# Patient Record
Sex: FEMALE | Race: WHITE | NOT HISPANIC OR LATINO | Employment: OTHER | URBAN - METROPOLITAN AREA
[De-identification: names, ages, dates, MRNs, and addresses within clinical notes are randomized per-mention and may not be internally consistent; named-entity substitution may affect disease eponyms.]

---

## 2017-02-15 ENCOUNTER — APPOINTMENT (OUTPATIENT)
Dept: RADIOLOGY | Facility: IMAGING CENTER | Age: 62
End: 2017-02-15
Attending: PHYSICIAN ASSISTANT
Payer: MEDICARE

## 2017-02-15 ENCOUNTER — OFFICE VISIT (OUTPATIENT)
Dept: URGENT CARE | Facility: PHYSICIAN GROUP | Age: 62
End: 2017-02-15
Payer: MEDICARE

## 2017-02-15 VITALS
OXYGEN SATURATION: 97 % | HEART RATE: 80 BPM | BODY MASS INDEX: 21.89 KG/M2 | HEIGHT: 64 IN | TEMPERATURE: 98.8 F | WEIGHT: 128.2 LBS | SYSTOLIC BLOOD PRESSURE: 140 MMHG | DIASTOLIC BLOOD PRESSURE: 82 MMHG
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
## 2017-02-15 DIAGNOSIS — S20.212A CONTUSION OF RIB ON LEFT SIDE, INITIAL ENCOUNTER: ICD-10-CM

## 2017-02-15 DIAGNOSIS — S20.212A CONTUSION OF RIB ON LEFT SIDE, INITIAL ENCOUNTER: Primary | ICD-10-CM

## 2017-02-15 PROCEDURE — G8420 CALC BMI NORM PARAMETERS: HCPCS | Performed by: PHYSICIAN ASSISTANT

## 2017-02-15 PROCEDURE — 3017F COLORECTAL CA SCREEN DOC REV: CPT | Mod: 8P | Performed by: PHYSICIAN ASSISTANT

## 2017-02-15 PROCEDURE — 99204 OFFICE O/P NEW MOD 45 MIN: CPT | Performed by: PHYSICIAN ASSISTANT

## 2017-02-15 PROCEDURE — G8432 DEP SCR NOT DOC, RNG: HCPCS | Performed by: PHYSICIAN ASSISTANT

## 2017-02-15 PROCEDURE — 4004F PT TOBACCO SCREEN RCVD TLK: CPT | Mod: 8P | Performed by: PHYSICIAN ASSISTANT

## 2017-02-15 PROCEDURE — 71101 X-RAY EXAM UNILAT RIBS/CHEST: CPT | Mod: TC,LT | Performed by: PHYSICIAN ASSISTANT

## 2017-02-15 PROCEDURE — G8484 FLU IMMUNIZE NO ADMIN: HCPCS | Performed by: PHYSICIAN ASSISTANT

## 2017-02-15 PROCEDURE — 3014F SCREEN MAMMO DOC REV: CPT | Mod: 8P | Performed by: PHYSICIAN ASSISTANT

## 2017-02-15 RX ORDER — TIZANIDINE 4 MG/1
4 TABLET ORAL EVERY 6 HOURS PRN
Qty: 45 TAB | Refills: 0 | Status: SHIPPED | OUTPATIENT
Start: 2017-02-15 | End: 2017-05-27

## 2017-02-15 RX ORDER — HYDROCODONE BITARTRATE AND ACETAMINOPHEN 5; 325 MG/1; MG/1
1-2 TABLET ORAL EVERY 4 HOURS PRN
Qty: 20 TAB | Refills: 0 | Status: SHIPPED | OUTPATIENT
Start: 2017-02-15 | End: 2017-05-27

## 2017-02-15 ASSESSMENT — PAIN SCALES - GENERAL: PAINLEVEL: 10=SEVERE PAIN

## 2017-02-15 ASSESSMENT — ENCOUNTER SYMPTOMS: MYALGIAS: 1

## 2017-02-15 NOTE — MR AVS SNAPSHOT
"Shahidajoseph Joshuabow   2/15/2017 9:30 AM   Office Visit   MRN: [id number]    Department:  [facility]   Dept Phone:  [phone]    Description:  Female : 1955   Provider:  Angélica Matt PA-C           Reason for Visit     Rib Injury fell on ribs on edge of bathtub 3 days ago      Allergies as of 2/15/2017     Allergen Noted Reactions    Codeine 2010   Rash    Difficulty breathing, and itching    Pcn [Penicillins] 2010       All \"cillin\" family, per patient  ---   I broke out in \"trench\" mouth    Morphine 2011       headache      You were diagnosed with     Contusion of rib on left side, initial encounter   [[id number]]  -  Primary     Fall at home, initial encounter   [[id number]]         Vital Signs     Blood Pressure Pulse Temperature Height Weight Body Mass Index    140/82 mmHg 80 37.1 °C (98.8 °F) 1.613 m (5' 3.5\") 58.151 kg (128 lb 3.2 oz) 22.35 kg/m2    Oxygen Saturation Breastfeeding? Smoking Status             97% No Current Every Day Smoker         Basic Information     Date Of Birth Sex Race Ethnicity Preferred Language    1955 Female White Non- English      Your appointments     2017  1:00 PM   MR EXTREMITY WITHOUT (30) with DOUBLE R MRI 1   IMAGING DOUBLE R. (Double R)    [address]  [facility] [address]   [phone]            2017  1:30 PM   MR EXTREMITY WITHOUT (30) with DOUBLE R MRI 1   IMAGING DOUBLE R. (Double R)    [address]  [facility] [address]   [phone]              Problem List              ICD-10-CM Priority Class Noted - Resolved    Closed die-punch intra-articular fracture of distal end of left radius S52.572A   3/3/2016 - Present    Lumbar spinal stenosis M48.06   10/8/2016 - Present      Health Maintenance        Date Due Completion Dates    IMM DTaP/Tdap/Td Vaccine (1 - Tdap) 1972 ---    PAP SMEAR 1974 ---    MAMMOGRAM 1993 ---    COLONOSCOPY 2003 ---    IMM ZOSTER " VACCINE 11/9/2013 ---    IMM INFLUENZA (1) 9/1/2016 ---            Current Immunizations     Influenza Vaccine 6/4/2011, 11/9/2010    Pertussis Vaccine 12/9/2009    Pneumococcal Vaccine (UF)Historical Data 10/4/2007      Below and/or attached are the medications your provider expects you to take. Review all of your home medications and newly ordered medications with your provider and/or pharmacist. Follow medication instructions as directed by your provider and/or pharmacist. Please keep your medication list with you and share with your provider. Update the information when medications are discontinued, doses are changed, or new medications (including over-the-counter products) are added; and carry medication information at all times in the event of emergency situations     Allergies:  CODEINE - Rash     PCN - (reactions not documented)     MORPHINE - (reactions not documented)               Medications  Valid as of: February 15, 2017 - 10:30 AM    Generic Name Brand Name Tablet Size Instructions for use    Acetaminophen (Tab) TYLENOL 325 MG Take 650 mg by mouth every four hours as needed.        Albuterol Sulfate (Nebu Soln) PROVENTIL 2.5mg/3ml 2.5 mg by Nebulization route every four hours as needed for Shortness of Breath.        Albuterol Sulfate (Aero Soln) albuterol 108 (90 BASE) MCG/ACT Inhale 2 Puffs by mouth every 6 hours as needed for Shortness of Breath.        Butalbital-APAP-Caffeine   Take  by mouth as needed.        CloNIDine HCl (Tab) CATAPRES 0.1 MG Take 0.1 mg by mouth 3 times a day.        Cyanocobalamin   Take  by mouth every day.        Dexlansoprazole (CAPSULE DELAYED RELEASE) Dexlansoprazole 60 MG Take  by mouth every day.        DiazePAM (Tab) VALIUM 5 MG Take 1 Tab by mouth every 8 hours as needed (severe spasms).        DULoxetine HCl   Take  by mouth. Indications: patient thinks it is 60 mg        Evening Primrose Oil   Take  by mouth every day.        Gabapentin   Take 1,200 mg by mouth 3  times a day.        Hydrocodone-Acetaminophen (Tab) NORCO 5-325 MG Take 1-2 Tabs by mouth every four hours as needed. No Driving or alcohol with medication given.        HYDROmorphone HCl (Tab) DILAUDID 4 MG Take 1 Tab by mouth every four hours as needed (pain).        Meloxicam   Take  by mouth.        PARoxetine HCl   Take  by mouth every day.        Sennosides-Docusate Sodium (Tab) PERICOLACE or SENOKOT S 8.6-50 MG Take 2 Tabs by mouth every day.        SUMAtriptan Succinate (Tab) IMITREX 25 MG Take  mg by mouth Once PRN for Migraine.        TiZANidine HCl (Tab) ZANAFLEX 4 MG Take 1 Tab by mouth every 8 hours.        TiZANidine HCl (Tab) ZANAFLEX 4 MG Take 1 Tab by mouth every 6 hours.        TiZANidine HCl (Tab) ZANAFLEX 4 MG Take 1 Tab by mouth every 6 hours as needed.        .                 Medicines prescribed today were sent to:     [facility] [address] [place] [address] [place] AT [facility] [employer] [facility] [place] [address] [place] [address]    Phone: [phone] Fax: [phone]    Open 24 Hours?: No      Medication refill instructions:       If your prescription bottle indicates you have medication refills left, it is not necessary to call your provider’s office. Please contact your pharmacy and they will refill your medication.    If your prescription bottle indicates you do not have any refills left, you may request refills at any time through one of the following ways: The online [email or web address] system (except Urgent Care), by calling your provider’s office, or by asking your pharmacy to contact your provider’s office with a refill request. Medication refills are processed only during regular business hours and may not be available until the next business day. Your provider may request additional information or to have a follow-up visit with you prior to refilling your medication.   *Please Note: Medication refills are assigned a new Rx number when refilled electronically. Your pharmacy  may indicate that no refills were authorized even though a new prescription for the same medication is available at the pharmacy. Please request the medicine by name with the pharmacy before contacting your provider for a refill.        Your To Do List     Future Labs/Procedures Complete By Expires    [id number] (WITH 1-VIEW CXR) LEFT  As directed 2/15/2018      Instructions    Rib Contusion  A rib contusion is a deep bruise on your rib area. Contusions are the result of a blunt trauma that causes bleeding and injury to the tissues under the skin. A rib contusion may involve bruising of the ribs and of the skin and muscles in the area. The skin overlying the contusion may turn blue, purple, or yellow. Minor injuries will give you a painless contusion, but more severe contusions may stay painful and swollen for a few weeks.  CAUSES   A contusion is usually caused by a blow, trauma, or direct force to an area of the body. This often occurs while playing contact sports.  SYMPTOMS  · Swelling and redness of the injured area.  · Discoloration of the injured area.  · Tenderness and soreness of the injured area.  · Pain with or without movement.  DIAGNOSIS   The diagnosis can be made by taking a medical history and performing a physical exam. An X-ray, CT scan, or MRI may be needed to determine if there were any associated injuries, such as broken bones (fractures) or internal injuries.  TREATMENT   Often, the best treatment for a rib contusion is rest. Icing or applying cold compresses to the injured area may help reduce swelling and inflammation. Deep breathing exercises may be recommended to reduce the risk of partial lung collapse and pneumonia. Over-the-counter or prescription medicines may also be recommended for pain control.  HOME CARE INSTRUCTIONS   · Apply ice to the injured area:  ¨ Put ice in a plastic bag.  ¨ Place a towel between your skin and the bag.  ¨ Leave the ice on for 20 minutes, 2-3 times per  day.  · Take medicines only as directed by your health care provider.  · Rest the injured area. Avoid strenuous activity and any activities or movements that cause pain. Be careful during activities and avoid bumping the injured area.  · Perform deep-breathing exercises as directed by your health care provider.  · Do not lift anything that is heavier than 5 lb (2.3 kg) until your health care provider approves.  · Do not use any tobacco products, including cigarettes, chewing tobacco, or electronic cigarettes. If you need help quitting, ask your health care provider.  SEEK MEDICAL CARE IF:   · You have increased bruising or swelling.  · You have pain that is not controlled with treatment.  · You have a fever.  SEEK IMMEDIATE MEDICAL CARE IF:   · You have difficulty breathing or shortness of breath.  · You develop a continual cough, or you cough up thick or bloody sputum.  · You feel sick to your stomach (nauseous), you throw up (vomit), or you have abdominal pain.     This information is not intended to replace advice given to you by your health care provider. Make sure you discuss any questions you have with your health care provider.     Document Released: 09/12/2002 Document Revised: 01/08/2016 Document Reviewed: 09/29/2015  [employer] Interactive Patient Education ©2016 [employer] Inc.            Directr Access Code: [email or web address]  Expires: 3/17/2017  9:36 AM    Directr  A secure, online tool to manage your health information     [employer]’s Directr® is a secure, online tool that connects you to your personalized health information from the privacy of your home -- day or night - making it very easy for you to manage your healthcare. Once the activation process is completed, you can even access your medical information using the Directr taylor, which is available for free in the Apple Taylor store or Google Play store.     Directr provides the following levels of access (as shown below):   My Chart Features   [facility]  Primary Care Doctor [facility]  Specialists [facility]  Urgent  Care Non-[facility]  Primary Care  Doctor   Email your healthcare team securely and privately 24/7 X X X    Manage appointments: schedule your next appointment; view details of past/upcoming appointments X      Request prescription refills. X      View recent personal medical records, including lab and immunizations X X X X   View health record, including health history, allergies, medications X X X X   Read reports about your outpatient visits, procedures, consult and ER notes X X X X   See your discharge summary, which is a recap of your hospital and/or ER visit that includes your diagnosis, lab results, and care plan. X X       How to register for Dynamic Recreation:  1. Go to  [email or web address].  2. Click on the Sign Up Now box, which takes you to the New Member Sign Up page. You will need to provide the following information:  a. Enter your Dynamic Recreation Access Code exactly as it appears at the top of this page. (You will not need to use this code after you’ve completed the sign-up process. If you do not sign up before the expiration date, you must request a new code.)   b. Enter your date of birth.   c. Enter your home email address.   d. Click Submit, and follow the next screen’s instructions.  3. Create a Dynamic Recreation ID. This will be your Dynamic Recreation login ID and cannot be changed, so think of one that is secure and easy to remember.  4. Create a Dynamic Recreation password. You can change your password at any time.  5. Enter your Password Reset Question and Answer. This can be used at a later time if you forget your password.   6. Enter your e-mail address. This allows you to receive e-mail notifications when new information is available in Dynamic Recreation.  7. Click Sign Up. You can now view your health information.    For assistance activating your Dynamic Recreation account, call [phone]

## 2017-02-15 NOTE — PROGRESS NOTES
Subjective:      Lynette Correa is a 61 y.o. female who presents with Rib Injury    PMH:  has a past medical history of ASTHMA; Breath shortness; migraines; Risk for falls; Arthritis (9-23-16); Dental disorder; Pneumonia (2013); Unspecified hemorrhagic conditions (CMS-HCC); Jaundice; kidney stones; Bronchitis (2015); COPD (chronic obstructive pulmonary disease) (CMS-HCC); Pain; Heart burn; Hepatitis C; Ulcer (CMS-HCC); Anesthesia; Anesthesia (9-23-16); Arrhythmia; Indigestion; and Bowel habit changes (9-23-16).  MEDS:   Current outpatient prescriptions:   •  MELOXICAM PO, Take  by mouth., Disp: , Rfl:   •  DULoxetine HCl (CYMBALTA PO), Take  by mouth. Indications: patient thinks it is 60 mg, Disp: , Rfl:   •  acetaminophen (TYLENOL) 325 MG Tab, Take 650 mg by mouth every four hours as needed., Disp: , Rfl:   •  albuterol (PROVENTIL) 2.5mg/3ml Nebu Soln solution for nebulization, 2.5 mg by Nebulization route every four hours as needed for Shortness of Breath., Disp: , Rfl:   •  albuterol 108 (90 BASE) MCG/ACT Aero Soln inhalation aerosol, Inhale 2 Puffs by mouth every 6 hours as needed for Shortness of Breath., Disp: , Rfl:   •  Cyanocobalamin (VITAMIN B-12 PO), Take  by mouth every day., Disp: , Rfl:   •  GABAPENTIN PO, Take 1,200 mg by mouth 3 times a day., Disp: , Rfl:   •  Dexlansoprazole (DEXILANT) 60 MG CAPSULE DELAYED RELEASE delayed-release capsule, Take  by mouth every day., Disp: , Rfl:   •  sumatriptan (IMITREX) 25 MG Tab, Take  mg by mouth Once PRN for Migraine., Disp: , Rfl:   •  clonidine (CATAPRES) 0.1 MG TABS, Take 0.1 mg by mouth 3 times a day., Disp: , Rfl:   •  PARoxetine HCl (PAXIL PO), Take  by mouth every day., Disp: , Rfl:   •  diazepam (VALIUM) 5 MG Tab, Take 1 Tab by mouth every 8 hours as needed (severe spasms)., Disp: 30 Tab, Rfl: 0  •  tizanidine (ZANAFLEX) 4 MG Tab, Take 1 Tab by mouth every 6 hours., Disp: 90 Tab, Rfl: 0  •  HYDROmorphone (DILAUDID) 4 MG Tab, Take 1 Tab by mouth  "every four hours as needed (pain)., Disp: 80 Tab, Rfl: 0  •  senna-docusate (PERICOLACE OR SENOKOT S) 8.6-50 MG Tab, Take 2 Tabs by mouth every day., Disp: 30 Tab, Rfl: 0  •  tizanidine (ZANAFLEX) 4 MG Tab, Take 1 Tab by mouth every 8 hours., Disp: 90 Tab, Rfl: 0  •  EVENING PRIMROSE OIL PO, Take  by mouth every day., Disp: , Rfl:   •  Butalbital-APAP-Caffeine (FIORICET PO), Take  by mouth as needed., Disp: , Rfl:   ALLERGIES:   Allergies   Allergen Reactions   • Codeine Rash     Difficulty breathing, and itching   • Pcn [Penicillins]      All \"cillin\" family, per patient  ---   I broke out in \"trench\" mouth   • Morphine      headache     SURGHX:   Past Surgical History   Procedure Laterality Date   • Cervical fusion posterior  7/20/2010     Performed by PREET SPEAR at SURGERY [facility]   • Cervical decompression posterior  7/20/2010     Performed by PREET SPEAR at SURGERY [facility]   • Tonsillectomy     • Lumbar laminectomy diskectomy  1/8/2011     Performed by PREET SPEAR at SURGERY [facility]   • Mass excision general  10/4/2011     Performed by GANSER, JOHN H at [facility]   • Wrist orif Left 3/3/2016     Procedure: WRIST ORIF;  Surgeon: Campbell Love M.D.;  Location: SURGERY [facility];  Service:    • Carpal tunnel release       right   • Other orthopedic surgery       left knee   • Knee arthroplasty total       right   • Other orthopedic surgery       right index finger sewn back on   • Other orthopedic surgery       spinal surgery 3 times   • Primary c section  1989/1988/1993     x 3   • Hysterectomy laparoscopy     • Other cardiac surgery  2005     ablation  and small hole in heart valve repaired by dr medel   • Other       right shin biopsy    • Lumbar laminectomy diskectomy  10/8/2016     Procedure: LUMBAR LAMINECTOMY DISKECTOMY FPRAMINOTOMY L3-4- POSTERIOR L3-4 STABILIZATION WITH INSTRUMENTATION  WITH [employer] INSTRUMENTS;  Surgeon: Preet Spear M.D.;  " "Location: SURGERY [facility];  Service:      SOCHX:  reports that she has been smoking Cigarettes.  She has a 40 pack-year smoking history. She has never used smokeless tobacco. She reports that she does not drink alcohol or use illicit drugs.  FH: Reviewed with patient/family. Not pertinent to this complaint.              HPI Comments: Patient presents with:  Rib Injury: fell on ribs on edge of bathtub 3 days ago        Rib Injury  This is a new problem. The current episode started in the past 7 days. The problem occurs constantly. The problem has been unchanged. Associated symptoms include myalgias. Pertinent negatives include no chest pain. The symptoms are aggravated by bending, coughing, exertion, sneezing and twisting (any movement really). She has tried NSAIDs, heat, ice, lying down, position changes, relaxation and rest for the symptoms. The treatment provided no relief.       Review of Systems   Cardiovascular: Negative for chest pain.        Rib pain     Musculoskeletal: Positive for myalgias.   All other systems reviewed and are negative.         Objective:     /82 mmHg  Pulse 80  Temp(Src) 37.1 °C (98.8 °F)  Ht 1.613 m (5' 3.5\")  Wt 58.151 kg (128 lb 3.2 oz)  BMI 22.35 kg/m2  SpO2 97%  Breastfeeding? No     Physical Exam   Constitutional: She appears well-developed and well-nourished. No distress.   HENT:   Head: Normocephalic.   Mouth/Throat: Oropharynx is clear and moist.   Eyes: Conjunctivae and EOM are normal. Pupils are equal, round, and reactive to light.   Neck: Normal range of motion. Neck supple.   Cardiovascular: Normal rate, regular rhythm and normal heart sounds.    Pulmonary/Chest: Effort normal and breath sounds normal. No respiratory distress. She has no wheezes. She has no rales. Chest wall is not dull to percussion. She exhibits tenderness (left anterolateral  ribs are very tender to palpation, no crepitus noted, + splinting of left side) and bony tenderness. She " exhibits no laceration, no crepitus, no edema and no swelling.       Musculoskeletal: Normal range of motion.   Neurological: She is alert.   Skin: Skin is warm and dry.   Psychiatric: She has a normal mood and affect.   Nursing note and vitals reviewed.         Xray images viewed and read by me, confirmed by radiology:    No rib fracture noted.  No acute infiltrate, no PTX or free air. No acute findings.       Assessment/Plan:     1. Contusion of rib on left side, initial encounter  [id number] (WITH 1-VIEW CXR) LEFT    tizanidine (ZANAFLEX) 4 MG Tab    hydrocodone-acetaminophen (NORCO) 5-325 MG Tab per tablet   2. Fall at home, initial encounter  [id number] (WITH 1-VIEW CXR) LEFT    tizanidine (ZANAFLEX) 4 MG Tab    hydrocodone-acetaminophen (NORCO) 5-325 MG Tab per tablet   PT encouraged to do incentive spirometry exercises (demonstrated) to help prevent respiratory infection due to under inflation of lungs secondary to pain.     PT should follow up with PCP in 1-2 days for re-evaluation if symptoms have not improved.  Discussed red flags and reasons to return to UC or ED.  Pt and/or family verbalized understanding of diagnosis and follow up instructions and was given informational handout on diagnosis.  PT discharged.     [place]  Aware web site evaluation: I have obtained and reviewed patient utilization report from [facility] pharmacy database prior to writing prescription for controlled substance II, III or IV per [place] bill . Based on the report and my clinical assessment the prescription is medically necessary.   Rx NSAIDs for pain 1-5, Norco for pain 6-10 or to help get to sleep.

## 2017-02-15 NOTE — PATIENT INSTRUCTIONS
Rib Contusion  A rib contusion is a deep bruise on your rib area. Contusions are the result of a blunt trauma that causes bleeding and injury to the tissues under the skin. A rib contusion may involve bruising of the ribs and of the skin and muscles in the area. The skin overlying the contusion may turn blue, purple, or yellow. Minor injuries will give you a painless contusion, but more severe contusions may stay painful and swollen for a few weeks.  CAUSES   A contusion is usually caused by a blow, trauma, or direct force to an area of the body. This often occurs while playing contact sports.  SYMPTOMS  · Swelling and redness of the injured area.  · Discoloration of the injured area.  · Tenderness and soreness of the injured area.  · Pain with or without movement.  DIAGNOSIS   The diagnosis can be made by taking a medical history and performing a physical exam. An X-ray, CT scan, or MRI may be needed to determine if there were any associated injuries, such as broken bones (fractures) or internal injuries.  TREATMENT   Often, the best treatment for a rib contusion is rest. Icing or applying cold compresses to the injured area may help reduce swelling and inflammation. Deep breathing exercises may be recommended to reduce the risk of partial lung collapse and pneumonia. Over-the-counter or prescription medicines may also be recommended for pain control.  HOME CARE INSTRUCTIONS   · Apply ice to the injured area:  ¨ Put ice in a plastic bag.  ¨ Place a towel between your skin and the bag.  ¨ Leave the ice on for 20 minutes, 2-3 times per day.  · Take medicines only as directed by your health care provider.  · Rest the injured area. Avoid strenuous activity and any activities or movements that cause pain. Be careful during activities and avoid bumping the injured area.  · Perform deep-breathing exercises as directed by your health care provider.  · Do not lift anything that is heavier than 5 lb (2.3 kg) until your  health care provider approves.  · Do not use any tobacco products, including cigarettes, chewing tobacco, or electronic cigarettes. If you need help quitting, ask your health care provider.  SEEK MEDICAL CARE IF:   · You have increased bruising or swelling.  · You have pain that is not controlled with treatment.  · You have a fever.  SEEK IMMEDIATE MEDICAL CARE IF:   · You have difficulty breathing or shortness of breath.  · You develop a continual cough, or you cough up thick or bloody sputum.  · You feel sick to your stomach (nauseous), you throw up (vomit), or you have abdominal pain.     This information is not intended to replace advice given to you by your health care provider. Make sure you discuss any questions you have with your health care provider.     Document Released: 09/12/2002 Document Revised: 01/08/2016 Document Reviewed: 09/29/2015  [employer] Interactive Patient Education ©2016 [employer]

## 2017-03-31 ENCOUNTER — TELEPHONE (OUTPATIENT)
Dept: URGENT CARE | Facility: PHYSICIAN GROUP | Age: 62
End: 2017-03-31

## 2017-03-31 ENCOUNTER — OFFICE VISIT (OUTPATIENT)
Dept: URGENT CARE | Facility: PHYSICIAN GROUP | Age: 62
End: 2017-03-31
Payer: MEDICARE

## 2017-03-31 VITALS
WEIGHT: 129 LBS | SYSTOLIC BLOOD PRESSURE: 134 MMHG | HEIGHT: 64 IN | TEMPERATURE: 98.1 F | BODY MASS INDEX: 22.02 KG/M2 | OXYGEN SATURATION: 98 % | HEART RATE: 69 BPM | RESPIRATION RATE: 12 BRPM | DIASTOLIC BLOOD PRESSURE: 86 MMHG

## 2017-03-31 DIAGNOSIS — S61.219A LACERATION OF FINGER, INITIAL ENCOUNTER: ICD-10-CM

## 2017-03-31 PROCEDURE — 90471 IMMUNIZATION ADMIN: CPT | Mod: 59 | Performed by: EMERGENCY MEDICINE

## 2017-03-31 PROCEDURE — 90715 TDAP VACCINE 7 YRS/> IM: CPT | Performed by: EMERGENCY MEDICINE

## 2017-03-31 PROCEDURE — 12001 RPR S/N/AX/GEN/TRNK 2.5CM/<: CPT | Mod: F1 | Performed by: EMERGENCY MEDICINE

## 2017-03-31 RX ORDER — DULOXETIN HYDROCHLORIDE 30 MG/1
CAPSULE, DELAYED RELEASE ORAL
Refills: 0 | COMMUNITY
Start: 2017-03-27 | End: 2017-05-27

## 2017-03-31 RX ORDER — PAROXETINE 10 MG/1
TABLET, FILM COATED ORAL
Refills: 4 | COMMUNITY
Start: 2017-03-13 | End: 2017-05-27

## 2017-03-31 RX ORDER — MELOXICAM 15 MG/1
TABLET ORAL
Refills: 11 | COMMUNITY
Start: 2017-03-13 | End: 2017-05-27

## 2017-03-31 RX ORDER — GABAPENTIN 800 MG/1
1200 TABLET ORAL 3 TIMES DAILY
Refills: 6 | COMMUNITY
Start: 2017-03-27 | End: 2021-10-26 | Stop reason: SDUPTHER

## 2017-03-31 ASSESSMENT — ENCOUNTER SYMPTOMS
VOMITING: 0
EYE DISCHARGE: 0
FALLS: 0
EYE REDNESS: 0
CHILLS: 0
NAUSEA: 0
ABDOMINAL PAIN: 0
FEVER: 0
COUGH: 0
NERVOUS/ANXIOUS: 0

## 2017-03-31 ASSESSMENT — PAIN SCALES - GENERAL: PAINLEVEL: 7=MODERATE-SEVERE PAIN

## 2017-03-31 NOTE — MR AVS SNAPSHOT
"        Lynette López Sunny   3/31/2017 11:35 AM   Office Visit   MRN: [id number]    Department:  [facility]   Dept Phone:  [phone]    Description:  Female : 1955   Provider:  ISIDORO Soto M.D.           Reason for Visit     Laceration x 7 days / not getting better      Allergies as of 3/31/2017     Allergen Noted Reactions    Codeine 2010   Rash    Difficulty breathing, and itching    Pcn [Penicillins] 2010       All \"cillin\" family, per patient  ---   I broke out in \"trench\" mouth    Morphine 2011       headache      You were diagnosed with     Laceration of finger, initial encounter   [[id number]]         Vital Signs     Blood Pressure Pulse Temperature Respirations Height Weight    134/86 mmHg 69 36.7 °C (98.1 °F) 12 1.613 m (5' 3.5\") 58.514 kg (129 lb)    Body Mass Index Oxygen Saturation Smoking Status             22.49 kg/m2 98% Current Every Day Smoker         Basic Information     Date Of Birth Sex Race Ethnicity Preferred Language    1955 Female White Non- English      Problem List              ICD-10-CM Priority Class Noted - Resolved    Closed die-punch intra-articular fracture of distal end of left radius S52.572A   3/3/2016 - Present    Lumbar spinal stenosis M48.06   10/8/2016 - Present      Health Maintenance        Date Due Completion Dates    IMM DTaP/Tdap/Td Vaccine (1 - Tdap) 1972 ---    PAP SMEAR 1974 ---    MAMMOGRAM 1993 ---    COLONOSCOPY 2003 ---    IMM ZOSTER VACCINE 2013 ---    IMM INFLUENZA (1) 2016 ---            Current Immunizations     Influenza Vaccine 2011, 2010    Pertussis Vaccine 2009    Pneumococcal Vaccine (UF)Historical Data 10/4/2007    Tdap Vaccine  Incomplete      Below and/or attached are the medications your provider expects you to take. Review all of your home medications and newly ordered medications with your provider and/or pharmacist. Follow medication instructions as directed by " your provider and/or pharmacist. Please keep your medication list with you and share with your provider. Update the information when medications are discontinued, doses are changed, or new medications (including over-the-counter products) are added; and carry medication information at all times in the event of emergency situations     Allergies:  CODEINE - Rash     PCN - (reactions not documented)     MORPHINE - (reactions not documented)               Medications  Valid as of: March 31, 2017 - 12:33 PM    Generic Name Brand Name Tablet Size Instructions for use    Acetaminophen (Tab) TYLENOL 325 MG Take 650 mg by mouth every four hours as needed.        Albuterol Sulfate (Nebu Soln) PROVENTIL 2.5mg/3ml 2.5 mg by Nebulization route every four hours as needed for Shortness of Breath.        Albuterol Sulfate (Aero Soln) albuterol 108 (90 BASE) MCG/ACT Inhale 2 Puffs by mouth every 6 hours as needed for Shortness of Breath.        Butalbital-APAP-Caffeine   Take  by mouth as needed.        CloNIDine HCl (Tab) CATAPRES 0.1 MG Take 0.1 mg by mouth 3 times a day.        Cyanocobalamin   Take  by mouth every day.        Dexlansoprazole (CAPSULE DELAYED RELEASE) Dexlansoprazole 60 MG Take  by mouth every day.        DiazePAM (Tab) VALIUM 5 MG Take 1 Tab by mouth every 8 hours as needed (severe spasms).        DULoxetine HCl   Take  by mouth. Indications: patient thinks it is 60 mg        DULoxetine HCl (Cap DR Particles) CYMBALTA 30 MG TK 1 C PO ONCE D        Evening Primrose Oil   Take  by mouth every day.        Gabapentin   Take 1,200 mg by mouth 3 times a day.        Gabapentin (Tab) NEURONTIN 800 MG         Hydrocodone-Acetaminophen (Tab) NORCO 5-325 MG Take 1-2 Tabs by mouth every four hours as needed. No Driving or alcohol with medication given.        HYDROmorphone HCl (Tab) DILAUDID 4 MG Take 1 Tab by mouth every four hours as needed (pain).        Meloxicam   Take  by mouth.        Meloxicam (Tab) MOBIC 15 MG TK  1 T PO D        PARoxetine HCl   Take  by mouth every day.        PARoxetine HCl (Tab) PAXIL 10 MG TK 1 T PO ONCE D        Sennosides-Docusate Sodium (Tab) PERICOLACE or SENOKOT S 8.6-50 MG Take 2 Tabs by mouth every day.        SUMAtriptan Succinate (Tab) IMITREX 25 MG Take  mg by mouth Once PRN for Migraine.        TiZANidine HCl (Tab) ZANAFLEX 4 MG Take 1 Tab by mouth every 8 hours.        TiZANidine HCl (Tab) ZANAFLEX 4 MG Take 1 Tab by mouth every 6 hours.        TiZANidine HCl (Tab) ZANAFLEX 4 MG Take 1 Tab by mouth every 6 hours as needed.        .                 Medicines prescribed today were sent to:     [facility] [address] [place] [address] [place] [facility] [address] [facility] [employer]    [address] [place] [address]    Phone: [phone] Fax: [phone]    Open 24 Hours?: No      Medication refill instructions:       If your prescription bottle indicates you have medication refills left, it is not necessary to call your provider’s office. Please contact your pharmacy and they will refill your medication.    If your prescription bottle indicates you do not have any refills left, you may request refills at any time through one of the following ways: The online Particle system (except Urgent Care), by calling your provider’s office, or by asking your pharmacy to contact your provider’s office with a refill request. Medication refills are processed only during regular business hours and may not be available until the next business day. Your provider may request additional information or to have a follow-up visit with you prior to refilling your medication.   *Please Note: Medication refills are assigned a new Rx number when refilled electronically. Your pharmacy may indicate that no refills were authorized even though a new prescription for the same medication is available at the pharmacy. Please request the medicine by name with the pharmacy before contacting your provider for a refill.             [employer] Access Code: Activation code not generated  Current [employer] Status: Active          Quit Tobacco Information     Do you want to quit using tobacco?    Quitting tobacco decreases risks of cancer, heart and lung disease, increases life expectancy, improves sense of taste and smell, and increases spending money, among other benefits.    If you are thinking about quitting, we can help.  • [facility] Quit Tobacco Program: [phone]  o Program occurs weekly for four weeks and includes pharmacist consultation on products to support quitting smoking or chewing tobacco. A provider referral is needed for pharmacist consultation.  • Tobacco Users Help Hotline: 5-536-QUIT-NOW (814-9299) or https://[place].quitlogix.org/  o Free, confidential telephone and online coaching for [place] residents. Sessions are designed on a schedule that is convenient for you. Eligible clients receive free nicotine replacement therapy.  • Nationally: www.smokefree.gov  o Information and professional assistance to support both immediate and long-term needs as you become, and remain, a non-smoker. Smokefree.gov allows you to choose the help that best fits your needs.

## 2017-03-31 NOTE — TELEPHONE ENCOUNTER
Called and spoke to the patient she is given a return for a Tdap, she does not know when she last had one

## 2017-03-31 NOTE — PROGRESS NOTES
"Subjective:      Lynette Correa is a 61 y.o. female who presents with Laceration            Laceration   The incident occurred 5 to 7 days ago (patient received a laceration to her finger a week ago.).   The patient was in the kitchen with a clean knife cutting food when she cut the tip of her left index finger there was a remaining flap approximately 80% of pulse that has been there for the past week turning purple. The patient has no fever chills nausea vomiting or diarrhea.      Allergies   Allergen Reactions   • Codeine Rash     Difficulty breathing, and itching   • Pcn [Penicillins]      All \"cillin\" family, per patient  ---   I broke out in \"trench\" mouth   • Morphine      headache       the patient Social History     Social History   • Marital Status:      Spouse Name: N/A   • Number of Children: N/A   • Years of Education: N/A     Occupational History   • Not on file.     Social History Main Topics   • Smoking status: Current Every Day Smoker -- 1.00 packs/day for 40 years     Types: Cigarettes   • Smokeless tobacco: Never Used      Comment: 1 ppd 10 yrs   • Alcohol Use: No   • Drug Use: No   • Sexual Activity: Not on file     Other Topics Concern   • Not on file     Social History Narrative     Past Medical History   Diagnosis Date   • ASTHMA    • Breath shortness    • Hx of migraines    • Risk for falls      HX of falls   • Arthritis 9-23-16     \"Everywhere\"   • Dental disorder      Full Set Dentures   • Pneumonia 2013   • Unspecified hemorrhagic conditions (CMS-HCC)      PROLONGED BLEEDING TIME   • Jaundice      Age 15   • kidney stones      kidney stones   • Bronchitis 2015   • COPD (chronic obstructive pulmonary disease) (CMS-HCC)    • Pain      \"everywhere\"   • Heart burn    • Hepatitis C    • Ulcer (CMS-HCC)    • Anesthesia      \"Mother had a stroke with anesthesia\"   • Anesthesia 9-23-16     \"Only with Morphine\"  Migraines & PONV    • Arrhythmia      \"2015 detected a flutter\", does not see a " cardiologist, small hole heart valve 10 years ago   • Indigestion      stomach ulcers    • Bowel habit changes 9-23-16     Diarrhea often     Current Outpatient Prescriptions on File Prior to Visit   Medication Sig Dispense Refill   • MELOXICAM PO Take  by mouth.     • tizanidine (ZANAFLEX) 4 MG Tab Take 1 Tab by mouth every 6 hours as needed. 45 Tab 0   • hydrocodone-acetaminophen (NORCO) 5-325 MG Tab per tablet Take 1-2 Tabs by mouth every four hours as needed. No Driving or alcohol with medication given. 20 Tab 0   • acetaminophen (TYLENOL) 325 MG Tab Take 650 mg by mouth every four hours as needed.     • albuterol (PROVENTIL) 2.5mg/3ml Nebu Soln solution for nebulization 2.5 mg by Nebulization route every four hours as needed for Shortness of Breath.     • albuterol 108 (90 BASE) MCG/ACT Aero Soln inhalation aerosol Inhale 2 Puffs by mouth every 6 hours as needed for Shortness of Breath.     • Cyanocobalamin (VITAMIN B-12 PO) Take  by mouth every day.     • GABAPENTIN PO Take 1,200 mg by mouth 3 times a day.     • Dexlansoprazole (DEXILANT) 60 MG CAPSULE DELAYED RELEASE delayed-release capsule Take  by mouth every day.     • sumatriptan (IMITREX) 25 MG Tab Take  mg by mouth Once PRN for Migraine.     • clonidine (CATAPRES) 0.1 MG TABS Take 0.1 mg by mouth 3 times a day.     • PARoxetine HCl (PAXIL PO) Take  by mouth every day.     • diazepam (VALIUM) 5 MG Tab Take 1 Tab by mouth every 8 hours as needed (severe spasms). 30 Tab 0   • tizanidine (ZANAFLEX) 4 MG Tab Take 1 Tab by mouth every 6 hours. 90 Tab 0   • HYDROmorphone (DILAUDID) 4 MG Tab Take 1 Tab by mouth every four hours as needed (pain). 80 Tab 0   • senna-docusate (PERICOLACE OR SENOKOT S) 8.6-50 MG Tab Take 2 Tabs by mouth every day. 30 Tab 0   • tizanidine (ZANAFLEX) 4 MG Tab Take 1 Tab by mouth every 8 hours. 90 Tab 0   • DULoxetine HCl (CYMBALTA PO) Take  by mouth. Indications: patient thinks it is 60 mg     • EVENING PRIMROSE OIL PO Take  by  "mouth every day.     • Butalbital-APAP-Caffeine (FIORICET PO) Take  by mouth as needed.       No current facility-administered medications on file prior to visit.   History reviewed. No pertinent family history.    Review of Systems   Constitutional: Negative for fever and chills.   Eyes: Negative for discharge and redness.   Respiratory: Negative for cough.    Cardiovascular: Negative for chest pain.   Gastrointestinal: Negative for nausea, vomiting and abdominal pain.   Musculoskeletal: Negative for joint pain and falls.   Skin: Negative for itching and rash.        Partial avulsion of the left index finger. This cyanotic. Avascular   Psychiatric/Behavioral: The patient is not nervous/anxious.           Objective:     /86 mmHg  Pulse 69  Temp(Src) 36.7 °C (98.1 °F)  Resp 12  Ht 1.613 m (5' 3.5\")  Wt 58.514 kg (129 lb)  BMI 22.49 kg/m2  SpO2 98%     Physical Exam   Constitutional: She appears well-developed and well-nourished. No distress.   Very pleasant and talkative patient went over all her orthopedic/ neurosurgical problems and her surgeons.   HENT:   Head: Normocephalic and atraumatic.   Right Ear: External ear normal.   Left Ear: External ear normal.   Eyes: Conjunctivae are normal.   Cardiovascular: Normal rate.    Pulmonary/Chest: Effort normal.   Musculoskeletal: She exhibits tenderness. She exhibits no edema.   Patient has a near avulsion of her tip of her left index finger with the tip being avascular approximately 0.5 x 0.7 cm in size. Purple in coloration.   Neurological: She is alert.   Skin: Skin is warm. She is not diaphoretic. No erythema.   Psychiatric: She has a normal mood and affect. Her behavior is normal.   Vitals reviewed.         Procedure the finger tip is avascular purple colorations does not appear to be infected. The wound appears to be clean, no signs of cellulitis. I blocked the finger with 1% without Xylocaine and removed the 80% avulsed tip that was nonviable. Bleeding " was stopped with silver nitrate and a dressing was placed.     Assessment/Plan:     Diagnosis laceration to left index finger tip with a vascular tip still remaining      The tip was removed and silver nitrate was placed on the oozing clean wound. Polysporin Coban dressing was placed. Patient return for any signs of infection lymphangitis. She'll change the dressing every day patient was given dressing changes Polysporin and Coban by the medical assistant.

## 2017-04-05 ENCOUNTER — HOSPITAL ENCOUNTER (OUTPATIENT)
Dept: RADIOLOGY | Facility: MEDICAL CENTER | Age: 62
End: 2017-04-05
Attending: ORTHOPAEDIC SURGERY
Payer: MEDICARE

## 2017-04-05 DIAGNOSIS — M19.012 PRIMARY OSTEOARTHRITIS OF LEFT SHOULDER: ICD-10-CM

## 2017-04-05 DIAGNOSIS — M19.011 PRIMARY OSTEOARTHRITIS OF RIGHT SHOULDER: ICD-10-CM

## 2017-04-05 PROCEDURE — 73221 MRI JOINT UPR EXTREM W/O DYE: CPT | Mod: LT

## 2017-04-05 PROCEDURE — 73221 MRI JOINT UPR EXTREM W/O DYE: CPT | Mod: RT

## 2017-05-13 ENCOUNTER — APPOINTMENT (OUTPATIENT)
Dept: RADIOLOGY | Facility: MEDICAL CENTER | Age: 62
End: 2017-05-13
Attending: EMERGENCY MEDICINE
Payer: MEDICARE

## 2017-05-13 ENCOUNTER — HOSPITAL ENCOUNTER (EMERGENCY)
Facility: MEDICAL CENTER | Age: 62
End: 2017-05-13
Attending: EMERGENCY MEDICINE
Payer: MEDICARE

## 2017-05-13 VITALS
RESPIRATION RATE: 16 BRPM | DIASTOLIC BLOOD PRESSURE: 69 MMHG | HEIGHT: 65 IN | TEMPERATURE: 97.1 F | BODY MASS INDEX: 20.9 KG/M2 | OXYGEN SATURATION: 96 % | WEIGHT: 125.44 LBS | HEART RATE: 63 BPM | SYSTOLIC BLOOD PRESSURE: 151 MMHG

## 2017-05-13 DIAGNOSIS — M25.562 CHRONIC PAIN OF LEFT KNEE: ICD-10-CM

## 2017-05-13 DIAGNOSIS — G89.29 CHRONIC PAIN OF LEFT KNEE: ICD-10-CM

## 2017-05-13 DIAGNOSIS — M19.90 ARTHRITIS: ICD-10-CM

## 2017-05-13 PROCEDURE — A9270 NON-COVERED ITEM OR SERVICE: HCPCS | Performed by: EMERGENCY MEDICINE

## 2017-05-13 PROCEDURE — 700102 HCHG RX REV CODE 250 W/ 637 OVERRIDE(OP): Performed by: EMERGENCY MEDICINE

## 2017-05-13 PROCEDURE — 73564 X-RAY EXAM KNEE 4 OR MORE: CPT | Mod: LT

## 2017-05-13 PROCEDURE — 99284 EMERGENCY DEPT VISIT MOD MDM: CPT

## 2017-05-13 RX ORDER — OXYCODONE HYDROCHLORIDE AND ACETAMINOPHEN 5; 325 MG/1; MG/1
1-2 TABLET ORAL EVERY 4 HOURS PRN
Qty: 10 TAB | Refills: 0 | Status: SHIPPED | OUTPATIENT
Start: 2017-05-13 | End: 2017-05-27

## 2017-05-13 RX ORDER — HYDROCODONE BITARTRATE AND ACETAMINOPHEN 5; 325 MG/1; MG/1
1 TABLET ORAL ONCE
Status: COMPLETED | OUTPATIENT
Start: 2017-05-13 | End: 2017-05-13

## 2017-05-13 RX ORDER — HYDROCODONE BITARTRATE AND ACETAMINOPHEN 5; 325 MG/1; MG/1
1-2 TABLET ORAL EVERY 4 HOURS PRN
Qty: 10 TAB | Refills: 0 | Status: SHIPPED | OUTPATIENT
Start: 2017-05-13 | End: 2017-05-27

## 2017-05-13 RX ADMIN — HYDROCODONE BITARTRATE AND ACETAMINOPHEN 1 TABLET: 5; 325 TABLET ORAL at 09:30

## 2017-05-13 ASSESSMENT — PAIN SCALES - GENERAL: PAINLEVEL_OUTOF10: 10

## 2017-05-13 NOTE — ED AVS SNAPSHOT
[employer] Access Code: Activation code not generated  Current [employer] Status: Active    MediaVhart  A secure, online tool to manage your health information     [employer]’s [employer]® is a secure, online tool that connects you to your personalized health information from the privacy of your home -- day or night - making it very easy for you to manage your healthcare. Once the activation process is completed, you can even access your medical information using the [employer] taylor, which is available for free in the Apple Taylor store or Google Play store.     [employer] provides the following levels of access (as shown below):   My Chart Features   [facility] Primary Care Doctor [facility]  Specialists [facility]  Urgent  Care Non-[facility]  Primary Care  Doctor   Email your healthcare team securely and privately 24/7 X X X X   Manage appointments: schedule your next appointment; view details of past/upcoming appointments X      Request prescription refills. X      View recent personal medical records, including lab and immunizations X X X X   View health record, including health history, allergies, medications X X X X   Read reports about your outpatient visits, procedures, consult and ER notes X X X X   See your discharge summary, which is a recap of your hospital and/or ER visit that includes your diagnosis, lab results, and care plan. X X       How to register for [employer]:  1. Go to  [email or web address].  2. Click on the Sign Up Now box, which takes you to the New Member Sign Up page. You will need to provide the following information:  a. Enter your [employer] Access Code exactly as it appears at the top of this page. (You will not need to use this code after you’ve completed the sign-up process. If you do not sign up before the expiration date, you must request a new code.)   b. Enter your date of birth.   c. Enter your home email address.   d. Click Submit, and follow the next screen’s instructions.  3. Create a [employer] ID. This will  be your [employer] login ID and cannot be changed, so think of one that is secure and easy to remember.  4. Create a [employer] password. You can change your password at any time.  5. Enter your Password Reset Question and Answer. This can be used at a later time if you forget your password.   6. Enter your e-mail address. This allows you to receive e-mail notifications when new information is available in [employer].  7. Click Sign Up. You can now view your health information.    For assistance activating your [employer] account, call [phone]

## 2017-05-13 NOTE — ED NOTES
"Chief Complaint   Patient presents with   • Knee Pain     Left knee, needs replacement, states pain has been increasing over the last five days     /69 mmHg  Pulse 82  Temp(Src) 36.2 °C (97.1 °F)  Resp 16  Ht 1.651 m (5' 5\")  Wt 56.9 kg (125 lb 7.1 oz)  BMI 20.87 kg/m2  SpO2 97%    "

## 2017-05-13 NOTE — ED AVS SNAPSHOT
5/13/2017    Lynette Correa  [address] [place] [id number]    Dear Lynette:    [facility] wants to ensure your discharge home is safe and you or your loved ones have had all of your questions answered regarding your care after you leave the hospital.    Below is a list of resources and contact information should you have any questions regarding your hospital stay, follow-up instructions, or active medical symptoms.    Questions or Concerns Regarding… Contact   Medical Questions Related to Your Discharge  (7 days a week, 8am-5pm) Contact a Nurse Care Coordinator   [phone]   Medical Questions Not Related to Your Discharge  (24 hours a day / 7 days a week)  Contact the Nurse Health Line   [phone]    Medications or Discharge Instructions Refer to your discharge packet   or contact your [facility] Primary Care Provider   [phone]   Follow-up Appointment(s) Schedule your appointment via [employer]   or contact Scheduling [phone]   Billing Review your statement via [employer]  or contact Billing [phone]   Medical Records Review your records via [employer]   or contact Medical Records [phone]     You may receive a telephone call within two days of discharge. This call is to make certain you understand your discharge instructions and have the opportunity to have any questions answered. You can also easily access your medical information, test results and upcoming appointments via the [employer] free online health management tool. You can learn more and sign up at [employer] [facility]. For assistance setting up your [employer] [facility] [employer] account, please call [phone].    Once again, we want to ensure your discharge home is safe and that you have a clear understanding of any next steps in your care. If you have any questions or concerns, please do not hesitate to contact us, we are here for you. Thank you for choosing [facility] for your healthcare needs.    Sincerely,    Your [facility] Healthcare Team

## 2017-05-13 NOTE — ED AVS SNAPSHOT
Home Care Instructions                                                                                                                Lynette Correa   MRN: [id number]    Department:  [facility], Emergency Dept   Date of Visit:  5/13/2017            [facility], Emergency Dept    [address]    [place] [address]    Phone:  [phone]      You were seen by     Sina Barajas M.D.      Your Diagnosis Was     Chronic pain of left knee     M25.562, G89.29       These are the medications you received during your hospitalization from 05/13/2017 0840 to 05/13/2017 1018     Date/Time Order Dose Route Action    05/13/2017 0930 hydrocodone-acetaminophen (NORCO) 5-325 MG per tablet 1 Tab 1 Tab Oral Given      Follow-up Information     1. Follow up with Pee Yoon M.D.. Schedule an appointment as soon as possible for a visit in 1 week.    Specialty:  Orthopaedics    Contact information    [address]  [address]  [place] [address]  [phone]        Medication Information     Review all of your home medications and newly ordered medications with your primary doctor and/or pharmacist as soon as possible. Follow medication instructions as directed by your doctor and/or pharmacist.     Please keep your complete medication list with you and share with your physician. Update the information when medications are discontinued, doses are changed, or new medications (including over-the-counter products) are added; and carry medication information at all times in the event of emergency situations.               Medication List      START taking these medications        Instructions    Morning Afternoon Evening Bedtime    oxycodone-acetaminophen 5-325 MG Tabs   Commonly known as:  PERCOCET        Take 1-2 Tabs by mouth every four hours as needed (pain).   Dose:  1-2 Tab                          ASK your doctor about these medications        Instructions    Morning  Afternoon Evening Bedtime    acetaminophen 325 MG Tabs   Commonly known as:  TYLENOL        Take 650 mg by mouth every four hours as needed.   Dose:  650 mg                        * albuterol 2.5mg/3ml Nebu solution for nebulization   Commonly known as:  PROVENTIL        2.5 mg by Nebulization route every four hours as needed for Shortness of Breath.   Dose:  2.5 mg                        * albuterol 108 (90 BASE) MCG/ACT Aers inhalation aerosol        Inhale 2 Puffs by mouth every 6 hours as needed for Shortness of Breath.   Dose:  2 Puff                        clonidine 0.1 MG Tabs   Commonly known as:  CATAPRES        Take 0.1 mg by mouth 3 times a day.   Dose:  0.1 mg                        * CYMBALTA PO        Take  by mouth. Indications: patient thinks it is 60 mg                        * duloxetine 30 MG Cpep   Commonly known as:  CYMBALTA        TK 1 C PO ONCE D                        DEXILANT 60 MG Cpdr delayed-release capsule   Generic drug:  Dexlansoprazole        Take  by mouth every day.                        diazepam 5 MG Tabs   Commonly known as:  VALIUM        Take 1 Tab by mouth every 8 hours as needed (severe spasms).   Dose:  5 mg                        EVENING PRIMROSE OIL PO        Take  by mouth every day.                        FIORICET PO        Take  by mouth as needed.                        * GABAPENTIN PO        Take 1,200 mg by mouth 3 times a day.   Dose:  1200 mg                        * gabapentin 800 MG tablet   Commonly known as:  NEURONTIN                             * hydrocodone-acetaminophen 5-325 MG Tabs per tablet   What changed:  Another medication with the same name was added. Make sure you understand how and when to take each.   Last time this was given:  1 Tab on 5/13/2017  9:30 AM   Commonly known as:  NORCO   Ask about: Which instructions should I use?        Take 1-2 Tabs by mouth every four hours as needed. No Driving or alcohol with medication given.   Dose:  1-2 Tab                         * hydrocodone-acetaminophen 5-325 MG Tabs per tablet   What changed:  You were already taking a medication with the same name, and this prescription was added. Make sure you understand how and when to take each.   Last time this was given:  1 Tab on 5/13/2017  9:30 AM   Commonly known as:  NORCO   Ask about: Which instructions should I use?        Take 1-2 Tabs by mouth every four hours as needed.   Dose:  1-2 Tab                        HYDROmorphone 4 MG Tabs   Commonly known as:  DILAUDID        Take 1 Tab by mouth every four hours as needed (pain).   Dose:  4 mg                        * MELOXICAM PO        Take  by mouth.                        * meloxicam 15 MG tablet   Commonly known as:  MOBIC        TK 1 T PO D                        * PAXIL PO        Take  by mouth every day.                        * paroxetine 10 MG Tabs   Commonly known as:  PAXIL        TK 1 T PO ONCE D                        senna-docusate 8.6-50 MG Tabs   Commonly known as:  PERICOLACE or SENOKOT S        Take 2 Tabs by mouth every day.   Dose:  2 Tab                        SUMAtriptan 25 MG Tabs tablet   Commonly known as:  IMITREX        Take  mg by mouth Once PRN for Migraine.   Dose:   mg                        * tizanidine 4 MG Tabs   Commonly known as:  ZANAFLEX        Take 1 Tab by mouth every 8 hours.   Dose:  4 mg                        * tizanidine 4 MG Tabs   Commonly known as:  ZANAFLEX        Take 1 Tab by mouth every 6 hours.   Dose:  4 mg                        * tizanidine 4 MG Tabs   Commonly known as:  ZANAFLEX        Take 1 Tab by mouth every 6 hours as needed.   Dose:  4 mg                        VITAMIN B-12 PO        Take  by mouth every day.                        * Notice:  This list has 15 medication(s) that are the same as other medications prescribed for you. Read the directions carefully, and ask your doctor or other care provider to review them with you.         Where to  Get Your Medications      You can get these medications from any pharmacy     Bring a paper prescription for each of these medications    - hydrocodone-acetaminophen 5-325 MG Tabs per tablet  - oxycodone-acetaminophen 5-325 MG Tabs            Procedures and tests performed during your visit     APPLICATION KNEE IMMOBILIZER    CRUTCHES    DX-KNEE COMPLETE 4+ LEFT        Discharge Instructions       Degenerative Arthritis  You have osteoarthritis. This is the wear and tear arthritis that comes with aging. It is also called degenerative arthritis. This is common in people past middle age. It is caused by stress on the joints. The large weight bearing joints of the lower extremities are most often affected. The knees, hips, back, neck, and hands can become painful, swollen, and stiff. This is the most common type of arthritis. It comes on with age, carrying too much weight, or from an injury.  Treatment includes resting the sore joint until the pain and swelling improve. Crutches or a walker may be needed for severe flares. Only take over-the-counter or prescription medicines for pain, discomfort, or fever as directed by your caregiver. Local heat therapy may improve motion. Cortisone shots into the joint are sometimes used to reduce pain and swelling during flares.  Osteoarthritis is usually not crippling and progresses slowly. There are things you can do to decrease pain:  · Avoid high impact activities.   · Exercise regularly.   · Low impact exercises such as walking, biking and swimming help to keep the muscles strong and keep normal joint function.   · Stretching helps to keep your range of motion.   · Lose weight if you are overweight. This reduces joint stress.   In severe cases when you have pain at rest or increasing disability, joint surgery may be helpful. See your caregiver for follow-up treatment as recommended.   SEEK IMMEDIATE MEDICAL CARE IF:   · You have severe joint pain.   · Marked swelling and  redness in your joint develops.   · You develop a high fever.   Document Released: 12/18/2006 Document Revised: 03/11/2013 Document Reviewed: 05/19/2008  [employer]® Patient Information ©2013 [employer].  Chronic Pain  Chronic pain can be defined as pain that is off and on and lasts for 3-6 months or longer. Many things cause chronic pain, which can make it difficult to make a diagnosis. There are many treatment options available for chronic pain. However, finding a treatment that works well for you may require trying various approaches until the right one is found. Many people benefit from a combination of two or more types of treatment to control their pain.  SYMPTOMS   Chronic pain can occur anywhere in the body and can range from mild to very severe. Some types of chronic pain include:  · Headache.  · Low back pain.  · Cancer pain.  · Arthritis pain.  · Neurogenic pain. This is pain resulting from damage to nerves.   People with chronic pain may also have other symptoms such as:  · Depression.  · Anger.  · Insomnia.  · Anxiety.  DIAGNOSIS   Your health care provider will help diagnose your condition over time. In many cases, the initial focus will be on excluding possible conditions that could be causing the pain. Depending on your symptoms, your health care provider may order tests to diagnose your condition. Some of these tests may include:   · Blood tests.    · CT scan.    · MRI.    · X-rays.    · Ultrasounds.    · Nerve conduction studies.    You may need to see a specialist.   TREATMENT   Finding treatment that works well may take time. You may be referred to a pain specialist. He or she may prescribe medicine or therapies, such as:   · Mindful meditation or yoga.  · Shots (injections) of numbing or pain-relieving medicines into the spine or area of pain.  · Local electrical stimulation.  · Acupuncture.    · Massage therapy.    · Aroma, color, light, or sound therapy.    · Biofeedback.    · Working with a  physical therapist to keep from getting stiff.    · Regular, gentle exercise.    · Cognitive or behavioral therapy.    · Group support.    Sometimes, surgery may be recommended.   HOME CARE INSTRUCTIONS   · Take all medicines as directed by your health care provider.    · Lessen stress in your life by relaxing and doing things such as listening to calming music.    · Exercise or be active as directed by your health care provider.    · Eat a healthy diet and include things such as vegetables, fruits, fish, and lean meats in your diet.    · Keep all follow-up appointments with your health care provider.    · Attend a support group with others suffering from chronic pain.  SEEK MEDICAL CARE IF:   · Your pain gets worse.    · You develop a new pain that was not there before.    · You cannot tolerate medicines given to you by your health care provider.    · You have new symptoms since your last visit with your health care provider.    SEEK IMMEDIATE MEDICAL CARE IF:   · You feel weak.    · You have decreased sensation or numbness.    · You lose control of bowel or bladder function.    · Your pain suddenly gets much worse.    · You develop shaking.  · You develop chills.  · You develop confusion.  · You develop chest pain.  · You develop shortness of breath.    MAKE SURE YOU:  · Understand these instructions.  · Will watch your condition.  · Will get help right away if you are not doing well or get worse.     This information is not intended to replace advice given to you by your health care provider. Make sure you discuss any questions you have with your health care provider.     Document Released: 09/09/2003 Document Revised: 08/20/2014 Document Reviewed: 06/13/2014  [employer] Interactive Patient Education ©2016 [employer]    Knee Bracing  Knee braces are supports to help stabilize and protect an injured or painful knee. They come in many different styles. They should support and protect the knee without increasing  the chance of other injuries to yourself or others. It is important not to have a false sense of security when using a brace. Knee braces that help you to keep using your knee:  · Do not restore normal knee stability under high stress forces.  · May decrease some aspects of athletic performance.  Some of the different types of knee braces are:  · Prophylactic knee braces are designed to prevent or reduce the severity of knee injuries during sports that make injury to the knee more likely.  · Rehabilitative knee braces are designed to allow protected motion of:  ¨ Injured knees.  ¨ Knees that have been treated with or without surgery.  There is no evidence that the use of a supportive knee brace protects the graft following a successful anterior cruciate ligament (ACL) reconstruction. However, braces are sometimes used to:   · Protect injured ligaments.  · Control knee movement during the initial healing period.  They may be used as part of the treatment program for the various injured ligaments or cartilage of the knee including the:  · Anterior cruciate ligament.  · Medial collateral ligament.  · Medial or lateral cartilage (meniscus).  · Posterior cruciate ligament.  · Lateral collateral ligament.  Rehabilitative knee braces are most commonly used:  · During crutch-assisted walking right after injury.  · During crutch-assisted walking right after surgery to repair the cartilage and/or cruciate ligament injury.  · For a short period of time, 2-8 weeks, after the injury or surgery.  The value of a rehabilitative brace as opposed to a cast or splint includes the:  · Ability to adjust the brace for swelling.  · Ability to remove the brace for examinations, icing, or showering.  · Ability to allow for movement in a controlled range of motion.  Functional knee braces give support to knees that have already been injured. They are designed to provide stability for the injured knee and provide protection after repair.  Functional knee braces may not affect performance much. Lower extremity muscle strengthening, flexibility, and improvement in technique are more important than bracing in treating ligamentous knee injuries. Functional braces are not a substitute for rehabilitation or surgical procedures.  /off- braces are designed to provide pain relief in arthritic knees. Patients with wear and tear arthritis from growing old or from an old cartilage injury (osteoarthritis) of the knee, and bowlegged (varus) or knock-knee (valgus) deformities, often develop increased pain in the arthritic side due to increased loading. /off- braces are made to reduce uneven loading in such knees. There is reduction in bowing out movement in bowlegged knees when the correct  brace is used. Patients with advanced osteoarthritis or severe varus or valgus alignment problems would not likely benefit from bracing.  Patellofemoral braces help the kneecap to move smoothly and well centered over the end of the femur in the knee.   Most people who wear knee braces feel that they help. However, there is a lack of scientific evidence that knee braces are helpful at the level needed for athletic participation to prevent injury. In spite of this, athletes report an increase in knee stability, pain relief, performance improvement, and confidence during athletics when using a brace.   Different knee problems require different knee braces:  · Your caregiver may suggest one kind of knee brace after knee surgery.  · A caregiver may choose another kind of knee brace for support instead of surgery for some types of torn ligaments.  · You may also need one for pain in the front of your knee that is not getting better with strengthening and flexibility exercises.  Get your caregiver's advice if you want to try a knee brace. The caregiver will advise you on where to get them and provide a prescription when it is needed to fashion and/or  fit the brace.  Knee braces are the least important part of preventing knee injuries or getting better following injury. Stretching, strengthening and technique improvement are far more important in caring for and preventing knee injuries. When strengthening your knee, increase your activities a little at a time so as not to develop injuries from overuse. Work out an exercise plan with your caregiver and/or physical therapist to get the best program for you. Do not let a knee brace become a crutch.  Always remember, there are no braces which support the knee as well as your original ligaments and cartilage you were born with. Conditioning, proper warm-up, and stretching remain the most important parts of keeping your knees healthy.  HOW TO USE A KNEE BRACE  · During sports, knee braces should be used as directed by your caregiver.  · Make sure that the hinges are where the knee bends.  · Straps, tapes, or hook-and-loop tapes should be fastened around your leg as instructed.  · You should check the placement of the brace during activities to make sure that it has not moved. Poorly positioned braces can hurt rather than help you.  · To work well, a knee brace should be worn during all activities that put you at risk of knee injury.  · Warm up properly before beginning athletic activities.  HOME CARE INSTRUCTIONS  · Knee braces often get damaged during normal use. Replace worn-out braces for maximum benefit.  · Clean regularly with soap and water.  · Inspect your brace often for wear and tear.  · Cover exposed metal to protect others from injury.  · Durable materials may cost more, but last longer.  SEEK IMMEDIATE MEDICAL CARE IF:   · Your knee seems to be getting worse rather than better.  · You have increasing pain or swelling in the knee.  · You have problems caused by the knee brace.  · You have increased swelling or inflammation (redness or soreness) in your knee.  · Your knee becomes warm and more painful and you  develop an unexplained temperature over 101°F (38.3°C).  MAKE SURE YOU:   · Understand these instructions.  · Will watch your condition.  · Will get help right away if you are not doing well or get worse.  See your caregiver, physical therapist, or orthopedic surgeon for additional information.     This information is not intended to replace advice given to you by your health care provider. Make sure you discuss any questions you have with your health care provider.     Document Released: 03/09/2005 Document Revised: 01/08/2016 Document Reviewed: 06/16/2010  [employer] Interactive Patient Education ©2016 [employer]            Patient Information     Patient Information    Following emergency treatment: all patient requiring follow-up care must return either to a private physician or a clinic if your condition worsens before you are able to obtain further medical attention, please return to the emergency room.     Billing Information    At [facility], we work to make the billing process streamlined for our patients.  Our Representatives are here to answer any questions you may have regarding your hospital bill.  If you have insurance coverage and have supplied your insurance information to us, we will submit a claim to your insurer on your behalf.  Should you have any questions regarding your bill, we can be reached online or by phone as follows:  Online: You are able pay your bills online or live chat with our representatives about any billing questions you may have. We are here to help Monday - Friday from 8:00am to 7:30pm and 9:00am - 12:00pm on Saturdays.  Please visit [email or web address]  for more information.   Phone:  [phone] or [phone]    Please note that your emergency physician, surgeon, pathologist, radiologist, anesthesiologist, and other specialists are not employed by [facility] and will therefore bill separately for their services.  Please contact them  directly for any questions concerning their bills at the numbers below:     Emergency Physician Services:  [phone]  [facility]:  [phone]  [facility]:  [phone]  [facility]:  [phone]    1. Your final bill may vary from the amount quoted upon discharge if all procedures are not complete at that time, or if your doctor has additional procedures of which we are not aware. You will receive an additional bill if you return to the Emergency Department at [facility] for suture removal regardless of the facility of which the sutures were placed.     2. Please arrange for settlement of this account at the emergency registration.    3. All self-pay accounts are due in full at the time of treatment.  If you are unable to meet this obligation then payment is expected within 4-5 days.     4. If you have had radiology studies (CT, X-ray, Ultrasound, MRI), you have received a preliminary result during your emergency department visit. Please contact the radiology department [phone] to receive a copy of your final result. Please discuss the Final result with your primary physician or with the follow up physician provided.     Crisis Hotline:  [place] Crisis Hotline:  1-899-QWFGEQB or 1-750.468.2408  [place] Crisis Hotline:    1-847.524.3734 or 746-425-8121         ED Discharge Follow Up Questions    1. In order to provide you with very good care, we would like to follow up with a phone call in the next few days.  May we have your permission to contact you?     YES /  NO    2. What is the best phone number to call you? (       )_____-__________    3. What is the best time to call you?      Morning  /  Afternoon  /  Evening                   Patient Signature:  ____________________________________________________________    Date:  ____________________________________________________________

## 2017-05-13 NOTE — ED PROVIDER NOTES
"ED Provider Note    CHIEF COMPLAINT  Chief Complaint   Patient presents with   • Knee Pain     Left knee, needs replacement, states pain has been increasing over the last five days       HPI  Lynette Correa is a 61 y.o. female who presents to the left knee pain. The patient said chronic left knee pain for some time. She states she needs a knee replacement. Currently does not have an orthopedic doctor. This is flared up on her many times before. Skin bother her now for the last week to 10 days. She denies any other trauma or falls recently. His [place] deformed. She has psoriatic arthritis. Fevers no chills no redness noted to the joint.    REVIEW OF SYSTEMS  CONSTITUTIONAL:  Denies fever, chills,   CARDIOVASCULAR:  Denies chest pain, palpitations or swelling  RESPIRATORY:  Denies cough or shortness of breath or difficulty breathing  GI:  Denies abdominal pain,  MUSCULOSKELETAL: Positive pain left knee very similar to previously. No heat. No trauma. No ankle or hip pain. She has chronic arthritis and lots of her joints of her hands and toes.   ALLERGIC: No itchy rashes.  NEUROLOGIC:  Denies headache, focal weakness or numbness area chronic back pain with surgeries by Dr. LOPEZ Restrepo in the past  PSYCHIATRIC:  Denies depression      PAST MEDICAL HISTORY  Past Medical History   Diagnosis Date   • ASTHMA    • Breath shortness    • Hx of migraines    • Risk for falls      HX of falls   • Arthritis 9-23-16     \"Everywhere\"   • Dental disorder      Full Set Dentures   • Pneumonia 2013   • Unspecified hemorrhagic conditions      PROLONGED BLEEDING TIME   • Jaundice      Age 15   • kidney stones      kidney stones   • Bronchitis 2015   • COPD (chronic obstructive pulmonary disease) (CMS-HCC)    • Pain      \"everywhere\"   • Heart burn    • Hepatitis C    • Ulcer (CMS-HCC)    • Anesthesia      \"Mother had a stroke with anesthesia\"   • Anesthesia 9-23-16     \"Only with Morphine\"  Migraines & PONV    • Arrhythmia      \"2015 " "detected a flutter\", does not see a cardiologist, small hole heart valve 10 years ago   • Indigestion      stomach ulcers    • Bowel habit changes 9-23-16     Diarrhea often       FAMILY HISTORY  History reviewed. No pertinent family history.    SOCIAL HISTORY   reports that she has been smoking Cigarettes.  She has a 20 pack-year smoking history. She has never used smokeless tobacco. She reports that she does not drink alcohol or use illicit drugs.    SURGICAL HISTORY  Past Surgical History   Procedure Laterality Date   • Cervical fusion posterior  7/20/2010     Performed by PREET SEPAR at SURGERY [facility]   • Cervical decompression posterior  7/20/2010     Performed by PREET SPEAR at SURGERY [facility]   • Tonsillectomy     • Lumbar laminectomy diskectomy  1/8/2011     Performed by PREET SPEAR at SURGERY [facility]   • Mass excision general  10/4/2011     Performed by GANSER, JOHN H at [facility]   • Wrist orif Left 3/3/2016     Procedure: WRIST ORIF;  Surgeon: Campbell Love M.D.;  Location: SURGERY [facility];  Service:    • Carpal tunnel release       right   • Other orthopedic surgery       left knee   • Knee arthroplasty total       right   • Other orthopedic surgery       right index finger sewn back on   • Other orthopedic surgery       spinal surgery 3 times   • Primary c section  1989/1988/1993     x 3   • Hysterectomy laparoscopy     • Other cardiac surgery  2005     ablation  and small hole in heart valve repaired by dr medel   • Other       right shin biopsy    • Lumbar laminectomy diskectomy  10/8/2016     Procedure: LUMBAR LAMINECTOMY DISKECTOMY FPRAMINOTOMY L3-4- POSTERIOR L3-4 STABILIZATION WITH INSTRUMENTATION  WITH [employer]ON[employer] INSTRUMENTS;  Surgeon: Preet Spear M.D.;  Location: [facility];  Service:        CURRENT MEDICATIONS  No current facility-administered medications for this encounter.    Current outpatient prescriptions:   •  " hydrocodone-acetaminophen (NORCO) 5-325 MG Tab per tablet, Take 1-2 Tabs by mouth every four hours as needed., Disp: 10 Tab, Rfl: 0  •  duloxetine (CYMBALTA) 30 MG Cap DR Particles, TK 1 C PO ONCE D, Disp: , Rfl: 0  •  gabapentin (NEURONTIN) 800 MG tablet, , Disp: , Rfl: 6  •  meloxicam (MOBIC) 15 MG tablet, TK 1 T PO D, Disp: , Rfl: 11  •  paroxetine (PAXIL) 10 MG Tab, TK 1 T PO ONCE D, Disp: , Rfl: 4  •  MELOXICAM PO, Take  by mouth., Disp: , Rfl:   •  tizanidine (ZANAFLEX) 4 MG Tab, Take 1 Tab by mouth every 6 hours as needed., Disp: 45 Tab, Rfl: 0  •  hydrocodone-acetaminophen (NORCO) 5-325 MG Tab per tablet, Take 1-2 Tabs by mouth every four hours as needed. No Driving or alcohol with medication given., Disp: 20 Tab, Rfl: 0  •  diazepam (VALIUM) 5 MG Tab, Take 1 Tab by mouth every 8 hours as needed (severe spasms)., Disp: 30 Tab, Rfl: 0  •  tizanidine (ZANAFLEX) 4 MG Tab, Take 1 Tab by mouth every 6 hours., Disp: 90 Tab, Rfl: 0  •  HYDROmorphone (DILAUDID) 4 MG Tab, Take 1 Tab by mouth every four hours as needed (pain)., Disp: 80 Tab, Rfl: 0  •  senna-docusate (PERICOLACE OR SENOKOT S) 8.6-50 MG Tab, Take 2 Tabs by mouth every day., Disp: 30 Tab, Rfl: 0  •  tizanidine (ZANAFLEX) 4 MG Tab, Take 1 Tab by mouth every 8 hours., Disp: 90 Tab, Rfl: 0  •  DULoxetine HCl (CYMBALTA PO), Take  by mouth. Indications: patient thinks it is 60 mg, Disp: , Rfl:   •  acetaminophen (TYLENOL) 325 MG Tab, Take 650 mg by mouth every four hours as needed., Disp: , Rfl:   •  albuterol (PROVENTIL) 2.5mg/3ml Nebu Soln solution for nebulization, 2.5 mg by Nebulization route every four hours as needed for Shortness of Breath., Disp: , Rfl:   •  albuterol 108 (90 BASE) MCG/ACT Aero Soln inhalation aerosol, Inhale 2 Puffs by mouth every 6 hours as needed for Shortness of Breath., Disp: , Rfl:   •  Cyanocobalamin (VITAMIN B-12 PO), Take  by mouth every day., Disp: , Rfl:   •  EVENING PRIMROSE OIL PO, Take  by mouth every day., Disp: , Rfl:  "  •  GABAPENTIN PO, Take 1,200 mg by mouth 3 times a day., Disp: , Rfl:   •  Dexlansoprazole (DEXILANT) 60 MG CAPSULE DELAYED RELEASE delayed-release capsule, Take  by mouth every day., Disp: , Rfl:   •  sumatriptan (IMITREX) 25 MG Tab, Take  mg by mouth Once PRN for Migraine., Disp: , Rfl:   •  clonidine (CATAPRES) 0.1 MG TABS, Take 0.1 mg by mouth 3 times a day., Disp: , Rfl:   •  PARoxetine HCl (PAXIL PO), Take  by mouth every day., Disp: , Rfl:   •  Butalbital-APAP-Caffeine (FIORICET PO), Take  by mouth as needed., Disp: , Rfl:       ALLERGIES  Allergies   Allergen Reactions   • Codeine Rash     Difficulty breathing, and itching   • Pcn [Penicillins]      All \"cillin\" family, per patient  ---   I broke out in \"trench\" mouth   • Morphine      headache         PHYSICAL EXAM  VITAL SIGNS: /69 mmHg  Pulse 82  Temp(Src) 36.2 °C (97.1 °F)  Resp 16  Ht 1.651 m (5' 5\")  Wt 56.9 kg (125 lb 7.1 oz)  BMI 20.87 kg/m2  SpO2 97%     Constitutional: Patient is awake alert person place and time. No acute respiratory distress Well developed,   HENT: Normocephalic, Atraumatic  Cardiovascular: Heart is regular rate and rhythm no murmur,  Thorax & Lungs: Chest is symmetrical, with good breath sounds. Occasional wheeze bilaterally.   Skin: Left knee. No redness no heat to it at all.  Extremities: Left knee is arthritic looking. He is not read it is not hot. It is obviously deformed from probable arthritis. She also has some deformity to her toes and fingers as well. However clinically no signs of infection or septic joint.  Musculoskeletal: No pain to range of motion of the left hip and left ankle. Just pain tenderness to the left knee with the deformities as described above.   Neurologic: Alert & oriented to person, place, and time. Strength is good in the left leg. She is able to move it and raise the legs able plantar flexion extend her leg. Sensory is intact to light touch to the leg " distally.      RADIOLOGY/PROCEDURES  DX-KNEE COMPLETE 4+ LEFT   Final Result      1.  Severe tricompartment degenerative change.      2.  Multiple intra-articular loose bodies.      3.  Valgus deformity.            COURSE & MEDICAL DECISION MAKING  Pertinent Labs & Imaging studies reviewed. (See chart for details)    I have reviewed the patient's prescription monitoring program      Patient with chronic arthritis. His had a lot of problems with her knee in the past. Increased pain in the last several days. No signs of clinical infection. X-ray shows no fractures. I will refer her to orthopedic surgery for continued care and evaluation. She will also follow up with her primary care doctor this week as well.    She is stable for discharge          FINAL IMPRESSION  1. Left knee pain/ severe arthritis  2. Chronic pain  3. Arthritis     PLAN  1. Follow up orthopedics within 5 days  2. Follow up primary care doctor within 3 days  3. Knee immobilizer/crutches/Percocet   4. Return to the emergency department for increased pains, fevers, vomiting or change in condition.  Electronically signed by: Sina Barajas, 5/13/2017 9:09 AM

## 2017-05-13 NOTE — DISCHARGE INSTRUCTIONS
Degenerative Arthritis  You have osteoarthritis. This is the wear and tear arthritis that comes with aging. It is also called degenerative arthritis. This is common in people past middle age. It is caused by stress on the joints. The large weight bearing joints of the lower extremities are most often affected. The knees, hips, back, neck, and hands can become painful, swollen, and stiff. This is the most common type of arthritis. It comes on with age, carrying too much weight, or from an injury.  Treatment includes resting the sore joint until the pain and swelling improve. Crutches or a walker may be needed for severe flares. Only take over-the-counter or prescription medicines for pain, discomfort, or fever as directed by your caregiver. Local heat therapy may improve motion. Cortisone shots into the joint are sometimes used to reduce pain and swelling during flares.  Osteoarthritis is usually not crippling and progresses slowly. There are things you can do to decrease pain:  · Avoid high impact activities.   · Exercise regularly.   · Low impact exercises such as walking, biking and swimming help to keep the muscles strong and keep normal joint function.   · Stretching helps to keep your range of motion.   · Lose weight if you are overweight. This reduces joint stress.   In severe cases when you have pain at rest or increasing disability, joint surgery may be helpful. See your caregiver for follow-up treatment as recommended.   SEEK IMMEDIATE MEDICAL CARE IF:   · You have severe joint pain.   · Marked swelling and redness in your joint develops.   · You develop a high fever.   Document Released: 12/18/2006 Document Revised: 03/11/2013 Document Reviewed: 05/19/2008  [employer]® Patient Information ©2013 [employer].  Chronic Pain  Chronic pain can be defined as pain that is off and on and lasts for 3-6 months or longer. Many things cause chronic pain, which can make it difficult to make a diagnosis. There are many  treatment options available for chronic pain. However, finding a treatment that works well for you may require trying various approaches until the right one is found. Many people benefit from a combination of two or more types of treatment to control their pain.  SYMPTOMS   Chronic pain can occur anywhere in the body and can range from mild to very severe. Some types of chronic pain include:  · Headache.  · Low back pain.  · Cancer pain.  · Arthritis pain.  · Neurogenic pain. This is pain resulting from damage to nerves.   People with chronic pain may also have other symptoms such as:  · Depression.  · Anger.  · Insomnia.  · Anxiety.  DIAGNOSIS   Your health care provider will help diagnose your condition over time. In many cases, the initial focus will be on excluding possible conditions that could be causing the pain. Depending on your symptoms, your health care provider may order tests to diagnose your condition. Some of these tests may include:   · Blood tests.    · CT scan.    · MRI.    · X-rays.    · Ultrasounds.    · Nerve conduction studies.    You may need to see a specialist.   TREATMENT   Finding treatment that works well may take time. You may be referred to a pain specialist. He or she may prescribe medicine or therapies, such as:   · Mindful meditation or yoga.  · Shots (injections) of numbing or pain-relieving medicines into the spine or area of pain.  · Local electrical stimulation.  · Acupuncture.    · Massage therapy.    · Aroma, color, light, or sound therapy.    · Biofeedback.    · Working with a physical therapist to keep from getting stiff.    · Regular, gentle exercise.    · Cognitive or behavioral therapy.    · Group support.    Sometimes, surgery may be recommended.   HOME CARE INSTRUCTIONS   · Take all medicines as directed by your health care provider.    · Lessen stress in your life by relaxing and doing things such as listening to calming music.    · Exercise or be active as directed by  your health care provider.    · Eat a healthy diet and include things such as vegetables, fruits, fish, and lean meats in your diet.    · Keep all follow-up appointments with your health care provider.    · Attend a support group with others suffering from chronic pain.  SEEK MEDICAL CARE IF:   · Your pain gets worse.    · You develop a new pain that was not there before.    · You cannot tolerate medicines given to you by your health care provider.    · You have new symptoms since your last visit with your health care provider.    SEEK IMMEDIATE MEDICAL CARE IF:   · You feel weak.    · You have decreased sensation or numbness.    · You lose control of bowel or bladder function.    · Your pain suddenly gets much worse.    · You develop shaking.  · You develop chills.  · You develop confusion.  · You develop chest pain.  · You develop shortness of breath.    MAKE SURE YOU:  · Understand these instructions.  · Will watch your condition.  · Will get help right away if you are not doing well or get worse.     This information is not intended to replace advice given to you by your health care provider. Make sure you discuss any questions you have with your health care provider.     Document Released: 09/09/2003 Document Revised: 08/20/2014 Document Reviewed: 06/13/2014  [employer] Interactive Patient Education ©2016 [employer]    Knee Bracing  Knee braces are supports to help stabilize and protect an injured or painful knee. They come in many different styles. They should support and protect the knee without increasing the chance of other injuries to yourself or others. It is important not to have a false sense of security when using a brace. Knee braces that help you to keep using your knee:  · Do not restore normal knee stability under high stress forces.  · May decrease some aspects of athletic performance.  Some of the different types of knee braces are:  · Prophylactic knee braces are designed to prevent or reduce  the severity of knee injuries during sports that make injury to the knee more likely.  · Rehabilitative knee braces are designed to allow protected motion of:  ¨ Injured knees.  ¨ Knees that have been treated with or without surgery.  There is no evidence that the use of a supportive knee brace protects the graft following a successful anterior cruciate ligament (ACL) reconstruction. However, braces are sometimes used to:   · Protect injured ligaments.  · Control knee movement during the initial healing period.  They may be used as part of the treatment program for the various injured ligaments or cartilage of the knee including the:  · Anterior cruciate ligament.  · Medial collateral ligament.  · Medial or lateral cartilage (meniscus).  · Posterior cruciate ligament.  · Lateral collateral ligament.  Rehabilitative knee braces are most commonly used:  · During crutch-assisted walking right after injury.  · During crutch-assisted walking right after surgery to repair the cartilage and/or cruciate ligament injury.  · For a short period of time, 2-8 weeks, after the injury or surgery.  The value of a rehabilitative brace as opposed to a cast or splint includes the:  · Ability to adjust the brace for swelling.  · Ability to remove the brace for examinations, icing, or showering.  · Ability to allow for movement in a controlled range of motion.  Functional knee braces give support to knees that have already been injured. They are designed to provide stability for the injured knee and provide protection after repair. Functional knee braces may not affect performance much. Lower extremity muscle strengthening, flexibility, and improvement in technique are more important than bracing in treating ligamentous knee injuries. Functional braces are not a substitute for rehabilitation or surgical procedures.  /off- braces are designed to provide pain relief in arthritic knees. Patients with wear and tear arthritis  from growing old or from an old cartilage injury (osteoarthritis) of the knee, and bowlegged (varus) or knock-knee (valgus) deformities, often develop increased pain in the arthritic side due to increased loading. /off- braces are made to reduce uneven loading in such knees. There is reduction in bowing out movement in bowlegged knees when the correct  brace is used. Patients with advanced osteoarthritis or severe varus or valgus alignment problems would not likely benefit from bracing.  Patellofemoral braces help the kneecap to move smoothly and well centered over the end of the femur in the knee.   Most people who wear knee braces feel that they help. However, there is a lack of scientific evidence that knee braces are helpful at the level needed for athletic participation to prevent injury. In spite of this, athletes report an increase in knee stability, pain relief, performance improvement, and confidence during athletics when using a brace.   Different knee problems require different knee braces:  · Your caregiver may suggest one kind of knee brace after knee surgery.  · A caregiver may choose another kind of knee brace for support instead of surgery for some types of torn ligaments.  · You may also need one for pain in the front of your knee that is not getting better with strengthening and flexibility exercises.  Get your caregiver's advice if you want to try a knee brace. The caregiver will advise you on where to get them and provide a prescription when it is needed to fashion and/or fit the brace.  Knee braces are the least important part of preventing knee injuries or getting better following injury. Stretching, strengthening and technique improvement are far more important in caring for and preventing knee injuries. When strengthening your knee, increase your activities a little at a time so as not to develop injuries from overuse. Work out an exercise plan with your caregiver and/or  physical therapist to get the best program for you. Do not let a knee brace become a crutch.  Always remember, there are no braces which support the knee as well as your original ligaments and cartilage you were born with. Conditioning, proper warm-up, and stretching remain the most important parts of keeping your knees healthy.  HOW TO USE A KNEE BRACE  · During sports, knee braces should be used as directed by your caregiver.  · Make sure that the hinges are where the knee bends.  · Straps, tapes, or hook-and-loop tapes should be fastened around your leg as instructed.  · You should check the placement of the brace during activities to make sure that it has not moved. Poorly positioned braces can hurt rather than help you.  · To work well, a knee brace should be worn during all activities that put you at risk of knee injury.  · Warm up properly before beginning athletic activities.  HOME CARE INSTRUCTIONS  · Knee braces often get damaged during normal use. Replace worn-out braces for maximum benefit.  · Clean regularly with soap and water.  · Inspect your brace often for wear and tear.  · Cover exposed metal to protect others from injury.  · Durable materials may cost more, but last longer.  SEEK IMMEDIATE MEDICAL CARE IF:   · Your knee seems to be getting worse rather than better.  · You have increasing pain or swelling in the knee.  · You have problems caused by the knee brace.  · You have increased swelling or inflammation (redness or soreness) in your knee.  · Your knee becomes warm and more painful and you develop an unexplained temperature over 101°F (38.3°C).  MAKE SURE YOU:   · Understand these instructions.  · Will watch your condition.  · Will get help right away if you are not doing well or get worse.  See your caregiver, physical therapist, or orthopedic surgeon for additional information.     This information is not intended to replace advice given to you by your health care provider. Make sure you  discuss any questions you have with your health care provider.     Document Released: 03/09/2005 Document Revised: 01/08/2016 Document Reviewed: 06/16/2010  [employer] Interactive Patient Education ©2016 [employer]

## 2017-05-26 ENCOUNTER — OFFICE VISIT (OUTPATIENT)
Dept: URGENT CARE | Facility: PHYSICIAN GROUP | Age: 62
End: 2017-05-26
Payer: MEDICARE

## 2017-05-26 VITALS
SYSTOLIC BLOOD PRESSURE: 150 MMHG | HEART RATE: 72 BPM | OXYGEN SATURATION: 96 % | RESPIRATION RATE: 16 BRPM | TEMPERATURE: 98.8 F | HEIGHT: 63 IN | BODY MASS INDEX: 21.62 KG/M2 | DIASTOLIC BLOOD PRESSURE: 98 MMHG | WEIGHT: 122 LBS

## 2017-05-26 DIAGNOSIS — R10.12 ABDOMINAL PAIN, ACUTE, LEFT UPPER QUADRANT: ICD-10-CM

## 2017-05-26 DIAGNOSIS — R10.9 ABDOMINAL PAIN IN FEMALE: ICD-10-CM

## 2017-05-26 DIAGNOSIS — R10.9 ABDOMINAL PAIN, LEFT LATERAL: ICD-10-CM

## 2017-05-26 LAB
APPEARANCE UR: CLEAR
BILIRUB UR STRIP-MCNC: NEGATIVE MG/DL
COLOR UR AUTO: NORMAL
GLUCOSE UR STRIP.AUTO-MCNC: NEGATIVE MG/DL
KETONES UR STRIP.AUTO-MCNC: NORMAL MG/DL
LEUKOCYTE ESTERASE UR QL STRIP.AUTO: NEGATIVE
NITRITE UR QL STRIP.AUTO: NEGATIVE
PH UR STRIP.AUTO: 6 [PH] (ref 5–8)
PROT UR QL STRIP: NEGATIVE MG/DL
RBC UR QL AUTO: NORMAL
SP GR UR STRIP.AUTO: 1.01
UROBILINOGEN UR STRIP-MCNC: NEGATIVE MG/DL

## 2017-05-26 PROCEDURE — 3017F COLORECTAL CA SCREEN DOC REV: CPT | Mod: 8P | Performed by: PHYSICIAN ASSISTANT

## 2017-05-26 PROCEDURE — 81002 URINALYSIS NONAUTO W/O SCOPE: CPT | Performed by: PHYSICIAN ASSISTANT

## 2017-05-26 PROCEDURE — 3014F SCREEN MAMMO DOC REV: CPT | Mod: 8P | Performed by: PHYSICIAN ASSISTANT

## 2017-05-26 PROCEDURE — 4004F PT TOBACCO SCREEN RCVD TLK: CPT | Performed by: PHYSICIAN ASSISTANT

## 2017-05-26 PROCEDURE — G8432 DEP SCR NOT DOC, RNG: HCPCS | Performed by: PHYSICIAN ASSISTANT

## 2017-05-26 PROCEDURE — G8420 CALC BMI NORM PARAMETERS: HCPCS | Performed by: PHYSICIAN ASSISTANT

## 2017-05-26 PROCEDURE — 99214 OFFICE O/P EST MOD 30 MIN: CPT | Performed by: PHYSICIAN ASSISTANT

## 2017-05-26 ASSESSMENT — ENCOUNTER SYMPTOMS
NAUSEA: 1
FLANK PAIN: 1
SHORTNESS OF BREATH: 0
SORE THROAT: 0
BLOOD IN STOOL: 0
MYALGIAS: 0
VOMITING: 1
ABDOMINAL PAIN: 1
HEADACHES: 0
DIARRHEA: 0
CONSTIPATION: 0
HEARTBURN: 0
WHEEZING: 0
BACK PAIN: 1
FEVER: 0
CHILLS: 0

## 2017-05-26 ASSESSMENT — PAIN SCALES - GENERAL: PAINLEVEL: 8=MODERATE-SEVERE PAIN

## 2017-05-26 NOTE — MR AVS SNAPSHOT
"Lynette Correa   2017 8:45 AM   Office Visit   MRN: [id number]    Department:  [facility]   Dept Phone:  [phone]    Description:  Female : 1955   Provider:  Jennifer Lino PA-C           Reason for Visit     LLQ Pain Lower abdonminal pain, lower back pain, nausea, vomitting X 1week       Allergies as of 2017     Allergen Noted Reactions    Codeine 2010   Rash    Difficulty breathing, and itching    Pcn [Penicillins] 2010       All \"cillin\" family, per patient  ---   I broke out in \"trench\" mouth    Morphine 2011       headache      You were diagnosed with     Abdominal pain in female   [[id number]]       Abdominal pain, acute, left upper quadrant   [[id number]]       Abdominal pain, left lateral   [[id number]]         Vital Signs     Blood Pressure Pulse Temperature Respirations Height Weight    150/98 mmHg 72 37.1 °C (98.8 °F) 16 1.6 m (5' 2.99\") 55.339 kg (122 lb)    Body Mass Index Oxygen Saturation Smoking Status             21.62 kg/m2 96% Current Every Day Smoker         Basic Information     Date Of Birth Sex Race Ethnicity Preferred Language    1955 Female White Non- English      Problem List              ICD-10-CM Priority Class Noted - Resolved    Closed die-punch intra-articular fracture of distal end of left radius S52.572A   3/3/2016 - Present    Lumbar spinal stenosis M48.06   10/8/2016 - Present      Health Maintenance        Date Due Completion Dates    PAP SMEAR 1974 ---    MAMMOGRAM 1993 ---    COLONOSCOPY 2003 ---    IMM ZOSTER VACCINE 2013 ---    IMM DTaP/Tdap/Td Vaccine (2 - Td) 3/31/2027 3/31/2017            Results     POCT Urinalysis      Component Value Standard Range & Units    POC Color Copper Negative    POC Appearance Clear Negative    POC Leukocyte Esterase Negative Negative    POC Nitrites Negative Negative    POC Urobiligen Negative Negative (0.2) mg/dL    POC Protein Negative Negative mg/dL    POC " Urine PH 6.0 5.0 - 8.0    POC Blood Moderate Negative    POC Specific Gravity 1.015 <1.005 - >1.030    POC Ketones TRace Negative mg/dL    POC Biliruben Negative Negative mg/dL    POC Glucose Negative Negative mg/dL                        Current Immunizations     Influenza Vaccine 6/4/2011, 11/9/2010    Pertussis Vaccine 12/9/2009    Pneumococcal Vaccine (UF)Historical Data 10/4/2007    Tdap Vaccine 3/31/2017      Below and/or attached are the medications your provider expects you to take. Review all of your home medications and newly ordered medications with your provider and/or pharmacist. Follow medication instructions as directed by your provider and/or pharmacist. Please keep your medication list with you and share with your provider. Update the information when medications are discontinued, doses are changed, or new medications (including over-the-counter products) are added; and carry medication information at all times in the event of emergency situations     Allergies:  CODEINE - Rash     PCN - (reactions not documented)     MORPHINE - (reactions not documented)               Medications  Valid as of: May 26, 2017 - 10:14 AM    Generic Name Brand Name Tablet Size Instructions for use    Acetaminophen (Tab) TYLENOL 325 MG Take 650 mg by mouth every four hours as needed.        Albuterol Sulfate (Nebu Soln) PROVENTIL 2.5mg/3ml 2.5 mg by Nebulization route every four hours as needed for Shortness of Breath.        Albuterol Sulfate (Aero Soln) albuterol 108 (90 BASE) MCG/ACT Inhale 2 Puffs by mouth every 6 hours as needed for Shortness of Breath.        Butalbital-APAP-Caffeine   Take  by mouth as needed.        CloNIDine HCl (Tab) CATAPRES 0.1 MG Take 0.1 mg by mouth 3 times a day.        Cyanocobalamin   Take  by mouth every day.        Dexlansoprazole (CAPSULE DELAYED RELEASE) Dexlansoprazole 60 MG Take  by mouth every day.        DiazePAM (Tab) VALIUM 5 MG Take 1 Tab by mouth every 8 hours as needed  (severe spasms).        DULoxetine HCl   Take  by mouth. Indications: patient thinks it is 60 mg        DULoxetine HCl (Cap DR Particles) CYMBALTA 30 MG TK 1 C PO ONCE D        Evening Primrose Oil   Take  by mouth every day.        Gabapentin   Take 1,200 mg by mouth 3 times a day.        Gabapentin (Tab) NEURONTIN 800 MG         Hydrocodone-Acetaminophen (Tab) NORCO 5-325 MG Take 1-2 Tabs by mouth every four hours as needed. No Driving or alcohol with medication given.        Hydrocodone-Acetaminophen (Tab) NORCO 5-325 MG Take 1-2 Tabs by mouth every four hours as needed.        HYDROmorphone HCl (Tab) DILAUDID 4 MG Take 1 Tab by mouth every four hours as needed (pain).        Meloxicam   Take  by mouth.        Meloxicam (Tab) MOBIC 15 MG TK 1 T PO D        Oxycodone-Acetaminophen (Tab) PERCOCET 5-325 MG Take 1-2 Tabs by mouth every four hours as needed (pain).        PARoxetine HCl   Take  by mouth every day.        PARoxetine HCl (Tab) PAXIL 10 MG TK 1 T PO ONCE D        Sennosides-Docusate Sodium (Tab) PERICOLACE or SENOKOT S 8.6-50 MG Take 2 Tabs by mouth every day.        SUMAtriptan Succinate (Tab) IMITREX 25 MG Take  mg by mouth Once PRN for Migraine.        TiZANidine HCl (Tab) ZANAFLEX 4 MG Take 1 Tab by mouth every 8 hours.        TiZANidine HCl (Tab) ZANAFLEX 4 MG Take 1 Tab by mouth every 6 hours.        TiZANidine HCl (Tab) ZANAFLEX 4 MG Take 1 Tab by mouth every 6 hours as needed.        .                 Medicines prescribed today were sent to:     [id number] - [place] - [address] AT [facility] [address]    [address] [place] [address]    Phone: [phone] Fax: [phone]    Open 24 Hours?: No      Medication refill instructions:       If your prescription bottle indicates you have medication refills left, it is not necessary to call your provider’s office. Please contact your pharmacy and they will refill your medication.    If your prescription bottle  indicates you do not have any refills left, you may request refills at any time through one of the following ways: The online [employer] system (except Urgent Care), by calling your provider’s office, or by asking your pharmacy to contact your provider’s office with a refill request. Medication refills are processed only during regular business hours and may not be available until the next business day. Your provider may request additional information or to have a follow-up visit with you prior to refilling your medication.   *Please Note: Medication refills are assigned a new Rx number when refilled electronically. Your pharmacy may indicate that no refills were authorized even though a new prescription for the same medication is available at the pharmacy. Please request the medicine by name with the pharmacy before contacting your provider for a refill.           [employer] Access Code: Activation code not generated  Current [employer] Status: Active          Quit Tobacco Information     Do you want to quit using tobacco?    Quitting tobacco decreases risks of cancer, heart and lung disease, increases life expectancy, improves sense of taste and smell, and increases spending money, among other benefits.    If you are thinking about quitting, we can help.  • [facility]uit [facility] Program: [phone]  o Program occurs weekly for four weeks and includes pharmacist consultation on products to support quitting smoking or chewing tobacco. A provider referral is needed for pharmacist consultation.  • Tobacco Users Help Hotline: 6-445-QUIT-NOW (705-9532) or https://[place].quitlogix.org/  o Free, confidential telephone and online coaching for [place] residents. Sessions are designed on a schedule that is convenient for you. Eligible clients receive free nicotine replacement therapy.  • Nationally: www.smokefree.gov  o Information and professional assistance to support both immediate and long-term needs as you become, and remain, a  non-smoker. Smokefree.gov allows you to choose the help that best fits your needs.

## 2017-05-26 NOTE — PROGRESS NOTES
Subjective:      Lynette Correa is a 61 y.o. female who presents with LLQ Pain            HPI Comments: Patient presents with a 5 day history of left sided abdominal pain, gradually getting worse.  Associated nausea, vomiting x 1 yesterday.  Pain is of the left upper/mid abdomen with radiation around her side and into her back.  Pain is a constant 8/10 with intermittent increased pain causing her to double over.      She admits to elevated temperature up to 100.0 with associated chills.  No change in BM's or urine, denies diarrhea, blood in stool or black stools.  She denies urinary burning, urgency, frequency of blood.  History of COPD, no change in cough or baseline shortness of breath, denies CP.      History of partial hysterectomy, ovaries remaining.  No additional abdominal surgeries.  Overdue for colonoscopy, findings of polyps on previous.    Currently working on decreasing tobacco use, now down to 6 cigarettes a day.  She does admit to medical marijuana use about 3 times a month, last use was 3 days ago to try to help with pain.      LLQ Pain  Associated symptoms include nausea and vomiting. Pertinent negatives include no constipation, diarrhea, dysuria, fever, frequency, headaches, hematuria, melena or myalgias.       Review of Systems   Constitutional: Negative for fever and chills.   HENT: Negative for congestion, ear pain and sore throat.    Respiratory: Negative for shortness of breath and wheezing.         Cough, shortness of breath is unchanged from baseline per patient   Gastrointestinal: Positive for nausea, vomiting and abdominal pain. Negative for heartburn, diarrhea, constipation, blood in stool and melena.   Genitourinary: Positive for flank pain. Negative for dysuria, urgency, frequency and hematuria.   Musculoskeletal: Positive for back pain. Negative for myalgias.   Skin: Negative for rash.   Neurological: Negative for headaches.   All other systems reviewed and are negative.          "Objective:     /98 mmHg  Pulse 72  Temp(Src) 37.1 °C (98.8 °F)  Resp 16  Ht 1.6 m (5' 2.99\")  Wt 55.339 kg (122 lb)  BMI 21.62 kg/m2  SpO2 96%     Physical Exam   Constitutional: She is oriented to person, place, and time.   Patient appears underweight.     HENT:   Head: Normocephalic.   Nose: Nose normal.   Mouth/Throat: Oropharynx is clear and moist.   Cardiovascular: Normal rate, regular rhythm, normal heart sounds and intact distal pulses.    Pulmonary/Chest: Effort normal and breath sounds normal. No respiratory distress. She has no wheezes. She has no rales.   Decreased air movement, lungs are CTA   Abdominal: Soft. She exhibits no distension and no mass. There is tenderness.   Tenderness of the LUQ, LLQ with grimace during exam;  No palpable masses;  No suprapubic tenderness on exam;  Bilateral CVA tenderness   Neurological: She is alert and oriented to person, place, and time.   Skin: Skin is warm and dry. No rash noted. No pallor.   Psychiatric: She has a normal mood and affect. Her behavior is normal. Judgment and thought content normal.   Nursing note and vitals reviewed.              Assessment/Plan:     1. Abdominal pain in female  Urinalysis:  Moderate blood, trace ketones;  Negative Leukocytes, Nitrates, Urobili, Protein, Bili, Glucose.  Will refer the patient to the ER, patient needs a further evaluation of ongoing and worsening abdominal pain than can be evaluated in the UC.  Discussed with patient that I would notify the ER team at [facility] but she will check in at the desk and be triaged according to priority.  I have advised patient not to eat/drink until evaluated.    Discussed with Dr. Gansert at [facility] [place] [facility].    - POCT Urinalysis        "

## 2017-05-27 ENCOUNTER — HOSPITAL ENCOUNTER (EMERGENCY)
Facility: MEDICAL CENTER | Age: 62
End: 2017-05-27
Attending: EMERGENCY MEDICINE
Payer: MEDICARE

## 2017-05-27 ENCOUNTER — APPOINTMENT (OUTPATIENT)
Dept: RADIOLOGY | Facility: MEDICAL CENTER | Age: 62
End: 2017-05-27
Attending: EMERGENCY MEDICINE
Payer: MEDICARE

## 2017-05-27 VITALS
RESPIRATION RATE: 18 BRPM | BODY MASS INDEX: 19.65 KG/M2 | WEIGHT: 117.95 LBS | SYSTOLIC BLOOD PRESSURE: 160 MMHG | HEART RATE: 75 BPM | HEIGHT: 65 IN | DIASTOLIC BLOOD PRESSURE: 88 MMHG | OXYGEN SATURATION: 98 % | TEMPERATURE: 98.8 F

## 2017-05-27 DIAGNOSIS — R10.32 LLQ ABDOMINAL PAIN: ICD-10-CM

## 2017-05-27 LAB
ALBUMIN SERPL BCP-MCNC: 4.4 G/DL (ref 3.2–4.9)
ALBUMIN/GLOB SERPL: 1.2 G/DL
ALP SERPL-CCNC: 99 U/L (ref 30–99)
ALT SERPL-CCNC: 16 U/L (ref 2–50)
ANION GAP SERPL CALC-SCNC: 7 MMOL/L (ref 0–11.9)
APPEARANCE UR: CLEAR
AST SERPL-CCNC: 46 U/L (ref 12–45)
BACTERIA #/AREA URNS HPF: ABNORMAL /HPF
BASOPHILS # BLD AUTO: 1.1 % (ref 0–1.8)
BASOPHILS # BLD: 0.08 K/UL (ref 0–0.12)
BILIRUB SERPL-MCNC: 0.8 MG/DL (ref 0.1–1.5)
BILIRUB UR QL STRIP.AUTO: NEGATIVE
BUN SERPL-MCNC: 9 MG/DL (ref 8–22)
CALCIUM SERPL-MCNC: 9.7 MG/DL (ref 8.4–10.2)
CHLORIDE SERPL-SCNC: 105 MMOL/L (ref 96–112)
CO2 SERPL-SCNC: 24 MMOL/L (ref 20–33)
COLOR UR: YELLOW
CREAT SERPL-MCNC: 0.8 MG/DL (ref 0.5–1.4)
CULTURE IF INDICATED INDCX: NO UA CULTURE
EOSINOPHIL # BLD AUTO: 0.19 K/UL (ref 0–0.51)
EOSINOPHIL NFR BLD: 2.5 % (ref 0–6.9)
EPI CELLS #/AREA URNS HPF: ABNORMAL /HPF
ERYTHROCYTE [DISTWIDTH] IN BLOOD BY AUTOMATED COUNT: 48.4 FL (ref 35.9–50)
GFR SERPL CREATININE-BSD FRML MDRD: >60 ML/MIN/1.73 M 2
GLOBULIN SER CALC-MCNC: 3.7 G/DL (ref 1.9–3.5)
GLUCOSE SERPL-MCNC: 99 MG/DL (ref 65–99)
GLUCOSE UR STRIP.AUTO-MCNC: NEGATIVE MG/DL
HCT VFR BLD AUTO: 47.3 % (ref 37–47)
HGB BLD-MCNC: 15.8 G/DL (ref 12–16)
IMM GRANULOCYTES # BLD AUTO: 0.02 K/UL (ref 0–0.11)
IMM GRANULOCYTES NFR BLD AUTO: 0.3 % (ref 0–0.9)
KETONES UR STRIP.AUTO-MCNC: ABNORMAL MG/DL
LEUKOCYTE ESTERASE UR QL STRIP.AUTO: NEGATIVE
LIPASE SERPL-CCNC: 24 U/L (ref 7–58)
LYMPHOCYTES # BLD AUTO: 1.89 K/UL (ref 1–4.8)
LYMPHOCYTES NFR BLD: 25.1 % (ref 22–41)
MCH RBC QN AUTO: 29 PG (ref 27–33)
MCHC RBC AUTO-ENTMCNC: 33.4 G/DL (ref 33.6–35)
MCV RBC AUTO: 86.9 FL (ref 81.4–97.8)
MICRO URNS: ABNORMAL
MONOCYTES # BLD AUTO: 0.45 K/UL (ref 0–0.85)
MONOCYTES NFR BLD AUTO: 6 % (ref 0–13.4)
MUCOUS THREADS #/AREA URNS HPF: ABNORMAL /HPF
NEUTROPHILS # BLD AUTO: 4.89 K/UL (ref 2–7.15)
NEUTROPHILS NFR BLD: 65 % (ref 44–72)
NITRITE UR QL STRIP.AUTO: NEGATIVE
NRBC # BLD AUTO: 0 K/UL
NRBC BLD AUTO-RTO: 0 /100 WBC
PH UR STRIP.AUTO: 5.5 [PH]
PLATELET # BLD AUTO: 387 K/UL (ref 164–446)
PMV BLD AUTO: 9.6 FL (ref 9–12.9)
POTASSIUM SERPL-SCNC: 4 MMOL/L (ref 3.6–5.5)
PROT SERPL-MCNC: 8.1 G/DL (ref 6–8.2)
PROT UR QL STRIP: NEGATIVE MG/DL
RBC # BLD AUTO: 5.44 M/UL (ref 4.2–5.4)
RBC # URNS HPF: ABNORMAL /HPF
RBC UR QL AUTO: ABNORMAL
SODIUM SERPL-SCNC: 136 MMOL/L (ref 135–145)
SP GR UR REFRACTOMETRY: 1.02
UNIDENT CRYS URNS QL MICRO: ABNORMAL /HPF
WBC # BLD AUTO: 7.5 K/UL (ref 4.8–10.8)
WBC #/AREA URNS HPF: ABNORMAL /HPF

## 2017-05-27 PROCEDURE — 81001 URINALYSIS AUTO W/SCOPE: CPT

## 2017-05-27 PROCEDURE — 99285 EMERGENCY DEPT VISIT HI MDM: CPT

## 2017-05-27 PROCEDURE — 85025 COMPLETE CBC W/AUTO DIFF WBC: CPT

## 2017-05-27 PROCEDURE — 96374 THER/PROPH/DIAG INJ IV PUSH: CPT

## 2017-05-27 PROCEDURE — 700105 HCHG RX REV CODE 258: Performed by: EMERGENCY MEDICINE

## 2017-05-27 PROCEDURE — 700111 HCHG RX REV CODE 636 W/ 250 OVERRIDE (IP): Performed by: EMERGENCY MEDICINE

## 2017-05-27 PROCEDURE — 83690 ASSAY OF LIPASE: CPT

## 2017-05-27 PROCEDURE — 96375 TX/PRO/DX INJ NEW DRUG ADDON: CPT

## 2017-05-27 PROCEDURE — 36415 COLL VENOUS BLD VENIPUNCTURE: CPT

## 2017-05-27 PROCEDURE — 74176 CT ABD & PELVIS W/O CONTRAST: CPT

## 2017-05-27 PROCEDURE — 80053 COMPREHEN METABOLIC PANEL: CPT

## 2017-05-27 RX ORDER — PAROXETINE 10 MG/1
20 TABLET, FILM COATED ORAL DAILY
Status: SHIPPED | COMMUNITY
End: 2019-06-02

## 2017-05-27 RX ORDER — ONDANSETRON 2 MG/ML
4 INJECTION INTRAMUSCULAR; INTRAVENOUS ONCE
Status: COMPLETED | OUTPATIENT
Start: 2017-05-27 | End: 2017-05-27

## 2017-05-27 RX ORDER — ONDANSETRON 4 MG/1
4 TABLET, FILM COATED ORAL EVERY 4 HOURS PRN
Qty: 20 TAB | Refills: 1 | Status: SHIPPED | OUTPATIENT
Start: 2017-05-27 | End: 2017-08-03

## 2017-05-27 RX ORDER — CIPROFLOXACIN 500 MG/1
500 TABLET, FILM COATED ORAL 2 TIMES DAILY
Qty: 20 TAB | Refills: 0 | Status: SHIPPED | OUTPATIENT
Start: 2017-05-27 | End: 2017-06-06

## 2017-05-27 RX ORDER — DULOXETIN HYDROCHLORIDE 30 MG/1
30 CAPSULE, DELAYED RELEASE ORAL DAILY
Status: SHIPPED | COMMUNITY
End: 2021-10-26 | Stop reason: SDUPTHER

## 2017-05-27 RX ORDER — SODIUM CHLORIDE 9 MG/ML
1000 INJECTION, SOLUTION INTRAVENOUS ONCE
Status: COMPLETED | OUTPATIENT
Start: 2017-05-27 | End: 2017-05-27

## 2017-05-27 RX ORDER — MELOXICAM 15 MG/1
15 TABLET ORAL DAILY
Status: SHIPPED | COMMUNITY
End: 2017-11-04

## 2017-05-27 RX ORDER — KETOROLAC TROMETHAMINE 30 MG/ML
30 INJECTION, SOLUTION INTRAMUSCULAR; INTRAVENOUS ONCE
Status: COMPLETED | OUTPATIENT
Start: 2017-05-27 | End: 2017-05-27

## 2017-05-27 RX ORDER — METRONIDAZOLE 500 MG/1
500 TABLET ORAL EVERY 8 HOURS
Qty: 30 TAB | Refills: 0 | Status: SHIPPED | OUTPATIENT
Start: 2017-05-27 | End: 2017-06-06

## 2017-05-27 RX ADMIN — SODIUM CHLORIDE 1000 ML: 9 INJECTION, SOLUTION INTRAVENOUS at 09:07

## 2017-05-27 RX ADMIN — ONDANSETRON 4 MG: 2 INJECTION INTRAMUSCULAR; INTRAVENOUS at 09:07

## 2017-05-27 RX ADMIN — KETOROLAC TROMETHAMINE 30 MG: 30 INJECTION, SOLUTION INTRAMUSCULAR at 09:07

## 2017-05-27 ASSESSMENT — PAIN SCALES - GENERAL: PAINLEVEL_OUTOF10: 8

## 2017-05-27 NOTE — ED PROVIDER NOTES
"ED Provider Note    CHIEF COMPLAINT  Chief Complaint   Patient presents with   • LLQ Pain       HPI  Lynette Correa is a 61 y.o. female who presents complaining of one week of left lower quadrant abdominal pain that radiates to her back. She describes nausea and vomiting. She denies having any diarrhea. She states that she was seen at the urgent care and had a small amount of blood in her urine. She denies any fevers or chills. Nothing seems to make the pain better or worse. She has not had any antibiotics in the last 30 days. She is no history of colitis or irritable bowel. The describes as the pain as constant, dull and achy.    REVIEW OF SYSTEMS    HEENT:  No ear pain, congestion or sore throat   EYES: no discharge redness or vision changes  CARDIAC: no chest pain, palpitations    PULMONARY: no dyspnea, cough or congestion   GI: See history of present illness  : no dysuria, back pain or hematuria   Neuro: no weakness, numbness aphasia or headache  Musculoskeletal: no swelling deformity or pain no joint swelling  Endocrine: no fevers, sweating, weight loss   SKIN: no rash, erythema or contusions     See history of present illness all other systems are negative        PAST MEDICAL HISTORY  Past Medical History   Diagnosis Date   • ASTHMA    • Breath shortness    • Hx of migraines    • Risk for falls      HX of falls   • Arthritis 9-23-16     \"Everywhere\"   • Dental disorder      Full Set Dentures   • Pneumonia 2013   • Unspecified hemorrhagic conditions      PROLONGED BLEEDING TIME   • Jaundice      Age 15   • kidney stones      kidney stones   • Bronchitis 2015   • COPD (chronic obstructive pulmonary disease) (CMS-HCC)    • Pain      \"everywhere\"   • Heart burn    • Hepatitis C    • Ulcer (CMS-HCC)    • Anesthesia      \"Mother had a stroke with anesthesia\"   • Anesthesia 9-23-16     \"Only with Morphine\"  Migraines & PONV    • Arrhythmia      \"2015 detected a flutter\", does not see a cardiologist, small hole " heart valve 10 years ago   • Indigestion      stomach ulcers    • Bowel habit changes 9-23-16     Diarrhea often       FAMILY HISTORY  No family history on file.    SOCIAL HISTORY  Social History     Social History   • Marital Status:      Spouse Name: N/A   • Number of Children: N/A   • Years of Education: N/A     Social History Main Topics   • Smoking status: Current Every Day Smoker -- 0.50 packs/day for 40 years     Types: Cigarettes   • Smokeless tobacco: Never Used      Comment: 1 ppd 10 yrs   • Alcohol Use: No   • Drug Use: No   • Sexual Activity: Not on file     Other Topics Concern   • Not on file     Social History Narrative       SURGICAL HISTORY  Past Surgical History   Procedure Laterality Date   • Cervical fusion posterior  7/20/2010     Performed by PREET SPEAR at SURGERY [facility]S   • Cervical decompression posterior  7/20/2010     Performed by PREET SPEAR at SURGERY [facility]   • Tonsillectomy     • Lumbar laminectomy diskectomy  1/8/2011     Performed by PREET SPEAR at SURGERY [facility]   • Mass excision general  10/4/2011     Performed by GANSER, JOHN H at SURGERY [facility] ORS   • Wrist orif Left 3/3/2016     Procedure: WRIST ORIF;  Surgeon: Campbell Love M.D.;  Location: SURGERY [facility];  Service:    • Carpal tunnel release       right   • Other orthopedic surgery       left knee   • Knee arthroplasty total       right   • Other orthopedic surgery       right index finger sewn back on   • Other orthopedic surgery       spinal surgery 3 times   • Primary c section  1989/1988/1993     x 3   • Hysterectomy laparoscopy     • Other cardiac surgery  2005     ablation  and small hole in heart valve repaired by dr medel   • Other       right shin biopsy    • Lumbar laminectomy diskectomy  10/8/2016     Procedure: LUMBAR LAMINECTOMY DISKECTOMY FPRAMINOTOMY L3-4- POSTERIOR L3-4 STABILIZATION WITH INSTRUMENTATION  WITH [employer] INSTRUMENTS;  Surgeon: Preet SOTO  "LAAT Restrepo;  Location: SURGERY [facility];  Service:        CURRENT MEDICATIONS  Home Medications     Reviewed by Radha Marx (Pharmacy Tech) on 05/27/17 at 0852  Med List Status: Complete    Medication Last Dose Status    albuterol (PROVENTIL) 2.5mg/3ml Nebu Soln solution for nebulization >week Active    albuterol 108 (90 BASE) MCG/ACT Aero Soln inhalation aerosol 5/26/2017 Active    clonidine (CATAPRES) 0.1 MG TABS 5/26/2017 Active    Cyanocobalamin (VITAMIN B-12 PO) 5/27/2017 Active    Dexlansoprazole (DEXILANT) 60 MG CAPSULE DELAYED RELEASE delayed-release capsule 5/27/2017 Active    duloxetine (CYMBALTA) 30 MG Cap DR Particles 5/27/2017 Active    gabapentin (NEURONTIN) 800 MG tablet 5/27/2017 Active    meloxicam (MOBIC) 15 MG tablet 5/27/2017 Active    paroxetine (PAXIL) 10 MG Tab 5/27/2017 Active                 ALLERGIES  Allergies   Allergen Reactions   • Codeine Rash     Difficulty breathing, and itching   • Pcn [Penicillins]      All \"cillin\" family, per patient  ---   I broke out in \"trench\" mouth   • Morphine      headache       PHYSICAL EXAM  VITAL SIGNS: /88 mmHg  Pulse 75  Temp(Src) 37.1 °C (98.8 °F)  Resp 18  Ht 1.651 m (5' 5\")  Wt 53.5 kg (117 lb 15.1 oz)  BMI 19.63 kg/m2  SpO2 98% Room air O2: 97    Constitutional: Well developed, Well nourished, No acute distress, Non-toxic appearance.   HENT: Normocephalic, Atraumatic, Bilateral external ears normal, Oropharynx moist, No oral exudates, Nose normal.   Eyes: PERRLA, EOMI, Conjunctiva normal, No discharge.   Neck: Normal range of motion, No tenderness, Supple, No stridor.   Lymphatic: No lymphadenopathy noted.   Cardiovascular: Normal heart rate, Normal rhythm, No murmurs, No rubs, No gallops.   Thorax & Lungs: Normal breath sounds, No respiratory distress, No wheezing, No chest tenderness.   Abdomen: The patient's abdomen is soft and scaphoid and bowel sounds are normal. She has some left lower quadrant tenderness " to palpation  Skin: Warm, Dry, No erythema, No rash.   Back: No tenderness, No CVA tenderness.   Extremities: Intact distal pulses, No edema, No tenderness, No cyanosis, No clubbing.   Neurologic: Alert & oriented x 3, Normal motor function, Normal sensory function, No focal deficits noted.       RADIOLOGY/PROCEDURES  CT-RENAL COLIC EVALUATION(A/P W/O)   Final Result      1.  No evidence of renal or ureteral stone or evidence of hydronephrosis. No evidence of inflammatory change.      2.  Vascular calcification.      3.  Diverticulosis.      4.  Surgical changes involving the lumbar spine.            COURSE & MEDICAL DECISION MAKING  Pertinent Labs & Imaging studies reviewed. (See chart for details)  Differential diagnosis: Kidney stone, diverticulitis, colitis    Results for orders placed or performed during the hospital encounter of 05/27/17   CBC WITH DIFFERENTIAL   Result Value Ref Range    WBC 7.5 4.8 - 10.8 K/uL    RBC 5.44 (H) 4.20 - 5.40 M/uL    Hemoglobin 15.8 12.0 - 16.0 g/dL    Hematocrit 47.3 (H) 37.0 - 47.0 %    MCV 86.9 81.4 - 97.8 fL    MCH 29.0 27.0 - 33.0 pg    MCHC 33.4 (L) 33.6 - 35.0 g/dL    RDW 48.4 35.9 - 50.0 fL    Platelet Count 387 164 - 446 K/uL    MPV 9.6 9.0 - 12.9 fL    Neutrophils-Polys 65.00 44.00 - 72.00 %    Lymphocytes 25.10 22.00 - 41.00 %    Monocytes 6.00 0.00 - 13.40 %    Eosinophils 2.50 0.00 - 6.90 %    Basophils 1.10 0.00 - 1.80 %    Immature Granulocytes 0.30 0.00 - 0.90 %    Nucleated RBC 0.00 /100 WBC    Neutrophils (Absolute) 4.89 2.00 - 7.15 K/uL    Lymphs (Absolute) 1.89 1.00 - 4.80 K/uL    Monos (Absolute) 0.45 0.00 - 0.85 K/uL    Eos (Absolute) 0.19 0.00 - 0.51 K/uL    Baso (Absolute) 0.08 0.00 - 0.12 K/uL    Immature Granulocytes (abs) 0.02 0.00 - 0.11 K/uL    NRBC (Absolute) 0.00 K/uL   COMP METABOLIC PANEL   Result Value Ref Range    Sodium 136 135 - 145 mmol/L    Potassium 4.0 3.6 - 5.5 mmol/L    Chloride 105 96 - 112 mmol/L    Co2 24 20 - 33 mmol/L    Anion Gap  7.0 0.0 - 11.9    Glucose 99 65 - 99 mg/dL    Bun 9 8 - 22 mg/dL    Creatinine 0.80 0.50 - 1.40 mg/dL    Calcium 9.7 8.4 - 10.2 mg/dL    AST(SGOT) 46 (H) 12 - 45 U/L    ALT(SGPT) 16 2 - 50 U/L    Alkaline Phosphatase 99 30 - 99 U/L    Total Bilirubin 0.8 0.1 - 1.5 mg/dL    Albumin 4.4 3.2 - 4.9 g/dL    Total Protein 8.1 6.0 - 8.2 g/dL    Globulin 3.7 (H) 1.9 - 3.5 g/dL    A-G Ratio 1.2 g/dL   LIPASE   Result Value Ref Range    Lipase 24 7 - 58 U/L   URINALYSIS CULTURE, IF INDICATED   Result Value Ref Range    Micro Urine Req Microscopic     Color Yellow     Character Clear     Ph 5.5 5.0-8.0    Glucose Negative Negative mg/dL    Ketones Trace (A) Negative mg/dL    Protein Negative Negative mg/dL    Bilirubin Negative Negative    Nitrite Negative Negative    Leukocyte Esterase Negative Negative    Occult Blood Small (A) Negative    Culture Indicated No UA Culture   REFRACTOMETER SG   Result Value Ref Range    Specific Gravity 1.025    URINE MICROSCOPIC (W/UA)   Result Value Ref Range    WBC Rare /hpf    RBC 2-5 (A) /hpf    Bacteria Few (A) None /hpf    Epithelial Cells Few Few /hpf    Mucous Threads Few /hpf    Urine Crystals Few Amorphous /hpf   ESTIMATED GFR   Result Value Ref Range    GFR If African American >60 >60 mL/min/1.73 m 2    GFR If Non African American >60 >60 mL/min/1.73 m 2        On recheck the patient's abdomen is soft without peritoneal signs, was  in the left lower quadrant. I'm going to place her on Flagyl and Cipro to treat any diverticulitis that could be present causing her pain. She feels better after IV Toradol and IV fluids for rehydration and I advised her to take Zofran for nausea and Naprosyn for pain at home. The patient is agreeable to this plan. He'll be discharged home in stable condition. She is to follow-up with her primary care physician next week for recheck or return here if worsening symptoms.    Current Outpatient Prescriptions   Medication Sig Dispense Refill   •  duloxetine (CYMBALTA) 30 MG Cap DR Particles Take 30 mg by mouth every day.     • meloxicam (MOBIC) 15 MG tablet Take 15 mg by mouth every day.     • paroxetine (PAXIL) 10 MG Tab Take 10 mg by mouth every day.     • ondansetron (ZOFRAN) 4 MG Tab tablet Take 1 Tab by mouth every four hours as needed for Nausea/Vomiting. 20 Tab 1   • metronidazole (FLAGYL) 500 MG Tab Take 1 Tab by mouth every 8 hours for 10 days. 30 Tab 0   • ciprofloxacin (CIPRO) 500 MG Tab Take 1 Tab by mouth 2 times a day for 10 days. 20 Tab 0   • gabapentin (NEURONTIN) 800 MG tablet Take 1,200 mg by mouth 3 times a day.  6   • albuterol (PROVENTIL) 2.5mg/3ml Nebu Soln solution for nebulization 2.5 mg by Nebulization route every four hours as needed for Shortness of Breath.     • albuterol 108 (90 BASE) MCG/ACT Aero Soln inhalation aerosol Inhale 2 Puffs by mouth every 6 hours as needed for Shortness of Breath.     • Cyanocobalamin (VITAMIN B-12 PO) Take  by mouth every day.     • Dexlansoprazole (DEXILANT) 60 MG CAPSULE DELAYED RELEASE delayed-release capsule Take 60 mg by mouth every day.     • clonidine (CATAPRES) 0.1 MG TABS Take 0.1 mg by mouth 2 times a day as needed.       Pee Mcknight M.D.  [address]  [place] [address]  [phone]    Call in 2 days  for recheck    [facility], Emergency Dept  [address]  [place] [id number] [phone]    As needed, If symptoms worsen        FINAL IMPRESSION  1. LLQ abdominal pain            Electronically signed by: Ольга Lemus, 5/27/2017 10:40 AM

## 2017-05-27 NOTE — DISCHARGE INSTRUCTIONS
Abdominal Pain (Nonspecific)  Your exam might not show the exact reason you have abdominal pain. Since there are many different causes of abdominal pain, another checkup and more tests may be needed. It is very important to follow up for lasting (persistent) or worsening symptoms. A possible cause of abdominal pain in any person who still has his or her appendix is acute appendicitis. Appendicitis is often hard to diagnose. Normal blood tests, urine tests, ultrasound, and CT scans do not completely rule out early appendicitis or other causes of abdominal pain. Sometimes, only the changes that happen over time will allow appendicitis and other causes of abdominal pain to be determined. Other potential problems that may require surgery may also take time to become more apparent. Because of this, it is important that you follow all of the instructions below.  HOME CARE INSTRUCTIONS   · Rest as much as possible.   · Do not eat solid food until your pain is gone.   · While adults or children have pain: A diet of water, weak decaffeinated tea, broth or bouillon, gelatin, oral rehydration solutions (ORS), frozen ice pops, or ice chips may be helpful.   · When pain is gone in adults or children: Start a light diet (dry toast, crackers, applesauce, or white rice). Increase the diet slowly as long as it does not bother you. Eat no dairy products (including cheese and eggs) and no spicy, fatty, fried, or high-fiber foods.   · Use no alcohol, caffeine, or cigarettes.   · Take your regular medicines unless your caregiver told you not to.   · Take any prescribed medicine as directed.   · Only take over-the-counter or prescription medicines for pain, discomfort, or fever as directed by your caregiver. Do not give aspirin to children.   If your caregiver has given you a follow-up appointment, it is very important to keep that appointment. Not keeping the appointment could result in a permanent injury and/or lasting (chronic) pain  and/or disability. If there is any problem keeping the appointment, you must call to reschedule.   SEEK IMMEDIATE MEDICAL CARE IF:   · Your pain is not gone in 24 hours.   · Your pain becomes worse, changes location, or feels different.   · You or your child has an oral temperature above 102° F (38.9° C), not controlled by medicine.   · Your baby is older than 3 months with a rectal temperature of 102° F (38.9° C) or higher.   · Your baby is 3 months old or younger with a rectal temperature of 100.4° F (38° C) or higher.   · You have shaking chills.   · You keep throwing up (vomiting) or cannot drink liquids.   · There is blood in your vomit or you see blood in your bowel movements.   · Your bowel movements become dark or black.   · You have frequent bowel movements.   · Your bowel movements stop (become blocked) or you cannot pass gas.   · You have bloody, frequent, or painful urination.   · You have yellow discoloration in the skin or whites of the eyes.   · Your stomach becomes bloated or bigger.   · You have dizziness or fainting.   · You have chest or back pain.   MAKE SURE YOU:   · Understand these instructions.   · Will watch your condition.   · Will get help right away if you are not doing well or get worse.   Document Released: 12/18/2006 Document Revised: 03/11/2013 Document Reviewed: 11/15/2010  ExitCare® Patient Information ©2013 [employer].

## 2017-05-27 NOTE — ED NOTES
1 week of LLQ pain that radiates to her back and upper left abd. N/V, no diarrhea. Seen at Urgent Care yesterday, did a UA that was normal

## 2017-05-27 NOTE — ED NOTES
Discharged home in good condition with prescriptions and follow up instructions, pt verbalizes understanding of all, ambulates out with steady gait accompanied by self.

## 2017-05-27 NOTE — ED AVS SNAPSHOT
5/27/2017    Lynette Correa  [address] [place] [address]    Dear Lynette:    Counts include 234 beds at the [facility] wants to ensure your discharge home is safe and you or your loved ones have had all of your questions answered regarding your care after you leave the hospital.    Below is a list of resources and contact information should you have any questions regarding your hospital stay, follow-up instructions, or active medical symptoms.    Questions or Concerns Regarding… Contact   Medical Questions Related to Your Discharge  (7 days a week, 8am-5pm) Contact a Nurse Care Coordinator   [phone]   Medical Questions Not Related to Your Discharge  (24 hours a day / 7 days a week)  Contact the Nurse Health Line   [phone]    Medications or Discharge Instructions Refer to your discharge packet   or contact your [facility] Primary Care Provider   [phone]   Follow-up Appointment(s) Schedule your appointment via [employer]   or contact Scheduling [phone]   Billing Review your statement via [employer]  or contact Billing [phone]   Medical Records Review your records via [employer]   or contact Medical Records [phone]     You may receive a telephone call within two days of discharge. This call is to make certain you understand your discharge instructions and have the opportunity to have any questions answered. You can also easily access your medical information, test results and upcoming appointments via the [employer] free online health management tool. You can learn more and sign up at [email or web address]. For assistance setting up your [employer] account, please call [phone].    Once again, we want to ensure your discharge home is safe and that you have a clear understanding of any next steps in your care. If you have any questions or concerns, please do not hesitate to contact us, we are here for you. Thank you for choosing [facility] for your healthcare needs.    Sincerely,    Your [facility] Healthcare Team

## 2017-05-27 NOTE — ED AVS SNAPSHOT
Home Care Instructions                                                                                                                Lynette Correa   MRN: [id number]    Department:  [facility], Emergency Dept   Date of Visit:  5/27/2017            [facility], Emergency Dept    [address]    [place] [address]    Phone:  [phone]      You were seen by     Ольга Lemus M.D.      Your Diagnosis Was     LLQ abdominal pain     R10.32       These are the medications you received during your hospitalization from 05/27/2017 0811 to 05/27/2017 1101     Date/Time Order Dose Route Action    05/27/2017 0907 NS infusion 1,000 mL 1,000 mL Intravenous New Bag    05/27/2017 0907 ketorolac (TORADOL) injection 30 mg 30 mg Intravenous Given    05/27/2017 0907 ondansetron (ZOFRAN) syringe/vial injection 4 mg 4 mg Intravenous Given      Follow-up Information     1. Follow up with Pee Mcknight M.D.. Call in 2 days.    Specialty:  Family Medicine    Why:  for recheck    Contact information    [address] [place] [address]  [phone]          2. Follow up with [facility], Emergency Dept.    Specialty:  Emergency Medicine    Why:  As needed, If symptoms worsen    Contact information    [address] [address] [phone]      Medication Information     Review all of your home medications and newly ordered medications with your primary doctor and/or pharmacist as soon as possible. Follow medication instructions as directed by your doctor and/or pharmacist.     Please keep your complete medication list with you and share with your physician. Update the information when medications are discontinued, doses are changed, or new medications (including over-the-counter products) are added; and carry medication information at all times in the event of emergency situations.               Medication List      START taking these  medications        Instructions    Morning Afternoon Evening Bedtime    ciprofloxacin 500 MG Tabs   Commonly known as:  CIPRO        Take 1 Tab by mouth 2 times a day for 10 days.   Dose:  500 mg                        metronidazole 500 MG Tabs   Commonly known as:  FLAGYL        Take 1 Tab by mouth every 8 hours for 10 days.   Dose:  500 mg                        ondansetron 4 MG Tabs tablet   Commonly known as:  ZOFRAN        Take 1 Tab by mouth every four hours as needed for Nausea/Vomiting.   Dose:  4 mg                          ASK your doctor about these medications        Instructions    Morning Afternoon Evening Bedtime    * albuterol 2.5mg/3ml Nebu solution for nebulization   Commonly known as:  PROVENTIL        2.5 mg by Nebulization route every four hours as needed for Shortness of Breath.   Dose:  2.5 mg                        * albuterol 108 (90 BASE) MCG/ACT Aers inhalation aerosol        Inhale 2 Puffs by mouth every 6 hours as needed for Shortness of Breath.   Dose:  2 Puff                        clonidine 0.1 MG Tabs   Commonly known as:  CATAPRES        Take 0.1 mg by mouth 2 times a day as needed.   Dose:  0.1 mg                        DEXILANT 60 MG Cpdr delayed-release capsule   Generic drug:  Dexlansoprazole        Take 60 mg by mouth every day.   Dose:  60 mg                        duloxetine 30 MG Cpep   Commonly known as:  CYMBALTA        Take 30 mg by mouth every day.   Dose:  30 mg                        gabapentin 800 MG tablet   Commonly known as:  NEURONTIN        Take 1,200 mg by mouth 3 times a day.   Dose:  1200 mg                        meloxicam 15 MG tablet   Commonly known as:  MOBIC        Take 15 mg by mouth every day.   Dose:  15 mg                        paroxetine 10 MG Tabs   Commonly known as:  PAXIL        Take 10 mg by mouth every day.   Dose:  10 mg                        VITAMIN B-12 PO        Take  by mouth every day.                        * Notice:  This list has 2  medication(s) that are the same as other medications prescribed for you. Read the directions carefully, and ask your doctor or other care provider to review them with you.         Where to Get Your Medications      These medications were sent to [id number] - [place] - [address] AT [facility] OF [facility] & [address]  [address] [place] [address]     Phone:  [phone]    - ciprofloxacin 500 MG Tabs  - metronidazole 500 MG Tabs      You can get these medications from any pharmacy     Bring a paper prescription for each of these medications    - ondansetron 4 MG Tabs tablet            Procedures and tests performed during your visit     CBC WITH DIFFERENTIAL    COMP METABOLIC PANEL    CT-RENAL COLIC EVALUATION(A/P W/O)    ESTIMATED GFR    IV Saline Lock    LIPASE    REFRACTOMETER SG    URINALYSIS CULTURE, IF INDICATED    URINE MICROSCOPIC (W/UA)        Discharge Instructions       Abdominal Pain (Nonspecific)  Your exam might not show the exact reason you have abdominal pain. Since there are many different causes of abdominal pain, another checkup and more tests may be needed. It is very important to follow up for lasting (persistent) or worsening symptoms. A possible cause of abdominal pain in any person who still has his or her appendix is acute appendicitis. Appendicitis is often hard to diagnose. Normal blood tests, urine tests, ultrasound, and CT scans do not completely rule out early appendicitis or other causes of abdominal pain. Sometimes, only the changes that happen over time will allow appendicitis and other causes of abdominal pain to be determined. Other potential problems that may require surgery may also take time to become more apparent. Because of this, it is important that you follow all of the instructions below.  HOME CARE INSTRUCTIONS   · Rest as much as possible.   · Do not eat solid food until your pain is gone.   · While adults or children have pain: A diet of water,  weak decaffeinated tea, broth or bouillon, gelatin, oral rehydration solutions (ORS), frozen ice pops, or ice chips may be helpful.   · When pain is gone in adults or children: Start a light diet (dry toast, crackers, applesauce, or white rice). Increase the diet slowly as long as it does not bother you. Eat no dairy products (including cheese and eggs) and no spicy, fatty, fried, or high-fiber foods.   · Use no alcohol, caffeine, or cigarettes.   · Take your regular medicines unless your caregiver told you not to.   · Take any prescribed medicine as directed.   · Only take over-the-counter or prescription medicines for pain, discomfort, or fever as directed by your caregiver. Do not give aspirin to children.   If your caregiver has given you a follow-up appointment, it is very important to keep that appointment. Not keeping the appointment could result in a permanent injury and/or lasting (chronic) pain and/or disability. If there is any problem keeping the appointment, you must call to reschedule.   SEEK IMMEDIATE MEDICAL CARE IF:   · Your pain is not gone in 24 hours.   · Your pain becomes worse, changes location, or feels different.   · You or your child has an oral temperature above 102° F (38.9° C), not controlled by medicine.   · Your baby is older than 3 months with a rectal temperature of 102° F (38.9° C) or higher.   · Your baby is 3 months old or younger with a rectal temperature of 100.4° F (38° C) or higher.   · You have shaking chills.   · You keep throwing up (vomiting) or cannot drink liquids.   · There is blood in your vomit or you see blood in your bowel movements.   · Your bowel movements become dark or black.   · You have frequent bowel movements.   · Your bowel movements stop (become blocked) or you cannot pass gas.   · You have bloody, frequent, or painful urination.   · You have yellow discoloration in the skin or whites of the eyes.   · Your stomach becomes bloated or bigger.   · You have  dizziness or fainting.   · You have chest or back pain.   MAKE SURE YOU:   · Understand these instructions.   · Will watch your condition.   · Will get help right away if you are not doing well or get worse.   Document Released: 12/18/2006 Document Revised: 03/11/2013 Document Reviewed: 11/15/2010  ExitCare® Patient Information ©2013 [employer].          Patient Information     Patient Information    Following emergency treatment: all patient requiring follow-up care must return either to a private physician or a clinic if your condition worsens before you are able to obtain further medical attention, please return to the emergency room.     Billing Information    At [facility], we work to make the billing process streamlined for our patients.  Our Representatives are here to answer any questions you may have regarding your hospital bill.  If you have insurance coverage and have supplied your insurance information to us, we will submit a claim to your insurer on your behalf.  Should you have any questions regarding your bill, we can be reached online or by phone as follows:  Online: You are able pay your bills online or live chat with our representatives about any billing questions you may have. We are here to help Monday - Friday from 8:00am to 7:30pm and 9:00am - 12:00pm on Saturdays.  Please visit [email or web address]  for more information.   Phone:  [phone] or [phone]    Please note that your emergency physician, surgeon, pathologist, radiologist, anesthesiologist, and other specialists are not employed by [facility] and will therefore bill separately for their services.  Please contact them directly for any questions concerning their bills at the numbers below:     Emergency Physician Services:  [phone]  [facility]:  [phone]  [facility]:  [phone]  [facility]:  [phone]    1. Your final bill may  vary from the amount quoted upon discharge if all procedures are not complete at that time, or if your doctor has additional procedures of which we are not aware. You will receive an additional bill if you return to the Emergency Department at [facility] for suture removal regardless of the facility of which the sutures were placed.     2. Please arrange for settlement of this account at the emergency registration.    3. All self-pay accounts are due in full at the time of treatment.  If you are unable to meet this obligation then payment is expected within 4-5 days.     4. If you have had radiology studies (CT, X-ray, Ultrasound, MRI), you have received a preliminary result during your emergency department visit. Please contact the radiology department [phone] to receive a copy of your final result. Please discuss the Final result with your primary physician or with the follow up physician provided.     Crisis Hotline:  [place] Crisis Hotline:  1-653-IZNFTBW or 1-694.642.3986  [place] Crisis Hotline:    1-966.217.8961 or 951-295-4132         ED Discharge Follow Up Questions    1. In order to provide you with very good care, we would like to follow up with a phone call in the next few days.  May we have your permission to contact you?     YES /  NO    2. What is the best phone number to call you? (       )_____-__________    3. What is the best time to call you?      Morning  /  Afternoon  /  Evening                   Patient Signature:  ____________________________________________________________    Date:  ____________________________________________________________

## 2017-05-27 NOTE — ED AVS SNAPSHOT
[employer] Access Code: Activation code not generated  Current [employer] Status: Active    TASShart  A secure, online tool to manage your health information     [employer]’s [employer]® is a secure, online tool that connects you to your personalized health information from the privacy of your home -- day or night - making it very easy for you to manage your healthcare. Once the activation process is completed, you can even access your medical information using the [employer] taylor, which is available for free in the Apple Taylor store or Google Play store.     [employer] provides the following levels of access (as shown below):   My Chart Features   [facility] Primary Care Doctor [facility]  Specialists [facility]  Urgent  Care Non-[facility]  Primary Care  Doctor   Email your healthcare team securely and privately 24/7 X X X X   Manage appointments: schedule your next appointment; view details of past/upcoming appointments X      Request prescription refills. X      View recent personal medical records, including lab and immunizations X X X X   View health record, including health history, allergies, medications X X X X   Read reports about your outpatient visits, procedures, consult and ER notes X X X X   See your discharge summary, which is a recap of your hospital and/or ER visit that includes your diagnosis, lab results, and care plan. X X       How to register for [employer]:  1. Go to  [email or web address].  2. Click on the Sign Up Now box, which takes you to the New Member Sign Up page. You will need to provide the following information:  a. Enter your [employer] Access Code exactly as it appears at the top of this page. (You will not need to use this code after you’ve completed the sign-up process. If you do not sign up before the expiration date, you must request a new code.)   b. Enter your date of birth.   c. Enter your home email address.   d. Click Submit, and follow the next screen’s instructions.  3. Create a [employer] ID. This will  be your [employer] login ID and cannot be changed, so think of one that is secure and easy to remember.  4. Create a [employer] password. You can change your password at any time.  5. Enter your Password Reset Question and Answer. This can be used at a later time if you forget your password.   6. Enter your e-mail address. This allows you to receive e-mail notifications when new information is available in [employer].  7. Click Sign Up. You can now view your health information.    For assistance activating your [employer] account, call [phone]

## 2017-07-02 ENCOUNTER — OFFICE VISIT (OUTPATIENT)
Dept: URGENT CARE | Facility: PHYSICIAN GROUP | Age: 62
End: 2017-07-02
Payer: MEDICARE

## 2017-07-02 ENCOUNTER — APPOINTMENT (OUTPATIENT)
Dept: RADIOLOGY | Facility: IMAGING CENTER | Age: 62
End: 2017-07-02
Attending: PHYSICIAN ASSISTANT
Payer: MEDICARE

## 2017-07-02 VITALS
BODY MASS INDEX: 21.62 KG/M2 | RESPIRATION RATE: 16 BRPM | HEIGHT: 63 IN | DIASTOLIC BLOOD PRESSURE: 74 MMHG | WEIGHT: 122 LBS | TEMPERATURE: 98.4 F | HEART RATE: 78 BPM | SYSTOLIC BLOOD PRESSURE: 126 MMHG | OXYGEN SATURATION: 97 %
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
## 2017-07-02 DIAGNOSIS — S99.911A RIGHT ANKLE INJURY, INITIAL ENCOUNTER: ICD-10-CM

## 2017-07-02 DIAGNOSIS — M54.50 ACUTE BILATERAL LOW BACK PAIN WITHOUT SCIATICA: ICD-10-CM

## 2017-07-02 PROCEDURE — 99214 OFFICE O/P EST MOD 30 MIN: CPT | Performed by: PHYSICIAN ASSISTANT

## 2017-07-02 PROCEDURE — 73610 X-RAY EXAM OF ANKLE: CPT | Mod: TC,RT | Performed by: PHYSICIAN ASSISTANT

## 2017-07-02 PROCEDURE — 72100 X-RAY EXAM L-S SPINE 2/3 VWS: CPT | Mod: TC | Performed by: PHYSICIAN ASSISTANT

## 2017-07-02 RX ORDER — HYDROCODONE BITARTRATE AND ACETAMINOPHEN 5; 325 MG/1; MG/1
1-2 TABLET ORAL EVERY 6 HOURS PRN
Qty: 15 TAB | Refills: 0 | Status: SHIPPED | OUTPATIENT
Start: 2017-07-02 | End: 2017-08-03

## 2017-07-02 ASSESSMENT — ENCOUNTER SYMPTOMS
CARDIOVASCULAR NEGATIVE: 1
HEADACHES: 0
FALLS: 1
ABDOMINAL PAIN: 0
VOMITING: 0
VISUAL CHANGE: 0
DIZZINESS: 0
BACK PAIN: 1
TINGLING: 0
NAUSEA: 0
NUMBNESS: 0
LOSS OF CONSCIOUSNESS: 0
DIARRHEA: 0
EYES NEGATIVE: 1
RESPIRATORY NEGATIVE: 1

## 2017-07-02 ASSESSMENT — PAIN SCALES - GENERAL: PAINLEVEL: 9=SEVERE PAIN

## 2017-07-02 NOTE — PROGRESS NOTES
Subjective:      Lynette Correa is a 61 y.o. female who presents with Fall            Fall  The accident occurred 1 to 3 hours ago. The fall occurred while walking. She fell from a height of 1 to 2 ft. She landed on hard floor. There was no blood loss. The point of impact was the right foot, right hip and right wrist (back). The pain is present in the back, right foot and right hip. The pain is at a severity of 9/10. The pain is severe. Pertinent negatives include no abdominal pain, headaches, hematuria, loss of consciousness, nausea, numbness, tingling, visual change or vomiting. She has tried nothing for the symptoms. The treatment provided no relief.   Patient fell approximately 2 feet and landed on a pile of rocks. The point of impact was her right side. Did not hit head, did not lose consciousness. No abdominal pain or hematuria. Denies dizziness, numbness tingling, headache. Pain now is in the lower lumbar region and right ankle. Several previous spinal surgeries.    PMH:  has a past medical history of ASTHMA; Breath shortness; migraines; Risk for falls; Arthritis (9-23-16); Dental disorder; Pneumonia (2013); Unspecified hemorrhagic conditions; Jaundice; kidney stones; Bronchitis (2015); COPD (chronic obstructive pulmonary disease) (CMS-HCC); Pain; Heart burn; Hepatitis C; Ulcer (CMS-HCC); Anesthesia; Anesthesia (9-23-16); Arrhythmia; Indigestion; and Bowel habit changes (9-23-16).  MEDS:   Current outpatient prescriptions:   •  duloxetine (CYMBALTA) 30 MG Cap DR Particles, Take 30 mg by mouth every day., Disp: , Rfl:   •  meloxicam (MOBIC) 15 MG tablet, Take 15 mg by mouth every day., Disp: , Rfl:   •  paroxetine (PAXIL) 10 MG Tab, Take 10 mg by mouth every day., Disp: , Rfl:   •  ondansetron (ZOFRAN) 4 MG Tab tablet, Take 1 Tab by mouth every four hours as needed for Nausea/Vomiting., Disp: 20 Tab, Rfl: 1  •  gabapentin (NEURONTIN) 800 MG tablet, Take 1,200 mg by mouth 3 times a day., Disp: , Rfl: 6  •   "albuterol (PROVENTIL) 2.5mg/3ml Nebu Soln solution for nebulization, 2.5 mg by Nebulization route every four hours as needed for Shortness of Breath., Disp: , Rfl:   •  albuterol 108 (90 BASE) MCG/ACT Aero Soln inhalation aerosol, Inhale 2 Puffs by mouth every 6 hours as needed for Shortness of Breath., Disp: , Rfl:   •  Cyanocobalamin (VITAMIN B-12 PO), Take  by mouth every day., Disp: , Rfl:   •  Dexlansoprazole (DEXILANT) 60 MG CAPSULE DELAYED RELEASE delayed-release capsule, Take 60 mg by mouth every day., Disp: , Rfl:   •  clonidine (CATAPRES) 0.1 MG TABS, Take 0.1 mg by mouth 2 times a day as needed., Disp: , Rfl:   ALLERGIES:   Allergies   Allergen Reactions   • Codeine Rash     Difficulty breathing, and itching   • Pcn [Penicillins]      All \"cillin\" family, per patient  ---   I broke out in \"trench\" mouth   • Morphine      headache     SURGHX:   Past Surgical History   Procedure Laterality Date   • Cervical fusion posterior  7/20/2010     Performed by PREET SPEAR at SURGERY [facility]   • Cervical decompression posterior  7/20/2010     Performed by PREET SPEAR at SURGERY [facility]   • Tonsillectomy     • Lumbar laminectomy diskectomy  1/8/2011     Performed by PREET SPEAR at [facility]   • Mass excision general  10/4/2011     Performed by GANSER, JOHN H at [facility]   • Wrist orif Left 3/3/2016     Procedure: WRIST ORIF;  Surgeon: Campbell Love M.D.;  Location: SURGERY [facility];  Service:    • Carpal tunnel release       right   • Other orthopedic surgery       left knee   • Knee arthroplasty total       right   • Other orthopedic surgery       right index finger sewn back on   • Other orthopedic surgery       spinal surgery 3 times   • Primary c section  1989/1988/1993     x 3   • Hysterectomy laparoscopy     • Other cardiac surgery  2005     ablation  and small hole in heart valve repaired by dr medel   • Other       right shin biopsy    • Lumbar " "laminectomy diskectomy  10/8/2016     Procedure: LUMBAR LAMINECTOMY DISKECTOMY FPRAMINOTOMY L3-4- POSTERIOR L3-4 STABILIZATION WITH INSTRUMENTATION  WITH [employer]ON[employer] INSTRUMENTS;  Surgeon: Baldemar Restrepo M.D.;  Location: SURGERY [facility];  Service:      SOCHX:  reports that she has been smoking Cigarettes.  She has a 20 pack-year smoking history. She has never used smokeless tobacco. She reports that she does not drink alcohol or use illicit drugs.  FH: family history is not on file.      Review of Systems   Eyes: Negative.    Respiratory: Negative.    Cardiovascular: Negative.    Gastrointestinal: Negative for nausea, vomiting, abdominal pain and diarrhea.   Genitourinary: Negative for hematuria.   Musculoskeletal: Positive for back pain, joint pain and falls.   Neurological: Negative for dizziness, tingling, loss of consciousness, numbness and headaches.       Medications, Allergies, and current problem list reviewed today in Epic     Objective:     /74 mmHg  Pulse 78  Temp(Src) 36.9 °C (98.4 °F)  Resp 16  Ht 1.6 m (5' 2.99\")  Wt 55.339 kg (122 lb)  BMI 21.62 kg/m2  SpO2 97%     Physical Exam   Constitutional: She is oriented to person, place, and time. She appears well-developed and well-nourished. No distress.   HENT:   Head: Normocephalic and atraumatic.   Right Ear: External ear normal.   Left Ear: External ear normal.   Eyes: Conjunctivae and EOM are normal. Right eye exhibits no discharge. Left eye exhibits no discharge.   Neck: Normal range of motion. Neck supple.   Cardiovascular: Normal rate, regular rhythm and normal heart sounds.    Pulmonary/Chest: Effort normal and breath sounds normal. No respiratory distress. She has no wheezes.   Musculoskeletal:        Right ankle: She exhibits decreased range of motion and swelling. She exhibits no ecchymosis and no deformity. Tenderness. Lateral malleolus and AITFL tenderness found. Achilles tendon normal.        Lumbar back: She exhibits " decreased range of motion (chronic), tenderness and pain. She exhibits no bony tenderness, no deformity and no spasm.        Back:         Feet:    Neurological: She is alert and oriented to person, place, and time.   Skin: Skin is warm and dry. She is not diaphoretic.   Psychiatric: She has a normal mood and affect. Her behavior is normal. Judgment and thought content normal.   Nursing note and vitals reviewed.         Xray: no fracture or dislocation by my read. Radiology review pending.       Assessment/Plan:     1. Fall, initial encounter  DX-ANKLE 3+ VIEWS RIGHT    DX-LUMBAR SPINE-2 OR 3 VIEWS    hydrocodone-acetaminophen (NORCO) 5-325 MG Tab per tablet   2. Acute bilateral low back pain without sciatica  DX-LUMBAR SPINE-2 OR 3 VIEWS    hydrocodone-acetaminophen (NORCO) 5-325 MG Tab per tablet   3. Right ankle injury, initial encounter  DX-ANKLE 3+ VIEWS RIGHT    hydrocodone-acetaminophen (NORCO) 5-325 MG Tab per tablet     X-ray negative for any acute injuries. Vital signs normal, no red flag symptoms. Advised patient that given she just had the accident within the last hour, new injuries and pain may arise. Return to clinic if anything changes.  Norco for breakthrough pain  [facility] Aware web site evaluation: I have obtained and reviewed patient utilization report from [facility] pharmacy database prior to writing prescription for controlled substance II, III or IV. Based on the report and my clinical assessment the prescription is medically necessary.   Patient is cautioned on sedation potential of narcotic medication; no drinking, driving or operating heavy machinery while on this medication.  OTC meds and conservative measures as discussed  Return to clinic or go to ED if symptoms worsen or persist. Indications for ED discussed at length. Patient voices understanding. Follow-up with your primary care provider in 3-5 days. Red flags discussed.    Please note that this dictation was created using voice  recognition software. I have made every reasonable attempt to correct obvious errors, but I expect that there are errors of grammar and possibly content that I did not discover before finalizing the note.

## 2017-07-02 NOTE — MR AVS SNAPSHOT
"        Lyntete Joshuabow   2017 4:30 PM   Office Visit   MRN: [id number]    Department:  [facility]   Dept Phone:  [phone]    Description:  Female : 1955   Provider:  Alex Najera PA-C           Reason for Visit     Fall right ankle, hip and arm; left inner thigh, middle of back and tailbone pain. Pt fell about three feet onto pile of rocks      Allergies as of 2017     Allergen Noted Reactions    Codeine 2010   Rash    Difficulty breathing, and itching    Pcn [Penicillins] 2010       All \"cillin\" family, per patient  ---   I broke out in \"trench\" mouth    Morphine 2011       headache      You were diagnosed with     Fall, initial encounter   [314805]       Acute bilateral low back pain without sciatica   [1020216]       Right ankle injury, initial encounter   [[id number]]         Vital Signs     Blood Pressure Pulse Temperature Respirations Height Weight    126/74 mmHg 78 36.9 °C (98.4 °F) 16 1.6 m (5' 2.99\") 55.339 kg (122 lb)    Body Mass Index Oxygen Saturation Smoking Status             21.62 kg/m2 97% Current Every Day Smoker         Basic Information     Date Of Birth Sex Race Ethnicity Preferred Language    1955 Female White Non- English      Problem List              ICD-10-CM Priority Class Noted - Resolved    Closed die-punch intra-articular fracture of distal end of left radius S52.572A   3/3/2016 - Present    Lumbar spinal stenosis M48.06   10/8/2016 - Present      Health Maintenance        Date Due Completion Dates    PAP SMEAR 1974 ---    MAMMOGRAM 1993 ---    COLONOSCOPY 2003 ---    IMM ZOSTER VACCINE 2013 ---    IMM INFLUENZA (1) 2017 ---    IMM DTaP/Tdap/Td Vaccine (2 - Td) 3/31/2027 3/31/2017            Current Immunizations     Influenza Vaccine 2011, 2010    Pertussis Vaccine 2009    Pneumococcal Vaccine (UF)Historical Data 10/4/2007    Tdap Vaccine 3/31/2017      Below and/or attached are the " medications your provider expects you to take. Review all of your home medications and newly ordered medications with your provider and/or pharmacist. Follow medication instructions as directed by your provider and/or pharmacist. Please keep your medication list with you and share with your provider. Update the information when medications are discontinued, doses are changed, or new medications (including over-the-counter products) are added; and carry medication information at all times in the event of emergency situations     Allergies:  CODEINE - Rash     PCN - (reactions not documented)     MORPHINE - (reactions not documented)               Medications  Valid as of: July 02, 2017 -  5:19 PM    Generic Name Brand Name Tablet Size Instructions for use    Albuterol Sulfate (Nebu Soln) PROVENTIL 2.5mg/3ml 2.5 mg by Nebulization route every four hours as needed for Shortness of Breath.        Albuterol Sulfate (Aero Soln) albuterol 108 (90 BASE) MCG/ACT Inhale 2 Puffs by mouth every 6 hours as needed for Shortness of Breath.        CloNIDine HCl (Tab) CATAPRES 0.1 MG Take 0.1 mg by mouth 2 times a day as needed.        Cyanocobalamin   Take  by mouth every day.        Dexlansoprazole (CAPSULE DELAYED RELEASE) Dexlansoprazole 60 MG Take 60 mg by mouth every day.        DULoxetine HCl (Cap DR Particles) CYMBALTA 30 MG Take 30 mg by mouth every day.        Gabapentin (Tab) NEURONTIN 800 MG Take 1,200 mg by mouth 3 times a day.        Hydrocodone-Acetaminophen (Tab) NORCO 5-325 MG Take 1-2 Tabs by mouth every 6 hours as needed.        Meloxicam (Tab) MOBIC 15 MG Take 15 mg by mouth every day.        Ondansetron HCl (Tab) ZOFRAN 4 MG Take 1 Tab by mouth every four hours as needed for Nausea/Vomiting.        PARoxetine HCl (Tab) PAXIL 10 MG Take 10 mg by mouth every day.        .                 Medicines prescribed today were sent to:     [facility] [address] [facility], [place] - [address] AT [facility] [employer]  [address] [address] [place] [address]    Phone: [phone] Fax: [phone]    Open 24 Hours?: No      Medication refill instructions:       If your prescription bottle indicates you have medication refills left, it is not necessary to call your provider’s office. Please contact your pharmacy and they will refill your medication.    If your prescription bottle indicates you do not have any refills left, you may request refills at any time through one of the following ways: The online [employer] system (except Urgent Care), by calling your provider’s office, or by asking your pharmacy to contact your provider’s office with a refill request. Medication refills are processed only during regular business hours and may not be available until the next business day. Your provider may request additional information or to have a follow-up visit with you prior to refilling your medication.   *Please Note: Medication refills are assigned a new Rx number when refilled electronically. Your pharmacy may indicate that no refills were authorized even though a new prescription for the same medication is available at the pharmacy. Please request the medicine by name with the pharmacy before contacting your provider for a refill.        Your To Do List     Future Labs/Procedures Complete By Expires    DX-ANKLE 3+ VIEWS RIGHT  As directed 7/2/2018    DX-LUMBAR SPINE-2 OR 3 VIEWS  As directed 7/2/2018         [employer] Access Code: Activation code not generated  Current Net [employer] Status: Active          Quit Tobacco Information     Do you want to quit using tobacco?    Quitting tobacco decreases risks of cancer, heart and lung disease, increases life expectancy, improves sense of taste and smell, and increases spending money, among other benefits.    If you are thinking about quitting, we can help.  • [facility] Quit Tobacco Program: [phone]  o Program occurs weekly for four weeks and includes pharmacist consultation on products to  support quitting smoking or chewing tobacco. A provider referral is needed for pharmacist consultation.  • Tobacco Users Help Hotline: 7-778-QUIT-NOW (912-6868) or https://[place].quitlogix.org/  o Free, confidential telephone and online coaching for [place] residents. Sessions are designed on a schedule that is convenient for you. Eligible clients receive free nicotine replacement therapy.  • Nationally: www.smokefree.gov  o Information and professional assistance to support both immediate and long-term needs as you become, and remain, a non-smoker. Smokefree.gov allows you to choose the help that best fits your needs.

## 2017-08-01 ENCOUNTER — OFFICE VISIT (OUTPATIENT)
Dept: BEHAVIORAL HEALTH | Facility: PHYSICIAN GROUP | Age: 62
End: 2017-08-01
Payer: MEDICARE

## 2017-08-01 DIAGNOSIS — G89.29 OTHER CHRONIC PAIN: ICD-10-CM

## 2017-08-01 DIAGNOSIS — F10.11 ALCOHOL ABUSE, IN REMISSION: ICD-10-CM

## 2017-08-01 DIAGNOSIS — F33.9 MAJOR DEPRESSIVE DISORDER, RECURRENT EPISODE WITH ANXIOUS DISTRESS (HCC): ICD-10-CM

## 2017-08-01 PROCEDURE — 90791 PSYCH DIAGNOSTIC EVALUATION: CPT | Performed by: PSYCHOLOGIST
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
## 2017-08-03 ENCOUNTER — APPOINTMENT (OUTPATIENT)
Dept: RADIOLOGY | Facility: MEDICAL CENTER | Age: 62
End: 2017-08-03
Attending: EMERGENCY MEDICINE
Payer: MEDICARE

## 2017-08-03 ENCOUNTER — HOSPITAL ENCOUNTER (EMERGENCY)
Facility: MEDICAL CENTER | Age: 62
End: 2017-08-03
Attending: EMERGENCY MEDICINE
Payer: MEDICARE

## 2017-08-03 VITALS — WEIGHT: 122.8 LBS | BODY MASS INDEX: 20.46 KG/M2 | HEIGHT: 65 IN

## 2017-08-03 DIAGNOSIS — L03.90 CELLULITIS, UNSPECIFIED CELLULITIS SITE: ICD-10-CM

## 2017-08-03 DIAGNOSIS — L08.9 FINGER INFECTION: ICD-10-CM

## 2017-08-03 LAB
GRAM STN SPEC: NORMAL
SIGNIFICANT IND 70042: NORMAL
SITE SITE: NORMAL
SOURCE SOURCE: NORMAL

## 2017-08-03 PROCEDURE — 90715 TDAP VACCINE 7 YRS/> IM: CPT | Performed by: EMERGENCY MEDICINE

## 2017-08-03 PROCEDURE — 90471 IMMUNIZATION ADMIN: CPT

## 2017-08-03 PROCEDURE — 99283 EMERGENCY DEPT VISIT LOW MDM: CPT

## 2017-08-03 PROCEDURE — 87205 SMEAR GRAM STAIN: CPT

## 2017-08-03 PROCEDURE — 87070 CULTURE OTHR SPECIMN AEROBIC: CPT

## 2017-08-03 PROCEDURE — 87186 SC STD MICRODIL/AGAR DIL: CPT

## 2017-08-03 PROCEDURE — 700111 HCHG RX REV CODE 636 W/ 250 OVERRIDE (IP): Performed by: EMERGENCY MEDICINE

## 2017-08-03 PROCEDURE — 73140 X-RAY EXAM OF FINGER(S): CPT | Mod: RT

## 2017-08-03 PROCEDURE — 87077 CULTURE AEROBIC IDENTIFY: CPT

## 2017-08-03 RX ORDER — CLINDAMYCIN HYDROCHLORIDE 300 MG/1
300 CAPSULE ORAL 4 TIMES DAILY
Qty: 20 CAP | Refills: 0 | Status: SHIPPED | OUTPATIENT
Start: 2017-08-03 | End: 2017-08-08

## 2017-08-03 RX ORDER — ACETAMINOPHEN 325 MG/1
650 TABLET ORAL EVERY 4 HOURS PRN
Status: SHIPPED | COMMUNITY
End: 2018-01-08

## 2017-08-03 RX ORDER — HYDROCODONE BITARTRATE AND ACETAMINOPHEN 5; 325 MG/1; MG/1
1-2 TABLET ORAL EVERY 6 HOURS PRN
Qty: 10 TAB | Refills: 0 | Status: SHIPPED | OUTPATIENT
Start: 2017-08-03 | End: 2017-11-04

## 2017-08-03 RX ADMIN — CLOSTRIDIUM TETANI TOXOID ANTIGEN (FORMALDEHYDE INACTIVATED), CORYNEBACTERIUM DIPHTHERIAE TOXOID ANTIGEN (FORMALDEHYDE INACTIVATED), BORDETELLA PERTUSSIS TOXOID ANTIGEN (GLUTARALDEHYDE INACTIVATED), BORDETELLA PERTUSSIS FILAMENTOUS HEMAGGLUTININ ANTIGEN (FORMALDEHYDE INACTIVATED), BORDETELLA PERTUSSIS PERTACTIN ANTIGEN, AND BORDETELLA PERTUSSIS FIMBRIAE 2/3 ANTIGEN 0.5 ML: 5; 2; 2.5; 5; 3; 5 INJECTION, SUSPENSION INTRAMUSCULAR at 10:51

## 2017-08-03 ASSESSMENT — PAIN SCALES - GENERAL: PAINLEVEL_OUTOF10: 7

## 2017-08-03 NOTE — ED AVS SNAPSHOT
Home Care Instructions                                                                                                                Lynette Correa   MRN: [id number]    Department:  [facility], Emergency Dept   Date of Visit:  8/3/2017            [facility], Emergency Dept    [address] [place] [address]    Phone:  [phone]      You were seen by     Issa Del Real M.D.      Your Diagnosis Was     Finger infection     L08.9       These are the medications you received during your hospitalization from 08/03/2017 0811 to 08/03/2017 1056     Date/Time Order Dose Route Action    08/03/2017 1051 tetanus-dipth-acell pertussis (ADACEL) inj 0.5 mL 0.5 mL Intramuscular Given      Follow-up Information     1. Follow up with MICHOACANO Maciel. Schedule an appointment as soon as possible for a visit today.    Specialty:  Orthopaedics    Why:  see Dr. Kim in his office tomorrow. He is expecting a call    Contact information    [address] [place] [address]  [phone]        Medication Information     Review all of your home medications and newly ordered medications with your primary doctor and/or pharmacist as soon as possible. Follow medication instructions as directed by your doctor and/or pharmacist.     Please keep your complete medication list with you and share with your physician. Update the information when medications are discontinued, doses are changed, or new medications (including over-the-counter products) are added; and carry medication information at all times in the event of emergency situations.               Medication List      START taking these medications        Instructions    Morning Afternoon Evening Bedtime    clindamycin 300 MG Caps   Commonly known as:  CLEOCIN        Take 1 Cap by mouth 4 times a day for 5 days.   Dose:  300 mg                        hydrocodone-acetaminophen 5-325 MG Tabs per tablet   Commonly  known as:  NORCO        Take 1-2 Tabs by mouth every 6 hours as needed.   Dose:  1-2 Tab                          ASK your doctor about these medications        Instructions    Morning Afternoon Evening Bedtime    acetaminophen 325 MG Tabs   Commonly known as:  TYLENOL        Take 650 mg by mouth every four hours as needed for Mild Pain.   Dose:  650 mg                        * albuterol 2.5mg/3ml Nebu solution for nebulization   Commonly known as:  PROVENTIL        2.5 mg by Nebulization route every four hours as needed for Shortness of Breath.   Dose:  2.5 mg                        * albuterol 108 (90 BASE) MCG/ACT Aers inhalation aerosol        Inhale 2 Puffs by mouth every 6 hours as needed for Shortness of Breath.   Dose:  2 Puff                        clonidine 0.1 MG Tabs   Commonly known as:  CATAPRES        Take 0.1 mg by mouth 2 times a day as needed.   Dose:  0.1 mg                        DEXILANT 60 MG Cpdr delayed-release capsule   Generic drug:  Dexlansoprazole        Take 60 mg by mouth every day.   Dose:  60 mg                        duloxetine 30 MG Cpep   Commonly known as:  CYMBALTA        Take 30 mg by mouth every day.   Dose:  30 mg                        gabapentin 800 MG tablet   Commonly known as:  NEURONTIN        Take 1,200 mg by mouth 3 times a day.   Dose:  1200 mg                        meloxicam 15 MG tablet   Commonly known as:  MOBIC        Take 15 mg by mouth every day.   Dose:  15 mg                        paroxetine 10 MG Tabs   Commonly known as:  PAXIL        Take 10 mg by mouth every day.   Dose:  10 mg                        VITAMIN B-12 PO        Take  by mouth every day.                        * Notice:  This list has 2 medication(s) that are the same as other medications prescribed for you. Read the directions carefully, and ask your doctor or other care provider to review them with you.         Where to Get Your Medications      These medications were sent to [facility]  [facility] [place] - [address] [facility] [address]  [address] [place] [address]     Phone:  [phone]    - clindamycin 300 MG Caps      You can get these medications from any pharmacy     Bring a paper prescription for each of these medications    - hydrocodone-acetaminophen 5-325 MG Tabs per tablet            Procedures and tests performed during your visit     DX-FINGER(S) 2+ RIGHT            Patient Information     Patient Information    Following emergency treatment: all patient requiring follow-up care must return either to a private physician or a clinic if your condition worsens before you are able to obtain further medical attention, please return to the emergency room.     Billing Information    At [facility], we work to make the billing process streamlined for our patients.  Our Representatives are here to answer any questions you may have regarding your hospital bill.  If you have insurance coverage and have supplied your insurance information to us, we will submit a claim to your insurer on your behalf.  Should you have any questions regarding your bill, we can be reached online or by phone as follows:  Online: You are able pay your bills online or live chat with our representatives about any billing questions you may have. We are here to help Monday - Friday from 8:00am to 7:30pm and 9:00am - 12:00pm on Saturdays.  Please visit [email or web address]  for more information.   Phone:  [phone] or [phone]    Please note that your emergency physician, surgeon, pathologist, radiologist, anesthesiologist, and other specialists are not employed by [facility] and will therefore bill separately for their services.  Please contact them directly for any questions concerning their bills at the numbers below:     Emergency Physician Services:  [phone]  [facility]:  [phone]  [facility]:   [phone]  [facility]:  [phone]    1. Your final bill may vary from the amount quoted upon discharge if all procedures are not complete at that time, or if your doctor has additional procedures of which we are not aware. You will receive an additional bill if you return to the Emergency Department at [facility] for suture removal regardless of the facility of which the sutures were placed.     2. Please arrange for settlement of this account at the emergency registration.    3. All self-pay accounts are due in full at the time of treatment.  If you are unable to meet this obligation then payment is expected within 4-5 days.     4. If you have had radiology studies (CT, X-ray, Ultrasound, MRI), you have received a preliminary result during your emergency department visit. Please contact the radiology department [phone] to receive a copy of your final result. Please discuss the Final result with your primary physician or with the follow up physician provided.     Crisis Hotline:  [place] Crisis Hotline:  0-995-CWKUXHK or 1-791.426.4034  [place] Crisis Hotline:    1-354.159.1550 or 192-252-4239         ED Discharge Follow Up Questions    1. In order to provide you with very good care, we would like to follow up with a phone call in the next few days.  May we have your permission to contact you?     YES /  NO    2. What is the best phone number to call you? (       )_____-__________    3. What is the best time to call you?      Morning  /  Afternoon  /  Evening                   Patient Signature:  ____________________________________________________________    Date:  ____________________________________________________________      Your appointments     Oct 18, 2017  8:00 AM   Individual Therapy with Sweta Santiago, Ph.D.   [facility]    [address]  [address]  [place] [address]   [phone]            Oct 31, 2017  8:00 AM   Individual Therapy with Sweta  TEE Santiago, Ph.D.   [facility] [place]    [address]  [address]  [place] [address]   [phone]            Nov 15, 2017  8:00 AM   Individual Therapy with Sweta Santiago, Ph.D.   [facility]    [address]  [address]  [place] [address]   [phone]            Nov 29, 2017  8:00 AM   Individual Therapy with Sweta Santiago, Ph.D.   [facility] [place]    [address]  [address]  [place] [address]   [phone]

## 2017-08-03 NOTE — LETTER
8/7/2017               Lynette Correa  [address] [facility] [address]        Dear Lynette (MR#[id number])    This letter is sent in regards to your, recent visit to the [facility] Emergency Department on 8/3/2017.  During the visit, tests were performed to assist the physician in a medical diagnosis.  A review of those tests requires that we notify you of the following:    Your wound culture was POSITIVE for a bacteria called Methicillin Sensitive Staphylococcus aureus. The antibiotic prescribed for you (Clindamycin) should be active to treat this bacteria. IT IS IMPORTANT THAT YOU CONTINUE TAKING YOUR ANTIBIOTIC UNTIL IT IS FINISHED.      Please feel free to contact me at the number below if you have any questions or concerns. Thank you for your cooperation in the matter.    Sincerely,  ED Culture Follow-Up Staff  Shelbie Law, PharmD    [facility], Emergency Department  [address] [place] [id number]  [phone] (ED Culture Line)  [phone]

## 2017-08-03 NOTE — ED AVS SNAPSHOT
Viibar Access Code: Activation code not generated  Current Viibar Status: Active    [employer]  A secure, online tool to manage your health information     [employer]’s Viibar® is a secure, online tool that connects you to your personalized health information from the privacy of your home -- day or night - making it very easy for you to manage your healthcare. Once the activation process is completed, you can even access your medical information using the Viibar taylor, which is available for free in the Apple Taylor store or Google Play store.     Viibar provides the following levels of access (as shown below):   My Chart Features   [facility] Primary Care Doctor [facility]  Specialists [facility]  Urgent  Care Non-[facility]  Primary Care  Doctor   Email your healthcare team securely and privately 24/7 X X X X   Manage appointments: schedule your next appointment; view details of past/upcoming appointments X      Request prescription refills. X      View recent personal medical records, including lab and immunizations X X X X   View health record, including health history, allergies, medications X X X X   Read reports about your outpatient visits, procedures, consult and ER notes X X X X   See your discharge summary, which is a recap of your hospital and/or ER visit that includes your diagnosis, lab results, and care plan. X X       How to register for Viibar:  1. Go to  [email or web address].  2. Click on the Sign Up Now box, which takes you to the New Member Sign Up page. You will need to provide the following information:  a. Enter your Viibar Access Code exactly as it appears at the top of this page. (You will not need to use this code after you’ve completed the sign-up process. If you do not sign up before the expiration date, you must request a new code.)   b. Enter your date of birth.   c. Enter your home email address.   d. Click Submit, and follow the next screen’s instructions.  3. Create a Viibar ID. This will  be your [employer] login ID and cannot be changed, so think of one that is secure and easy to remember.  4. Create a [employer]ix password. You can change your password at any time.  5. Enter your Password Reset Question and Answer. This can be used at a later time if you forget your password.   6. Enter your e-mail address. This allows you to receive e-mail notifications when new information is available in [employer].  7. Click Sign Up. You can now view your health information.    For assistance activating your Nutritionix account, call [phone]

## 2017-08-03 NOTE — ED AVS SNAPSHOT
8/3/2017    Lynette Correa  [address] [facility] [place] [address]    Dear Lynette:    [facility] wants to ensure your discharge home is safe and you or your loved ones have had all of your questions answered regarding your care after you leave the hospital.    Below is a list of resources and contact information should you have any questions regarding your hospital stay, follow-up instructions, or active medical symptoms.    Questions or Concerns Regarding… Contact   Medical Questions Related to Your Discharge  (7 days a week, 8am-5pm) Contact a Nurse Care Coordinator   [phone]   Medical Questions Not Related to Your Discharge  (24 hours a day / 7 days a week)  Contact the Nurse Health Line   [phone]    Medications or Discharge Instructions Refer to your discharge packet   or contact your [facility] Primary Care Provider   [phone]   Follow-up Appointment(s) Schedule your appointment via MarkTend   or contact Scheduling [phone]   Billing Review your statement via MarkTend  or contact Billing [phone]   Medical Records Review your records via MarkTend   or contact Medical Records [phone]     You may receive a telephone call within two days of discharge. This call is to make certain you understand your discharge instructions and have the opportunity to have any questions answered. You can also easily access your medical information, test results and upcoming appointments via the MarkTend free online health management tool. You can learn more and sign up at [email or web address]. For assistance setting up your MarkTend account, please call [phone].    Once again, we want to ensure your discharge home is safe and that you have a clear understanding of any next steps in your care. If you have any questions or concerns, please do not hesitate to contact us, we are here for you. Thank you for choosing [facility] for your healthcare needs.    Sincerely,    Your [facility] Healthcare Team

## 2017-08-03 NOTE — ED PROVIDER NOTES
"ED Provider Note    CHIEF COMPLAINT  Chief Complaint   Patient presents with   • Dog Bite     monday 7-31       [facility] [place]  Lynette Correa is a 61 y.o. female who presents with dog bite, right index finger, 3 days ago. Complains of pain in the entire finger, most of the pain is distal to the bite occurred. No fever no chills no nausea no vomiting. Does have a history of previous surgery on that right index finger, 40 years ago. He is unable to extend the finger passively however not any active extension of the finger since then, mallet finger on the right for the last 40 years. But now complaining of increased pain and swelling of the index finger.    REVIEW OF SYSTEMS  See HPI for further details. All other systems are negative.     PAST MEDICAL HISTORY  Past Medical History   Diagnosis Date   • ASTHMA    • Breath shortness    • Hx of migraines    • Risk for falls      HX of falls   • Arthritis 9-23-16     \"Everywhere\"   • Dental disorder      Full Set Dentures   • Pneumonia 2013   • Unspecified hemorrhagic conditions      PROLONGED BLEEDING TIME   • Jaundice      Age 15   • kidney stones      kidney stones   • Bronchitis 2015   • COPD (chronic obstructive pulmonary disease) (CMS-HCC)    • Pain      \"everywhere\"   • Heart burn    • Hepatitis C    • Ulcer (CMS-HCC)    • Anesthesia      \"Mother had a stroke with anesthesia\"   • Anesthesia 9-23-16     \"Only with Morphine\"  Migraines & PONV    • Arrhythmia      \"2015 detected a flutter\", does not see a cardiologist, small hole heart valve 10 years ago   • Indigestion      stomach ulcers    • Bowel habit changes 9-23-16     Diarrhea often   • Alcohol abuse    • Chronic pain    • Depression    • Neuropathy, peripheral    • Substance abuse        FAMILY HISTORY  Family History   Problem Relation Age of Onset   • Anxiety disorder Mother    • Depression Mother    • Cancer Mother    • Cancer Father    • Alcohol abuse Brother    • Diabetes Brother      3 brothers   • Cancer " Paternal Uncle    • Dementia Paternal Uncle    • Dementia Paternal Grandmother        SOCIAL HISTORY  Social History     Social History   • Marital Status:      Spouse Name: N/A   • Number of Children: N/A   • Years of Education: N/A     Social History Main Topics   • Smoking status: Current Every Day Smoker -- 0.50 packs/day for 40 years     Types: Cigarettes   • Smokeless tobacco: Never Used      Comment: 1 ppd 10 yrs   • Alcohol Use: No   • Drug Use: No   • Sexual Activity: Not on file     Other Topics Concern   • Not on file     Social History Narrative       SURGICAL HISTORY  Past Surgical History   Procedure Laterality Date   • Cervical fusion posterior  7/20/2010     Performed by PREET SPEAR at SURGERY [facility]   • Cervical decompression posterior  7/20/2010     Performed by PREET SPEAR at SURGERY [facility]   • Tonsillectomy     • Lumbar laminectomy diskectomy  1/8/2011     Performed by PREET SPEAR at SURGERY [facility]   • Mass excision general  10/4/2011     Performed by GANSER, JOHN H at SURGERY [facility]   • Wrist orif Left 3/3/2016     Procedure: WRIST ORIF;  Surgeon: Campbell Love M.D.;  Location: SURGERY [facility];  Service:    • Carpal tunnel release       right   • Other orthopedic surgery       left knee   • Knee arthroplasty total       right   • Other orthopedic surgery       right index finger sewn back on   • Other orthopedic surgery       spinal surgery 3 times   • Primary c section  1989/1988/1993     x 3   • Hysterectomy laparoscopy     • Other cardiac surgery  2005     ablation  and small hole in heart valve repaired by dr medel   • Other       right shin biopsy    • Lumbar laminectomy diskectomy  10/8/2016     Procedure: LUMBAR LAMINECTOMY DISKECTOMY FPRAMINOTOMY L3-4- POSTERIOR L3-4 STABILIZATION WITH INSTRUMENTATION  WITH MEDTRONICS INSTRUMENTS;  Surgeon: Preet Spear M.D.;  Location: SURGERY [facility];  Service:        CURRENT  "MEDICATIONS  Home Medications     Reviewed by Radha Marx (Pharmacy Tech) on 08/03/17 at 0826  Med List Status: Complete    Medication Last Dose Status    acetaminophen (TYLENOL) 325 MG Tab 8/3/2017 Active    albuterol (PROVENTIL) 2.5mg/3ml Nebu Soln solution for nebulization >week Active    albuterol 108 (90 BASE) MCG/ACT Aero Soln inhalation aerosol 8/2/2017 Active    clonidine (CATAPRES) 0.1 MG TABS 8/2/2017 Active    Cyanocobalamin (VITAMIN B-12 PO) 8/2/2017 Active    Dexlansoprazole (DEXILANT) 60 MG CAPSULE DELAYED RELEASE delayed-release capsule 8/3/2017 Active    duloxetine (CYMBALTA) 30 MG Cap DR Particles 8/3/2017 Active    gabapentin (NEURONTIN) 800 MG tablet 8/3/2017 Active    meloxicam (MOBIC) 15 MG tablet 8/2/2017 Active    paroxetine (PAXIL) 10 MG Tab 8/3/2017 Active                ALLERGIES  Allergies   Allergen Reactions   • Codeine Rash     Difficulty breathing, and itching   • Pcn [Penicillins]      All \"cillin\" family, per patient  ---   I broke out in \"trench\" mouth   • Morphine      headache       PHYSICAL EXAM  VITAL SIGNS: Ht 1.651 m (5' 5\")  Wt 55.7 kg (122 lb 12.7 oz)  BMI 20.43 kg/m2  Constitutional: Well developed, Well nourished, No acute distress, Non-toxic appearance.   Extremities: Intact distal pulses examination right index finger, DIP is flexed at 45°. She says she can normally extend without any pain but she has great deal of pain on any passive extension now. There is also swelling over the middle and proximal phalanx, however the finger is not flexed. At the PIP joint and MP joint. There are puncture wounds, distal phalanx, dorsum and hip, no current pus, No cyanosis, No clubbing.   Musculoskeletal: Good range of motion in all major joints. No tenderness to palpation or major deformities noted.   Neurologic: Alert & oriented x 3, Normal motor function, Normal sensory function, No focal deficits noted.   Psychiatric: Affect normal, Judgment normal, Mood normal. "       RADIOLOGY/PROCEDURES  DX-FINGER(S) 2+ RIGHT   Final Result      1.  No evidence of fracture or dislocation.      2.  Boutonniere deformity of the second digit which may indicate tendon injury.      3.  Soft tissue swelling.      4.  Degenerative change.            COURSE & MEDICAL DECISION MAKING  Pertinent Labs & Imaging studies reviewed. (See chart for details)    Appears to have a cellulitis, and finger however I'm not seeing signs of tenosynovitis. I have discussed this with the on-call orthopedic surgeon, Dr. Kim who will see this patient in the office tomorrow. A Gram stain culture pending  FINAL IMPRESSION  1.   1. Finger infection      1. Finger infection    2. Cellulitis, unspecified cellulitis site          2.   3.     Disposition  Procedure note:. I have anesthetized the right index finger with 1% Xylocaine. When adequate anesthesia was achieved I used scissors to open the 2 wounds tip of her finger. There was no pus, glucose will be done.    Discharge instructions are understood. This patient is to return if fever vomiting or no better in 12 hours. Follow up with the [facility] clinic or private physician. Information sheets on cellulitis, finger infection  Electronically signed by: Issa Del Real, 8/3/2017 8:48 AM

## 2017-08-03 NOTE — ED NOTES
Pt ambulates to room w/ steady gait. A & O, privacy, plan of care & support given.    Pt states she was bitten by a dog on Monday. R first finger @ distal joint. 4 small (less than 0.5cm) approximated and scabbed wounds noted around the circumferance of the knuckle.  Pt states the pain is 10/10 at times. Worried about possible infection as she states the wound is swollen.

## 2017-08-03 NOTE — ED NOTES
Pt given d/c instructions. Questions answered, support given. Pt given wound care instructions, RX x1, one RX called into pharmacy for pt convenience. Pt left prior to d/c vitals. NAD noted.

## 2017-08-04 PROBLEM — F33.9 MAJOR DEPRESSIVE DISORDER, RECURRENT EPISODE WITH ANXIOUS DISTRESS (HCC): Status: ACTIVE | Noted: 2017-08-04

## 2017-08-04 PROBLEM — G89.29 CHRONIC PAIN: Status: ACTIVE | Noted: 2017-08-04

## 2017-08-04 PROBLEM — F10.11 ALCOHOL ABUSE, IN REMISSION: Status: ACTIVE | Noted: 2017-08-04

## 2017-08-05 LAB
BACTERIA WND AEROBE CULT: ABNORMAL
BACTERIA WND AEROBE CULT: ABNORMAL
GRAM STN SPEC: ABNORMAL
SIGNIFICANT IND 70042: ABNORMAL
SITE SITE: ABNORMAL
SOURCE SOURCE: ABNORMAL

## 2017-08-07 NOTE — ED NOTES
ED Positive Culture Follow-up/Notification Note:    Date: 8/7/17     Patient seen in the ED on 8/3/2017 for right index finger infection x 3 days.   1. Finger infection    2. Cellulitis, unspecified cellulitis site       Discharge Medication List as of 8/3/2017 10:56 AM      START taking these medications    Details   clindamycin (CLEOCIN) 300 MG Cap Take 1 Cap by mouth 4 times a day for 5 days., Disp-20 Cap, R-0, Normal      hydrocodone-acetaminophen (NORCO) 5-325 MG Tab per tablet Take 1-2 Tabs by mouth every 6 hours as needed., Disp-10 Tab, R-0, Print Rx Paper             Allergies: Codeine; Pcn; and Morphine     Final cultures:   Results     Procedure Component Value Units Date/Time    CULTURE WOUND W/ GRAM STAIN [[id number]]  (Abnormal)  (Susceptibility) Collected:  08/03/17 1040    Order Status:  Completed Specimen Information:  Wound from Right Hand Updated:  08/05/17 1001     Gram Stain Result --      Result:        Testing performed at:  [facility]  [address]  [place] [address]  epithelial cells.  No organisms seen.       Significant Indicator POS (POS)      Source WND      Site RIGHT HAND      Culture Result Wound -- (A)      Culture Result Wound -- (A)      Result:        Staphylococcus aureus  Heavy growth      Culture & Susceptibility     STAPHYLOCOCCUS AUREUS     Antibiotic Sensitivity Microscan Unit Status    Ampicillin/sulbactam Sensitive <=8/4 mcg/mL Final    Clindamycin Sensitive <=0.5 mcg/mL Final    Daptomycin Sensitive <=0.5 mcg/mL Final    Erythromycin Sensitive <=0.5 mcg/mL Final    Moxifloxacin Sensitive <=0.5 mcg/mL Final    Oxacillin Sensitive <=0.25 mcg/mL Final    Tetracycline Sensitive <=4 mcg/mL Final    Trimeth/Sulfa Sensitive <=0.5/9.5 mcg/mL Final    Vancomycin Sensitive 1 mcg/mL Final                       GRAM STAIN [[id number]] Collected:  08/03/17 1040    Order Status:  Completed Specimen Information:  Wound Updated:  08/03/17 1157     Significant  Indicator .      Source WND      Site RIGHT HAND      Gram Stain Result --      Result:        Testing performed at:  [facility]  [address]  [place] [address]  epithelial cells.  No organisms seen.            Plan:   Appropriate antibiotic therapy prescribed. No changes required based upon culture result.  Sent letter to patient to notify of positive culture result and encourage compliance with prescribed antibiotics.     Shelbie Law

## 2017-10-04 ENCOUNTER — HOSPITAL ENCOUNTER (OUTPATIENT)
Dept: LAB | Facility: MEDICAL CENTER | Age: 62
End: 2017-10-04
Attending: FAMILY MEDICINE
Payer: MEDICARE

## 2017-10-04 LAB
ALBUMIN SERPL BCP-MCNC: 4.1 G/DL (ref 3.2–4.9)
ALBUMIN/GLOB SERPL: 1.3 G/DL
ALP SERPL-CCNC: 109 U/L (ref 30–99)
ALT SERPL-CCNC: 14 U/L (ref 2–50)
ANION GAP SERPL CALC-SCNC: 6 MMOL/L (ref 0–11.9)
AST SERPL-CCNC: 39 U/L (ref 12–45)
BILIRUB SERPL-MCNC: 0.3 MG/DL (ref 0.1–1.5)
BUN SERPL-MCNC: 14 MG/DL (ref 8–22)
CALCIUM SERPL-MCNC: 9.5 MG/DL (ref 8.5–10.5)
CHLORIDE SERPL-SCNC: 105 MMOL/L (ref 96–112)
CO2 SERPL-SCNC: 26 MMOL/L (ref 20–33)
CREAT SERPL-MCNC: 0.75 MG/DL (ref 0.5–1.4)
GFR SERPL CREATININE-BSD FRML MDRD: >60 ML/MIN/1.73 M 2
GGT SERPL-CCNC: 14 U/L (ref 7–34)
GLOBULIN SER CALC-MCNC: 3.1 G/DL (ref 1.9–3.5)
GLUCOSE SERPL-MCNC: 80 MG/DL (ref 65–99)
HAV IGM SERPL QL IA: NEGATIVE
HBV CORE IGM SER QL: NEGATIVE
HBV SURFACE AG SER QL: NEGATIVE
HCV AB SER QL: ABNORMAL
POTASSIUM SERPL-SCNC: 4.4 MMOL/L (ref 3.6–5.5)
PROT SERPL-MCNC: 7.2 G/DL (ref 6–8.2)
SODIUM SERPL-SCNC: 137 MMOL/L (ref 135–145)

## 2017-10-04 PROCEDURE — 36415 COLL VENOUS BLD VENIPUNCTURE: CPT

## 2017-10-04 PROCEDURE — 82977 ASSAY OF GGT: CPT

## 2017-10-04 PROCEDURE — 87522 HEPATITIS C REVRS TRNSCRPJ: CPT

## 2017-10-04 PROCEDURE — 80053 COMPREHEN METABOLIC PANEL: CPT

## 2017-10-04 PROCEDURE — 80074 ACUTE HEPATITIS PANEL: CPT

## 2017-10-07 LAB
HCV RNA SERPL NAA+PROBE-ACNC: ABNORMAL IU/ML
HCV RNA SERPL NAA+PROBE-LOG IU: ABNORMAL LOG IU
HCV RNA SERPL QL NAA+PROBE: DETECTED
PATHOLOGY STUDY: ABNORMAL

## 2017-10-13 LAB
HCV RNA SERPL NAA+PROBE-ACNC: NORMAL IU/ML
TEST INFO  93644: NORMAL

## 2017-11-04 ENCOUNTER — APPOINTMENT (OUTPATIENT)
Dept: RADIOLOGY | Facility: MEDICAL CENTER | Age: 62
End: 2017-11-04
Attending: EMERGENCY MEDICINE
Payer: MEDICARE

## 2017-11-04 ENCOUNTER — HOSPITAL ENCOUNTER (EMERGENCY)
Facility: MEDICAL CENTER | Age: 62
End: 2017-11-04
Attending: EMERGENCY MEDICINE
Payer: MEDICARE

## 2017-11-04 VITALS
SYSTOLIC BLOOD PRESSURE: 161 MMHG | HEIGHT: 65 IN | BODY MASS INDEX: 21.93 KG/M2 | HEART RATE: 73 BPM | WEIGHT: 131.61 LBS | DIASTOLIC BLOOD PRESSURE: 88 MMHG | OXYGEN SATURATION: 98 % | RESPIRATION RATE: 20 BRPM | TEMPERATURE: 98.6 F

## 2017-11-04 DIAGNOSIS — G89.29 CHRONIC LEFT SHOULDER PAIN: ICD-10-CM

## 2017-11-04 DIAGNOSIS — M25.512 CHRONIC LEFT SHOULDER PAIN: ICD-10-CM

## 2017-11-04 PROCEDURE — 99284 EMERGENCY DEPT VISIT MOD MDM: CPT

## 2017-11-04 PROCEDURE — 96372 THER/PROPH/DIAG INJ SC/IM: CPT

## 2017-11-04 PROCEDURE — 700102 HCHG RX REV CODE 250 W/ 637 OVERRIDE(OP): Performed by: EMERGENCY MEDICINE

## 2017-11-04 PROCEDURE — 73030 X-RAY EXAM OF SHOULDER: CPT | Mod: LT

## 2017-11-04 PROCEDURE — A9270 NON-COVERED ITEM OR SERVICE: HCPCS | Performed by: EMERGENCY MEDICINE

## 2017-11-04 PROCEDURE — 700111 HCHG RX REV CODE 636 W/ 250 OVERRIDE (IP): Performed by: EMERGENCY MEDICINE

## 2017-11-04 RX ORDER — OXYCODONE HYDROCHLORIDE AND ACETAMINOPHEN 5; 325 MG/1; MG/1
1-2 TABLET ORAL EVERY 4 HOURS PRN
Qty: 20 TAB | Refills: 0 | Status: SHIPPED | OUTPATIENT
Start: 2017-11-04 | End: 2018-01-08

## 2017-11-04 RX ORDER — ONDANSETRON 2 MG/ML
4 INJECTION INTRAMUSCULAR; INTRAVENOUS ONCE
Status: COMPLETED | OUTPATIENT
Start: 2017-11-04 | End: 2017-11-04

## 2017-11-04 RX ORDER — OXYCODONE HYDROCHLORIDE AND ACETAMINOPHEN 5; 325 MG/1; MG/1
2 TABLET ORAL ONCE
Status: COMPLETED | OUTPATIENT
Start: 2017-11-04 | End: 2017-11-04

## 2017-11-04 RX ADMIN — ONDANSETRON 4 MG: 2 INJECTION, SOLUTION INTRAMUSCULAR; INTRAVENOUS at 09:03

## 2017-11-04 RX ADMIN — OXYCODONE HYDROCHLORIDE AND ACETAMINOPHEN 2 TABLET: 5; 325 TABLET ORAL at 11:29

## 2017-11-04 RX ADMIN — HYDROMORPHONE HYDROCHLORIDE 1 MG: 1 INJECTION, SOLUTION INTRAMUSCULAR; INTRAVENOUS; SUBCUTANEOUS at 09:03

## 2017-11-04 ASSESSMENT — PAIN SCALES - GENERAL: PAINLEVEL_OUTOF10: 10

## 2017-11-04 NOTE — ED PROVIDER NOTES
"ED Provider Note    CHIEF COMPLAINT  No chief complaint on file.       HPI  Lynette Correa is a 61 y.o. female who presents to the ED with complaints of left shoulder pain. The patient has a long-standing history of chronic left shoulder pain. Over the past 2 days. She just been having increasing pain. She is not sure if she really injured it. The patient states that she does fall a lot. Patient describes increasing pain primarily shoulder. She describes it into the neck and down the left arm. Patient denies any overt paresthesias seems to be more muscular in nature. Patient denies any other symptoms. Presents for evaluation.    REVIEW OF SYSTEMS  See HPI for further details. All other systems are negative.     PAST MEDICAL HISTORY  Past Medical History:   Diagnosis Date   • Alcohol abuse    • Anesthesia     \"Mother had a stroke with anesthesia\"   • Anesthesia 9-23-16    \"Only with Morphine\"  Migraines & PONV    • Arrhythmia     \"2015 detected a flutter\", does not see a cardiologist, small hole heart valve 10 years ago   • Arthritis 9-23-16    \"Everywhere\"   • ASTHMA    • Bowel habit changes 9-23-16    Diarrhea often   • Breath shortness    • Bronchitis 2015   • Chronic pain    • COPD (chronic obstructive pulmonary disease) (CMS-HCC)    • Dental disorder     Full Set Dentures   • Depression    • Heart burn    • Hepatitis C    • Hx of migraines    • Indigestion     stomach ulcers    • Jaundice     Age 15   • kidney stones     kidney stones   • Neuropathy, peripheral (CMS-[facility])    • Pain     \"everywhere\"   • Pneumonia 2013   • Risk for falls     HX of falls   • Substance abuse    • Ulcer (CMS-HCC)    • Unspecified hemorrhagic conditions     PROLONGED BLEEDING TIME       FAMILY HISTORY  Family History   Problem Relation Age of Onset   • Anxiety disorder Mother    • Depression Mother    • Cancer Mother    • Cancer Father    • Alcohol abuse Brother    • Diabetes Brother      3 brothers   • Cancer Paternal Uncle    • " Dementia Paternal Uncle    • Dementia Paternal Grandmother      Patient's family history has been discussed and is been found to be noncontributory to his present illness  SOCIAL HISTORY  Social History     Social History   • Marital status: Legally      Spouse name: N/A   • Number of children: N/A   • Years of education: N/A     Social History Main Topics   • Smoking status: Current Every Day Smoker     Packs/day: 0.50     Years: 40.00     Types: Cigarettes   • Smokeless tobacco: Never Used      Comment: 1 ppd 10 yrs   • Alcohol use No   • Drug use: No   • Sexual activity: Not on file     Other Topics Concern   • Not on file     Social History Narrative   • No narrative on file      Pee Mcknight M.D.        SURGICAL HISTORY  Past Surgical History:   Procedure Laterality Date   • LUMBAR LAMINECTOMY DISKECTOMY  10/8/2016    Procedure: LUMBAR LAMINECTOMY DISKECTOMY FPRAMINOTOMY L3-4- POSTERIOR L3-4 STABILIZATION WITH INSTRUMENTATION  WITH MEDTRONICS INSTRUMENTS;  Surgeon: Preet Spear M.D.;  Location: SURGERY [facility];  Service:    • WRIST ORIF Left 3/3/2016    Procedure: WRIST ORIF;  Surgeon: Campbell Love M.D.;  Location: SURGERY [facility];  Service:    • MASS EXCISION GENERAL  10/4/2011    Performed by GANSER, JOHN H at SURGERY [facility]   • LUMBAR LAMINECTOMY DISKECTOMY  1/8/2011    Performed by RPEET SPEAR at SURGERY [facility]   • CERVICAL FUSION POSTERIOR  7/20/2010    Performed by PREET SPEAR at SURGERY [facility]   • CERVICAL DECOMPRESSION POSTERIOR  7/20/2010    Performed by PREET SPEAR at SURGERY [facility]   • OTHER CARDIAC SURGERY  2005    ablation  and small hole in heart valve repaired by dr medel   • CARPAL TUNNEL RELEASE      right   • HYSTERECTOMY LAPAROSCOPY     • KNEE ARTHROPLASTY TOTAL      right   • OTHER      right shin biopsy    • OTHER ORTHOPEDIC SURGERY      left knee   • OTHER ORTHOPEDIC SURGERY      right index finger sewn back on  "  • OTHER ORTHOPEDIC SURGERY      spinal surgery 3 times   • PRIMARY C SECTION  1989/1988/1993    x 3   • TONSILLECTOMY         CURRENT MEDICATIONS   Home Medications     Reviewed by Radha Calderón (Pharmacy Tech) on 11/04/17 at 0902  Med List Status: Complete   Medication Last Dose Status   acetaminophen (TYLENOL) 325 MG Tab 11/3/2017 Active   albuterol (PROVENTIL) 2.5mg/3ml Nebu Soln solution for nebulization 11/3/2017 Active   albuterol 108 (90 BASE) MCG/ACT Aero Soln inhalation aerosol 11/3/2017 Active   clonidine (CATAPRES) 0.1 MG TABS 11/3/2017 Active   Dexlansoprazole (DEXILANT) 60 MG CAPSULE DELAYED RELEASE delayed-release capsule 11/4/2017 Active   duloxetine (CYMBALTA) 30 MG Cap DR Particles 11/4/2017 Active   gabapentin (NEURONTIN) 800 MG tablet 11/4/2017 Active   paroxetine (PAXIL) 10 MG Tab 11/4/2017 Active                ALLERGIES   Allergies   Allergen Reactions   • Codeine Rash     Difficulty breathing, and itching   • Pcn [Penicillins]      All \"cillin\" family, per patient  ---   I broke out in \"trench\" mouth   • Morphine      headache       PHYSICAL EXAM  VITAL SIGNS: BP (!) 161/88   Pulse 73   Temp 37 °C (98.6 °F)   Resp 20   Ht 1.651 m (5' 5\")   Wt 59.7 kg (131 lb 9.8 oz)   SpO2 98%   BMI 21.90 kg/m²    Pulse Ox interpretation. Nonhypoxic    Constitutional: Well developed, Well nourished, No acute distress, Non-toxic appearance.   Cardiovascular: Regular rate and rhythm without murmurs gallops or rubs.   Thorax & Lungs: Lungs are clear to auscultation bilaterally, there are no wheezes no rales. Chest wall is nontender.  Abdomen: Soft, nontender nondistended. Bowel sounds are present.   Skin: Warm, Dry, No erythema,   Musculoskeletal: Intact distal pulses, no clubbing, no cyanosis, no edema. Left shoulder there is an effusion within it. Has limited range of motion secondary to pain. Appears to be in good position. Clavicle, neck and chest is nontender.  Neurologic: Alert & oriented " x 3, Normal motor function, Normal sensory function, No focal deficits noted.     RADIOLOGY/PROCEDURES  DX-SHOULDER 2+ LEFT   Final Result      Severe left glenohumeral joint osteoarthritis with associated intra-articular ossific body            COURSE & MEDICAL DECISION MAKING  Pertinent Labs & Imaging studies reviewed. (See chart for details)  Patient presents for evaluation. Patient was given pain medication. An x-ray done the above fashion. No signs of fractures. At this point, I do feel that this is an acute on chronic pain to the left shoulder. She has no other abnormalities. Also, the patient pain medications. Recommend a follow-up with her orthopedic surgeon, Dr. Guzmán for further outpatient treatment and care.    FINAL IMPRESSION  1. Chronic left shoulder pain          I reviewed prescription monitoring program for patient's narcotic use before prescribing a scheduled drug.The patient will not drink alcohol nor drive with prescribed medications. The patient will return for new or worsening symptoms and is stable at the time of discharge.    The patient is referred to a primary physician for blood pressure management, diabetic screening, and for all other preventative health concerns.    DISPOSITION:  Patient will be discharged home in stable condition.    FOLLOW UP:  Yovani Guzmán M.D.  [address]  [facility] [address]  [phone]    Schedule an appointment as soon as possible for a visit        OUTPATIENT MEDICATIONS:  Discharge Medication List as of 11/4/2017 11:52 AM      START taking these medications    Details   oxycodone-acetaminophen (PERCOCET) 5-325 MG Tab Take 1-2 Tabs by mouth every four hours as needed., Disp-20 Tab, R-0, Print Rx Paper                     Electronically signed by: Emmanuel Hernández, 11/4/2017 8:48 AM

## 2017-11-04 NOTE — ED NOTES
The Medication Reconciliation process has been completed by interviewing the patient    Allergies have been reviewed  Antibiotic use in 30 days - none    [facility]:  [facility]

## 2017-11-04 NOTE — DISCHARGE INSTRUCTIONS
"How to Use a Sling  A sling is a type of hanging bandage that is worn around your neck to protect an injured arm, shoulder, or other body part. You may need to wear a sling to keep you from moving (immobilize) the injured body part while it heals. Keeping the injured part of your body still reduces pain and speeds up healing. Your health care provider may recommend using a sling if you have:   · A broken arm.  · A broken collarbone.  · A shoulder injury.  · Surgery.  RISKS AND COMPLICATIONS  Wearing a sling the wrong way can:  · Make your injury worse.  · Cause stiffness or numbness.  · Affect blood circulation in your arm and hand. This can cause tingling or numbness in your fingers or hands.  HOW TO USE A SLING  The way that you should use a sling depends on your injury. It is important that you follow all of your health care provider's instructions for your injury. Also follow these general guidelines:  · Wear the sling so that your arm bends 90 degrees at the elbow. That is like a right angle or the shape of a capital letter \"L.\" The sling should also support your wrist and your hand.  · Try to avoid moving your arm.  · Do not lie down flat on your back while wearing a sling. Sleep in a recliner or use pillows to raise your upper body in bed.  · Do not twist, raise, or move your arm in a way that could make your injury worse.  · Do not lean on your arm while wearing a sling.  · Do not lift anything while wearing a sling.  SEEK MEDICAL CARE IF:  · You have bruising, swelling, or pain that is getting worse.  · Your pain medicine is not helping.  · You have a fever.  SEEK IMMEDIATE MEDICAL CARE IF:  · Your fingers are numb or tingling.  · Your fingers turn blue or feel cold to the touch.  · You cannot control the bleeding from your injury.  · You are short of breath.     This information is not intended to replace advice given to you by your health care provider. Make sure you discuss any questions you have with " your health care provider.     Document Released: 08/01/2005 Document Revised: 01/08/2016 Document Reviewed: 10/21/2015  Elsevier Interactive Patient Education ©2016 [employer]

## 2017-11-04 NOTE — ED NOTES
Pt c/o L shoulder pain. Pt needs shoulder replacement. Pt states woke up at 0200 with excruciating pain. States cant even lift her arm. Denies injury

## 2017-11-15 ENCOUNTER — OFFICE VISIT (OUTPATIENT)
Dept: BEHAVIORAL HEALTH | Facility: PHYSICIAN GROUP | Age: 62
End: 2017-11-15
Payer: MEDICARE

## 2017-11-15 DIAGNOSIS — F33.9 MAJOR DEPRESSIVE DISORDER, RECURRENT EPISODE WITH ANXIOUS DISTRESS (HCC): ICD-10-CM

## 2017-11-15 DIAGNOSIS — G89.29 OTHER CHRONIC PAIN: ICD-10-CM

## 2017-11-15 PROCEDURE — 90834 PSYTX W PT 45 MINUTES: CPT | Performed by: PSYCHOLOGIST
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
## 2017-11-25 ENCOUNTER — HOSPITAL ENCOUNTER (OUTPATIENT)
Dept: RADIOLOGY | Facility: MEDICAL CENTER | Age: 62
End: 2017-11-25
Attending: ORTHOPAEDIC SURGERY
Payer: MEDICARE

## 2017-11-25 DIAGNOSIS — M25.512 LEFT SHOULDER PAIN, UNSPECIFIED CHRONICITY: ICD-10-CM

## 2017-11-25 PROCEDURE — 73221 MRI JOINT UPR EXTREM W/O DYE: CPT | Mod: LT

## 2017-12-13 ENCOUNTER — OFFICE VISIT (OUTPATIENT)
Dept: BEHAVIORAL HEALTH | Facility: PHYSICIAN GROUP | Age: 62
End: 2017-12-13
Payer: MEDICARE

## 2017-12-13 DIAGNOSIS — F33.9 MAJOR DEPRESSIVE DISORDER, RECURRENT EPISODE WITH ANXIOUS DISTRESS (HCC): ICD-10-CM

## 2017-12-13 DIAGNOSIS — G89.29 OTHER CHRONIC PAIN: ICD-10-CM

## 2017-12-13 DIAGNOSIS — F43.10 POSTTRAUMATIC STRESS DISORDER: ICD-10-CM

## 2017-12-13 PROCEDURE — 90834 PSYTX W PT 45 MINUTES: CPT | Performed by: PSYCHOLOGIST

## 2017-12-13 NOTE — BH THERAPY
" [facility]  Therapy Progress Note    Patient Name: Lynette Correa  Patient MRN: [id number]  Today's Date: 12/13/2017     Type of session:Individual psychotherapy  Length of session: 45 minutes  Persons in attendance:Patient    Subjective/New Info: Pt reports she is still struggling with pain issues and processed her anxiety over having the shoulder surgery. Conts to be clean and sober. Introduced Pt to DBT distress tolerance of tuning into her five senses as a means of grounding herself when she feels a wave of fear and anxiety come over her. Talked about the things Pt might put in her \"sensory grounding tool kit\" - baby powder and lotion, chocolate M&Ms, little magnetic stones. Disc how Pt could use these items to ground herself in the here and now - the reality of what was really happening around her instead of the chaos going on in her head and how to use soothing self talk.     Objective/Observations:   Participation: Active verbal participation, Attentive and Engaged   Grooming: Casual   Cognition: Alert and Fully Oriented   Eye contact: Good   Mood: Depressed and Anxious   Affect: Anxious   Thought process: Logical   Speech: Rate within normal limits   Other:     Diagnoses:   1. Major depressive disorder, recurrent episode with anxious distress (CMS-HCC)    2. Posttraumatic stress disorder    3. Other chronic pain         Current risk:   SUICIDE: Not applicable   Homicide: Not applicable   Self-harm: Not applicable   Relapse: Low   Other:    Safety Plan reviewed? Not Indicated   If evidence of imminent risk is present, intervention/plan:     Therapeutic Intervention(s): Establish rapport, Exposure exercise, Stressors assessed and Supportive psychotherapy    Treatment Goal(s)/Objective(s) addressed: Tx plan:  - Utilize learned skills to manage mood more effectively  - Identify goals/values and cultivate a meaningful life  - Learn to be more emotionally flexible/resilient in times of " stress/challenges  - Work an identified program of recovery & relapse prevention  - Process and reduce the effects of past trauma on life, functioning, & sense of self  - Learn to ameliorate effects of anxiety on life and functioning  - Increase behaviors of self-compassion and self-care  - Learn to manage pain more effectively  -        Progress toward Treatment Goals: Mild improvement    Plan:  - Continue Individual therapy    Sweta Santiago, Ph.D.  12/13/2017

## 2018-01-08 ENCOUNTER — HOSPITAL ENCOUNTER (OUTPATIENT)
Dept: PAIN MANAGEMENT | Facility: REHABILITATION | Age: 63
End: 2018-01-08
Attending: ANESTHESIOLOGY
Payer: MEDICARE

## 2018-01-08 ENCOUNTER — HOSPITAL ENCOUNTER (OUTPATIENT)
Dept: RADIOLOGY | Facility: REHABILITATION | Age: 63
End: 2018-01-08
Attending: ANESTHESIOLOGY
Payer: MEDICARE

## 2018-01-08 VITALS
SYSTOLIC BLOOD PRESSURE: 160 MMHG | TEMPERATURE: 99.1 F | OXYGEN SATURATION: 97 % | HEIGHT: 65 IN | HEART RATE: 64 BPM | BODY MASS INDEX: 22.08 KG/M2 | RESPIRATION RATE: 16 BRPM | DIASTOLIC BLOOD PRESSURE: 78 MMHG | WEIGHT: 132.5 LBS

## 2018-01-08 PROCEDURE — 64494 INJ PARAVERT F JNT L/S 2 LEV: CPT

## 2018-01-08 PROCEDURE — 64493 INJ PARAVERT F JNT L/S 1 LEV: CPT

## 2018-01-08 PROCEDURE — 64495 INJ PARAVERT F JNT L/S 3 LEV: CPT

## 2018-01-08 PROCEDURE — 700111 HCHG RX REV CODE 636 W/ 250 OVERRIDE (IP)

## 2018-01-08 RX ORDER — BUPIVACAINE HYDROCHLORIDE 7.5 MG/ML
INJECTION, SOLUTION EPIDURAL; RETROBULBAR
Status: COMPLETED
Start: 2018-01-08 | End: 2018-01-08

## 2018-01-08 RX ORDER — BUPIVACAINE HYDROCHLORIDE 5 MG/ML
INJECTION, SOLUTION EPIDURAL; INTRACAUDAL
Status: COMPLETED
Start: 2018-01-08 | End: 2018-01-08

## 2018-01-08 RX ADMIN — BUPIVACAINE HYDROCHLORIDE 3 ML: 7.5 INJECTION, SOLUTION EPIDURAL; RETROBULBAR at 11:36

## 2018-01-08 ASSESSMENT — PAIN SCALES - GENERAL
PAINLEVEL_OUTOF10: 0
PAINLEVEL_OUTOF10: 10
PAINLEVEL_OUTOF10: 0

## 2018-01-08 NOTE — PROGRESS NOTES
Medication reconciliation reviewed with patient. Denied taking   any anti- inflammatories medications. She's been off Motrin for2 days.Patient had a  [place] / daughter BF  .Home care form and verbal  instruction given to patient and verbalized understanding. Dr. Shen made aware & spoke to the patient . Hand off reported to LEVI Cochran RN.

## 2018-01-08 NOTE — PROGRESS NOTES
Handoff reported by LEVI Cochran RN..Fluids tolerated well.  Reviewed home care instructions and understood by patient.

## 2018-01-26 DIAGNOSIS — Z01.810 PRE-OPERATIVE CARDIOVASCULAR EXAMINATION: ICD-10-CM

## 2018-01-26 DIAGNOSIS — Z01.812 PRE-OPERATIVE LABORATORY EXAMINATION: ICD-10-CM

## 2018-01-26 LAB
ALBUMIN SERPL BCP-MCNC: 4.5 G/DL (ref 3.2–4.9)
ALBUMIN/GLOB SERPL: 1.4 G/DL
ALP SERPL-CCNC: 92 U/L (ref 30–99)
ALT SERPL-CCNC: 15 U/L (ref 2–50)
ANION GAP SERPL CALC-SCNC: 5 MMOL/L (ref 0–11.9)
APPEARANCE UR: CLEAR
AST SERPL-CCNC: 42 U/L (ref 12–45)
BACTERIA #/AREA URNS HPF: ABNORMAL /HPF
BILIRUB SERPL-MCNC: 0.5 MG/DL (ref 0.1–1.5)
BILIRUB UR QL STRIP.AUTO: NEGATIVE
BUN SERPL-MCNC: 16 MG/DL (ref 8–22)
CALCIUM SERPL-MCNC: 9.4 MG/DL (ref 8.5–10.5)
CHLORIDE SERPL-SCNC: 103 MMOL/L (ref 96–112)
CO2 SERPL-SCNC: 28 MMOL/L (ref 20–33)
COLOR UR: YELLOW
CREAT SERPL-MCNC: 0.73 MG/DL (ref 0.5–1.4)
EKG IMPRESSION: NORMAL
EPI CELLS #/AREA URNS HPF: ABNORMAL /HPF
ERYTHROCYTE [DISTWIDTH] IN BLOOD BY AUTOMATED COUNT: 51.8 FL (ref 35.9–50)
GLOBULIN SER CALC-MCNC: 3.2 G/DL (ref 1.9–3.5)
GLUCOSE SERPL-MCNC: 84 MG/DL (ref 65–99)
GLUCOSE UR STRIP.AUTO-MCNC: NEGATIVE MG/DL
HCT VFR BLD AUTO: 42.7 % (ref 37–47)
HGB BLD-MCNC: 13.3 G/DL (ref 12–16)
HYALINE CASTS #/AREA URNS LPF: ABNORMAL /LPF
KETONES UR STRIP.AUTO-MCNC: NEGATIVE MG/DL
LEUKOCYTE ESTERASE UR QL STRIP.AUTO: ABNORMAL
MCH RBC QN AUTO: 26.8 PG (ref 27–33)
MCHC RBC AUTO-ENTMCNC: 31.1 G/DL (ref 33.6–35)
MCV RBC AUTO: 86.1 FL (ref 81.4–97.8)
MICRO URNS: ABNORMAL
NITRITE UR QL STRIP.AUTO: NEGATIVE
PH UR STRIP.AUTO: 6 [PH]
PLATELET # BLD AUTO: 400 K/UL (ref 164–446)
PMV BLD AUTO: 10.5 FL (ref 9–12.9)
POTASSIUM SERPL-SCNC: 4.1 MMOL/L (ref 3.6–5.5)
PROT SERPL-MCNC: 7.7 G/DL (ref 6–8.2)
PROT UR QL STRIP: NEGATIVE MG/DL
RBC # BLD AUTO: 4.96 M/UL (ref 4.2–5.4)
RBC # URNS HPF: ABNORMAL /HPF
RBC UR QL AUTO: NEGATIVE
RENAL EPI CELLS #/AREA URNS HPF: ABNORMAL /HPF
SCCMEC + MECA PNL NOSE NAA+PROBE: NEGATIVE
SCCMEC + MECA PNL NOSE NAA+PROBE: POSITIVE
SODIUM SERPL-SCNC: 136 MMOL/L (ref 135–145)
SP GR UR STRIP.AUTO: 1.01
UROBILINOGEN UR STRIP.AUTO-MCNC: 0.2 MG/DL
WBC # BLD AUTO: 9.4 K/UL (ref 4.8–10.8)
WBC #/AREA URNS HPF: ABNORMAL /HPF

## 2018-01-26 PROCEDURE — 80053 COMPREHEN METABOLIC PANEL: CPT

## 2018-01-26 PROCEDURE — 87641 MR-STAPH DNA AMP PROBE: CPT

## 2018-01-26 PROCEDURE — 93010 ELECTROCARDIOGRAM REPORT: CPT | Performed by: INTERNAL MEDICINE

## 2018-01-26 PROCEDURE — 36415 COLL VENOUS BLD VENIPUNCTURE: CPT

## 2018-01-26 PROCEDURE — 81001 URINALYSIS AUTO W/SCOPE: CPT

## 2018-01-26 PROCEDURE — 85027 COMPLETE CBC AUTOMATED: CPT

## 2018-01-26 PROCEDURE — 87640 STAPH A DNA AMP PROBE: CPT

## 2018-01-26 PROCEDURE — 93005 ELECTROCARDIOGRAM TRACING: CPT

## 2018-01-26 PROCEDURE — 87086 URINE CULTURE/COLONY COUNT: CPT

## 2018-01-26 RX ORDER — IBUPROFEN 800 MG/1
800 TABLET ORAL EVERY 6 HOURS PRN
Status: ON HOLD | COMMUNITY
End: 2018-05-15

## 2018-01-26 RX ORDER — TRAMADOL HYDROCHLORIDE 50 MG/1
50 TABLET ORAL EVERY 4 HOURS PRN
Status: ON HOLD | COMMUNITY
End: 2018-02-13

## 2018-01-26 NOTE — DISCHARGE PLANNING
DISCHARGE PLANNING NOTE - TOTAL JOINT     Procedure: Procedure(s):  SHOULDER ARTHROPLASTY TOTAL  Procedure Date: 2/12/2018  Insurance:  Payor: MEDICARE / Plan: MEDICARE PART A & B / Product Type: *No Product type* /   Equipment currently available at home? N/A  Steps into the home? N/A  Steps within the home? N/A  Toilet height? N/A  Type of shower? N/A  Who will be with you during your recovery? daughter  Is Outpatient Physical Therapy set up after surgery? No / MD  Did you take the Total Joint Class and where? [facility]     Plan: Patient is requesting Home Health

## 2018-01-26 NOTE — OR NURSING
Pre admit apt: Pt. Instructed to continue regularly prescribed medications through day before surgery.  Instructed to take the following medications, the day of surgery, with a sip of water per anesthesia protocol:proventil, albuterol, dexilant, neurontin, ultram

## 2018-01-28 LAB
BACTERIA UR CULT: NORMAL
SIGNIFICANT IND 70042: NORMAL
SITE SITE: NORMAL
SOURCE SOURCE: NORMAL

## 2018-02-02 ENCOUNTER — HOSPITAL ENCOUNTER (OUTPATIENT)
Dept: LAB | Facility: MEDICAL CENTER | Age: 63
End: 2018-02-02
Attending: PHYSICIAN ASSISTANT
Payer: MEDICARE

## 2018-02-02 LAB
APTT PPP: 34.4 SEC (ref 24.7–36)
INR PPP: 1.01 (ref 0.87–1.13)
PROTHROMBIN TIME: 13 SEC (ref 12–14.6)

## 2018-02-02 PROCEDURE — 36415 COLL VENOUS BLD VENIPUNCTURE: CPT | Mod: GA

## 2018-02-02 PROCEDURE — 85730 THROMBOPLASTIN TIME PARTIAL: CPT | Mod: GA

## 2018-02-02 PROCEDURE — 85610 PROTHROMBIN TIME: CPT | Mod: GA

## 2018-02-05 ENCOUNTER — HOSPITAL ENCOUNTER (OUTPATIENT)
Dept: RADIOLOGY | Facility: REHABILITATION | Age: 63
End: 2018-02-05
Attending: ANESTHESIOLOGY
Payer: MEDICARE

## 2018-02-05 ENCOUNTER — HOSPITAL ENCOUNTER (OUTPATIENT)
Dept: PAIN MANAGEMENT | Facility: REHABILITATION | Age: 63
End: 2018-02-05
Attending: ANESTHESIOLOGY
Payer: MEDICARE

## 2018-02-05 VITALS
HEART RATE: 64 BPM | HEIGHT: 66 IN | TEMPERATURE: 97.7 F | WEIGHT: 142.2 LBS | SYSTOLIC BLOOD PRESSURE: 153 MMHG | OXYGEN SATURATION: 97 % | RESPIRATION RATE: 16 BRPM | BODY MASS INDEX: 22.85 KG/M2 | DIASTOLIC BLOOD PRESSURE: 77 MMHG

## 2018-02-05 PROCEDURE — 700117 HCHG RX CONTRAST REV CODE 255

## 2018-02-05 PROCEDURE — 20553 NJX 1/MLT TRIGGER POINTS 3/>: CPT

## 2018-02-05 PROCEDURE — 99152 MOD SED SAME PHYS/QHP 5/>YRS: CPT

## 2018-02-05 PROCEDURE — 62321 NJX INTERLAMINAR CRV/THRC: CPT

## 2018-02-05 PROCEDURE — 700111 HCHG RX REV CODE 636 W/ 250 OVERRIDE (IP)

## 2018-02-05 RX ORDER — BETAMETHASONE SODIUM PHOSPHATE AND BETAMETHASONE ACETATE 3; 3 MG/ML; MG/ML
INJECTION, SUSPENSION INTRA-ARTICULAR; INTRALESIONAL; INTRAMUSCULAR; SOFT TISSUE
Status: COMPLETED
Start: 2018-02-05 | End: 2018-02-05

## 2018-02-05 RX ORDER — METHYLPREDNISOLONE ACETATE 40 MG/ML
INJECTION, SUSPENSION INTRA-ARTICULAR; INTRALESIONAL; INTRAMUSCULAR; SOFT TISSUE
Status: COMPLETED
Start: 2018-02-05 | End: 2018-02-05

## 2018-02-05 RX ORDER — METHYLPREDNISOLONE ACETATE 80 MG/ML
INJECTION, SUSPENSION INTRA-ARTICULAR; INTRALESIONAL; INTRAMUSCULAR; SOFT TISSUE
Status: COMPLETED
Start: 2018-02-05 | End: 2018-02-05

## 2018-02-05 RX ORDER — BUPIVACAINE HYDROCHLORIDE 2.5 MG/ML
INJECTION, SOLUTION EPIDURAL; INFILTRATION; INTRACAUDAL
Status: COMPLETED
Start: 2018-02-05 | End: 2018-02-05

## 2018-02-05 RX ORDER — MIDAZOLAM HYDROCHLORIDE 1 MG/ML
INJECTION INTRAMUSCULAR; INTRAVENOUS
Status: DISPENSED
Start: 2018-02-05 | End: 2018-02-05

## 2018-02-05 RX ORDER — DEXAMETHASONE SODIUM PHOSPHATE 10 MG/ML
INJECTION, SOLUTION INTRAMUSCULAR; INTRAVENOUS
Status: DISPENSED
Start: 2018-02-05 | End: 2018-02-05

## 2018-02-05 RX ORDER — BUPIVACAINE HYDROCHLORIDE 2.5 MG/ML
INJECTION, SOLUTION EPIDURAL; INFILTRATION; INTRACAUDAL
Status: DISPENSED
Start: 2018-02-05 | End: 2018-02-05

## 2018-02-05 RX ORDER — MIDAZOLAM HYDROCHLORIDE 1 MG/ML
INJECTION INTRAMUSCULAR; INTRAVENOUS
Status: COMPLETED
Start: 2018-02-05 | End: 2018-02-05

## 2018-02-05 RX ADMIN — BUPIVACAINE HYDROCHLORIDE 6 ML: 2.5 INJECTION, SOLUTION EPIDURAL; INFILTRATION; INTRACAUDAL; PERINEURAL at 11:16

## 2018-02-05 RX ADMIN — BETAMETHASONE SODIUM PHOSPHATE AND BETAMETHASONE ACETATE 6 MG: 3; 3 INJECTION, SUSPENSION INTRA-ARTICULAR; INTRALESIONAL; INTRAMUSCULAR at 11:17

## 2018-02-05 RX ADMIN — METHYLPREDNISOLONE ACETATE 40 MG: 40 INJECTION, SUSPENSION INTRA-ARTICULAR; INTRALESIONAL; INTRAMUSCULAR; SOFT TISSUE at 11:16

## 2018-02-05 RX ADMIN — MIDAZOLAM HYDROCHLORIDE 1 MG: 1 INJECTION, SOLUTION INTRAMUSCULAR; INTRAVENOUS at 11:11

## 2018-02-05 RX ADMIN — FENTANYL CITRATE 50 MCG: 50 INJECTION, SOLUTION INTRAMUSCULAR; INTRAVENOUS at 11:13

## 2018-02-05 RX ADMIN — IOHEXOL 8 ML: 240 INJECTION, SOLUTION INTRATHECAL; INTRAVASCULAR; INTRAVENOUS; ORAL at 11:15

## 2018-02-05 ASSESSMENT — PAIN SCALES - GENERAL
PAINLEVEL_OUTOF10: 2
PAINLEVEL_OUTOF10: 7
PAINLEVEL_OUTOF10: 2

## 2018-02-05 NOTE — PROGRESS NOTES
Current medications reviewed with pt, see medications reconciliation form. Pt ronald taking ASA or other blood thinners or anti-inflammatories.  Pt has a ride post-procedure(Hawk to drive).  Printed and verbal discharge instructions given to pt who verbalized understanding.After STOP BANG assessment, pt score >5, education sheet on SUPA protocol given pre procedure.  Pt's responsible adult was given SUPA information and instructed pt not to be left along of 24 hours, due to sedation and SUPA assessment.

## 2018-02-12 ENCOUNTER — HOSPITAL ENCOUNTER (INPATIENT)
Facility: MEDICAL CENTER | Age: 63
LOS: 1 days | DRG: 483 | End: 2018-02-13
Attending: ORTHOPAEDIC SURGERY | Admitting: ORTHOPAEDIC SURGERY
Payer: MEDICARE

## 2018-02-12 ENCOUNTER — APPOINTMENT (OUTPATIENT)
Dept: RADIOLOGY | Facility: MEDICAL CENTER | Age: 63
DRG: 483 | End: 2018-02-12
Attending: PHYSICIAN ASSISTANT
Payer: MEDICARE

## 2018-02-12 PROCEDURE — 770006 HCHG ROOM/CARE - MED/SURG/GYN SEMI*

## 2018-02-12 PROCEDURE — 73030 X-RAY EXAM OF SHOULDER: CPT | Mod: LT

## 2018-02-12 PROCEDURE — 501838 HCHG SUTURE GENERAL: Performed by: ORTHOPAEDIC SURGERY

## 2018-02-12 PROCEDURE — 700102 HCHG RX REV CODE 250 W/ 637 OVERRIDE(OP): Performed by: PHYSICIAN ASSISTANT

## 2018-02-12 PROCEDURE — 700111 HCHG RX REV CODE 636 W/ 250 OVERRIDE (IP)

## 2018-02-12 PROCEDURE — 160035 HCHG PACU - 1ST 60 MINS PHASE I: Performed by: ORTHOPAEDIC SURGERY

## 2018-02-12 PROCEDURE — 500447 HCHG DRESSING, TEGADERM 8X12: Performed by: ORTHOPAEDIC SURGERY

## 2018-02-12 PROCEDURE — 700112 HCHG RX REV CODE 229: Performed by: PHYSICIAN ASSISTANT

## 2018-02-12 PROCEDURE — A9270 NON-COVERED ITEM OR SERVICE: HCPCS | Performed by: PHYSICIAN ASSISTANT

## 2018-02-12 PROCEDURE — 500891 HCHG PACK, ORTHO MAJOR: Performed by: ORTHOPAEDIC SURGERY

## 2018-02-12 PROCEDURE — 700111 HCHG RX REV CODE 636 W/ 250 OVERRIDE (IP): Performed by: PHYSICIAN ASSISTANT

## 2018-02-12 PROCEDURE — 0LS40ZZ REPOSITION LEFT UPPER ARM TENDON, OPEN APPROACH: ICD-10-PCS | Performed by: ORTHOPAEDIC SURGERY

## 2018-02-12 PROCEDURE — L8699 PROSTHETIC IMPLANT NOS: HCPCS | Performed by: ORTHOPAEDIC SURGERY

## 2018-02-12 PROCEDURE — 160041 HCHG SURGERY MINUTES - EA ADDL 1 MIN LEVEL 4: Performed by: ORTHOPAEDIC SURGERY

## 2018-02-12 PROCEDURE — 160029 HCHG SURGERY MINUTES - 1ST 30 MINS LEVEL 4: Performed by: ORTHOPAEDIC SURGERY

## 2018-02-12 PROCEDURE — 160036 HCHG PACU - EA ADDL 30 MINS PHASE I: Performed by: ORTHOPAEDIC SURGERY

## 2018-02-12 PROCEDURE — 160022 HCHG BLOCK: Performed by: ORTHOPAEDIC SURGERY

## 2018-02-12 PROCEDURE — 500562 HCHG FIBERWIRE: Performed by: ORTHOPAEDIC SURGERY

## 2018-02-12 PROCEDURE — 700105 HCHG RX REV CODE 258

## 2018-02-12 PROCEDURE — 502240 HCHG MISC OR SUPPLY RC 0272: Performed by: ORTHOPAEDIC SURGERY

## 2018-02-12 PROCEDURE — 700101 HCHG RX REV CODE 250

## 2018-02-12 PROCEDURE — 700101 HCHG RX REV CODE 250: Performed by: PHYSICIAN ASSISTANT

## 2018-02-12 PROCEDURE — 160009 HCHG ANES TIME/MIN: Performed by: ORTHOPAEDIC SURGERY

## 2018-02-12 PROCEDURE — 160048 HCHG OR STATISTICAL LEVEL 1-5: Performed by: ORTHOPAEDIC SURGERY

## 2018-02-12 PROCEDURE — 500126 HCHG BOVIE, NEEDLE TIP: Performed by: ORTHOPAEDIC SURGERY

## 2018-02-12 PROCEDURE — 160002 HCHG RECOVERY MINUTES (STAT): Performed by: ORTHOPAEDIC SURGERY

## 2018-02-12 PROCEDURE — 0RRK0JZ REPLACEMENT OF LEFT SHOULDER JOINT WITH SYNTHETIC SUBSTITUTE, OPEN APPROACH: ICD-10-PCS | Performed by: ORTHOPAEDIC SURGERY

## 2018-02-12 PROCEDURE — A6222 GAUZE <=16 IN NO W/SAL W/O B: HCPCS | Performed by: ORTHOPAEDIC SURGERY

## 2018-02-12 PROCEDURE — 502000 HCHG MISC OR IMPLANTS RC 0278: Performed by: ORTHOPAEDIC SURGERY

## 2018-02-12 PROCEDURE — 500864 HCHG NEEDLE, SPINAL 18G: Performed by: ORTHOPAEDIC SURGERY

## 2018-02-12 PROCEDURE — 700105 HCHG RX REV CODE 258: Performed by: PHYSICIAN ASSISTANT

## 2018-02-12 DEVICE — BONE CEMENT SIMPLEX FULL DOSE - (10EA/PK): Type: IMPLANTABLE DEVICE | Status: FUNCTIONAL

## 2018-02-12 DEVICE — IMPLANTABLE DEVICE: Type: IMPLANTABLE DEVICE | Status: FUNCTIONAL

## 2018-02-12 RX ORDER — DEXLANSOPRAZOLE 60 MG/1
60 CAPSULE, DELAYED RELEASE ORAL DAILY
Status: DISCONTINUED | OUTPATIENT
Start: 2018-02-12 | End: 2018-02-12

## 2018-02-12 RX ORDER — CLONIDINE HYDROCHLORIDE 0.1 MG/1
0.1 TABLET ORAL EVERY EVENING
Status: DISCONTINUED | OUTPATIENT
Start: 2018-02-12 | End: 2018-02-13 | Stop reason: HOSPADM

## 2018-02-12 RX ORDER — AMOXICILLIN 250 MG
1 CAPSULE ORAL
Status: DISCONTINUED | OUTPATIENT
Start: 2018-02-12 | End: 2018-02-13 | Stop reason: HOSPADM

## 2018-02-12 RX ORDER — DOCUSATE SODIUM 100 MG/1
100 CAPSULE, LIQUID FILLED ORAL 2 TIMES DAILY
Status: DISCONTINUED | OUTPATIENT
Start: 2018-02-12 | End: 2018-02-13 | Stop reason: HOSPADM

## 2018-02-12 RX ORDER — ALBUTEROL SULFATE 90 UG/1
2 AEROSOL, METERED RESPIRATORY (INHALATION) EVERY 6 HOURS PRN
Status: DISCONTINUED | OUTPATIENT
Start: 2018-02-12 | End: 2018-02-13 | Stop reason: HOSPADM

## 2018-02-12 RX ORDER — POLYETHYLENE GLYCOL 3350 17 G/17G
1 POWDER, FOR SOLUTION ORAL 2 TIMES DAILY PRN
Status: DISCONTINUED | OUTPATIENT
Start: 2018-02-12 | End: 2018-02-13 | Stop reason: HOSPADM

## 2018-02-12 RX ORDER — LIDOCAINE HYDROCHLORIDE 10 MG/ML
INJECTION, SOLUTION INFILTRATION; PERINEURAL
Status: COMPLETED
Start: 2018-02-12 | End: 2018-02-12

## 2018-02-12 RX ORDER — ENEMA 19; 7 G/133ML; G/133ML
1 ENEMA RECTAL
Status: DISCONTINUED | OUTPATIENT
Start: 2018-02-12 | End: 2018-02-13 | Stop reason: HOSPADM

## 2018-02-12 RX ORDER — OXYCODONE HYDROCHLORIDE 5 MG/1
5 TABLET ORAL
Status: DISCONTINUED | OUTPATIENT
Start: 2018-02-12 | End: 2018-02-13 | Stop reason: HOSPADM

## 2018-02-12 RX ORDER — ONDANSETRON 2 MG/ML
4 INJECTION INTRAMUSCULAR; INTRAVENOUS EVERY 4 HOURS PRN
Status: DISCONTINUED | OUTPATIENT
Start: 2018-02-12 | End: 2018-02-13 | Stop reason: HOSPADM

## 2018-02-12 RX ORDER — BACITRACIN 50000 [IU]/1
INJECTION, POWDER, FOR SOLUTION INTRAMUSCULAR
Status: DISCONTINUED | OUTPATIENT
Start: 2018-02-12 | End: 2018-02-12 | Stop reason: HOSPADM

## 2018-02-12 RX ORDER — MORPHINE SULFATE 4 MG/ML
4 INJECTION, SOLUTION INTRAMUSCULAR; INTRAVENOUS
Status: DISCONTINUED | OUTPATIENT
Start: 2018-02-12 | End: 2018-02-12

## 2018-02-12 RX ORDER — OXYCODONE HYDROCHLORIDE 10 MG/1
10 TABLET ORAL
Status: DISCONTINUED | OUTPATIENT
Start: 2018-02-12 | End: 2018-02-13 | Stop reason: HOSPADM

## 2018-02-12 RX ORDER — DULOXETIN HYDROCHLORIDE 30 MG/1
30 CAPSULE, DELAYED RELEASE ORAL DAILY
Status: DISCONTINUED | OUTPATIENT
Start: 2018-02-13 | End: 2018-02-13 | Stop reason: HOSPADM

## 2018-02-12 RX ORDER — SODIUM CHLORIDE, SODIUM LACTATE, POTASSIUM CHLORIDE, CALCIUM CHLORIDE 600; 310; 30; 20 MG/100ML; MG/100ML; MG/100ML; MG/100ML
INJECTION, SOLUTION INTRAVENOUS
Status: DISCONTINUED | OUTPATIENT
Start: 2018-02-12 | End: 2018-02-13 | Stop reason: HOSPADM

## 2018-02-12 RX ORDER — KETOROLAC TROMETHAMINE 30 MG/ML
30 INJECTION, SOLUTION INTRAMUSCULAR; INTRAVENOUS EVERY 6 HOURS
Status: DISCONTINUED | OUTPATIENT
Start: 2018-02-12 | End: 2018-02-13 | Stop reason: HOSPADM

## 2018-02-12 RX ORDER — DIPHENHYDRAMINE HYDROCHLORIDE 50 MG/ML
25 INJECTION INTRAMUSCULAR; INTRAVENOUS EVERY 6 HOURS PRN
Status: DISCONTINUED | OUTPATIENT
Start: 2018-02-12 | End: 2018-02-13 | Stop reason: HOSPADM

## 2018-02-12 RX ORDER — GABAPENTIN 400 MG/1
400 CAPSULE ORAL 2 TIMES DAILY
Status: DISCONTINUED | OUTPATIENT
Start: 2018-02-12 | End: 2018-02-13 | Stop reason: HOSPADM

## 2018-02-12 RX ORDER — HALOPERIDOL 5 MG/ML
1 INJECTION INTRAMUSCULAR EVERY 6 HOURS PRN
Status: DISCONTINUED | OUTPATIENT
Start: 2018-02-12 | End: 2018-02-13 | Stop reason: HOSPADM

## 2018-02-12 RX ORDER — SODIUM CHLORIDE 9 MG/ML
INJECTION, SOLUTION INTRAVENOUS CONTINUOUS
Status: DISCONTINUED | OUTPATIENT
Start: 2018-02-12 | End: 2018-02-13 | Stop reason: HOSPADM

## 2018-02-12 RX ORDER — AMOXICILLIN 250 MG
1 CAPSULE ORAL NIGHTLY
Status: DISCONTINUED | OUTPATIENT
Start: 2018-02-12 | End: 2018-02-13 | Stop reason: HOSPADM

## 2018-02-12 RX ORDER — ACETAMINOPHEN 500 MG
1000 TABLET ORAL EVERY 6 HOURS
Status: DISCONTINUED | OUTPATIENT
Start: 2018-02-12 | End: 2018-02-13 | Stop reason: HOSPADM

## 2018-02-12 RX ORDER — SCOLOPAMINE TRANSDERMAL SYSTEM 1 MG/1
1 PATCH, EXTENDED RELEASE TRANSDERMAL
Status: DISCONTINUED | OUTPATIENT
Start: 2018-02-12 | End: 2018-02-13 | Stop reason: HOSPADM

## 2018-02-12 RX ORDER — DEXAMETHASONE SODIUM PHOSPHATE 4 MG/ML
4 INJECTION, SOLUTION INTRA-ARTICULAR; INTRALESIONAL; INTRAMUSCULAR; INTRAVENOUS; SOFT TISSUE
Status: DISCONTINUED | OUTPATIENT
Start: 2018-02-12 | End: 2018-02-13 | Stop reason: HOSPADM

## 2018-02-12 RX ORDER — BISACODYL 10 MG
10 SUPPOSITORY, RECTAL RECTAL
Status: DISCONTINUED | OUTPATIENT
Start: 2018-02-12 | End: 2018-02-13 | Stop reason: HOSPADM

## 2018-02-12 RX ORDER — OMEPRAZOLE 20 MG/1
20 CAPSULE, DELAYED RELEASE ORAL DAILY
Status: DISCONTINUED | OUTPATIENT
Start: 2018-02-13 | End: 2018-02-13 | Stop reason: HOSPADM

## 2018-02-12 RX ADMIN — OXYCODONE HYDROCHLORIDE 5 MG: 5 TABLET ORAL at 17:49

## 2018-02-12 RX ADMIN — DOCUSATE SODIUM 100 MG: 100 CAPSULE, LIQUID FILLED ORAL at 22:16

## 2018-02-12 RX ADMIN — SODIUM CHLORIDE: 9 INJECTION, SOLUTION INTRAVENOUS at 17:53

## 2018-02-12 RX ADMIN — ACETAMINOPHEN 1000 MG: 500 TABLET ORAL at 17:48

## 2018-02-12 RX ADMIN — OXYCODONE HYDROCHLORIDE 10 MG: 10 TABLET ORAL at 22:17

## 2018-02-12 RX ADMIN — GABAPENTIN 400 MG: 400 CAPSULE ORAL at 22:16

## 2018-02-12 RX ADMIN — CLONIDINE HYDROCHLORIDE 0.1 MG: 0.1 TABLET ORAL at 22:16

## 2018-02-12 RX ADMIN — KETOROLAC TROMETHAMINE 30 MG: 30 INJECTION, SOLUTION INTRAMUSCULAR at 17:49

## 2018-02-12 RX ADMIN — LIDOCAINE HYDROCHLORIDE 0.5 ML: 10 INJECTION, SOLUTION INFILTRATION; PERINEURAL at 11:00

## 2018-02-12 RX ADMIN — SODIUM CHLORIDE, SODIUM LACTATE, POTASSIUM CHLORIDE, CALCIUM CHLORIDE: 600; 310; 30; 20 INJECTION, SOLUTION INTRAVENOUS at 11:15

## 2018-02-12 RX ADMIN — DOCUSATE SODIUM AND SENNOSIDES 1 TABLET: 8.6; 5 TABLET, FILM COATED ORAL at 22:16

## 2018-02-12 RX ADMIN — VANCOMYCIN 1 G: 1 INJECTION, SOLUTION INTRAVENOUS at 11:15

## 2018-02-12 RX ADMIN — WATER 2 G: 100 INJECTION, SOLUTION INTRAVENOUS at 22:18

## 2018-02-12 ASSESSMENT — PATIENT HEALTH QUESTIONNAIRE - PHQ9
9. THOUGHTS THAT YOU WOULD BE BETTER OFF DEAD, OR OF HURTING YOURSELF: NOT AT ALL
4. FEELING TIRED OR HAVING LITTLE ENERGY: NEARLY EVERY DAY
1. LITTLE INTEREST OR PLEASURE IN DOING THINGS: SEVERAL DAYS
8. MOVING OR SPEAKING SO SLOWLY THAT OTHER PEOPLE COULD HAVE NOTICED. OR THE OPPOSITE, BEING SO FIGETY OR RESTLESS THAT YOU HAVE BEEN MOVING AROUND A LOT MORE THAN USUAL: NOT AT ALL
6. FEELING BAD ABOUT YOURSELF - OR THAT YOU ARE A FAILURE OR HAVE LET YOURSELF OR YOUR FAMILY DOWN: NOT AL ALL
SUM OF ALL RESPONSES TO PHQ QUESTIONS 1-9: 10
SUM OF ALL RESPONSES TO PHQ9 QUESTIONS 1 AND 2: 2
7. TROUBLE CONCENTRATING ON THINGS, SUCH AS READING THE NEWSPAPER OR WATCHING TELEVISION: SEVERAL DAYS
2. FEELING DOWN, DEPRESSED, IRRITABLE, OR HOPELESS: SEVERAL DAYS
3. TROUBLE FALLING OR STAYING ASLEEP OR SLEEPING TOO MUCH: NEARLY EVERY DAY
5. POOR APPETITE OR OVEREATING: SEVERAL DAYS

## 2018-02-12 ASSESSMENT — PAIN SCALES - GENERAL
PAINLEVEL_OUTOF10: 0
PAINLEVEL_OUTOF10: 10
PAINLEVEL_OUTOF10: 10

## 2018-02-12 ASSESSMENT — LIFESTYLE VARIABLES
ALCOHOL_USE: NO
EVER_SMOKED: YES
PACK_YEARS: 75

## 2018-02-12 NOTE — OR NURSING
1050: Brought patient back to pre-op and assumed care.  1134: Patient allergies and NPO status verified, home medication reconciliation completed and belongings secured. Patient verbalizes understanding of pain scale, expected course of stay and plan of care. Surgical site verified with patient. IV access established. Sequentials placed on legs.

## 2018-02-13 ENCOUNTER — APPOINTMENT (OUTPATIENT)
Dept: BEHAVIORAL HEALTH | Facility: PHYSICIAN GROUP | Age: 63
End: 2018-02-13
Payer: MEDICARE

## 2018-02-13 VITALS
HEIGHT: 66 IN | DIASTOLIC BLOOD PRESSURE: 79 MMHG | WEIGHT: 137.13 LBS | TEMPERATURE: 97.4 F | OXYGEN SATURATION: 95 % | BODY MASS INDEX: 22.04 KG/M2 | RESPIRATION RATE: 18 BRPM | HEART RATE: 69 BPM | SYSTOLIC BLOOD PRESSURE: 123 MMHG

## 2018-02-13 PROCEDURE — G8988 SELF CARE GOAL STATUS: HCPCS | Mod: CJ

## 2018-02-13 PROCEDURE — G8987 SELF CARE CURRENT STATUS: HCPCS | Mod: CJ

## 2018-02-13 PROCEDURE — G8989 SELF CARE D/C STATUS: HCPCS | Mod: CJ

## 2018-02-13 PROCEDURE — 700111 HCHG RX REV CODE 636 W/ 250 OVERRIDE (IP): Performed by: PHYSICIAN ASSISTANT

## 2018-02-13 PROCEDURE — 700102 HCHG RX REV CODE 250 W/ 637 OVERRIDE(OP): Performed by: ORTHOPAEDIC SURGERY

## 2018-02-13 PROCEDURE — 99406 BEHAV CHNG SMOKING 3-10 MIN: CPT

## 2018-02-13 PROCEDURE — 700105 HCHG RX REV CODE 258: Performed by: PHYSICIAN ASSISTANT

## 2018-02-13 PROCEDURE — 700102 HCHG RX REV CODE 250 W/ 637 OVERRIDE(OP): Performed by: PHYSICIAN ASSISTANT

## 2018-02-13 PROCEDURE — A9270 NON-COVERED ITEM OR SERVICE: HCPCS | Performed by: ORTHOPAEDIC SURGERY

## 2018-02-13 PROCEDURE — 700101 HCHG RX REV CODE 250: Performed by: PHYSICIAN ASSISTANT

## 2018-02-13 PROCEDURE — 700111 HCHG RX REV CODE 636 W/ 250 OVERRIDE (IP): Performed by: ANESTHESIOLOGY

## 2018-02-13 PROCEDURE — A9270 NON-COVERED ITEM OR SERVICE: HCPCS | Performed by: PHYSICIAN ASSISTANT

## 2018-02-13 PROCEDURE — 700101 HCHG RX REV CODE 250

## 2018-02-13 PROCEDURE — 700112 HCHG RX REV CODE 229: Performed by: PHYSICIAN ASSISTANT

## 2018-02-13 PROCEDURE — 97166 OT EVAL MOD COMPLEX 45 MIN: CPT

## 2018-02-13 RX ORDER — BUPIVACAINE HYDROCHLORIDE 5 MG/ML
INJECTION, SOLUTION EPIDURAL; INTRACAUDAL
Status: COMPLETED
Start: 2018-02-13 | End: 2018-02-13

## 2018-02-13 RX ADMIN — DOCUSATE SODIUM 100 MG: 100 CAPSULE, LIQUID FILLED ORAL at 09:46

## 2018-02-13 RX ADMIN — ASPIRIN 325 MG: 325 TABLET, DELAYED RELEASE ORAL at 13:10

## 2018-02-13 RX ADMIN — SODIUM CHLORIDE: 9 INJECTION, SOLUTION INTRAVENOUS at 03:08

## 2018-02-13 RX ADMIN — ACETAMINOPHEN 1000 MG: 500 TABLET ORAL at 05:03

## 2018-02-13 RX ADMIN — ACETAMINOPHEN 1000 MG: 500 TABLET ORAL at 00:21

## 2018-02-13 RX ADMIN — DULOXETINE HYDROCHLORIDE 30 MG: 30 CAPSULE, DELAYED RELEASE ORAL at 09:46

## 2018-02-13 RX ADMIN — OXYCODONE HYDROCHLORIDE 10 MG: 10 TABLET ORAL at 09:46

## 2018-02-13 RX ADMIN — OXYCODONE HYDROCHLORIDE 10 MG: 10 TABLET ORAL at 04:50

## 2018-02-13 RX ADMIN — OMEPRAZOLE 20 MG: 20 CAPSULE, DELAYED RELEASE ORAL at 09:46

## 2018-02-13 RX ADMIN — WATER 2 G: 100 INJECTION, SOLUTION INTRAVENOUS at 04:52

## 2018-02-13 RX ADMIN — OXYCODONE HYDROCHLORIDE 10 MG: 10 TABLET ORAL at 01:39

## 2018-02-13 RX ADMIN — OXYCODONE HYDROCHLORIDE 10 MG: 10 TABLET ORAL at 13:11

## 2018-02-13 RX ADMIN — ACETAMINOPHEN 1000 MG: 500 TABLET ORAL at 13:10

## 2018-02-13 RX ADMIN — KETOROLAC TROMETHAMINE 30 MG: 30 INJECTION, SOLUTION INTRAMUSCULAR at 00:22

## 2018-02-13 RX ADMIN — OXYCODONE HYDROCHLORIDE 10 MG: 10 TABLET ORAL at 17:02

## 2018-02-13 RX ADMIN — KETOROLAC TROMETHAMINE 30 MG: 30 INJECTION, SOLUTION INTRAMUSCULAR at 05:03

## 2018-02-13 RX ADMIN — KETOROLAC TROMETHAMINE 30 MG: 30 INJECTION, SOLUTION INTRAMUSCULAR at 13:11

## 2018-02-13 RX ADMIN — FENTANYL CITRATE 50 MCG: 50 INJECTION INTRAMUSCULAR; INTRAVENOUS at 07:39

## 2018-02-13 RX ADMIN — GABAPENTIN 400 MG: 400 CAPSULE ORAL at 09:46

## 2018-02-13 ASSESSMENT — PAIN SCALES - GENERAL
PAINLEVEL_OUTOF10: 7
PAINLEVEL_OUTOF10: 2
PAINLEVEL_OUTOF10: 8
PAINLEVEL_OUTOF10: 4
PAINLEVEL_OUTOF10: 9
PAINLEVEL_OUTOF10: 9
PAINLEVEL_OUTOF10: 8
PAINLEVEL_OUTOF10: 4
PAINLEVEL_OUTOF10: 6
PAINLEVEL_OUTOF10: 6

## 2018-02-13 ASSESSMENT — LIFESTYLE VARIABLES
PACK_YEARS: 75
EVER_SMOKED: YES

## 2018-02-13 ASSESSMENT — ACTIVITIES OF DAILY LIVING (ADL): TOILETING: INDEPENDENT

## 2018-02-13 NOTE — PROGRESS NOTES
Progress Note               Author: Yovani Guzmán Date & Time created: 2018  7:56 AM     Interval History:  No complaints  Pain controlled  Tolerating PO    Review of Systems:  ROS    Physical Exam:  Physical Exam  UE with neg homans, no sign of DVT  Bandage stable with no signs of drainage  ABD soft  No complaints of orthostasis  Sling stable  Labs:        Invalid input(s): [id number]      No results for input(s): SODIUM, POTASSIUM, CHLORIDE, CO2, BUN, CREATININE, MAGNESIUM, PHOSPHORUS, CALCIUM in the last 72 hours.  No results for input(s): ALTSGPT, ASTSGOT, ALKPHOSPHAT, TBILIRUBIN, DBILIRUBIN, GAMMAGT, AMYLASE, LIPASE, ALB, PREALBUMIN, GLUCOSE in the last 72 hours.  No results for input(s): RBC, HEMOGLOBIN, HEMATOCRIT, PLATELETCT, PROTHROMBTM, APTT, INR, IRON, FERRITIN, TOTIRONBC in the last 72 hours.      Hemodynamics:  Temp (24hrs), Av.9 °C (98.4 °F), Min:36.6 °C (97.8 °F), Max:37.5 °C (99.5 °F)  Temperature: 36.6 °C (97.8 °F)  Pulse  Av  Min: 66  Max: 92Heart Rate (Monitored): 86  Blood Pressure: 106/51     Respiratory:    Respiration: 18, Pulse Oximetry: 96 %, O2 Daily Delivery Respiratory : Room Air with O2 Available           Fluids:    Intake/Output Summary (Last 24 hours) at 18 0756  Last data filed at 18 0300   Gross per 24 hour   Intake             2540 ml   Output             2050 ml   Net              490 ml     Weight: 62.2 kg (137 lb 2 oz)  GI/Nutrition:  Orders Placed This Encounter   Procedures   • DIET ORDER     Standing Status:   Standing     Number of Occurrences:   1     Order Specific Question:   Diet:     Answer:   Regular [1]     Medical Decision Making, by Problem:  There are no active hospital problems to display for this patient.      Plan:  1. DC home on crutches/ walker  2. DC home on home meds per home doses  3. DC home on Percocet, ASA, and Outpt PT.  4. Follow up [facility] 10-14 days  5. Call for Fevers, drainage, redness, shortness of breath, or  increasing lower extremity swelling particularly in the calf.  6. Start Aspirin 325mg per day  Use for thirty days.    Quality-Core Measures

## 2018-02-13 NOTE — PROGRESS NOTES
Dr. Kendall at bedside for re-block for left shoulder; time-out performed. Patient pre-medicated, tolerated well. Vitals stable.

## 2018-02-13 NOTE — PROGRESS NOTES
Total Joint Replacement Program Rounding     Inpatient bedside rounding completed to address quality of care provided by total joint replacement program IDT and overall patient experience at [facility].    Performance Measures addressed: None. Pt states she will f/u with OP therapy as per Dr. Guzmán.   Patient Satisfaction addressed: No complaints. Happy with her care. Educated pt on post op precautions and neural monitoring s/p block.   Additional notes: Patient/family updated on POC during hospital stay. Pt provided with water and coffee. RN updated.

## 2018-02-13 NOTE — THERAPY
"Occupational Therapy Evaluation completed.   Functional Status: Pt is a 61 y/o female, admit for a L TSA. Pt lives with her daughters , who can assist with care after d/c.Occupational Therapy Evaluation completed.     Pt was educated in detail on ADL's after a L  Total Shoulder surgery.   Patient specifically educated on surgeon's post-operative precautions including NO shoulder AROM , pendulum exercises ONLY until cleared by physician.    Summary of education completed:  How to adjust sling/immobilizer for best fit.  · To keep sling positioned so hand is level or slightly above elbow to reduce pull of gravity on arm and maintain shoulder in neutral positioning.  How to don & doff sling/immobilizer safely and appropriately for dressing and bathing.  How to dress upper body while maintaining post surgical precautions.  How to shower safely.  · How to appropriately cover incision for showering if needed prior to removal of staples.    · Proper positioning of arm during showering.   · Encouraged use of hand held shower (using the non-operative extremity) to keep water away from the incision site.    · Safe shower and toilet transfers.  Proper positioning while sleeping either in bed or recliner  · Encouraged patient to support arm with pillows under forearm when sitting to reduce strain on the neck from the weight of the arm and immobilizer.   Proper bed mobility and transfer techniques while maintaining NWB on surgical arm.  Proper positioning of arm for gentle wrist/hand ROM and passive elbow extension.    Pain management education - around the clock.    Pt  verbalized and demonstrated understanding of education provided.      Plan of Care: Patient with no further skilled OT needs in the acute care setting at this time  Discharge Recommendations:  Equipment: No Equipment Needed. Post-acute therapy Discharge to home with outpatient or home health for additional skilled therapy services.    See \"Rehab Therapy-Acute\" " Patient Summary Report for complete documentation.

## 2018-02-13 NOTE — DISCHARGE PLANNING
Medical Social Worker    BENNETT met with pt bedside about dc plan. Pt reports her daughters boyfriend (name unknown) will be picking her up for dc around 5:00 p.m. Pt was given IMM copy and signed copy placed in medical records. No needs from this SW at this time. Flow sheets updated.     Plan: SW to remain available for any needs.

## 2018-02-13 NOTE — OP REPORT
DATE OF SERVICE: 2/12/18     PREOPERATIVE DIAGNOSIS:  Osteoarthritis, left shoulder.     POSTOPERATIVE DIAGNOSIS:  Osteoarthritis, left shoulder.     PROCEDURE:  Left total shoulder arthroplasty.        Left shoulder biceps tenodesis     SURGEON:  Yovani Guzmán MD.     ASSISTANT: Becca Peterson PA-C   ANESTHESIOLOGIST: Dr. Harris   ANESTHESIA:  General with associated block     IMPLANTS:  Tornier size 7 a short stem, size large 40 glenoid with a size 51 x 19 high offset humeral head       PROCEDURE IN DETAIL:  The patient was taken to the operating room where he   underwent general anesthetic in supine position.  He had been seen in the   preop holding area.  He was positioned for general anesthesia and then brought   into a supine and then sleeping position in a beach chair.  The spider was   utilized.  The left upper extremity was isolated, prepped using Hibiclens,   alcohol ChloraPrep, draped using sterile technique, perioperative antibiotics,   air isolation equipment, isolation draping and surgery undertaken after an   appropriate timeout.     The surgery was addressed with anterior deltopectoral incision.  Skin and   subcutaneous tissue were incised.  Hemostasis obtained.  The deltopectoral   interface was identified and the cephalic vein retracted laterally.  The   anterior aspect of the shoulder was identified.  The subscapularis was taken   off 1 cm medial to the lesser tuberosity.  There was evidence of chronic   attritional tearing and absence of the biceps.  The proximal humerus was   addressed with mobilization of the soft tissue around the labrum with excision   of the labrum circumferentially and then mobilization of the capsule from the   inferior humeral neck on a subperiosteal dissection.  External rotation was   undertaken.  The humerus was addressed with intramedullary guidance and then   proximal resection and osteophyte excision.  This was then followed with   broaching.  The broach was  positioned with a protecting plate.     At this point, the glenoid was addressed with glenoid exposure and preparation   with the appropriate positioning and sizing and then drilling.  The reaming   was completed.  Pulse lavage undertaken.  Hemostasis obtained and then   cementation of the glenoid component undertaken with axial load pressure until   it is hardened.  Excess cement had been removed.  At this point, the humeral   component was inserted and trial reductions repeated with excellent stability.  The high offset head was utilized.  The final humeral stem was inserted. The final head inserted into the appropriately templated position and the shoulder relocated with excellent soft tissue balance.   The capsule and subscapularis were then repaired as a single layer with interrupted #2 FiberWire and #2 Tycron.    A biceps tenodesis was undertaken. This was done by identifying the biceps at the distal aspect of the bicipital groove. It was tenotomized intra-articularly and then brought out laterally. It was oversewn with #2 FiberWire externally to obtain a full biceps tenodesis. Copious irrigation had been repeated throughout the case with antibiotic saline with pulse lavage    The deltopectoral fascia was repaired using 0 Monocryl, subcuticular 2-0 Monocryl and staples on the skin.  The patient placed into a sterile bandage, awoken from his anesthetic and taken to the recovery room, tolerated   the procedure very well with no signs of complications.        ____________________________________     MAGUI HASSAN MD

## 2018-02-13 NOTE — CARE PLAN
Problem: Safety  Goal: Will remain free from injury  Outcome: PROGRESSING AS EXPECTED    Intervention: Provide assistance with mobility   02/13/18 0431   OTHER   Assistance / Tolerance Assistance of One   Pt educated to call for assistance prior to ambulating to bathroom or in hallways   02/13/18 0431   OTHER   Assistance / Tolerance Assistance of One;Tolerates Well         Problem: Venous Thromboembolism (VTW)/Deep Vein Thrombosis (DVT) Prevention:  Goal: Patient will participate in Venous Thrombosis (VTE)/Deep Vein Thrombosis (DVT)Prevention Measures  Outcome: PROGRESSING AS EXPECTED   02/12/18 1525 02/13/18 0133   OTHER   Risk Assessment Score --  4   VTE RISK --  High   Pharmacologic Prophylaxis Used --  (ASA)   Mechanical/VTE Prophylaxis   Mechanical Prophylaxis  --  SCDs, Sequential Compression Device   MEG Hose (Graduated Compression Stockings) On --    SCDs, Sequential Compression Device --  On

## 2018-02-13 NOTE — FLOWSHEET NOTE
02/12/18 2018   Events/Summary/Plan   Events/Summary/Plan IS    Incentive Spirometry Group   Incentive Spirometry Instruction Yes   Breathing Exercises Yes   Incentive Spirometer Volume 1750 mL   Incentive Spirometer Date Last Changed 02/12/18   Incentive Spirometer Next Change Date (Q 30 Days) 03/14/18   Chest Exam   Respiration 18   Pulse 84   Oximetry   Continuous Oximetry Yes   Oxygen   Pulse Oximetry 96 %   O2 Daily Delivery Respiratory  Room Air with O2 Available

## 2018-02-13 NOTE — RESPIRATORY CARE
"COPD Education by COPD Clinical Educator  2/13/2018 at 11:55 AM by Charo Chong    Patient interviewed by COPD education team.  Patient refused full COPD program at this time, but agreed to short intervention.  A comprehensive packet including information about COPD, treatments, and smoking cessation given.   Patient has home nebulizer and rescue inhaler at home.  Patient instructed on importance of cleaning home nebulizer after every treatment to prevent infection and rescue inhaler.  Patient has never been to [facility] would like an appointment called #[phone] to schedule appointment August 3, 2018 at 1300.  Patient states has hx Asthma.    Smoking Cessation Intervention 3 -10 minutes completed.  Provided smoking cessation packet with \"Tips to Quit\" and flyer for \"Free Smoking Cessation Classes\". Provided \"Quit Card\" with the phone number to [employer] [facility] for free nicotine replacement therapy.   "

## 2018-02-13 NOTE — PROGRESS NOTES
Received report from night RN; patient resting up in chair, reports moderate pain. Vitals stable. Dressing CDI.

## 2018-02-13 NOTE — PROGRESS NOTES
Patient to floor, vitals stable, on 2L oxygen. She is calm and resting in bed. Complains of moderate pain, mild numbness or tingling in left hand. Does not report dizziness or nausea.

## 2018-02-14 NOTE — DISCHARGE INSTRUCTIONS
Per Dr. Guzmán:    Plan:    1. DC home on home meds per home doses  2. DC home on Percocet, ASA, and Outpt PT.  3. Follow up [facility] 10-14 days  4. Call for Fevers, drainage, redness, shortness of breath, or increasing lower extremity swelling particularly in the calf.  5. Start Aspirin 325mg per day  Use for thirty days.    Discharge Instructions    Discharged to home by car with relative. Discharged via wheelchair, hospital escort: Yes.  Special equipment needed: Immobilizer    Be sure to schedule a follow-up appointment with your primary care doctor or any specialists as instructed.     Discharge Plan:   Diet Plan: Discussed  Activity Level: Discussed  Smoking Cessation Offered: Patient Counseled  Confirmed Follow up Appointment: Appointment Scheduled  Confirmed Symptoms Management: Discussed  Medication Reconciliation Updated: Yes  Influenza Vaccine Indication: Patient Refuses    I understand that a diet low in cholesterol, fat, and sodium is recommended for good health. Unless I have been given specific instructions below for another diet, I accept this instruction as my diet prescription.   Other diet: Regular    Special Instructions: Discharge instructions for the Orthopedic Patient    Follow up with Primary Care Physician within 2 weeks of discharge to home, regarding:  Review of medications and diagnostic testing.  Surveillance for medical complications.  Workup and treatment of osteoporosis, if appropriate.     -Is this a Joint Replacement patient? No    -Is this patient being discharged with medication to prevent blood clots?  Yes, Aspirin Aspirin, ASA oral tablets  What is this medicine?  ASPIRIN (AS pir in) is a pain reliever. It is used to treat mild pain and fever. This medicine is also used as directed by a doctor to prevent and to treat heart attacks, to prevent strokes, and to treat arthritis or inflammation.  This medicine may be used for other purposes; ask your health care provider or  pharmacist if you have questions.  COMMON BRAND NAME(S): Aspir-Low, Aspir-Sherry , Aspirtab , Faustino Advanced Aspirin, Faustino Aspirin Extra Strength, Faustino Aspirin Plus, Faustino Aspirin, Faustino Genuine Aspirin, Faustino Womens Aspirin , Bufferin Extra Strength, Bufferin Low Dose, Bufferin  What should I tell my health care provider before I take this medicine?  They need to know if you have any of these conditions:  -anemia  -asthma  -bleeding problems  -child with chickenpox, the flu, or other viral infection  -diabetes  -gout  -if you frequently drink alcohol containing drinks  -kidney disease  -liver disease  -low level of vitamin K  -lupus  -smoke tobacco  -stomach ulcers or other problems  -an unusual or allergic reaction to aspirin, tartrazine dye, other medicines, dyes, or preservatives  -pregnant or trying to get pregnant  -breast-feeding  How should I use this medicine?  Take this medicine by mouth with a glass of water. Follow the directions on the package or prescription label. You can take this medicine with or without food. If it upsets your stomach, take it with food. Do not take your medicine more often than directed.  Talk to your pediatrician regarding the use of this medicine in children. While this drug may be prescribed for children as young as 12 years of age for selected conditions, precautions do apply. Children and teenagers should not use this medicine to treat chicken pox or flu symptoms unless directed by a doctor.  Patients over 65 years old may have a stronger reaction and need a smaller dose.  Overdosage: If you think you have taken too much of this medicine contact a poison control center or emergency room at once.  NOTE: This medicine is only for you. Do not share this medicine with others.  What if I miss a dose?  If you are taking this medicine on a regular schedule and miss a dose, take it as soon as you can. If it is almost time for your next dose, take only that dose. Do not take double or  extra doses.  What may interact with this medicine?  Do not take this medicine with any of the following medications:  -cidofovir  -ketorolac  -probenecid  This medicine may also interact with the following medications:  -alcohol  -alendronate  -bismuth subsalicylate  -flavocoxid  -herbal supplements like feverfew, garlic, aggie, ginkgo biloba, horse chestnut  -medicines for diabetes or glaucoma like acetazolamide, methazolamide  -medicines for gout  -medicines that treat or prevent blood clots like enoxaparin, heparin, ticlopidine, warfarin  -other aspirin and aspirin-like medicines  -NSAIDs, medicines for pain and inflammation, like ibuprofen or naproxen  -pemetrexed  -sulfinpyrazone  -varicella live vaccine  This list may not describe all possible interactions. Give your health care provider a list of all the medicines, herbs, non-prescription drugs, or dietary supplements you use. Also tell them if you smoke, drink alcohol, or use illegal drugs. Some items may interact with your medicine.  What should I watch for while using this medicine?  If you are treating yourself for pain, tell your doctor or health care professional if the pain lasts more than 10 days, if it gets worse, or if there is a new or different kind of pain. Tell your doctor if you see redness or swelling. Also, check with your doctor if you have a fever that lasts for more than 3 days. Only take this medicine to prevent heart attacks or blood clotting if prescribed by your doctor or health care professional.  Do not take aspirin or aspirin-like medicines with this medicine. Too much aspirin can be dangerous. Always read the labels carefully.  This medicine can irritate your stomach or cause bleeding problems. Do not smoke cigarettes or drink alcohol while taking this medicine. Do not lie down for 30 minutes after taking this medicine to prevent irritation to your throat.  If you are scheduled for any medical or dental procedure, tell your  healthcare provider that you are taking this medicine. You may need to stop taking this medicine before the procedure.  What side effects may I notice from receiving this medicine?  Side effects that you should report to your doctor or health care professional as soon as possible:  -allergic reactions like skin rash, itching or hives, swelling of the face, lips, or tongue  -black, tarry stools  -bloody, coffee ground-like vomit  -breathing problems  -changes in hearing, ringing in the ears  -confusion  -general ill feeling or flu-like symptoms  -pain on swallowing  -redness, blistering, peeling or loosening of the skin, including inside the mouth or nose  -trouble passing urine or change in the amount of urine  -unusual bleeding or bruising  -unusually weak or tired  -yellowing of the eyes or skin  Side effects that usually do not require medical attention (report to your doctor or health care professional if they continue or are bothersome):  -diarrhea or constipation  -nausea, vomiting  -stomach gas, heartburn  This list may not describe all possible side effects. Call your doctor for medical advice about side effects. You may report side effects to FDA at 8-497-FDA-3710.  Where should I keep my medicine?  Keep out of the reach of children.  Store at room temperature between 15 and 30 degrees C (59 and 86 degrees F). Protect from heat and moisture. Do not use this medicine if it has a strong vinegar smell. Throw away any unused medicine after the expiration date.  NOTE: This sheet is a summary. It may not cover all possible information. If you have questions about this medicine, talk to your doctor, pharmacist, or health care provider.  © 2014, Elsevier/Gold Standard. (3/10/2009 10:44:17 AM)      · Is patient discharged on Warfarin / Coumadin?   No     Depression / Suicide Risk    As you are discharged from this [facility] facility, it is important to learn how to keep safe from harming yourself.    Recognize  the warning signs:  · Abrupt changes in personality, positive or negative- including increase in energy   · Giving away possessions  · Change in eating patterns- significant weight changes-  positive or negative  · Change in sleeping patterns- unable to sleep or sleeping all the time   · Unwillingness or inability to communicate  · Depression  · Unusual sadness, discouragement and loneliness  · Talk of wanting to die  · Neglect of personal appearance   · Rebelliousness- reckless behavior  · Withdrawal from people/activities they love  · Confusion- inability to concentrate     If you or a loved one observes any of these behaviors or has concerns about self-harm, here's what you can do:  · Talk about it- your feelings and reasons for harming yourself  · Remove any means that you might use to hurt yourself (examples: pills, rope, extension cords, firearm)  · Get professional help from the community (Mental Health, Substance Abuse, psychological counseling)  · Do not be alone:Call your Safe Contact- someone whom you trust who will be there for you.  · Call your local CRISIS HOTLINE 770-9630 or 806-838-4478  · Call your local Children's Mobile Crisis Response Team [place] (129) 416-7767 or www.[employer]  · Call the toll free National Suicide Prevention Hotlines   · National Suicide Prevention Lifeline 480-130-AMNO (6379)  · National Hope Line Network 800-SUICIDE (578-0645)

## 2018-02-14 NOTE — PROGRESS NOTES
Provided and educated patient on discharge instructions. Patient had no further questions. Patient left with daughter's boyfriend via wheelchair and escort.

## 2018-02-27 ENCOUNTER — OFFICE VISIT (OUTPATIENT)
Dept: BEHAVIORAL HEALTH | Facility: PHYSICIAN GROUP | Age: 63
End: 2018-02-27
Payer: MEDICARE

## 2018-02-27 DIAGNOSIS — F43.10 POSTTRAUMATIC STRESS DISORDER: ICD-10-CM

## 2018-02-27 DIAGNOSIS — F33.9 MAJOR DEPRESSIVE DISORDER, RECURRENT EPISODE WITH ANXIOUS DISTRESS (HCC): ICD-10-CM

## 2018-02-27 DIAGNOSIS — F10.11 ALCOHOL ABUSE, IN REMISSION: ICD-10-CM

## 2018-02-27 PROCEDURE — 90834 PSYTX W PT 45 MINUTES: CPT | Performed by: PSYCHOLOGIST
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
## 2018-03-27 ENCOUNTER — OFFICE VISIT (OUTPATIENT)
Dept: BEHAVIORAL HEALTH | Facility: PHYSICIAN GROUP | Age: 63
End: 2018-03-27
Payer: MEDICARE

## 2018-03-27 DIAGNOSIS — F43.10 POSTTRAUMATIC STRESS DISORDER: ICD-10-CM

## 2018-03-27 DIAGNOSIS — G89.29 OTHER CHRONIC PAIN: ICD-10-CM

## 2018-03-27 DIAGNOSIS — F33.9 MAJOR DEPRESSIVE DISORDER, RECURRENT EPISODE WITH ANXIOUS DISTRESS (HCC): ICD-10-CM

## 2018-03-27 PROCEDURE — 90834 PSYTX W PT 45 MINUTES: CPT | Performed by: PSYCHOLOGIST

## 2018-03-27 NOTE — BH THERAPY
[facility]  Therapy Progress Note    Patient Name: Lynette Correa  Patient MRN: [id number]  Today's Date: 3/27/2018     Type of session:Individual psychotherapy  Length of session: 45 minutes  Persons in attendance:Patient    Subjective/New Info: Pt reports she is out of her shoulder brace and recovering well. She stopped all pain meds and feels proud of how she handled things. Pt struggling with past memories - feels like she failed people and stated she doesn't like herself and wished she felt like she knew who she was. Disc how her childhood was denied and she never got the same chance to discover who she was like most people her age bc she was burdened with taking care of 5 siblings and caring for a house. She never got to pay attention to herself. Disc how she could start doing that now and suggested a workbook journal to help her along the way. Pt disc where her siblings are now and talked about her efforts to stop enabling and co-dependently caring for others. Disc how Pt might take herself out of the role of driving her 30+ daughter around everywhere so she could live her own life without being tied to her daughter's work schedule. As soon as Pt heals shoulder, she will have her knee surgery.     Objective/Observations:   Participation: Active verbal participation, Attentive and Engaged   Grooming: Casual   Cognition: Alert and Fully Oriented   Eye contact: Good   Mood: Euthymic   Affect: Anxious   Thought process: Logical   Speech: Rate within normal limits   Other:     Diagnoses:   1. Posttraumatic stress disorder    2. Major depressive disorder, recurrent episode with anxious distress (CMS-HCC)    3. Other chronic pain         Current risk:   SUICIDE: Not applicable   Homicide: Not applicable   Self-harm: Not applicable   Relapse: Low   Other:    Safety Plan reviewed? Not Indicated   If evidence of imminent risk is present, intervention/plan:     Therapeutic Intervention(s): Behavior:   Behavioral activation, Cognitive modification, Establish rapport, Limit-setting, Self-care skills, Stressors assessed and Supportive psychotherapy    Treatment Goal(s)/Objective(s) addressed: Tx plan:  - Utilize learned skills to manage mood more effectively  - Identify goals/values and cultivate a meaningful life  - Learn to be more emotionally flexible/resilient in times of stress/challenges  - Work an identified program of recovery & relapse prevention  - Process and reduce the effects of past trauma on life, functioning, & sense of self  - Learn to ameliorate effects of anxiety on life and functioning  - Increase behaviors of self-compassion and self-care  - Learn to manage pain more effectively  -        Progress toward Treatment Goals: Moderate improvement    Plan:  - Continue Individual therapy    Sweta Santiago, Ph.D.  3/27/2018

## 2018-04-27 ENCOUNTER — OFFICE VISIT (OUTPATIENT)
Dept: URGENT CARE | Facility: PHYSICIAN GROUP | Age: 63
End: 2018-04-27
Payer: MEDICARE

## 2018-04-27 VITALS
SYSTOLIC BLOOD PRESSURE: 176 MMHG | TEMPERATURE: 98.3 F | RESPIRATION RATE: 16 BRPM | OXYGEN SATURATION: 97 % | BODY MASS INDEX: 22.62 KG/M2 | HEART RATE: 100 BPM | DIASTOLIC BLOOD PRESSURE: 98 MMHG | WEIGHT: 138 LBS

## 2018-04-27 DIAGNOSIS — M62.830 BACK MUSCLE SPASM: ICD-10-CM

## 2018-04-27 DIAGNOSIS — M54.50 ACUTE LEFT-SIDED LOW BACK PAIN WITHOUT SCIATICA: ICD-10-CM

## 2018-04-27 PROCEDURE — 99214 OFFICE O/P EST MOD 30 MIN: CPT | Performed by: EMERGENCY MEDICINE

## 2018-04-27 RX ORDER — CARISOPRODOL 350 MG/1
350 TABLET ORAL 4 TIMES DAILY
Qty: 20 TAB | Refills: 0 | Status: SHIPPED | OUTPATIENT
Start: 2018-04-27 | End: 2018-05-01

## 2018-04-27 RX ORDER — MELOXICAM 15 MG/1
15 TABLET ORAL DAILY
Qty: 7 TAB | Refills: 0 | Status: SHIPPED | OUTPATIENT
Start: 2018-04-27 | End: 2018-05-01

## 2018-04-27 ASSESSMENT — ENCOUNTER SYMPTOMS
DIARRHEA: 0
ABDOMINAL PAIN: 0
LEG PAIN: 0
VOMITING: 0
NUMBNESS: 0
TINGLING: 0
PARESIS: 0
FOCAL WEAKNESS: 0
BACK PAIN: 1
FEVER: 0
BOWEL INCONTINENCE: 0
NAUSEA: 0
SENSORY CHANGE: 0

## 2018-04-27 NOTE — PATIENT INSTRUCTIONS
Discontinue ibuprofen, previously prescribed tramadol or muscle relaxant.  You should contact a primary care provider for follow-up as soon as available.  Back Pain, Adult  Back pain is very common in adults. The cause of back pain is rarely dangerous and the pain often gets better over time. The cause of your back pain may not be known. Some common causes of back pain include:  · Strain of the muscles or ligaments supporting the spine.  · Wear and tear (degeneration) of the spinal disks.  · Arthritis.  · Direct injury to the back.  For many people, back pain may return. Since back pain is rarely dangerous, most people can learn to manage this condition on their own.  Follow these instructions at home:  Watch your back pain for any changes. The following actions may help to lessen any discomfort you are feeling:  · Remain active. It is stressful on your back to sit or  one place for long periods of time. Do not sit, drive, or  one place for more than 30 minutes at a time. Take short walks on even surfaces as soon as you are able. Try to increase the length of time you walk each day.  · Exercise regularly as directed by your health care provider. Exercise helps your back heal faster. It also helps avoid future injury by keeping your muscles strong and flexible.  · Do not stay in bed. Resting more than 1-2 days can delay your recovery.  · Pay attention to your body when you bend and lift. The most comfortable positions are those that put less stress on your recovering back. Always use proper lifting techniques, including:  ¨ Bending your knees.  ¨ Keeping the load close to your body.  ¨ Avoiding twisting.  · Find a comfortable position to sleep. Use a firm mattress and lie on your side with your knees slightly bent. If you lie on your back, put a pillow under your knees.  · Avoid feeling anxious or stressed. Stress increases muscle tension and can worsen back pain. It is important to recognize when you  are anxious or stressed and learn ways to manage it, such as with exercise.  · Take medicines only as directed by your health care provider. Over-the-counter medicines to reduce pain and inflammation are often the most helpful. Your health care provider may prescribe muscle relaxant drugs. These medicines help dull your pain so you can more quickly return to your normal activities and healthy exercise.  · Apply ice to the injured area:  ¨ Put ice in a plastic bag.  ¨ Place a towel between your skin and the bag.  ¨ Leave the ice on for 20 minutes, 2-3 times a day for the first 2-3 days. After that, ice and heat may be alternated to reduce pain and spasms.  · Maintain a healthy weight. Excess weight puts extra stress on your back and makes it difficult to maintain good posture.  Contact a health care provider if:  · You have pain that is not relieved with rest or medicine.  · You have increasing pain going down into the legs or buttocks.  · You have pain that does not improve in one week.  · You have night pain.  · You lose weight.  · You have a fever or chills.  Get help right away if:  · You develop new bowel or bladder control problems.  · You have unusual weakness or numbness in your arms or legs.  · You develop nausea or vomiting.  · You develop abdominal pain.  · You feel faint.  This information is not intended to replace advice given to you by your health care provider. Make sure you discuss any questions you have with your health care provider.  Document Released: 12/18/2006 Document Revised: 04/27/2017 Document Reviewed: 04/21/2015  [employer] Interactive Patient Education © 2017 [employer]

## 2018-04-27 NOTE — PROGRESS NOTES
Subjective:      Lynette Correa is a 62 y.o. female who presents with Back Pain ((lower left) x 2 weeks, getting worse in the last few days. No trauma. )            Back Pain   This is a new problem. Episode onset: 2 weeks. The problem occurs daily. The problem has been gradually worsening since onset. The pain is present in the lumbar spine (left). Radiates to: left groin. The pain is severe. The pain is the same all the time. The symptoms are aggravated by twisting and sitting. Pertinent negatives include no abdominal pain, bladder incontinence, bowel incontinence, chest pain, dysuria, fever, leg pain, numbness, paresis or tingling. She has tried NSAIDs, ice and heat for the symptoms. The treatment provided no relief.   No trauma.    Review of Systems   Constitutional: Negative for fever.   Cardiovascular: Negative for chest pain.   Gastrointestinal: Negative for abdominal pain, bowel incontinence, diarrhea, nausea and vomiting.   Genitourinary: Negative for bladder incontinence, dysuria, hematuria and urgency.   Musculoskeletal: Positive for back pain.   Skin: Negative for rash.   Neurological: Negative for tingling, sensory change, focal weakness and numbness.     PMH:  has a past medical history of Alcohol abuse; Anesthesia; Anesthesia (9-23-16); Arrhythmia; Arthritis (9-23-16); ASTHMA; Bowel habit changes (9-23-16); Breath shortness (01/2018); Bronchitis (2015); Chronic pain (01/2018); COPD (chronic obstructive pulmonary disease) (CMS-HCC); Dental disorder; Depression; Emphysema of lung (CMS-HCC); Heart burn; Hepatitis C (01/2018); migraines; Indigestion; Jaundice; kidney stones; Neuropathy, peripheral (CMS-HCC); Pain; Pneumonia (2015); Risk for falls; Sleep apnea (01/2018); Substance abuse; Ulcer (CMS-HCC); and Unspecified hemorrhagic conditions.  MEDS:   Current Outpatient Prescriptions:   •  meloxicam (MOBIC) 15 MG tablet, Take 1 Tab by mouth every day for 7 days., Disp: 7 Tab, Rfl: 0  •  carisoprodol  "(SOMA) 350 MG Tab, Take 1 Tab by mouth 4 times a day for 5 days., Disp: 20 Tab, Rfl: 0  •  ibuprofen (MOTRIN) 800 MG Tab, Take 800 mg by mouth every 6 hours as needed., Disp: , Rfl:   •  duloxetine (CYMBALTA) 30 MG Cap DR Particles, Take 30 mg by mouth every day., Disp: , Rfl:   •  paroxetine (PAXIL) 10 MG Tab, Take 20 mg by mouth every day., Disp: , Rfl:   •  gabapentin (NEURONTIN) 800 MG tablet, Take 1,200 mg by mouth 3 times a day., Disp: , Rfl: 6  •  albuterol (PROVENTIL) 2.5mg/3ml Nebu Soln solution for nebulization, 2.5 mg by Nebulization route every four hours as needed for Shortness of Breath., Disp: , Rfl:   •  albuterol 108 (90 BASE) MCG/ACT Aero Soln inhalation aerosol, Inhale 2 Puffs by mouth every 6 hours as needed for Shortness of Breath., Disp: , Rfl:   •  Dexlansoprazole (DEXILANT) 60 MG CAPSULE DELAYED RELEASE delayed-release capsule, Take 60 mg by mouth every day., Disp: , Rfl:   •  clonidine (CATAPRES) 0.1 MG TABS, Take 0.1 mg by mouth every evening., Disp: , Rfl:   ALLERGIES:   Allergies   Allergen Reactions   • Codeine Rash     Difficulty breathing, and itching   • Pcn [Penicillins]      All \"cillin\" family, per patient  ---   I broke out in \"trench\" mouth   • Morphine      headache     SURGHX:   Past Surgical History:   Procedure Laterality Date   • SHOULDER ARTHROPLASTY TOTAL Left 2/12/2018    Procedure: SHOULDER ARTHROPLASTY TOTAL;  Surgeon: Yovani Guzmán M.D.;  Location: SURGERY [facility];  Service: Orthopedics   • LUMBAR LAMINECTOMY DISKECTOMY  10/8/2016    Procedure: LUMBAR LAMINECTOMY DISKECTOMY FPRAMINOTOMY L3-4- POSTERIOR L3-4 STABILIZATION WITH INSTRUMENTATION  WITH Gameyola INSTRUMENTS;  Surgeon: Baldemar Restrepo M.D.;  Location: SURGERY [facility];  Service:    • WRIST ORIF Left 3/3/2016    Procedure: WRIST ORIF;  Surgeon: Campbell Love M.D.;  Location: SURGERY [facility];  Service:    • MASS EXCISION GENERAL  10/4/2011    Performed by GANSER, JOHN H at SURGERY " [facility]   • LUMBAR LAMINECTOMY DISKECTOMY  1/8/2011    Performed by PREET SPEAR at SURGERY [facility]   • CERVICAL FUSION POSTERIOR  7/20/2010    Performed by PREET SPEAR at SURGERY [facility]   • CERVICAL DECOMPRESSION POSTERIOR  7/20/2010    Performed by RPEET SPEAR at SURGERY [facility]   • OTHER CARDIAC SURGERY  2005    ablation  and small hole in heart valve repaired by dr medel   • CARPAL TUNNEL RELEASE      right   • HYSTERECTOMY LAPAROSCOPY     • KNEE ARTHROPLASTY TOTAL      right   • OTHER      right shin biopsy    • OTHER ORTHOPEDIC SURGERY      left knee   • OTHER ORTHOPEDIC SURGERY      right index finger sewn back on   • OTHER ORTHOPEDIC SURGERY      spinal surgery 3 times   • PRIMARY C SECTION  1989/1988/1993    x 3   • TONSILLECTOMY       SOCHX:  reports that she quit smoking about 15 months ago. Her smoking use included Cigarettes. She has a 20.00 pack-year smoking history. She has never used smokeless tobacco. She reports that she does not drink alcohol or use drugs.  FH: family history includes Alcohol abuse in her brother; Anxiety disorder in her mother; Cancer in her father, mother, and paternal uncle; Dementia in her paternal grandmother and paternal uncle; Depression in her mother; Diabetes in her brother.       Objective:     BP (!) 176/98   Pulse 100   Temp 36.8 °C (98.3 °F)   Resp 16   Wt 62.6 kg (138 lb)   LMP 01/01/1995   SpO2 97%   BMI 22.62 kg/m²      Physical Exam   Constitutional: She is oriented to person, place, and time. She appears well-developed and well-nourished. She is cooperative. She does not have a sickly appearance. She does not appear ill.   Cardiovascular: Normal rate and regular rhythm.    Pulses:       Dorsalis pedis pulses are 2+ on the right side, and 2+ on the left side.   Pulmonary/Chest: Effort normal.   Abdominal: Soft. She exhibits no distension and no mass. There is no tenderness. There is no CVA tenderness.   Musculoskeletal:         Lumbar back: She exhibits decreased range of motion, tenderness, bony tenderness and spasm. She exhibits no edema and no deformity.   Straight leg raise negative.   Neurological: She is alert and oriented to person, place, and time. She displays no tremor. No sensory deficit. Coordination normal.   Reflex Scores:       Patellar reflexes are 1+ on the right side and 1+ on the left side.       Achilles reflexes are 0 on the right side and 0 on the left side.  Decreased right ankle plantarflexion   Skin: Skin is warm and dry. No rash noted.   Psychiatric: She has a normal mood and affect.               Assessment/Plan:     1. Acute left-sided low back pain without sciatica  - meloxicam (MOBIC) 15 MG tablet; Take 1 Tab by mouth every day for 7 days.  Dispense: 7 Tab; Refill: 0    2. Back muscle spasm  - carisoprodol (SOMA) 350 MG Tab; Take 1 Tab by mouth 4 times a day for 5 days.  Dispense: 20 Tab; Refill: 0

## 2018-05-01 ENCOUNTER — APPOINTMENT (OUTPATIENT)
Dept: ADMISSIONS | Facility: MEDICAL CENTER | Age: 63
End: 2018-05-01
Attending: ORTHOPAEDIC SURGERY
Payer: MEDICARE

## 2018-05-01 DIAGNOSIS — Z01.812 PRE-OPERATIVE LABORATORY EXAMINATION: ICD-10-CM

## 2018-05-01 LAB
APPEARANCE UR: CLEAR
BILIRUB UR QL STRIP.AUTO: NEGATIVE
COLOR UR: YELLOW
ERYTHROCYTE [DISTWIDTH] IN BLOOD BY AUTOMATED COUNT: 47.8 FL (ref 35.9–50)
GLUCOSE UR STRIP.AUTO-MCNC: NEGATIVE MG/DL
HCT VFR BLD AUTO: 39.7 % (ref 37–47)
HGB BLD-MCNC: 12.9 G/DL (ref 12–16)
KETONES UR STRIP.AUTO-MCNC: NEGATIVE MG/DL
LEUKOCYTE ESTERASE UR QL STRIP.AUTO: NEGATIVE
MCH RBC QN AUTO: 26.4 PG (ref 27–33)
MCHC RBC AUTO-ENTMCNC: 32.5 G/DL (ref 33.6–35)
MCV RBC AUTO: 81.4 FL (ref 81.4–97.8)
MICRO URNS: NORMAL
NITRITE UR QL STRIP.AUTO: NEGATIVE
PH UR STRIP.AUTO: 6 [PH]
PLATELET # BLD AUTO: 369 K/UL (ref 164–446)
PMV BLD AUTO: 10.8 FL (ref 9–12.9)
PROT UR QL STRIP: NEGATIVE MG/DL
RBC # BLD AUTO: 4.88 M/UL (ref 4.2–5.4)
RBC UR QL AUTO: NEGATIVE
SCCMEC + MECA PNL NOSE NAA+PROBE: NEGATIVE
SCCMEC + MECA PNL NOSE NAA+PROBE: POSITIVE
SP GR UR STRIP.AUTO: 1.01
UROBILINOGEN UR STRIP.AUTO-MCNC: 0.2 MG/DL
WBC # BLD AUTO: 7.7 K/UL (ref 4.8–10.8)

## 2018-05-01 PROCEDURE — 87640 STAPH A DNA AMP PROBE: CPT

## 2018-05-01 PROCEDURE — 81003 URINALYSIS AUTO W/O SCOPE: CPT

## 2018-05-01 PROCEDURE — 87086 URINE CULTURE/COLONY COUNT: CPT

## 2018-05-01 PROCEDURE — 36415 COLL VENOUS BLD VENIPUNCTURE: CPT

## 2018-05-01 PROCEDURE — 87641 MR-STAPH DNA AMP PROBE: CPT

## 2018-05-01 PROCEDURE — 85027 COMPLETE CBC AUTOMATED: CPT

## 2018-05-01 RX ORDER — ASCORBIC ACID 500 MG
500 TABLET ORAL 3 TIMES DAILY
Status: ON HOLD | COMMUNITY
End: 2018-05-15

## 2018-05-01 NOTE — DISCHARGE PLANNING
DISCHARGE PLANNING NOTE - TOTAL JOINT     Procedure: Procedure(s):  KNEE ARTHROPLASTY TOTAL- WITH INTRAOPERATIVE BLOCK FOR POST OPERATIVE PAIN RELIEF  Procedure Date: 5/14/2018  Insurance:  Payor: MEDICARE / Plan: MEDICARE PART A & B / Product Type: *No Product type* /   Equipment currently available at home? cane, crutches, front-wheel walker and shower chair  Steps into the home? 5  Steps within the home? 0  Toilet height? Standard  Type of shower? walk-in shower  Who will be with you during your recovery? daughter  Is Outpatient Physical Therapy set up after surgery? No / Patient  Did you take the Total Joint Class and where? Yes, at [facility]     Plan: The patient is requesting Home Health and Home Physical Therapy post-op

## 2018-05-01 NOTE — OR NURSING
"Preadmit appointment: \" Preparing for your Procedure information\" sheet given to patient with verbal and written instructions. Patient instructed to continue prescribed medications through the day before surgery, instructed to take the following medications the day of surgery per anesthesia protocol: Albuterol inhaler if needed and bring inhaler day of surgery or albuterol nebulizer, clonidine, dexilant, gabapentin  "

## 2018-05-03 LAB
BACTERIA UR CULT: NORMAL
SIGNIFICANT IND 70042: NORMAL
SITE SITE: NORMAL
SOURCE SOURCE: NORMAL

## 2018-05-14 ENCOUNTER — HOSPITAL ENCOUNTER (OUTPATIENT)
Facility: MEDICAL CENTER | Age: 63
End: 2018-05-16
Attending: ORTHOPAEDIC SURGERY | Admitting: ORTHOPAEDIC SURGERY
Payer: MEDICARE

## 2018-05-14 ENCOUNTER — APPOINTMENT (OUTPATIENT)
Dept: RADIOLOGY | Facility: MEDICAL CENTER | Age: 63
End: 2018-05-14
Attending: PHYSICIAN ASSISTANT
Payer: MEDICARE

## 2018-05-14 PROCEDURE — 501838 HCHG SUTURE GENERAL: Performed by: ORTHOPAEDIC SURGERY

## 2018-05-14 PROCEDURE — 73560 X-RAY EXAM OF KNEE 1 OR 2: CPT | Mod: LT

## 2018-05-14 PROCEDURE — 700111 HCHG RX REV CODE 636 W/ 250 OVERRIDE (IP): Performed by: PHYSICIAN ASSISTANT

## 2018-05-14 PROCEDURE — 160036 HCHG PACU - EA ADDL 30 MINS PHASE I: Performed by: ORTHOPAEDIC SURGERY

## 2018-05-14 PROCEDURE — 160002 HCHG RECOVERY MINUTES (STAT): Performed by: ORTHOPAEDIC SURGERY

## 2018-05-14 PROCEDURE — L8699 PROSTHETIC IMPLANT NOS: HCPCS | Performed by: ORTHOPAEDIC SURGERY

## 2018-05-14 PROCEDURE — G0378 HOSPITAL OBSERVATION PER HR: HCPCS

## 2018-05-14 PROCEDURE — 96374 THER/PROPH/DIAG INJ IV PUSH: CPT

## 2018-05-14 PROCEDURE — 160022 HCHG BLOCK: Performed by: ORTHOPAEDIC SURGERY

## 2018-05-14 PROCEDURE — 700101 HCHG RX REV CODE 250

## 2018-05-14 PROCEDURE — 700101 HCHG RX REV CODE 250: Performed by: PHYSICIAN ASSISTANT

## 2018-05-14 PROCEDURE — A9270 NON-COVERED ITEM OR SERVICE: HCPCS

## 2018-05-14 PROCEDURE — 500864 HCHG NEEDLE, SPINAL 18G: Performed by: ORTHOPAEDIC SURGERY

## 2018-05-14 PROCEDURE — 160029 HCHG SURGERY MINUTES - 1ST 30 MINS LEVEL 4: Performed by: ORTHOPAEDIC SURGERY

## 2018-05-14 PROCEDURE — 700111 HCHG RX REV CODE 636 W/ 250 OVERRIDE (IP)

## 2018-05-14 PROCEDURE — 160035 HCHG PACU - 1ST 60 MINS PHASE I: Performed by: ORTHOPAEDIC SURGERY

## 2018-05-14 PROCEDURE — 700105 HCHG RX REV CODE 258: Performed by: ORTHOPAEDIC SURGERY

## 2018-05-14 PROCEDURE — 700102 HCHG RX REV CODE 250 W/ 637 OVERRIDE(OP): Performed by: PHYSICIAN ASSISTANT

## 2018-05-14 PROCEDURE — 502579 HCHG PACK, TOTAL KNEE: Performed by: ORTHOPAEDIC SURGERY

## 2018-05-14 PROCEDURE — A9270 NON-COVERED ITEM OR SERVICE: HCPCS | Performed by: PHYSICIAN ASSISTANT

## 2018-05-14 PROCEDURE — 160048 HCHG OR STATISTICAL LEVEL 1-5: Performed by: ORTHOPAEDIC SURGERY

## 2018-05-14 PROCEDURE — 700102 HCHG RX REV CODE 250 W/ 637 OVERRIDE(OP)

## 2018-05-14 PROCEDURE — 700111 HCHG RX REV CODE 636 W/ 250 OVERRIDE (IP): Performed by: ORTHOPAEDIC SURGERY

## 2018-05-14 PROCEDURE — 96375 TX/PRO/DX INJ NEW DRUG ADDON: CPT

## 2018-05-14 PROCEDURE — 160041 HCHG SURGERY MINUTES - EA ADDL 1 MIN LEVEL 4: Performed by: ORTHOPAEDIC SURGERY

## 2018-05-14 PROCEDURE — 94760 N-INVAS EAR/PLS OXIMETRY 1: CPT

## 2018-05-14 PROCEDURE — 502000 HCHG MISC OR IMPLANTS RC 0278: Performed by: ORTHOPAEDIC SURGERY

## 2018-05-14 PROCEDURE — 96376 TX/PRO/DX INJ SAME DRUG ADON: CPT

## 2018-05-14 PROCEDURE — 160009 HCHG ANES TIME/MIN: Performed by: ORTHOPAEDIC SURGERY

## 2018-05-14 DEVICE — BONE CEMENT SIMPLEX FULL DOSE - (10EA/PK): Type: IMPLANTABLE DEVICE | Site: KNEE | Status: FUNCTIONAL

## 2018-05-14 DEVICE — IMPLANTABLE DEVICE: Type: IMPLANTABLE DEVICE | Site: KNEE | Status: FUNCTIONAL

## 2018-05-14 RX ORDER — CLONIDINE HYDROCHLORIDE 0.1 MG/1
0.1 TABLET ORAL 2 TIMES DAILY
Status: DISCONTINUED | OUTPATIENT
Start: 2018-05-14 | End: 2018-05-16 | Stop reason: HOSPADM

## 2018-05-14 RX ORDER — BISACODYL 10 MG
10 SUPPOSITORY, RECTAL RECTAL
Status: DISCONTINUED | OUTPATIENT
Start: 2018-05-14 | End: 2018-05-16 | Stop reason: HOSPADM

## 2018-05-14 RX ORDER — DIAZEPAM 5 MG/1
5 TABLET ORAL EVERY 6 HOURS PRN
Status: CANCELLED | OUTPATIENT
Start: 2018-05-14

## 2018-05-14 RX ORDER — ENEMA 19; 7 G/133ML; G/133ML
1 ENEMA RECTAL
Status: DISCONTINUED | OUTPATIENT
Start: 2018-05-14 | End: 2018-05-16 | Stop reason: HOSPADM

## 2018-05-14 RX ORDER — KETOROLAC TROMETHAMINE 30 MG/ML
30 INJECTION, SOLUTION INTRAMUSCULAR; INTRAVENOUS EVERY 6 HOURS
Status: DISCONTINUED | OUTPATIENT
Start: 2018-05-14 | End: 2018-05-16 | Stop reason: HOSPADM

## 2018-05-14 RX ORDER — KETOROLAC TROMETHAMINE 30 MG/ML
INJECTION, SOLUTION INTRAMUSCULAR; INTRAVENOUS
Status: DISCONTINUED | OUTPATIENT
Start: 2018-05-14 | End: 2018-05-14 | Stop reason: HOSPADM

## 2018-05-14 RX ORDER — HALOPERIDOL 5 MG/ML
1 INJECTION INTRAMUSCULAR EVERY 6 HOURS PRN
Status: DISCONTINUED | OUTPATIENT
Start: 2018-05-14 | End: 2018-05-16 | Stop reason: HOSPADM

## 2018-05-14 RX ORDER — DEXAMETHASONE SODIUM PHOSPHATE 4 MG/ML
4 INJECTION, SOLUTION INTRA-ARTICULAR; INTRALESIONAL; INTRAMUSCULAR; INTRAVENOUS; SOFT TISSUE
Status: DISCONTINUED | OUTPATIENT
Start: 2018-05-14 | End: 2018-05-16 | Stop reason: HOSPADM

## 2018-05-14 RX ORDER — AMOXICILLIN 250 MG
1 CAPSULE ORAL NIGHTLY
Status: DISCONTINUED | OUTPATIENT
Start: 2018-05-14 | End: 2018-05-16 | Stop reason: HOSPADM

## 2018-05-14 RX ORDER — AMOXICILLIN 250 MG
1 CAPSULE ORAL
Status: DISCONTINUED | OUTPATIENT
Start: 2018-05-14 | End: 2018-05-16 | Stop reason: HOSPADM

## 2018-05-14 RX ORDER — DEXLANSOPRAZOLE 60 MG/1
60 CAPSULE, DELAYED RELEASE ORAL DAILY
Status: DISCONTINUED | OUTPATIENT
Start: 2018-05-14 | End: 2018-05-14

## 2018-05-14 RX ORDER — HYDROMORPHONE HYDROCHLORIDE 2 MG/ML
0.4 INJECTION, SOLUTION INTRAMUSCULAR; INTRAVENOUS; SUBCUTANEOUS
Status: CANCELLED | OUTPATIENT
Start: 2018-05-14

## 2018-05-14 RX ORDER — ROPIVACAINE HYDROCHLORIDE 5 MG/ML
INJECTION, SOLUTION EPIDURAL; INFILTRATION; PERINEURAL
Status: DISCONTINUED | OUTPATIENT
Start: 2018-05-14 | End: 2018-05-14 | Stop reason: HOSPADM

## 2018-05-14 RX ORDER — ALBUTEROL SULFATE 2.5 MG/3ML
2.5 SOLUTION RESPIRATORY (INHALATION) EVERY 4 HOURS PRN
Status: DISCONTINUED | OUTPATIENT
Start: 2018-05-14 | End: 2018-05-14

## 2018-05-14 RX ORDER — GABAPENTIN 400 MG/1
400 CAPSULE ORAL 2 TIMES DAILY
Status: DISCONTINUED | OUTPATIENT
Start: 2018-05-14 | End: 2018-05-16 | Stop reason: HOSPADM

## 2018-05-14 RX ORDER — DIPHENHYDRAMINE HYDROCHLORIDE 50 MG/ML
25 INJECTION INTRAMUSCULAR; INTRAVENOUS EVERY 6 HOURS PRN
Status: DISCONTINUED | OUTPATIENT
Start: 2018-05-14 | End: 2018-05-16 | Stop reason: HOSPADM

## 2018-05-14 RX ORDER — OXYCODONE HCL 5 MG/5 ML
SOLUTION, ORAL ORAL
Status: COMPLETED
Start: 2018-05-14 | End: 2018-05-14

## 2018-05-14 RX ORDER — SCOLOPAMINE TRANSDERMAL SYSTEM 1 MG/1
1 PATCH, EXTENDED RELEASE TRANSDERMAL
Status: DISCONTINUED | OUTPATIENT
Start: 2018-05-14 | End: 2018-05-16 | Stop reason: HOSPADM

## 2018-05-14 RX ORDER — SODIUM CHLORIDE, SODIUM LACTATE, POTASSIUM CHLORIDE, CALCIUM CHLORIDE 600; 310; 30; 20 MG/100ML; MG/100ML; MG/100ML; MG/100ML
1000 INJECTION, SOLUTION INTRAVENOUS
Status: COMPLETED | OUTPATIENT
Start: 2018-05-14 | End: 2018-05-14

## 2018-05-14 RX ORDER — POLYETHYLENE GLYCOL 3350 17 G/17G
1 POWDER, FOR SOLUTION ORAL 2 TIMES DAILY PRN
Status: DISCONTINUED | OUTPATIENT
Start: 2018-05-14 | End: 2018-05-16 | Stop reason: HOSPADM

## 2018-05-14 RX ORDER — DULOXETIN HYDROCHLORIDE 30 MG/1
30 CAPSULE, DELAYED RELEASE ORAL DAILY
Status: DISCONTINUED | OUTPATIENT
Start: 2018-05-14 | End: 2018-05-16 | Stop reason: HOSPADM

## 2018-05-14 RX ORDER — PAROXETINE HYDROCHLORIDE 20 MG/1
20 TABLET, FILM COATED ORAL DAILY
Status: DISCONTINUED | OUTPATIENT
Start: 2018-05-14 | End: 2018-05-16 | Stop reason: HOSPADM

## 2018-05-14 RX ORDER — OMEPRAZOLE 20 MG/1
20 CAPSULE, DELAYED RELEASE ORAL DAILY
Status: DISCONTINUED | OUTPATIENT
Start: 2018-05-14 | End: 2018-05-16 | Stop reason: HOSPADM

## 2018-05-14 RX ORDER — ACETAMINOPHEN 500 MG
TABLET ORAL
Status: COMPLETED
Start: 2018-05-14 | End: 2018-05-14

## 2018-05-14 RX ORDER — SODIUM CHLORIDE 9 MG/ML
INJECTION, SOLUTION INTRAVENOUS CONTINUOUS
Status: DISCONTINUED | OUTPATIENT
Start: 2018-05-14 | End: 2018-05-16 | Stop reason: HOSPADM

## 2018-05-14 RX ORDER — ONDANSETRON 2 MG/ML
4 INJECTION INTRAMUSCULAR; INTRAVENOUS EVERY 4 HOURS PRN
Status: DISCONTINUED | OUTPATIENT
Start: 2018-05-14 | End: 2018-05-16 | Stop reason: HOSPADM

## 2018-05-14 RX ORDER — OXYCODONE HYDROCHLORIDE 5 MG/1
5-10 TABLET ORAL
Status: DISCONTINUED | OUTPATIENT
Start: 2018-05-14 | End: 2018-05-16 | Stop reason: HOSPADM

## 2018-05-14 RX ORDER — ACETAMINOPHEN 500 MG
1000 TABLET ORAL EVERY 6 HOURS
Status: DISCONTINUED | OUTPATIENT
Start: 2018-05-14 | End: 2018-05-16 | Stop reason: HOSPADM

## 2018-05-14 RX ORDER — DOCUSATE SODIUM 100 MG/1
100 CAPSULE, LIQUID FILLED ORAL 2 TIMES DAILY
Status: DISCONTINUED | OUTPATIENT
Start: 2018-05-14 | End: 2018-05-16 | Stop reason: HOSPADM

## 2018-05-14 RX ORDER — CEFAZOLIN SODIUM 1 G/3ML
INJECTION, POWDER, FOR SOLUTION INTRAMUSCULAR; INTRAVENOUS
Status: DISCONTINUED | OUTPATIENT
Start: 2018-05-14 | End: 2018-05-14 | Stop reason: HOSPADM

## 2018-05-14 RX ORDER — OXYCODONE HYDROCHLORIDE 5 MG/1
5 TABLET ORAL
Status: DISCONTINUED | OUTPATIENT
Start: 2018-05-14 | End: 2018-05-14

## 2018-05-14 RX ORDER — HYDROMORPHONE HYDROCHLORIDE 2 MG/ML
0.4 INJECTION, SOLUTION INTRAMUSCULAR; INTRAVENOUS; SUBCUTANEOUS
Status: DISCONTINUED | OUTPATIENT
Start: 2018-05-14 | End: 2018-05-16 | Stop reason: HOSPADM

## 2018-05-14 RX ORDER — BACITRACIN 50000 [IU]/1
INJECTION, POWDER, FOR SOLUTION INTRAMUSCULAR
Status: DISCONTINUED | OUTPATIENT
Start: 2018-05-14 | End: 2018-05-14 | Stop reason: HOSPADM

## 2018-05-14 RX ORDER — ALBUTEROL SULFATE 90 UG/1
2 AEROSOL, METERED RESPIRATORY (INHALATION) EVERY 6 HOURS PRN
Status: DISCONTINUED | OUTPATIENT
Start: 2018-05-14 | End: 2018-05-16 | Stop reason: HOSPADM

## 2018-05-14 RX ADMIN — FENTANYL CITRATE 50 MCG: 50 INJECTION, SOLUTION INTRAMUSCULAR; INTRAVENOUS at 15:24

## 2018-05-14 RX ADMIN — FENTANYL CITRATE 50 MCG: 50 INJECTION, SOLUTION INTRAMUSCULAR; INTRAVENOUS at 15:45

## 2018-05-14 RX ADMIN — HYDROMORPHONE HYDROCHLORIDE 0.4 MG: 2 INJECTION INTRAMUSCULAR; INTRAVENOUS; SUBCUTANEOUS at 23:20

## 2018-05-14 RX ADMIN — FENTANYL CITRATE 50 MCG: 50 INJECTION, SOLUTION INTRAMUSCULAR; INTRAVENOUS at 15:56

## 2018-05-14 RX ADMIN — SODIUM CHLORIDE, SODIUM LACTATE, POTASSIUM CHLORIDE, CALCIUM CHLORIDE 1000 ML: 600; 310; 30; 20 INJECTION, SOLUTION INTRAVENOUS at 10:50

## 2018-05-14 RX ADMIN — ACETAMINOPHEN 1000 MG: 500 TABLET ORAL at 18:02

## 2018-05-14 RX ADMIN — KETOROLAC TROMETHAMINE 30 MG: 30 INJECTION, SOLUTION INTRAMUSCULAR at 18:02

## 2018-05-14 RX ADMIN — VANCOMYCIN HYDROCHLORIDE 1000 MG: 10 INJECTION, POWDER, LYOPHILIZED, FOR SOLUTION INTRAVENOUS at 10:50

## 2018-05-14 RX ADMIN — OXYCODONE HYDROCHLORIDE 5 MG: 5 SOLUTION ORAL at 15:45

## 2018-05-14 RX ADMIN — GABAPENTIN 400 MG: 400 CAPSULE ORAL at 20:27

## 2018-05-14 RX ADMIN — OXYCODONE HYDROCHLORIDE 5 MG: 5 TABLET ORAL at 18:44

## 2018-05-14 RX ADMIN — WATER 2 G: 100 INJECTION, SOLUTION INTRAVENOUS at 20:27

## 2018-05-14 RX ADMIN — OXYCODONE HYDROCHLORIDE 10 MG: 5 TABLET ORAL at 20:45

## 2018-05-14 RX ADMIN — ACETAMINOPHEN 1000 MG: 500 TABLET ORAL at 23:21

## 2018-05-14 RX ADMIN — SODIUM CHLORIDE: 9 INJECTION, SOLUTION INTRAVENOUS at 15:54

## 2018-05-14 RX ADMIN — OXYCODONE HYDROCHLORIDE 5 MG: 5 SOLUTION ORAL at 15:07

## 2018-05-14 RX ADMIN — KETOROLAC TROMETHAMINE 30 MG: 30 INJECTION, SOLUTION INTRAMUSCULAR at 23:20

## 2018-05-14 RX ADMIN — DULOXETINE HYDROCHLORIDE 30 MG: 30 CAPSULE, DELAYED RELEASE ORAL at 18:03

## 2018-05-14 RX ADMIN — FENTANYL CITRATE 50 MCG: 50 INJECTION, SOLUTION INTRAMUSCULAR; INTRAVENOUS at 15:32

## 2018-05-14 RX ADMIN — CLONIDINE HYDROCHLORIDE 0.1 MG: 0.1 TABLET ORAL at 20:28

## 2018-05-14 ASSESSMENT — PATIENT HEALTH QUESTIONNAIRE - PHQ9
8. MOVING OR SPEAKING SO SLOWLY THAT OTHER PEOPLE COULD HAVE NOTICED. OR THE OPPOSITE, BEING SO FIGETY OR RESTLESS THAT YOU HAVE BEEN MOVING AROUND A LOT MORE THAN USUAL: NOT AT ALL
6. FEELING BAD ABOUT YOURSELF - OR THAT YOU ARE A FAILURE OR HAVE LET YOURSELF OR YOUR FAMILY DOWN: NOT AL ALL
5. POOR APPETITE OR OVEREATING: NOT AT ALL
9. THOUGHTS THAT YOU WOULD BE BETTER OFF DEAD, OR OF HURTING YOURSELF: NOT AT ALL
4. FEELING TIRED OR HAVING LITTLE ENERGY: SEVERAL DAYS
2. FEELING DOWN, DEPRESSED, IRRITABLE, OR HOPELESS: SEVERAL DAYS
7. TROUBLE CONCENTRATING ON THINGS, SUCH AS READING THE NEWSPAPER OR WATCHING TELEVISION: NOT AT ALL
SUM OF ALL RESPONSES TO PHQ9 QUESTIONS 1 AND 2: 2
3. TROUBLE FALLING OR STAYING ASLEEP OR SLEEPING TOO MUCH: SEVERAL DAYS
1. LITTLE INTEREST OR PLEASURE IN DOING THINGS: SEVERAL DAYS
SUM OF ALL RESPONSES TO PHQ QUESTIONS 1-9: 4

## 2018-05-14 ASSESSMENT — LIFESTYLE VARIABLES
EVER_SMOKED: YES
PACK_YEARS: 15
ALCOHOL_USE: NO
EVER_SMOKED: YES

## 2018-05-14 ASSESSMENT — PAIN SCALES - GENERAL
PAINLEVEL_OUTOF10: 10
PAINLEVEL_OUTOF10: ASSUMED PAIN PRESENT
PAINLEVEL_OUTOF10: ASSUMED PAIN PRESENT
PAINLEVEL_OUTOF10: 0
PAINLEVEL_OUTOF10: 9
PAINLEVEL_OUTOF10: ASSUMED PAIN PRESENT
PAINLEVEL_OUTOF10: 8
PAINLEVEL_OUTOF10: 10
PAINLEVEL_OUTOF10: ASSUMED PAIN PRESENT
PAINLEVEL_OUTOF10: 9
PAINLEVEL_OUTOF10: 9

## 2018-05-14 ASSESSMENT — COPD QUESTIONNAIRES
HAVE YOU SMOKED AT LEAST 100 CIGARETTES IN YOUR ENTIRE LIFE: YES
COPD SCREENING SCORE: 6
HAVE YOU SMOKED AT LEAST 100 CIGARETTES IN YOUR ENTIRE LIFE: YES
IN THE PAST 12 MONTHS DO YOU DO LESS THAN YOU USED TO BECAUSE OF YOUR BREATHING PROBLEMS: DISAGREE/UNSURE
DO YOU EVER COUGH UP ANY MUCUS OR PHLEGM?: NO/ONLY WITH OCCASIONAL COLDS OR INFECTIONS
DURING THE PAST 4 WEEKS HOW MUCH DID YOU FEEL SHORT OF BREATH: MOST  OR ALL OF THE TIME
COPD SCREENING SCORE: 6
DO YOU EVER COUGH UP ANY MUCUS OR PHLEGM?: NO/ONLY WITH OCCASIONAL COLDS OR INFECTIONS
DURING THE PAST 4 WEEKS HOW MUCH DID YOU FEEL SHORT OF BREATH: MOST  OR ALL OF THE TIME

## 2018-05-14 NOTE — OR NURSING
1454: To PACU from OR via bed, sleeping, respirations spontaneous and non-labored via LMA. Icepack applied over c/d/i L knee surgical dressings. L foot warm, brisk CRF, +2 DP  Plan to keep pt in PACU for full hour per [facility] protocol.  1457: Rouses spontaneously, denies pain. Denies altered sensation LLE.  1505: Plan analgesia for onset surgical pain  1520: Plan IV analgesia for pt c/o severe pain. Xrays completed  1535: Severe pain persists  1550: No change.  1600: SpO2 drops to 89% when dozing, O2 increased back to 4 LPM, no further analgesia planned at this time (as awaiting onset 2nd dose Oxycodone)  1605: No change  1610: intermittently sleeping, rouses spontaneously w/pain -  will continue to monitor.  1620: No change.  1635: Pain persists - awaiting anesthesiologist to review pain control options  1650: s/b Dr Rivas, pt requests transfer to floor, plan to do so as pt stable. No change in surgical site assessment.Meets criteria to transfer to [facility].

## 2018-05-14 NOTE — OP REPORT
DATE OF OPERATION: 5/14/2018    PREOPERATIVE DIAGNOSIS: Osteoarthritis, left knee.   POSTOPERATIVE DIAGNOSIS: Osteoarthritis, left knee.     PROCEDURE: 1. Left total knee arthroplasty with secondary difficulty.       2. Intraoperative block for relief of extensive postoperative pain.      SURGEON: Yovani Guzmán MD.   ASSISTANT: DULCE Salazar.   ANESTHESIOLOGIST: Dr. Rivas   ANESTHESIA: General with intraoperative local block.     FINDINGS:   1. Grade 4 chondromalacic changes of the medial and lateral joint   Space, and patella femoral joint  2. Extensive bone loss lateral joint and posterior joint  3. Extensive osteophyte formation posteriorly and anteriorly  4. Marked deformity preoperatively with greater than 45° of knee valgus and greater than 25° of flexion contracture    WEIGHT BEARING:  As tolerated     IMPLANTS: Nahomi posterior stabilized design with a size E femur, 5 tibia, lateral tibial step augment at 5 mm, 10 posterior stabilized polyethylene, 35 patellar component all cemented.       PROCEDURE IN DETAIL: The patient was taken to the operating room where she   underwent general anesthetic supine position.  Left lower extremity isolated,   prepped using Hibiclens, alcohol ChloraPrep, draped using sterile technique.   An appropriate timeout was undertaken. The patient's left lower extremity was   addressed. The body-exhaust uniforms and perioperative antibiotics were   given. Air isolation draping was utilized.    The surgery is coded as with secondary difficulty in the setting of the surgery taking greater than 2 times the normal length of time, supplemental retractor placement ×4, prominent osteophyte excision from the posterior aspect of the knee, and marked soft tissue mobilization requirements inclusive of mobilization of the popliteus, PCL, iliotibial band pie crusting.        Surgery was initiated with an anterior quadriceps snip approach.   Skin and subcutaneous tissue were incised.  Hemostasis obtained. The   Luis's layer was dissected separately. The medial parapatellar quad snip   was undertaken. The soft tissues were elevated off the proximal medial tibia.   The ACL was excised as was the medial and lateral meniscus. The LCL, PCL and   MCL were all maintained. The patella was resected freehand and trialed.          Intraoperative periarticular and extensive neeraj-incisional block was undertaken. This was undertaken with a combination of Toradol ropivacaine and morphine to treat postoperative pain. This was done extensively throughout the pericapsular tissues, followed by an extensive block  into the subcutaneous tissues. This obtained a significant intraoperative improvement in anesthesia requirements as well as pain management and vitals stabilization.      The intramedullary guide system was utilized on the femur and tibia and the appropriate cuts were made. The posterior femoral condyles were resected using a curved osteotome.     At this point extensive posterior osteophytes were encountered posteromedially and posterolaterally. A careful dissection of this was undertaken. Posterior capsular tightness was noted as well. Associated with trialing this posterior capsular tightness and PCL tightness was so prominent that the patient was not able to obtain extension. For this reason the PCL was sacrificed and the posterior capsule elevated off the posterior femur after resecting the curved osteotome femoral condyles. Supplemental osteophytes were noted in the lateral aspect of the proximal tibia.       Trial reductions were repeated. Marked improvement in the soft tissue balance was undertaken but were not adequate. The popliteus was noted to be absent. The iliotibial band was pie crusted with significant improvement in the lateral tightness. A preoperative marked flexion and valgus deformity were corrected. At this point repeat trialing noted a marked improvement in position.       The  pulse lavage was copiously irrigated. The  implants were placed with the tibial component placed first followed by the femoral followed by the final polyethylene and excess cement removal.     Pulse lavage was undertaken and the patella dried. The patella was placed with the  cement. The tourniquet was let down, hemostasis was obtained and then pulse   lavage repeated.  The closure was undertaken after obtaining hemostasis. Interrupted #2 Tycron were utilized proximally and then #1 Quil suture was utilized to close the deep layer.  Marshalls layer was closed and pulse lavage was repeated. Subcutaneous closure with 0  and 2-0 Monocryl were completed. Staples were utilized on the skin. The patient placed into a sterile bandage, awoken from his anesthetic and taken to the recovery room. The patient tolerated the procedure very well with no signs of complications.

## 2018-05-15 PROCEDURE — G8978 MOBILITY CURRENT STATUS: HCPCS | Mod: CJ

## 2018-05-15 PROCEDURE — 700111 HCHG RX REV CODE 636 W/ 250 OVERRIDE (IP): Performed by: PHYSICIAN ASSISTANT

## 2018-05-15 PROCEDURE — 97162 PT EVAL MOD COMPLEX 30 MIN: CPT

## 2018-05-15 PROCEDURE — A9270 NON-COVERED ITEM OR SERVICE: HCPCS | Performed by: ORTHOPAEDIC SURGERY

## 2018-05-15 PROCEDURE — G8987 SELF CARE CURRENT STATUS: HCPCS | Mod: CI

## 2018-05-15 PROCEDURE — G8988 SELF CARE GOAL STATUS: HCPCS | Mod: CI

## 2018-05-15 PROCEDURE — G8979 MOBILITY GOAL STATUS: HCPCS | Mod: CI

## 2018-05-15 PROCEDURE — 700102 HCHG RX REV CODE 250 W/ 637 OVERRIDE(OP): Performed by: PHYSICIAN ASSISTANT

## 2018-05-15 PROCEDURE — 97110 THERAPEUTIC EXERCISES: CPT

## 2018-05-15 PROCEDURE — 700112 HCHG RX REV CODE 229: Performed by: PHYSICIAN ASSISTANT

## 2018-05-15 PROCEDURE — 97165 OT EVAL LOW COMPLEX 30 MIN: CPT

## 2018-05-15 PROCEDURE — 96376 TX/PRO/DX INJ SAME DRUG ADON: CPT

## 2018-05-15 PROCEDURE — A9270 NON-COVERED ITEM OR SERVICE: HCPCS | Performed by: PHYSICIAN ASSISTANT

## 2018-05-15 PROCEDURE — 99406 BEHAV CHNG SMOKING 3-10 MIN: CPT

## 2018-05-15 PROCEDURE — 700102 HCHG RX REV CODE 250 W/ 637 OVERRIDE(OP): Performed by: ORTHOPAEDIC SURGERY

## 2018-05-15 PROCEDURE — G0378 HOSPITAL OBSERVATION PER HR: HCPCS

## 2018-05-15 PROCEDURE — 700101 HCHG RX REV CODE 250: Performed by: PHYSICIAN ASSISTANT

## 2018-05-15 PROCEDURE — G8989 SELF CARE D/C STATUS: HCPCS | Mod: CI

## 2018-05-15 RX ADMIN — WATER 2 G: 100 INJECTION, SOLUTION INTRAVENOUS at 03:59

## 2018-05-15 RX ADMIN — HYDROMORPHONE HYDROCHLORIDE 0.4 MG: 2 INJECTION INTRAMUSCULAR; INTRAVENOUS; SUBCUTANEOUS at 22:12

## 2018-05-15 RX ADMIN — CLONIDINE HYDROCHLORIDE 0.1 MG: 0.1 TABLET ORAL at 20:30

## 2018-05-15 RX ADMIN — OXYCODONE HYDROCHLORIDE 10 MG: 5 TABLET ORAL at 07:54

## 2018-05-15 RX ADMIN — KETOROLAC TROMETHAMINE 30 MG: 30 INJECTION, SOLUTION INTRAMUSCULAR at 18:03

## 2018-05-15 RX ADMIN — DULOXETINE HYDROCHLORIDE 30 MG: 30 CAPSULE, DELAYED RELEASE ORAL at 07:55

## 2018-05-15 RX ADMIN — PAROXETINE HYDROCHLORIDE 20 MG: 20 TABLET, FILM COATED ORAL at 07:55

## 2018-05-15 RX ADMIN — ACETAMINOPHEN 1000 MG: 500 TABLET ORAL at 12:25

## 2018-05-15 RX ADMIN — STANDARDIZED SENNA CONCENTRATE AND DOCUSATE SODIUM 1 TABLET: 8.6; 5 TABLET, FILM COATED ORAL at 20:30

## 2018-05-15 RX ADMIN — ACETAMINOPHEN 1000 MG: 500 TABLET ORAL at 18:04

## 2018-05-15 RX ADMIN — HYDROMORPHONE HYDROCHLORIDE 0.4 MG: 2 INJECTION INTRAMUSCULAR; INTRAVENOUS; SUBCUTANEOUS at 14:50

## 2018-05-15 RX ADMIN — KETOROLAC TROMETHAMINE 30 MG: 30 INJECTION, SOLUTION INTRAMUSCULAR at 05:28

## 2018-05-15 RX ADMIN — OXYCODONE HYDROCHLORIDE 10 MG: 5 TABLET ORAL at 00:30

## 2018-05-15 RX ADMIN — ACETAMINOPHEN 1000 MG: 500 TABLET ORAL at 05:29

## 2018-05-15 RX ADMIN — OXYCODONE HYDROCHLORIDE 10 MG: 5 TABLET ORAL at 10:34

## 2018-05-15 RX ADMIN — ASPIRIN 325 MG: 325 TABLET, DELAYED RELEASE ORAL at 10:34

## 2018-05-15 RX ADMIN — OXYCODONE HYDROCHLORIDE 10 MG: 5 TABLET ORAL at 05:29

## 2018-05-15 RX ADMIN — DOCUSATE SODIUM 100 MG: 100 CAPSULE, LIQUID FILLED ORAL at 20:29

## 2018-05-15 RX ADMIN — OXYCODONE HYDROCHLORIDE 10 MG: 5 TABLET ORAL at 14:20

## 2018-05-15 RX ADMIN — OXYCODONE HYDROCHLORIDE 10 MG: 5 TABLET ORAL at 20:30

## 2018-05-15 RX ADMIN — DOCUSATE SODIUM 100 MG: 100 CAPSULE, LIQUID FILLED ORAL at 07:55

## 2018-05-15 RX ADMIN — HYDROMORPHONE HYDROCHLORIDE 0.4 MG: 2 INJECTION INTRAMUSCULAR; INTRAVENOUS; SUBCUTANEOUS at 03:58

## 2018-05-15 RX ADMIN — GABAPENTIN 400 MG: 400 CAPSULE ORAL at 07:55

## 2018-05-15 RX ADMIN — OXYCODONE HYDROCHLORIDE 10 MG: 5 TABLET ORAL at 02:52

## 2018-05-15 RX ADMIN — OXYCODONE HYDROCHLORIDE 10 MG: 5 TABLET ORAL at 17:14

## 2018-05-15 RX ADMIN — OMEPRAZOLE 20 MG: 20 CAPSULE, DELAYED RELEASE ORAL at 07:55

## 2018-05-15 RX ADMIN — KETOROLAC TROMETHAMINE 30 MG: 30 INJECTION, SOLUTION INTRAMUSCULAR at 12:25

## 2018-05-15 RX ADMIN — GABAPENTIN 400 MG: 400 CAPSULE ORAL at 20:30

## 2018-05-15 ASSESSMENT — GAIT ASSESSMENTS
ASSISTIVE DEVICE: FRONT WHEEL WALKER
GAIT LEVEL OF ASSIST: STAND BY ASSIST
DISTANCE (FEET): 150
DEVIATION: DECREASED HEEL STRIKE;DECREASED TOE OFF

## 2018-05-15 ASSESSMENT — PAIN SCALES - GENERAL
PAINLEVEL_OUTOF10: 10
PAINLEVEL_OUTOF10: 8
PAINLEVEL_OUTOF10: 9
PAINLEVEL_OUTOF10: 9
PAINLEVEL_OUTOF10: 10
PAINLEVEL_OUTOF10: 7
PAINLEVEL_OUTOF10: 10
PAINLEVEL_OUTOF10: 10
PAINLEVEL_OUTOF10: 8
PAINLEVEL_OUTOF10: 9
PAINLEVEL_OUTOF10: 7
PAINLEVEL_OUTOF10: 9

## 2018-05-15 ASSESSMENT — PATIENT HEALTH QUESTIONNAIRE - PHQ9
SUM OF ALL RESPONSES TO PHQ9 QUESTIONS 1 AND 2: 0
2. FEELING DOWN, DEPRESSED, IRRITABLE, OR HOPELESS: NOT AT ALL
1. LITTLE INTEREST OR PLEASURE IN DOING THINGS: NOT AT ALL

## 2018-05-15 ASSESSMENT — ACTIVITIES OF DAILY LIVING (ADL): TOILETING: INDEPENDENT

## 2018-05-15 NOTE — PROGRESS NOTES
Received report from NOC RN; assumed pt care. Pt A&Ox4, sitting up in chair. Pt state 7/10 pain to LT knee, medicated per MAR. Numbness/tingling noted to LT foot, MD aware. Pt able to dorsi/plantar flex without difficulty, pt able to wiggle toes. Scant serosanguinous drainage noted. Polar ice in place, pt notified to call for assistance, call light within reach.

## 2018-05-15 NOTE — PROGRESS NOTES
Total Joint Replacement Program Rounding     Inpatient bedside rounding completed to address quality of care provided by total joint replacement program IDT and overall patient experience at [facility].    Performance Measures addressed: Pt completed mupirocin x5 days, BID, as instructed preoperatively.   Patient Satisfaction addressed: Pain well controlled. No complaints.   Additional notes: Patient/family updated on POC during hospital stay. Pt currently working with PT on stair training. PT will follow up with nursing as pt's dressing saturating. No questions/concerns from patient at this time. Anticipate DC to home w/ assist from friend today.

## 2018-05-15 NOTE — PROGRESS NOTES
Spoke with Zoila CARDONA, informed her of significant bleeding coming from incision, reinforced with ABD pads and 4x4s, new ACE wrap placed. Will call back if bleeding continues, plan for pt to stay tonight.

## 2018-05-15 NOTE — CARE PLAN
Problem: Safety  Goal: Will remain free from injury  Outcome: PROGRESSING AS EXPECTED  Mobility assessed. Pt out of bed with SBA and FWW. Educated on the importance of calling for assistance, verbalizes understanding. Upper bed rails up x2, bed in low/locked position, hourly rounding in place, treaded socks on, call light/belongings within reach.     Problem: Pain Management  Goal: Pain level will decrease to patient's comfort goal  Outcome: PROGRESSING AS EXPECTED  Numeric scale to assess pain. Prns per MAR. Rest/reposotion for comfort.

## 2018-05-15 NOTE — CARE PLAN
Problem: Venous Thromboembolism (VTW)/Deep Vein Thrombosis (DVT) Prevention:  Goal: Patient will participate in Venous Thrombosis (VTE)/Deep Vein Thrombosis (DVT)Prevention Measures  Outcome: PROGRESSING AS EXPECTED  SCDs in place, MEG Hoes, ASA, Lovenox.     Problem: Pain Management  Goal: Pain level will decrease to patient's comfort goal  Outcome: PROGRESSING AS EXPECTED  Pain medcation administered Q2h.

## 2018-05-15 NOTE — PROGRESS NOTES
Pt able to ambulate hallway, reports it has helped a little bit with her pain, still with spastic pain especially under the knee.    Paging Dr. Guzmán's office for pain control, waiting for call back.    2035 Call back received from DULCE Salazar: orders: Dilaudid 0.4mg IV q1hr PRN   Modify: oxycodone 5mg 1-2 tabs p.o.  q2hrs for pain    2039 Clarified with DULCE Salazar regarding Dilaudid concern for cross allergy with codeine and morphine, Per Becca, Dilaudid should be fine.

## 2018-05-15 NOTE — PROGRESS NOTES
Received report from Sylvia, PACU RN. Pt arrived to floor via bed. Assumed care. This pt is AOx4, ambulatory with SBA and FWW, no N/V, voided on arrival to unit, c/o 8/10 knee pain, plan to medicat per MAR. Patient and RN discussed plan of care including IV abx x2, ambulate 50 ft by MN, IS, PT/OT in am, pain mgmt: questions answered. Labs noted, assessment complete, patient tolerating regular diet. Call light in place, fall precautions in place, patient educated on importance of calling for assistance. No additional needs at this time. VSS

## 2018-05-15 NOTE — THERAPY
"Occupational Therapy Evaluation completed.   Functional Status:  A&Ox4. WBAT LLE. Motivated for OOB activity. Performs self-cares and functional mobility using fww with Sup. Reviewed safe bathing, home safety.   Plan of Care: Patient with no further skilled OT needs in the acute care setting at this time  Discharge Recommendations:  Equipment: No Equipment Needed. Post-acute therapy Discharge to home with outpatient or home health for additional skilled therapy services. Lives with 2 daughters.   See \"Rehab Therapy-Acute\" Patient Summary Report for complete documentation.    "

## 2018-05-15 NOTE — THERAPY
"Physical Therapy Evaluation completed.   Bed Mobility:  Supine to Sit:  (NT, pt sitting up in chair)  Transfers: Sit to Stand: Stand by Assist  Gait: Level Of Assist: Stand by Assist with Front-Wheel Walker       Plan of Care: Will benefit from Physical Therapy 7 times per week  Discharge Recommendations: Equipment: Raised toilet seat. Post-acute therapy Discharge to home with outpatient or home health for additional skilled therapy services.    Pt is a 61 yo female s/p L TKA presenting with post-op pain and weakness L LE. Pt able to ambulate safely with FWW, improved gait pattern and balance with FWW instead of crutches at this time. PT will be available for 1x more visit to review stairs to ensure pt able to get safely into home.    See \"Rehab Therapy-Acute\" Patient Summary Report for complete documentation.     "

## 2018-05-15 NOTE — PROGRESS NOTES
Progress Note               Author: Yovani Guzmán Date & Time created: 5/15/2018  8:05 AM     Interval History:  No complaints  Pain controlled  Tolerating PO  New decreased sensation on dorsum of left foot    Review of Systems:  ROS    Physical Exam:  Physical Exam  LE with neg homans, no sign of DVT  Bandage stable with no signs of drainage  ABD soft  No complaints of orthostasis  Intact PF/DF of left foot but decr sens to dorsum, intact in first dorsal web  Labs:        Invalid input(s): [id number]      No results for input(s): SODIUM, POTASSIUM, CHLORIDE, CO2, BUN, CREATININE, MAGNESIUM, PHOSPHORUS, CALCIUM in the last 72 hours.  No results for input(s): ALTSGPT, ASTSGOT, ALKPHOSPHAT, TBILIRUBIN, DBILIRUBIN, GAMMAGT, AMYLASE, LIPASE, ALB, PREALBUMIN, GLUCOSE in the last 72 hours.  No results for input(s): RBC, HEMOGLOBIN, HEMATOCRIT, PLATELETCT, PROTHROMBTM, APTT, INR, IRON, FERRITIN, TOTIRONBC in the last 72 hours.      Hemodynamics:  Temp (24hrs), Av.4 °C (97.6 °F), Min:36.1 °C (97 °F), Max:37.4 °C (99.3 °F)  Temperature: 36.5 °C (97.7 °F)  Pulse  Av.6  Min: 72  Max: 89Heart Rate (Monitored): 86  Blood Pressure: 132/68, NIBP: 142/69     Respiratory:    Respiration: 18, Pulse Oximetry: 92 %, O2 Daily Delivery Respiratory : Nasal Cannula     Work Of Breathing / Effort: Mild  RUL Breath Sounds: Clear, RML Breath Sounds: Diminished, RLL Breath Sounds: Diminished, GABRIELLE Breath Sounds: Clear, LLL Breath Sounds: Diminished  Fluids:    Intake/Output Summary (Last 24 hours) at 05/15/18 0805  Last data filed at 05/15/18 0256   Gross per 24 hour   Intake             2550 ml   Output             1650 ml   Net              900 ml     Weight: 61.7 kg (136 lb 0.4 oz)  GI/Nutrition:  Orders Placed This Encounter   Procedures   • DIET ORDER     Standing Status:   Standing     Number of Occurrences:   1     Order Specific Question:   Diet:     Answer:   Regular [1]     Medical Decision Making, by Problem:  There  are no active hospital problems to display for this patient.      Plan:  1. DC home on crutches/ walker  2. DC home on home meds per home doses  3. DC home on Percocet, Lovenox X14 days, and Outpt PT.  4. Follow up [facility] 10-14 days  5. Call for Fevers, drainage, redness, shortness of breath, or increasing lower extremity swelling particularly in the calf.  6. Start Aspirin 325mg per day after Lovenox completed. Use for thirty days.  7. Remove bandage on postop day 5, call prior if drainage or wetness.    Quality-Core Measures

## 2018-05-15 NOTE — PROGRESS NOTES
"Paged Dr. Guzmán regarding pt surgical dressing, saturated with blood, will not stop bleeding, per pt she can feel it \"oozing.\" Dressing reinforced, ace wrap placed for pressure.   "

## 2018-05-15 NOTE — PROGRESS NOTES
Report to NOC RN.     2 RN skin assessment done; skin not WDL d/t LTKA. See Wound flowsheet. Otherwise skin exam unremarkable.

## 2018-05-15 NOTE — RESPIRATORY CARE
COPD Education by COPD Clinical Educator  5/15/2018 at 4:26 PM by Charo Chong    Patient interviewed by COPD education team.  Patient refused full COPD program at this time, but agreed to short intervention.  A comprehensive packet including information about COPD, treatments, and smoking cessation given.

## 2018-05-15 NOTE — PROGRESS NOTES
Pt screaming, crying, requesting to take carmel damon off of LLE, carmel damon removed temporarily.
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
## 2018-05-15 NOTE — PROGRESS NOTES
Report received from day RN.   Pt is AOx4, VSS, on 1-2L of o2 via nasal cannula, will wean accordingly.  Pt with Left knee surgical dressings c/d/I, polar ice in place.   Pt reports numbness/tingling sensations to LLE, but also states she has had them before surgery.  Pt reports on/off spasms to back of knee, has recently been medicated for pain by day RN.   Bed locked and low, controls on call light button within reach.   Pt updated with plan of care.  Will continue to monitor.

## 2018-05-15 NOTE — CARE PLAN
Problem: Safety  Goal: Will remain free from injury  Outcome: PROGRESSING AS EXPECTED  Bed locked and low, controls on call light button within reach.   Instructed with use of call light buttons for needs/help  Pt calls appropriately for needs.  1 person assistance provided, using ffw  Will continue to monitor.    Problem: Infection  Goal: Will remain free from infection  Outcome: PROGRESSING AS EXPECTED  Pt remains afebrile; Sx dressings c/d/I, tolerating iv abx. Will continue to monitor.    Problem: Pain Management  Goal: Pain level will decrease to patient's comfort goal  Outcome: PROGRESSING SLOWER THAN EXPECTED   05/15/18 0259   OTHER   Nurse Pain Scale 0 - 10  9   Non Verbal Scale  Calm;Unlabored Breathing   Comfort Goal Comfort at Rest;Comfort with Movement;Perform Activity;Sleep Comfortably

## 2018-05-15 NOTE — FLOWSHEET NOTE
05/14/18 1950   Events/Summary/Plan   Non-Invasive Resp Device Site Inspection Completed Intact   Education   Education Yes - Pt. / Family has been Instructed in use of Respiratory Equipment   Incentive Spirometry Group   Incentive Spirometry Instruction Yes   Breathing Exercises Yes   Incentive Spirometer Volume 3000 mL   Incentive Spirometer Date Last Changed 05/14/18   Incentive Spirometer Next Change Date (Q 30 Days) 06/13/18   Chest Exam   Work Of Breathing / Effort Mild   Respiration 18   Pulse 89   Breath Sounds   Pre/Post Intervention Pre Intervention Assessment   RUL Breath Sounds Clear   RML Breath Sounds Diminished   RLL Breath Sounds Diminished   GABRIELLE Breath Sounds Clear   LLL Breath Sounds Diminished   Secretions   Cough Non Productive   Oximetry   #Pulse Oximetry (Single Determination) Yes   Oxygen   Home O2 Use Prior To Admission? No   Pulse Oximetry 97 %   O2 (LPM) 3   O2 Daily Delivery Respiratory  Nasal Cannula

## 2018-05-16 VITALS
TEMPERATURE: 98 F | OXYGEN SATURATION: 95 % | HEART RATE: 79 BPM | BODY MASS INDEX: 22.66 KG/M2 | SYSTOLIC BLOOD PRESSURE: 134 MMHG | DIASTOLIC BLOOD PRESSURE: 73 MMHG | WEIGHT: 136.02 LBS | RESPIRATION RATE: 18 BRPM | HEIGHT: 65 IN

## 2018-05-16 PROCEDURE — A9270 NON-COVERED ITEM OR SERVICE: HCPCS | Performed by: PHYSICIAN ASSISTANT

## 2018-05-16 PROCEDURE — 700111 HCHG RX REV CODE 636 W/ 250 OVERRIDE (IP): Performed by: PHYSICIAN ASSISTANT

## 2018-05-16 PROCEDURE — 700102 HCHG RX REV CODE 250 W/ 637 OVERRIDE(OP): Performed by: ORTHOPAEDIC SURGERY

## 2018-05-16 PROCEDURE — 700112 HCHG RX REV CODE 229: Performed by: PHYSICIAN ASSISTANT

## 2018-05-16 PROCEDURE — 700102 HCHG RX REV CODE 250 W/ 637 OVERRIDE(OP): Performed by: PHYSICIAN ASSISTANT

## 2018-05-16 PROCEDURE — 97116 GAIT TRAINING THERAPY: CPT

## 2018-05-16 PROCEDURE — G8980 MOBILITY D/C STATUS: HCPCS | Mod: CJ

## 2018-05-16 PROCEDURE — A9270 NON-COVERED ITEM OR SERVICE: HCPCS | Performed by: ORTHOPAEDIC SURGERY

## 2018-05-16 PROCEDURE — G0378 HOSPITAL OBSERVATION PER HR: HCPCS

## 2018-05-16 PROCEDURE — 96376 TX/PRO/DX INJ SAME DRUG ADON: CPT

## 2018-05-16 PROCEDURE — G8979 MOBILITY GOAL STATUS: HCPCS | Mod: CI

## 2018-05-16 PROCEDURE — 97530 THERAPEUTIC ACTIVITIES: CPT

## 2018-05-16 RX ADMIN — KETOROLAC TROMETHAMINE 30 MG: 30 INJECTION, SOLUTION INTRAMUSCULAR at 00:27

## 2018-05-16 RX ADMIN — PAROXETINE HYDROCHLORIDE 20 MG: 20 TABLET, FILM COATED ORAL at 08:10

## 2018-05-16 RX ADMIN — OXYCODONE HYDROCHLORIDE 10 MG: 5 TABLET ORAL at 00:28

## 2018-05-16 RX ADMIN — OXYCODONE HYDROCHLORIDE 10 MG: 5 TABLET ORAL at 14:07

## 2018-05-16 RX ADMIN — ASPIRIN 325 MG: 325 TABLET, DELAYED RELEASE ORAL at 00:28

## 2018-05-16 RX ADMIN — ACETAMINOPHEN 1000 MG: 500 TABLET ORAL at 05:46

## 2018-05-16 RX ADMIN — ACETAMINOPHEN 1000 MG: 500 TABLET ORAL at 00:28

## 2018-05-16 RX ADMIN — DOCUSATE SODIUM 100 MG: 100 CAPSULE, LIQUID FILLED ORAL at 08:09

## 2018-05-16 RX ADMIN — OXYCODONE HYDROCHLORIDE 10 MG: 5 TABLET ORAL at 11:11

## 2018-05-16 RX ADMIN — OMEPRAZOLE 20 MG: 20 CAPSULE, DELAYED RELEASE ORAL at 08:10

## 2018-05-16 RX ADMIN — OXYCODONE HYDROCHLORIDE 10 MG: 5 TABLET ORAL at 03:05

## 2018-05-16 RX ADMIN — ASPIRIN 325 MG: 325 TABLET, DELAYED RELEASE ORAL at 11:08

## 2018-05-16 RX ADMIN — GABAPENTIN 400 MG: 400 CAPSULE ORAL at 08:09

## 2018-05-16 RX ADMIN — OXYCODONE HYDROCHLORIDE 10 MG: 5 TABLET ORAL at 05:46

## 2018-05-16 RX ADMIN — OXYCODONE HYDROCHLORIDE 10 MG: 5 TABLET ORAL at 08:11

## 2018-05-16 RX ADMIN — CLONIDINE HYDROCHLORIDE 0.1 MG: 0.1 TABLET ORAL at 08:10

## 2018-05-16 RX ADMIN — DULOXETINE HYDROCHLORIDE 30 MG: 30 CAPSULE, DELAYED RELEASE ORAL at 08:09

## 2018-05-16 RX ADMIN — POLYETHYLENE GLYCOL 3350 1 PACKET: 17 POWDER, FOR SOLUTION ORAL at 08:09

## 2018-05-16 ASSESSMENT — PAIN SCALES - GENERAL
PAINLEVEL_OUTOF10: 9
PAINLEVEL_OUTOF10: 7
PAINLEVEL_OUTOF10: 8
PAINLEVEL_OUTOF10: 8
PAINLEVEL_OUTOF10: 7
PAINLEVEL_OUTOF10: 7
PAINLEVEL_OUTOF10: 9
PAINLEVEL_OUTOF10: 8

## 2018-05-16 ASSESSMENT — GAIT ASSESSMENTS
DISTANCE (FEET): 150
DEVIATION: DECREASED HEEL STRIKE;DECREASED TOE OFF
GAIT LEVEL OF ASSIST: STAND BY ASSIST
ASSISTIVE DEVICE: FRONT WHEEL WALKER

## 2018-05-16 NOTE — PROGRESS NOTES
Pt awake, alert and oriented. Pt medicated for pain, CMS intact, no new signs of bleeding at this time. Pt refusing to call before getting out of bed. Pt educated and states she understands but continues to get up without calling. Pt refusing bed alar. Pt close to nursing station. Pt has steady gait and uses FWW. Plan for discharge home today.

## 2018-05-16 NOTE — THERAPY
"Physical Therapy Treatment completed.   Bed Mobility:  Supine to Sit:  (NT, pt sitting up in chair)  Transfers: Sit to Stand: Supervised  Gait: Level Of Assist: Stand by Assist with Front-Wheel Walker       Plan of Care: Patient with no further skilled PT needs in the acute care setting at this time  Discharge Recommendations: Equipment: Raised Toilet Seat. Post-acute therapy Discharge to home with outpatient or home health for additional skilled therapy services.    Pt with improved mobility today, steady using FWW, HEP reviewed, stairs reviewed. Ready for DC home when medically cleared.     See \"Rehab Therapy-Acute\" Patient Summary Report for complete documentation.       "

## 2018-05-16 NOTE — DISCHARGE INSTRUCTIONS
Use  crutches/ walker  Take home meds per home doses  Take  Percocet, Lovenox X14 days, and Outpt PT.  Follow up [facility] 10-14 days  Call for Fevers, drainage, redness, shortness of breath, or increasing lower extremity swelling particularly in the calf.  Start Aspirin 325mg per day after Lovenox completed. Use for thirty days.  Remove bandage on postoperative day 5, call prior if drainage or wetness.      Discharge Instructions    Discharged to home by car with relative. Discharged via wheelchair, hospital escort: Yes.  Special equipment needed: Not Applicable    Be sure to schedule a follow-up appointment with your primary care doctor or any specialists as instructed.     Discharge Plan:   Smoking Cessation Offered: Patient Refused  Influenza Vaccine Indication: Not indicated: Previously immunized this influenza season and > 8 years of age    I understand that a diet low in cholesterol, fat, and sodium is recommended for good health. Unless I have been given specific instructions below for another diet, I accept this instruction as my diet prescription.   Other diet: Regular    Special Instructions: Discharge instructions for the Orthopedic Patient    Follow up with Primary Care Physician within 2 weeks of discharge to home, regarding:  Review of medications and diagnostic testing.  Surveillance for medical complications.  Workup and treatment of osteoporosis, if appropriate.     -Is this a Joint Replacement patient? Yes Total Joint Knee Replacement Discharge Instructions    Pain  - The goal is to slowly wean off the prescription pain medicine.  - Ice can be used for pain control.  20 minutes at a time is recommended, and never directly against your skin or incision.  - Most patients are off the pain pills by 3 weeks; others may require a low level of pain medications for many months.  If your pain continues to be severe, follow up with your physician.  Infection    Knee joint infections; occur in fewer than  2% of patients. The most common causes of infection following total knee replacement surgery are from bacteria that enter the bloodstream during dental procedures, urinary tract infections, or skin infections. These bacteria can lodge around your knee replacement and cause an infection.  - Keep the incision as clean and dry as possible.  - Always wash your hands before touching your incision.  - Skin infections tend to develop around 7-10 days after surgery; most can be treated with oral antibiotics.  - Dental Care should be delayed for 3 months after surgery, your surgeon recommends taking a dose of antibiotics 1 hour prior to any dental procedure. After 2 years, most surgeons recommend antibiotics only before an extensive procedure.  Ask your surgeon what he recommends.  - Signs and symptoms of infection can include:  low grade fever, redness, pain, swelling and drainage from your incision.  Notify your surgeon immediately if you develop any of these symptoms.  Other instructions  - Bowel habits - constipation is extremely common and is caused by a combination of anesthesia, lack of mobility and pain medicine.  Use stool softeners or laxatives if necessary. It is important not to ignore this problem, as bowel obstructions can be a serious complication after joint replacement surgery.  - Mood/Energy Level - Many patients experience a lack of energy and endurance for up to 2-3 months after surgery.  Some may also feel down and can even become depressed.  This is likely due to the postoperative anemia, change in activity level, lack of sleep, pain medicine and just the emotional reaction to the surgery itself that is a big disruption in a person’s life.  This usually passes.  If symptoms persist, follow up with your primary physician.  - Returning to work - Your surgeon will give you more specific instructions. Depending on the type of activities you perform, it may be 6 to 8 weeks before you return to work.    Generally, if you work a sedentary job requiring little standing or walking, most patients may return within 2-6 weeks.  Manual labor jobs involving walking, lifting and standing may take longer. Your surgeon’s office can provide a release to part-time or light duty work early on in your recovery and progress you to full duty as able.    - Driving - If your left knee was replaced and you have an automatic transmission, you may be able to begin driving in a week or so, provided you are no longer taking narcotic pain medication. If your right knee was replaced, avoid driving for 6 to 8 weeks. Remember that your reflexes may not be as sharp as before your surgery. Ask your surgeon for specific instructions.   - Avoiding falls - A fall during the first few weeks after surgery can damage your new knee and may result in a need for further surgery.   throw rugs and tack down loose carpeting.  Be aware of floor hazards such as pets, small objects or uneven surfaces.    - [employer] Detectors - The sensitivity of metal detectors varies and it is likely that your prosthesis will cause an alarm.  Inform the  of your artificial joint.  Diet  - Resume your normal diet as tolerated.  - It is important to achieve a healthy nutritional status by eating a well balanced diet on a regular basis.  - Your physician may recommend that you take iron and vitamin supplements.   - Continue to drink plenty of fluids.  Shower/Bathing  - You may shower as soon as you get home from the hospital unless otherwise instructed.  - Keep your incision out of water.  To keep the incision dry when showering, cover it with a plastic bag or plastic wrap.  - Pat incision dry if it gets wet.  Don’t rub.  - Do not submerge in a bath until staples are out and the incision is completely healed. (Approximately 6-8 weeks)  Dressing Change:  Procedure (if recommended by your physician)  - Wash hands.  - Open all new dressing change  materials.  - Remove old dressing and discard.  - Inspect incision for redness, increase in clear drainage, yellow/green drainage, odor and surrounding skin hot to touch.  -  ABD (large gauze) pad or “island dressing” by one corner and lay over the incision.  Be careful not to touch the inside of the dressing that will lay over the incision.  - Secure in place as instructed (Ace wrap or tape).    Swelling/Bruising    - Swelling can last from 3-6 months.  - Elevate your leg higher than your heart while reclining.   The first week you are home you should elevate your leg an equal amount of time, as you are active.    - Anti-inflammatory pills can be taken once you have stopped the blood thinners.  - The swelling is usually worse after you go home since you are upright for longer periods of time.  - Bruising is common and can involve the entire leg including the thigh, calf and even foot. Bruising often does not appear until after you arrive home and it can be quite dramatic- purple, black, and green.  The bruising you can see is not usually concerning and will subside without any treatment.      Blood Clot Prevention  Blood clots in the legs and the less common, but frightening, clots that travel to the lungs are a real focus of our preventative. Most patients are at standard risk for them, but those patients who are at higher risk include people who have had previous clots, a family history of clotting, smoking, diabetes, obesity, advanced age, use of estrogen and a sedentary lifestyle.    - Signs of blood clots in legs - Swelling in thigh, calf or ankle that does not go down with elevation.  Pain, heat and tenderness in calf, back of calf or groin area.  NOTE: blood clots can occur in either leg.  - You have been receiving anticoagulant therapy (blood thinners) in the hospital and you may be instructed to continue at home depending on your risk factors.  - Your risk for developing a clot continues for up to  2-3 months after surgery.  You should avoid prolonged sitting and dehydration during that time (long air trips and car trips).  If you do take a trip during this time, please get up and move around every 1- 1.5 hours.  - If you are prescribed blood-thinning medication for home, follow instructions as directed. (Handouts provided if applicable).      Activity  Once home, you should continue to stay active. The key is to remember not to overdo it! While you can expect some good days and some bad days, you should notice a gradual improvement and a gradual increase in your endurance over the next 6 to 12 months. Exercise is a critical component of home care, particularly during the first few weeks after surgery.     - Normal activities of daily living You should be able to resume most within 3 to 6 weeks following surgery. Some pain with activity and at night is common for several weeks after surgery  -  Physical Therapy Exercises - Continue to do the exercises prescribed for at least two months after surgery. Riding a stationary bicycle can help maintain muscle tone and keep your knee flexible. Try to achieve the maximum degree of bending and extension possible. (handout provided by Therapist).  - Sexual Activity -. Your surgeon can tell you when it safe to resume sexual activity.    - Sleeping Positions - You can safely sleep on your back, on either side, or on your stomach.   - Other Activities - Walk as much as you like, but remember that walking is no substitute for the exercises your doctor and physical therapist will prescribe. Lower impact activities are preferred.  If you have specific questions, consult your Surgeon.    When to Call the Doctor   Call the physician if:   - Fever over 100.5? F  - Increased pain, drainage, redness, odor or heat around the incision area  - Shaking chills  - Increased knee pain with activity and rest  - Increased pain in calf, tenderness or redness above or below the  knee  - Increased swelling of calf, ankle, foot  - Sudden increased shortness of breath, sudden onset of chest pain, localized chest pain with coughing  - Incision opening  Or, if there are any questions or concerns about medications or care.       -Is this patient being discharged with medication to prevent blood clots?  Yes, Lovenox Enoxaparin injection  What is this medicine?  ENOXAPARIN (ee nox a PA rin) is used after knee, hip, or abdominal surgeries to prevent blood clotting. It is also used to treat existing blood clots in the lungs or in the veins.  This medicine may be used for other purposes; ask your health care provider or pharmacist if you have questions.  COMMON BRAND NAME(S): Lovenox  What should I tell my health care provider before I take this medicine?  They need to know if you have any of these conditions:  -bleeding disorders, hemorrhage, or hemophilia  -infection of the heart or heart valves  -kidney or liver disease  -previous stroke  -prosthetic heart valve  -recent surgery or delivery of a baby  -ulcer in the stomach or intestine, diverticulitis, or other bowel disease  -an unusual or allergic reaction to enoxaparin, heparin, pork or pork products, other medicines, foods, dyes, or preservatives  -pregnant or trying to get pregnant  -breast-feeding  How should I use this medicine?  This medicine is for injection under the skin. It is usually given by a health-care professional. You or a family member may be trained on how to give the injections. If you are to give yourself injections, make sure you understand how to use the syringe, measure the dose if necessary, and give the injection. To avoid bruising, do not rub the site where this medicine has been injected. Do not take your medicine more often than directed. Do not stop taking except on the advice of your doctor or health care professional.  Make sure you receive a puncture-resistant container to dispose of the needles and syringes once  you have finished with them. Do not reuse these items. Return the container to your doctor or health care professional for proper disposal.  Talk to your pediatrician regarding the use of this medicine in children. Special care may be needed.  Overdosage: If you think you have taken too much of this medicine contact a poison control center or emergency room at once.  NOTE: This medicine is only for you. Do not share this medicine with others.  What if I miss a dose?  If you miss a dose, take it as soon as you can. If it is almost time for your next dose, take only that dose. Do not take double or extra doses.  What may interact with this medicine?  -aspirin and aspirin-like medicines  -certain medicines that treat or prevent blood clots  -dipyridamole  -NSAIDs, medicines for pain and inflammation, like ibuprofen or naproxen  This list may not describe all possible interactions. Give your health care provider a list of all the medicines, herbs, non-prescription drugs, or dietary supplements you use. Also tell them if you smoke, drink alcohol, or use illegal drugs. Some items may interact with your medicine.  What should I watch for while using this medicine?  Visit your doctor or health care professional for regular checks on your progress. Your condition will be monitored carefully while you are receiving this medicine.  Notify your doctor or health care professional and seek emergency treatment if you develop breathing problems; changes in vision; chest pain; severe, sudden headache; pain, swelling, warmth in the leg; trouble speaking; sudden numbness or weakness of the face, arm, or leg. These can be signs that your condition has gotten worse.  If you are going to have surgery, tell your doctor or health care professional that you are taking this medicine.  Do not stop taking this medicine without first talking to your doctor. Be sure to refill your prescription before you run out of medicine.  Avoid sports and  activities that might cause injury while you are using this medicine. Severe falls or injuries can cause unseen bleeding. Be careful when using sharp tools or knives. Consider using an electric razor. Take special care brushing or flossing your teeth. Report any injuries, bruising, or red spots on the skin to your doctor or health care professional.  What side effects may I notice from receiving this medicine?  Side effects that you should report to your doctor or health care professional as soon as possible:  -allergic reactions like skin rash, itching or hives, swelling of the face, lips, or tongue  -feeling faint or lightheaded, falls  -signs and symptoms of bleeding such as bloody or black, tarry stools; red or dark-brown urine; spitting up blood or brown material that looks like coffee grounds; red spots on the skin; unusual bruising or bleeding from the eye, gums, or nose  Side effects that usually do not require medical attention (report to your doctor or health care professional if they continue or are bothersome):  -pain, redness, or irritation at site where injected  This list may not describe all possible side effects. Call your doctor for medical advice about side effects. You may report side effects to FDA at 5-756-FDA-5765.  Where should I keep my medicine?  Keep out of the reach of children.  Store at room temperature between 15 and 30 degrees C (59 and 86 degrees F). Do not freeze. If your injections have been specially prepared, you may need to store them in the refrigerator. Ask your pharmacist. Throw away any unused medicine after the expiration date.  NOTE: This sheet is a summary. It may not cover all possible information. If you have questions about this medicine, talk to your doctor, pharmacist, or health care provider.  © 2018 [employer] (2015-04-21 16:06:21)      · Is patient discharged on Warfarin / Coumadin?   No     Acetaminophen; Oxycodone tablets  What is this  medicine?  ACETAMINOPHEN; OXYCODONE (a set a CRISTIAN liz fen; ox i KOE done) is a pain reliever. It is used to treat moderate to severe pain.  This medicine may be used for other purposes; ask your health care provider or pharmacist if you have questions.  COMMON BRAND NAME(S): Endocet, Magnacet, Narvox, Percocet, Perloxx, Primalev, Primlev, Roxicet, Xolox  What should I tell my health care provider before I take this medicine?  They need to know if you have any of these conditions:  -brain tumor  -Crohn's disease, inflammatory bowel disease, or ulcerative colitis  -drug abuse or addiction  -head injury  -heart or circulation problems  -if you often drink alcohol  -kidney disease or problems going to the bathroom  -liver disease  -lung disease, asthma, or breathing problems  -an unusual or allergic reaction to acetaminophen, oxycodone, other opioid analgesics, other medicines, foods, dyes, or preservatives  -pregnant or trying to get pregnant  -breast-feeding  How should I use this medicine?  Take this medicine by mouth with a full glass of water. Follow the directions on the prescription label. You can take it with or without food. If it upsets your stomach, take it with food. Take your medicine at regular intervals. Do not take it more often than directed.  A special MedGuide will be given to you by the pharmacist with each prescription and refill. Be sure to read this information carefully each time.  Talk to your pediatrician regarding the use of this medicine in children. Special care may be needed.  Overdosage: If you think you have taken too much of this medicine contact a poison control center or emergency room at once.  NOTE: This medicine is only for you. Do not share this medicine with others.  What if I miss a dose?  If you miss a dose, take it as soon as you can. If it is almost time for your next dose, take only that dose. Do not take double or extra doses.  What may interact with this medicine?  This  medicine may interact with the following medications:  -alcohol  -antihistamines for allergy, cough and cold  -antiviral medicines for HIV or AIDS  -atropine  -certain antibiotics like clarithromycin, erythromycin, linezolid, rifampin  -certain medicines for anxiety or sleep  -certain medicines for bladder problems like oxybutynin, tolterodine  -certain medicines for depression like amitriptyline, fluoxetine, sertraline  -certain medicines for fungal infections like ketoconazole, itraconazole, voriconazole  -certain medicines for migraine headache like almotriptan, eletriptan, frovatriptan, naratriptan, rizatriptan, sumatriptan, zolmitriptan  -certain medicines for nausea or vomiting like dolasetron, ondansetron, palonosetron  -certain medicines for Parkinson's disease like benztropine, trihexyphenidyl  -certain medicines for seizures like phenobarbital, phenytoin, primidone  -certain medicines for stomach problems like dicyclomine, hyoscyamine  -certain medicines for travel sickness like scopolamine  -diuretics  -general anesthetics like halothane, isoflurane, methoxyflurane, propofol  -ipratropium  -local anesthetics like lidocaine, pramoxine, tetracaine  -MAOIs like Carbex, Eldepryl, Marplan, Nardil, and Parnate  -medicines that relax muscles for surgery  -methylene blue  -nilotinib  -other medicines with acetaminophen  -other narcotic medicines for pain or cough  -phenothiazines like chlorpromazine, mesoridazine, prochlorperazine, thioridazine  This list may not describe all possible interactions. Give your health care provider a list of all the medicines, herbs, non-prescription drugs, or dietary supplements you use. Also tell them if you smoke, drink alcohol, or use illegal drugs. Some items may interact with your medicine.  What should I watch for while using this medicine?  Tell your doctor or health care professional if your pain does not go away, if it gets worse, or if you have new or a different type of  pain. You may develop tolerance to the medicine. Tolerance means that you will need a higher dose of the medication for pain relief. Tolerance is normal and is expected if you take this medicine for a long time.  Do not suddenly stop taking your medicine because you may develop a severe reaction. Your body becomes used to the medicine. This does NOT mean you are addicted. Addiction is a behavior related to getting and using a drug for a non-medical reason. If you have pain, you have a medical reason to take pain medicine. Your doctor will tell you how much medicine to take. If your doctor wants you to stop the medicine, the dose will be slowly lowered over time to avoid any side effects.  There are different types of narcotic medicines (opiates). If you take more than one type at the same time or if you are taking another medicine that also causes drowsiness, you may have more side effects. Give your health care provider a list of all medicines you use. Your doctor will tell you how much medicine to take. Do not take more medicine than directed. Call emergency for help if you have problems breathing or unusual sleepiness.  Do not take other medicines that contain acetaminophen with this medicine. Always read labels carefully. If you have questions, ask your doctor or pharmacist.  If you take too much acetaminophen get medical help right away. Too much acetaminophen can be very dangerous and cause liver damage. Even if you do not have symptoms, it is important to get help right away.  You may get drowsy or dizzy. Do not drive, use machinery, or do anything that needs mental alertness until you know how this medicine affects you. Do not stand or sit up quickly, especially if you are an older patient. This reduces the risk of dizzy or fainting spells. Alcohol may interfere with the effect of this medicine. Avoid alcoholic drinks.  The medicine will cause constipation. Try to have a bowel movement at least every 2 to 3  days. If you do not have a bowel movement for 3 days, call your doctor or health care professional.  Your mouth may get dry. Chewing sugarless gum or sucking hard candy, and drinking plenty or water may help. Contact your doctor if the problem does not go away or is severe.  What side effects may I notice from receiving this medicine?  Side effects that you should report to your doctor or health care professional as soon as possible:  -allergic reactions like skin rash, itching or hives, swelling of the face, lips, or tongue  -breathing problems  -confusion  -redness, blistering, peeling or loosening of the skin, including inside the mouth  -signs and symptoms of liver injury like dark yellow or brown urine; general ill feeling or flu-like symptoms; light-colored stools; loss of appetite; nausea; right upper belly pain; unusually weak or tired; yellowing of the eyes or skin  -signs and symptoms of low blood pressure like dizziness; feeling faint or lightheaded, falls; unusually weak or tired  -trouble passing urine or change in the amount of urine  Side effects that usually do not require medical attention (report to your doctor or health care professional if they continue or are bothersome):  -constipation  -dry mouth  -nausea, vomiting  -tiredness  This list may not describe all possible side effects. Call your doctor for medical advice about side effects. You may report side effects to FDA at 4-667-FDA-8408.  Where should I keep my medicine?  Keep out of the reach of children. This medicine can be abused. Keep your medicine in a safe place to protect it from theft. Do not share this medicine with anyone. Selling or giving away this medicine is dangerous and against the law.  This medicine may cause accidental overdose and death if it taken by other adults, children, or pets. Mix any unused medicine with a substance like cat litter or coffee grounds. Then throw the medicine away in a sealed container like a sealed  bag or a coffee can with a lid. Do not use the medicine after the expiration date.  Store at room temperature between 20 and 25 degrees C (68 and 77 degrees F).  NOTE: This sheet is a summary. It may not cover all possible information. If you have questions about this medicine, talk to your doctor, pharmacist, or health care provider.  © 2018 [employer] (2016-09-12 21:48:12)    Total Knee Replacement, Care After  Refer to this sheet in the next few weeks. These instructions provide you with information about caring for yourself after your procedure. Your health care provider may also give you more specific instructions. Your treatment has been planned according to current medical practices, but problems sometimes occur. Call your health care provider if you have any problems or questions after your procedure.  What can I expect after the procedure?  After the procedure, it is common to have:  · Pain and swelling.  · A small amount of blood or clear fluid coming from your incision.  · Limited range of motion.  Follow these instructions at home:  Medicines  · Take over-the-counter and prescription medicines only as told by your health care provider.  · If you were prescribed an antibiotic medicine, take it as told by your health care provider. Do not stop taking the antibiotic even if you start to feel better.  · If you were prescribed a blood thinner (anticoagulant), take it as told by your health care provider.  If you have a splint or brace:  · Wear the immobilizer as told by your health care provider. Remove it only as told by your health care provider.  · Loosen the immobilizer if your toes tingle, become numb, or turn cold and blue.  · Do not let your immobilizer get wet if it is not waterproof.  · Keep the immobilizer clean.  Bathing  · Do not take baths, swim, or use a hot tub until your health care provider approves. Ask your health care provider if you can take showers. You may only be allowed to  take sponge baths for bathing.  · If you have an immobilizer that is not waterproof, cover it with a watertight covering when you take a bath or shower.  · Keep your bandage (dressing) dry until your health care provider says it can be removed.  Incision care and drain care  · Check your incision area and drain site every day for signs of infection. Check for:  ¨ More redness, swelling, or pain.  ¨ More fluid or blood.  ¨ Warmth.  ¨ Pus or a bad smell.  · Follow instructions from your health care provider about how to take care of your incision. Make sure you:  ¨ Wash your hands with soap and water before you change your dressing. If soap and water are not available, use hand .  ¨ Change your dressing as told by your health care provider.  ¨ Leave stitches (sutures), skin glue, or adhesive strips in place. These skin closures may need to stay in place for 2 weeks or longer. If adhesive strip edges start to loosen and curl up, you may trim the loose edges. Do not remove adhesive strips completely unless your health care provider tells you to do that.  · If you have a drain, follow instructions from your health care provider about caring for it. Do not remove the drain tube or any dressings around the tube opening unless your health care provider approves.  Managing pain, stiffness, and swelling  · If directed, put ice on your knee.  ¨ Put ice in a plastic bag.  ¨ Place a towel between your skin and the bag.  ¨ Leave the ice on for 20 minutes, 2-3 times per day.  · If directed, apply heat to the affected area as often as told by your health care provider. Use the heat source that your health care provider recommends, such as a moist heat pack or a heating pad.  ¨ Place a towel between your skin and the heat source.  ¨ Leave the heat on for 20-30 minutes.  ¨ Remove the heat if your skin turns bright red. This is especially important if you are unable to feel pain, heat, or cold. You may have a greater risk of  getting burned.  · Move your toes often to avoid stiffness and to lessen swelling.  · Raise (elevate) your knee above the level of your heart while you are sitting or lying down.  · Wear elastic knee support for as long as told by your health care provider.  Driving  · Do not drive until your health care provider approves. Ask your health care provider when it is safe to drive if you have an immobilizer on your knee.  · Do not drive or operate heavy machinery while taking prescription pain medicine.  · Do not drive for 24 hours if you received a sedative.  Activity  · Do not lift anything that is heavier than 10 lb (4.5 kg) until your health care provider approves.  · Do not play contact sports until your health care provider approves.  · Avoid high-impact activities, including running, jumping rope, and jumping jacks.  · Avoid sitting for a long time without moving. Get up and move around at least every few hours.  · If physical therapy was prescribed, do exercises as told by your health care provider.  · Return to your normal activities as told by your health care provider. Ask your health care provider what activities are safe for you.  Safety  · Do not use your leg to support your body weight until your health care provider approves. Use crutches or a walker as told by your health care provider.  General instructions  · Do not have any dental work done for at least 3 months after your surgery. When you do have dental work done, tell your dentist about your joint replacement.  · Do not use any tobacco products, such as cigarettes, chewing tobacco, or e-cigarettes. If you need help quitting, ask your health care provider.  · Wear compression stockings as told by your health care provider. These stockings help to prevent blood clots and reduce swelling in your legs.  · If you have been sent home with a continuous passive motion machine, use it as told by your health care provider.  · Drink enough fluid to keep your  urine clear or pale yellow.  · If you have been instructed to lose weight, follow instructions from your health care provider about how to do this safely.  · Keep all follow-up visits as told by your health care provider. This is important.  Contact a health care provider if:  · You have more redness, swelling, or pain around your incision or drain.  · You have more fluid or blood coming from your incision or drain.  · Your incision or drain site feels warm to the touch.  · You have pus or a bad smell coming from your incision or drain.  · You have a fever.  · Your incision breaks open after your health care provider removes your sutures, skin glue, or adhesive tape.  · Your prosthesis feels loose.  · You have knee pain that does not go away.  Get help right away if:  · You have a rash.  · You have pain or swelling in your calf or thigh.  · You have shortness of breath or difficulty breathing.  · You have chest pain.  · Your range of motion in your knee is getting worse.  This information is not intended to replace advice given to you by your health care provider. Make sure you discuss any questions you have with your health care provider.  Document Released: 07/07/2006 Document Revised: 08/21/2017 Document Reviewed: 11/23/2016  [employer] Interactive Patient Education © 2017 [employer]        Depression / Suicide Risk    As you are discharged from this [facility] facility, it is important to learn how to keep safe from harming yourself.    Recognize the warning signs:  · Abrupt changes in personality, positive or negative- including increase in energy   · Giving away possessions  · Change in eating patterns- significant weight changes-  positive or negative  · Change in sleeping patterns- unable to sleep or sleeping all the time   · Unwillingness or inability to communicate  · Depression  · Unusual sadness, discouragement and loneliness  · Talk of wanting to die  · Neglect of personal  appearance   · Rebelliousness- reckless behavior  · Withdrawal from people/activities they love  · Confusion- inability to concentrate     If you or a loved one observes any of these behaviors or has concerns about self-harm, here's what you can do:  · Talk about it- your feelings and reasons for harming yourself  · Remove any means that you might use to hurt yourself (examples: pills, rope, extension cords, firearm)  · Get professional help from the community (Mental Health, Substance Abuse, psychological counseling)  · Do not be alone:Call your Safe Contact- someone whom you trust who will be there for you.  · Call your local CRISIS HOTLINE 422-7683 or 920-870-5421  · Call your local Children's Mobile Crisis Response Team [place] (774) 540-1891 or www.Workers On Call  · Call the toll free National Suicide Prevention Hotlines   · National Suicide Prevention Lifeline 001-934-WPFJ (3510)  · National Hope Line Network 800-SUICIDE (091-9211)

## 2018-05-16 NOTE — PROGRESS NOTES
Discharge instruction given and discussed. Pt states she does not have Lovenox at home. Pt states she thinks she took the Rx to [facility] on [address]. NOD called [facility] on [address], pharmacy tech states that he has the RX for Celebrex but not Lovenox. Spoke to TIFFANIE perez from Dr. benoit office to clarify. TIFFANIE Perez states she will clarify with DULCE Hudson. Waiting for call back.

## 2018-05-16 NOTE — PROGRESS NOTES
Per chris, RN. Pt is to go home on aspirin,no lovenox for pt due to hx of hep C. Pt updated and discharged home in stable condition, no acute changes.

## 2018-05-16 NOTE — PROGRESS NOTES
Dr. moyer updated on pt's status, no bleeding after dressing was reinforced yesterday day shift. Order received to discharge home after therapy.

## 2018-05-16 NOTE — PROGRESS NOTES
Received report from Vilma at 1855. Patient is awake, alert and oriented with clear speech, sitting in cardiac chair. Assessed dressing on L. Knee and verified there is no current drainage coming from wound underneath. Day shift reported breakthrough bleeding but has changed and reinforced dressing with no new breakthrough bleeding as of yet. Patient is steady and balanced and independently transferred from chair to bed.

## 2018-05-22 NOTE — DISCHARGE SUMMARY
DATE OF DISCHARGE:  05/16/2018    HOSPITAL COURSE:  The patient was admitted and taken for a total knee   arthroplasty.  Postoperatively, she remained stable and was able to mobilize.    She was subsequently able to discharge to home.    DISPOSITION:  The patient will be discharged to home.    The patient will have outpatient pain medications and anticoagulation.    The patient will follow up in the outpatient clinic setting in approximately   10-14 days.  The patient has already been given prescriptions for the blood   thinning agents and pain medications.  During the admission, the patient had a   total knee arthroplasty and tolerated the procedure very well.       ____________________________________     MD KELI MOSES / NTS    DD:  05/22/2018 07:53:00  DT:  05/22/2018 09:20:16    D#:  [id number]  Job#:  [id number]

## 2018-05-23 ENCOUNTER — HOSPITAL ENCOUNTER (OUTPATIENT)
Dept: RADIOLOGY | Facility: MEDICAL CENTER | Age: 63
End: 2018-05-23
Attending: ORTHOPAEDIC SURGERY
Payer: MEDICARE

## 2018-05-23 DIAGNOSIS — R60.0 LOCALIZED EDEMA: ICD-10-CM

## 2018-05-23 PROCEDURE — 93970 EXTREMITY STUDY: CPT

## 2018-08-27 ENCOUNTER — APPOINTMENT (OUTPATIENT)
Dept: ADMISSIONS | Facility: MEDICAL CENTER | Age: 63
DRG: 468 | End: 2018-08-27
Payer: MEDICARE

## 2018-08-28 ENCOUNTER — APPOINTMENT (OUTPATIENT)
Dept: ADMISSIONS | Facility: MEDICAL CENTER | Age: 63
DRG: 468 | End: 2018-08-28
Payer: MEDICARE

## 2018-08-31 DIAGNOSIS — Z01.812 PRE-OPERATIVE LABORATORY EXAMINATION: ICD-10-CM

## 2018-08-31 DIAGNOSIS — Z01.810 PRE-OPERATIVE CARDIOVASCULAR EXAMINATION: ICD-10-CM

## 2018-08-31 LAB
ANION GAP SERPL CALC-SCNC: 2 MMOL/L (ref 0–11.9)
APPEARANCE UR: CLEAR
BASOPHILS # BLD AUTO: 0.6 % (ref 0–1.8)
BASOPHILS # BLD: 0.07 K/UL (ref 0–0.12)
BILIRUB UR QL STRIP.AUTO: NEGATIVE
BUN SERPL-MCNC: 12 MG/DL (ref 8–22)
CALCIUM SERPL-MCNC: 9 MG/DL (ref 8.5–10.5)
CHLORIDE SERPL-SCNC: 107 MMOL/L (ref 96–112)
CO2 SERPL-SCNC: 28 MMOL/L (ref 20–33)
COLOR UR: YELLOW
CREAT SERPL-MCNC: 0.83 MG/DL (ref 0.5–1.4)
EKG IMPRESSION: NORMAL
EOSINOPHIL # BLD AUTO: 0.36 K/UL (ref 0–0.51)
EOSINOPHIL NFR BLD: 3 % (ref 0–6.9)
ERYTHROCYTE [DISTWIDTH] IN BLOOD BY AUTOMATED COUNT: 53.9 FL (ref 35.9–50)
GLUCOSE SERPL-MCNC: 88 MG/DL (ref 65–99)
GLUCOSE UR STRIP.AUTO-MCNC: NEGATIVE MG/DL
HCT VFR BLD AUTO: 35.7 % (ref 37–47)
HGB BLD-MCNC: 11.5 G/DL (ref 12–16)
IMM GRANULOCYTES # BLD AUTO: 0.06 K/UL (ref 0–0.11)
IMM GRANULOCYTES NFR BLD AUTO: 0.5 % (ref 0–0.9)
KETONES UR STRIP.AUTO-MCNC: NEGATIVE MG/DL
LEUKOCYTE ESTERASE UR QL STRIP.AUTO: NEGATIVE
LYMPHOCYTES # BLD AUTO: 2.29 K/UL (ref 1–4.8)
LYMPHOCYTES NFR BLD: 19 % (ref 22–41)
MCH RBC QN AUTO: 26.5 PG (ref 27–33)
MCHC RBC AUTO-ENTMCNC: 32.2 G/DL (ref 33.6–35)
MCV RBC AUTO: 82.3 FL (ref 81.4–97.8)
MICRO URNS: NORMAL
MONOCYTES # BLD AUTO: 0.78 K/UL (ref 0–0.85)
MONOCYTES NFR BLD AUTO: 6.5 % (ref 0–13.4)
NEUTROPHILS # BLD AUTO: 8.49 K/UL (ref 2–7.15)
NEUTROPHILS NFR BLD: 70.4 % (ref 44–72)
NITRITE UR QL STRIP.AUTO: NEGATIVE
NRBC # BLD AUTO: 0 K/UL
NRBC BLD-RTO: 0 /100 WBC
PH UR STRIP.AUTO: 6.5 [PH]
PLATELET # BLD AUTO: 368 K/UL (ref 164–446)
PMV BLD AUTO: 11 FL (ref 9–12.9)
POTASSIUM SERPL-SCNC: 4.1 MMOL/L (ref 3.6–5.5)
PROT UR QL STRIP: NEGATIVE MG/DL
RBC # BLD AUTO: 4.34 M/UL (ref 4.2–5.4)
RBC UR QL AUTO: NEGATIVE
SCCMEC + MECA PNL NOSE NAA+PROBE: NEGATIVE
SCCMEC + MECA PNL NOSE NAA+PROBE: NEGATIVE
SODIUM SERPL-SCNC: 137 MMOL/L (ref 135–145)
SP GR UR STRIP.AUTO: 1.01
UROBILINOGEN UR STRIP.AUTO-MCNC: 1 MG/DL
WBC # BLD AUTO: 12.1 K/UL (ref 4.8–10.8)

## 2018-08-31 PROCEDURE — 87086 URINE CULTURE/COLONY COUNT: CPT

## 2018-08-31 PROCEDURE — 87641 MR-STAPH DNA AMP PROBE: CPT

## 2018-08-31 PROCEDURE — 80048 BASIC METABOLIC PNL TOTAL CA: CPT

## 2018-08-31 PROCEDURE — 93005 ELECTROCARDIOGRAM TRACING: CPT

## 2018-08-31 PROCEDURE — 85025 COMPLETE CBC W/AUTO DIFF WBC: CPT

## 2018-08-31 PROCEDURE — 93010 ELECTROCARDIOGRAM REPORT: CPT | Performed by: INTERNAL MEDICINE

## 2018-08-31 PROCEDURE — 87640 STAPH A DNA AMP PROBE: CPT | Mod: XU

## 2018-08-31 PROCEDURE — 81003 URINALYSIS AUTO W/O SCOPE: CPT

## 2018-08-31 PROCEDURE — 36415 COLL VENOUS BLD VENIPUNCTURE: CPT

## 2018-08-31 RX ORDER — IBUPROFEN 800 MG/1
800 TABLET ORAL EVERY 8 HOURS PRN
Status: ON HOLD | COMMUNITY
End: 2018-09-11

## 2018-08-31 NOTE — OR NURSING
"TOTAL JOINT REPLACEMENT SURGERY   PreAdmit Appointment  Pre-Operative Education Note       1) Did you take a Total Joint Replacement Pre-Operative Education class?  Where?  Answer: April 2018    2) If you did not take a class, did you receive pre-op education in the form of a book or through an online class?    Answer:     3) Have you had the same joint replacement procedure within the last 3 years?   Answer:yes    For patients who answered \"No\" to the above questions:     4) Was patient given information on [facility] Pre-Op Education class through the Pre-Op Education Class flyer or the Alternative Pre-op Education flyer?   Answer:     5) Was a [facility] Total Joint Replacement Patient Guide binder handed out?   Answer:    6) Did the patient refuse preoperative education?  Answer:    "

## 2018-09-02 LAB
BACTERIA UR CULT: NORMAL
SIGNIFICANT IND 70042: NORMAL
SITE SITE: NORMAL
SOURCE SOURCE: NORMAL

## 2018-09-10 ENCOUNTER — HOSPITAL ENCOUNTER (INPATIENT)
Facility: MEDICAL CENTER | Age: 63
LOS: 1 days | DRG: 468 | End: 2018-09-11
Attending: ORTHOPAEDIC SURGERY | Admitting: ORTHOPAEDIC SURGERY
Payer: MEDICARE

## 2018-09-10 ENCOUNTER — APPOINTMENT (OUTPATIENT)
Dept: RADIOLOGY | Facility: MEDICAL CENTER | Age: 63
DRG: 468 | End: 2018-09-10
Attending: ORTHOPAEDIC SURGERY
Payer: MEDICARE

## 2018-09-10 LAB
BASOPHILS # BLD AUTO: 0.6 % (ref 0–1.8)
BASOPHILS # BLD: 0.06 K/UL (ref 0–0.12)
EOSINOPHIL # BLD AUTO: 0.16 K/UL (ref 0–0.51)
EOSINOPHIL NFR BLD: 1.6 % (ref 0–6.9)
ERYTHROCYTE [DISTWIDTH] IN BLOOD BY AUTOMATED COUNT: 53 FL (ref 35.9–50)
HCT VFR BLD AUTO: 38 % (ref 37–47)
HGB BLD-MCNC: 12.1 G/DL (ref 12–16)
IMM GRANULOCYTES # BLD AUTO: 0.02 K/UL (ref 0–0.11)
IMM GRANULOCYTES NFR BLD AUTO: 0.2 % (ref 0–0.9)
LYMPHOCYTES # BLD AUTO: 1.92 K/UL (ref 1–4.8)
LYMPHOCYTES NFR BLD: 19.5 % (ref 22–41)
MCH RBC QN AUTO: 26 PG (ref 27–33)
MCHC RBC AUTO-ENTMCNC: 31.8 G/DL (ref 33.6–35)
MCV RBC AUTO: 81.5 FL (ref 81.4–97.8)
MONOCYTES # BLD AUTO: 0.45 K/UL (ref 0–0.85)
MONOCYTES NFR BLD AUTO: 4.6 % (ref 0–13.4)
NEUTROPHILS # BLD AUTO: 7.22 K/UL (ref 2–7.15)
NEUTROPHILS NFR BLD: 73.5 % (ref 44–72)
NRBC # BLD AUTO: 0 K/UL
NRBC BLD-RTO: 0 /100 WBC
PLATELET # BLD AUTO: 345 K/UL (ref 164–446)
PMV BLD AUTO: 10 FL (ref 9–12.9)
RBC # BLD AUTO: 4.66 M/UL (ref 4.2–5.4)
WBC # BLD AUTO: 9.8 K/UL (ref 4.8–10.8)

## 2018-09-10 PROCEDURE — 770001 HCHG ROOM/CARE - MED/SURG/GYN PRIV*

## 2018-09-10 PROCEDURE — A6222 GAUZE <=16 IN NO W/SAL W/O B: HCPCS | Performed by: ORTHOPAEDIC SURGERY

## 2018-09-10 PROCEDURE — 700111 HCHG RX REV CODE 636 W/ 250 OVERRIDE (IP)

## 2018-09-10 PROCEDURE — A9270 NON-COVERED ITEM OR SERVICE: HCPCS

## 2018-09-10 PROCEDURE — 502000 HCHG MISC OR IMPLANTS RC 0278: Performed by: ORTHOPAEDIC SURGERY

## 2018-09-10 PROCEDURE — 160009 HCHG ANES TIME/MIN: Performed by: ORTHOPAEDIC SURGERY

## 2018-09-10 PROCEDURE — 700101 HCHG RX REV CODE 250

## 2018-09-10 PROCEDURE — 700102 HCHG RX REV CODE 250 W/ 637 OVERRIDE(OP)

## 2018-09-10 PROCEDURE — 501838 HCHG SUTURE GENERAL: Performed by: ORTHOPAEDIC SURGERY

## 2018-09-10 PROCEDURE — 73560 X-RAY EXAM OF KNEE 1 OR 2: CPT | Mod: RT

## 2018-09-10 PROCEDURE — 160035 HCHG PACU - 1ST 60 MINS PHASE I: Performed by: ORTHOPAEDIC SURGERY

## 2018-09-10 PROCEDURE — 85025 COMPLETE CBC W/AUTO DIFF WBC: CPT

## 2018-09-10 PROCEDURE — 700105 HCHG RX REV CODE 258: Performed by: ORTHOPAEDIC SURGERY

## 2018-09-10 PROCEDURE — 700102 HCHG RX REV CODE 250 W/ 637 OVERRIDE(OP): Performed by: ORTHOPAEDIC SURGERY

## 2018-09-10 PROCEDURE — L8699 PROSTHETIC IMPLANT NOS: HCPCS | Performed by: ORTHOPAEDIC SURGERY

## 2018-09-10 PROCEDURE — 700111 HCHG RX REV CODE 636 W/ 250 OVERRIDE (IP): Performed by: ORTHOPAEDIC SURGERY

## 2018-09-10 PROCEDURE — A9270 NON-COVERED ITEM OR SERVICE: HCPCS | Performed by: ORTHOPAEDIC SURGERY

## 2018-09-10 PROCEDURE — 160029 HCHG SURGERY MINUTES - 1ST 30 MINS LEVEL 4: Performed by: ORTHOPAEDIC SURGERY

## 2018-09-10 PROCEDURE — 160048 HCHG OR STATISTICAL LEVEL 1-5: Performed by: ORTHOPAEDIC SURGERY

## 2018-09-10 PROCEDURE — 502579 HCHG PACK, TOTAL KNEE: Performed by: ORTHOPAEDIC SURGERY

## 2018-09-10 PROCEDURE — 160002 HCHG RECOVERY MINUTES (STAT): Performed by: ORTHOPAEDIC SURGERY

## 2018-09-10 PROCEDURE — 160022 HCHG BLOCK: Performed by: ORTHOPAEDIC SURGERY

## 2018-09-10 PROCEDURE — 160041 HCHG SURGERY MINUTES - EA ADDL 1 MIN LEVEL 4: Performed by: ORTHOPAEDIC SURGERY

## 2018-09-10 PROCEDURE — 0SRC0J9 REPLACEMENT OF RIGHT KNEE JOINT WITH SYNTHETIC SUBSTITUTE, CEMENTED, OPEN APPROACH: ICD-10-PCS | Performed by: ORTHOPAEDIC SURGERY

## 2018-09-10 PROCEDURE — 0SPC0JZ REMOVAL OF SYNTHETIC SUBSTITUTE FROM RIGHT KNEE JOINT, OPEN APPROACH: ICD-10-PCS | Performed by: ORTHOPAEDIC SURGERY

## 2018-09-10 DEVICE — IMPLANTABLE DEVICE: Type: IMPLANTABLE DEVICE | Site: KNEE | Status: FUNCTIONAL

## 2018-09-10 DEVICE — BONE CEMENT SIMPLEX ANTIBIO - (10/PK): Type: IMPLANTABLE DEVICE | Site: KNEE | Status: FUNCTIONAL

## 2018-09-10 RX ORDER — GABAPENTIN 300 MG/1
CAPSULE ORAL
Status: DISPENSED
Start: 2018-09-10 | End: 2018-09-10

## 2018-09-10 RX ORDER — DEXAMETHASONE SODIUM PHOSPHATE 4 MG/ML
4 INJECTION, SOLUTION INTRA-ARTICULAR; INTRALESIONAL; INTRAMUSCULAR; INTRAVENOUS; SOFT TISSUE
Status: DISCONTINUED | OUTPATIENT
Start: 2018-09-10 | End: 2018-09-11 | Stop reason: HOSPADM

## 2018-09-10 RX ORDER — SODIUM CHLORIDE, SODIUM LACTATE, POTASSIUM CHLORIDE, CALCIUM CHLORIDE 600; 310; 30; 20 MG/100ML; MG/100ML; MG/100ML; MG/100ML
1000 INJECTION, SOLUTION INTRAVENOUS
Status: ACTIVE | OUTPATIENT
Start: 2018-09-10 | End: 2018-09-10

## 2018-09-10 RX ORDER — DEXLANSOPRAZOLE 60 MG/1
60 CAPSULE, DELAYED RELEASE ORAL DAILY
Status: DISCONTINUED | OUTPATIENT
Start: 2018-09-11 | End: 2018-09-10

## 2018-09-10 RX ORDER — ONDANSETRON 2 MG/ML
4 INJECTION INTRAMUSCULAR; INTRAVENOUS EVERY 4 HOURS PRN
Status: DISCONTINUED | OUTPATIENT
Start: 2018-09-10 | End: 2018-09-11 | Stop reason: HOSPADM

## 2018-09-10 RX ORDER — CLONIDINE HYDROCHLORIDE 0.1 MG/1
0.1 TABLET ORAL 2 TIMES DAILY
Status: DISCONTINUED | OUTPATIENT
Start: 2018-09-10 | End: 2018-09-11 | Stop reason: HOSPADM

## 2018-09-10 RX ORDER — DULOXETIN HYDROCHLORIDE 30 MG/1
30 CAPSULE, DELAYED RELEASE ORAL DAILY
Status: DISCONTINUED | OUTPATIENT
Start: 2018-09-11 | End: 2018-09-11 | Stop reason: HOSPADM

## 2018-09-10 RX ORDER — GABAPENTIN 400 MG/1
1200 CAPSULE ORAL 3 TIMES DAILY
Status: DISCONTINUED | OUTPATIENT
Start: 2018-09-10 | End: 2018-09-11 | Stop reason: HOSPADM

## 2018-09-10 RX ORDER — DIPHENHYDRAMINE HYDROCHLORIDE 50 MG/ML
25 INJECTION INTRAMUSCULAR; INTRAVENOUS EVERY 6 HOURS PRN
Status: DISCONTINUED | OUTPATIENT
Start: 2018-09-10 | End: 2018-09-11 | Stop reason: HOSPADM

## 2018-09-10 RX ORDER — ENEMA 19; 7 G/133ML; G/133ML
1 ENEMA RECTAL
Status: DISCONTINUED | OUTPATIENT
Start: 2018-09-10 | End: 2018-09-11 | Stop reason: HOSPADM

## 2018-09-10 RX ORDER — OMEPRAZOLE 20 MG/1
40 CAPSULE, DELAYED RELEASE ORAL DAILY
Status: DISCONTINUED | OUTPATIENT
Start: 2018-09-11 | End: 2018-09-11 | Stop reason: HOSPADM

## 2018-09-10 RX ORDER — ROPIVACAINE HYDROCHLORIDE 5 MG/ML
INJECTION, SOLUTION EPIDURAL; INFILTRATION; PERINEURAL
Status: DISCONTINUED | OUTPATIENT
Start: 2018-09-10 | End: 2018-09-10 | Stop reason: HOSPADM

## 2018-09-10 RX ORDER — AMOXICILLIN 250 MG
1 CAPSULE ORAL NIGHTLY
Status: DISCONTINUED | OUTPATIENT
Start: 2018-09-10 | End: 2018-09-11 | Stop reason: HOSPADM

## 2018-09-10 RX ORDER — BACITRACIN 50000 [IU]/1
INJECTION, POWDER, FOR SOLUTION INTRAMUSCULAR
Status: DISCONTINUED | OUTPATIENT
Start: 2018-09-10 | End: 2018-09-10 | Stop reason: HOSPADM

## 2018-09-10 RX ORDER — OXYCODONE HYDROCHLORIDE 10 MG/1
10 TABLET ORAL
Status: DISCONTINUED | OUTPATIENT
Start: 2018-09-10 | End: 2018-09-11 | Stop reason: HOSPADM

## 2018-09-10 RX ORDER — OXYCODONE HCL 5 MG/5 ML
SOLUTION, ORAL ORAL
Status: COMPLETED
Start: 2018-09-10 | End: 2018-09-10

## 2018-09-10 RX ORDER — PAROXETINE HYDROCHLORIDE 20 MG/1
20 TABLET, FILM COATED ORAL DAILY
Status: DISCONTINUED | OUTPATIENT
Start: 2018-09-11 | End: 2018-09-11 | Stop reason: HOSPADM

## 2018-09-10 RX ORDER — DOCUSATE SODIUM 100 MG/1
100 CAPSULE, LIQUID FILLED ORAL 2 TIMES DAILY
Status: DISCONTINUED | OUTPATIENT
Start: 2018-09-10 | End: 2018-09-11 | Stop reason: HOSPADM

## 2018-09-10 RX ORDER — SCOLOPAMINE TRANSDERMAL SYSTEM 1 MG/1
PATCH, EXTENDED RELEASE TRANSDERMAL
Status: COMPLETED
Start: 2018-09-10 | End: 2018-09-10

## 2018-09-10 RX ORDER — OXYCODONE HYDROCHLORIDE 5 MG/1
5 TABLET ORAL
Status: DISCONTINUED | OUTPATIENT
Start: 2018-09-10 | End: 2018-09-11 | Stop reason: HOSPADM

## 2018-09-10 RX ORDER — ACETAMINOPHEN 500 MG
TABLET ORAL
Status: COMPLETED
Start: 2018-09-10 | End: 2018-09-10

## 2018-09-10 RX ORDER — AMOXICILLIN 250 MG
1 CAPSULE ORAL
Status: DISCONTINUED | OUTPATIENT
Start: 2018-09-10 | End: 2018-09-11 | Stop reason: HOSPADM

## 2018-09-10 RX ORDER — HALOPERIDOL 5 MG/ML
1 INJECTION INTRAMUSCULAR EVERY 6 HOURS PRN
Status: DISCONTINUED | OUTPATIENT
Start: 2018-09-10 | End: 2018-09-11 | Stop reason: HOSPADM

## 2018-09-10 RX ORDER — CELECOXIB 200 MG/1
CAPSULE ORAL
Status: COMPLETED
Start: 2018-09-10 | End: 2018-09-10

## 2018-09-10 RX ORDER — CELECOXIB 200 MG/1
200 CAPSULE ORAL 2 TIMES DAILY WITH MEALS
Status: DISCONTINUED | OUTPATIENT
Start: 2018-09-11 | End: 2018-09-11 | Stop reason: HOSPADM

## 2018-09-10 RX ORDER — POLYETHYLENE GLYCOL 3350 17 G/17G
1 POWDER, FOR SOLUTION ORAL 2 TIMES DAILY PRN
Status: DISCONTINUED | OUTPATIENT
Start: 2018-09-10 | End: 2018-09-11 | Stop reason: HOSPADM

## 2018-09-10 RX ORDER — BISACODYL 10 MG
10 SUPPOSITORY, RECTAL RECTAL
Status: DISCONTINUED | OUTPATIENT
Start: 2018-09-10 | End: 2018-09-11 | Stop reason: HOSPADM

## 2018-09-10 RX ORDER — ACETAMINOPHEN 325 MG/1
650 TABLET ORAL EVERY 6 HOURS
Status: DISCONTINUED | OUTPATIENT
Start: 2018-09-10 | End: 2018-09-11 | Stop reason: HOSPADM

## 2018-09-10 RX ORDER — KETOROLAC TROMETHAMINE 30 MG/ML
30 INJECTION, SOLUTION INTRAMUSCULAR; INTRAVENOUS EVERY 6 HOURS
Status: COMPLETED | OUTPATIENT
Start: 2018-09-10 | End: 2018-09-11

## 2018-09-10 RX ORDER — KETOROLAC TROMETHAMINE 30 MG/ML
INJECTION, SOLUTION INTRAMUSCULAR; INTRAVENOUS
Status: DISCONTINUED | OUTPATIENT
Start: 2018-09-10 | End: 2018-09-10 | Stop reason: HOSPADM

## 2018-09-10 RX ORDER — HYDROMORPHONE HYDROCHLORIDE 2 MG/ML
0.5 INJECTION, SOLUTION INTRAMUSCULAR; INTRAVENOUS; SUBCUTANEOUS
Status: DISCONTINUED | OUTPATIENT
Start: 2018-09-10 | End: 2018-09-11 | Stop reason: HOSPADM

## 2018-09-10 RX ORDER — SCOLOPAMINE TRANSDERMAL SYSTEM 1 MG/1
1 PATCH, EXTENDED RELEASE TRANSDERMAL
Status: DISCONTINUED | OUTPATIENT
Start: 2018-09-10 | End: 2018-09-11 | Stop reason: HOSPADM

## 2018-09-10 RX ADMIN — OXYCODONE HYDROCHLORIDE 10 MG: 10 TABLET ORAL at 18:39

## 2018-09-10 RX ADMIN — KETOROLAC TROMETHAMINE 30 MG: 30 INJECTION, SOLUTION INTRAMUSCULAR at 21:39

## 2018-09-10 RX ADMIN — ACETAMINOPHEN 650 MG: 325 TABLET, FILM COATED ORAL at 23:23

## 2018-09-10 RX ADMIN — FENTANYL CITRATE 50 MCG: 50 INJECTION, SOLUTION INTRAMUSCULAR; INTRAVENOUS at 16:14

## 2018-09-10 RX ADMIN — GABAPENTIN 1200 MG: 400 CAPSULE ORAL at 23:23

## 2018-09-10 RX ADMIN — CLONIDINE HYDROCHLORIDE 0.1 MG: 0.1 TABLET ORAL at 23:24

## 2018-09-10 RX ADMIN — OXYCODONE HYDROCHLORIDE 10 MG: 5 SOLUTION ORAL at 16:17

## 2018-09-10 RX ADMIN — ACETAMINOPHEN 1000 MG: 500 TABLET, COATED ORAL at 10:15

## 2018-09-10 RX ADMIN — VANCOMYCIN HYDROCHLORIDE 1000 MG: 10 INJECTION, POWDER, LYOPHILIZED, FOR SOLUTION INTRAVENOUS at 10:30

## 2018-09-10 RX ADMIN — HYDROMORPHONE HYDROCHLORIDE 0.5 MG: 2 INJECTION, SOLUTION INTRAMUSCULAR; INTRAVENOUS; SUBCUTANEOUS at 21:46

## 2018-09-10 RX ADMIN — OXYCODONE HYDROCHLORIDE 10 MG: 10 TABLET ORAL at 23:26

## 2018-09-10 RX ADMIN — CELECOXIB 200 MG: 200 CAPSULE ORAL at 10:15

## 2018-09-10 RX ADMIN — SCOPALAMINE 1 PATCH: 1 PATCH, EXTENDED RELEASE TRANSDERMAL at 12:19

## 2018-09-10 RX ADMIN — VANCOMYCIN HYDROCHLORIDE 900 MG: 1 INJECTION, POWDER, LYOPHILIZED, FOR SOLUTION INTRAVENOUS at 21:39

## 2018-09-10 RX ADMIN — FENTANYL CITRATE 50 MCG: 50 INJECTION, SOLUTION INTRAMUSCULAR; INTRAVENOUS at 16:31

## 2018-09-10 ASSESSMENT — PAIN SCALES - GENERAL
PAINLEVEL_OUTOF10: 7
PAINLEVEL_OUTOF10: 7
PAINLEVEL_OUTOF10: 4
PAINLEVEL_OUTOF10: 4
PAINLEVEL_OUTOF10: 7
PAINLEVEL_OUTOF10: 9
PAINLEVEL_OUTOF10: 6
PAINLEVEL_OUTOF10: 8
PAINLEVEL_OUTOF10: 7

## 2018-09-10 ASSESSMENT — PATIENT HEALTH QUESTIONNAIRE - PHQ9
2. FEELING DOWN, DEPRESSED, IRRITABLE, OR HOPELESS: NOT AT ALL
SUM OF ALL RESPONSES TO PHQ9 QUESTIONS 1 AND 2: 0
1. LITTLE INTEREST OR PLEASURE IN DOING THINGS: NOT AT ALL

## 2018-09-10 NOTE — OR NURSING
Respirations easy in PACU. Palpable pedal pulses, ACE wrap clean and dry with ice pack in place. Post-op X-ray done and second dose of TXA given by Dr. Tarango upom arrival to PACU>

## 2018-09-11 VITALS
BODY MASS INDEX: 22.41 KG/M2 | HEIGHT: 65 IN | RESPIRATION RATE: 18 BRPM | TEMPERATURE: 98.6 F | OXYGEN SATURATION: 97 % | DIASTOLIC BLOOD PRESSURE: 68 MMHG | WEIGHT: 134.48 LBS | SYSTOLIC BLOOD PRESSURE: 136 MMHG | HEART RATE: 55 BPM

## 2018-09-11 PROCEDURE — G8988 SELF CARE GOAL STATUS: HCPCS | Mod: CJ

## 2018-09-11 PROCEDURE — 97166 OT EVAL MOD COMPLEX 45 MIN: CPT

## 2018-09-11 PROCEDURE — A9270 NON-COVERED ITEM OR SERVICE: HCPCS | Performed by: ORTHOPAEDIC SURGERY

## 2018-09-11 PROCEDURE — G8980 MOBILITY D/C STATUS: HCPCS | Mod: CJ

## 2018-09-11 PROCEDURE — G8989 SELF CARE D/C STATUS: HCPCS | Mod: CJ

## 2018-09-11 PROCEDURE — G8978 MOBILITY CURRENT STATUS: HCPCS | Mod: CJ

## 2018-09-11 PROCEDURE — G8987 SELF CARE CURRENT STATUS: HCPCS | Mod: CJ

## 2018-09-11 PROCEDURE — 97162 PT EVAL MOD COMPLEX 30 MIN: CPT

## 2018-09-11 PROCEDURE — 700111 HCHG RX REV CODE 636 W/ 250 OVERRIDE (IP): Performed by: ORTHOPAEDIC SURGERY

## 2018-09-11 PROCEDURE — G8979 MOBILITY GOAL STATUS: HCPCS | Mod: CJ

## 2018-09-11 PROCEDURE — 700102 HCHG RX REV CODE 250 W/ 637 OVERRIDE(OP): Performed by: ORTHOPAEDIC SURGERY

## 2018-09-11 RX ADMIN — KETOROLAC TROMETHAMINE 30 MG: 30 INJECTION, SOLUTION INTRAMUSCULAR at 06:24

## 2018-09-11 RX ADMIN — ACETAMINOPHEN 650 MG: 325 TABLET, FILM COATED ORAL at 06:25

## 2018-09-11 RX ADMIN — KETOROLAC TROMETHAMINE 30 MG: 30 INJECTION, SOLUTION INTRAMUSCULAR at 03:05

## 2018-09-11 RX ADMIN — GABAPENTIN 1200 MG: 400 CAPSULE ORAL at 12:11

## 2018-09-11 RX ADMIN — PAROXETINE HYDROCHLORIDE 20 MG: 20 TABLET, FILM COATED ORAL at 06:24

## 2018-09-11 RX ADMIN — CLONIDINE HYDROCHLORIDE 0.1 MG: 0.1 TABLET ORAL at 06:25

## 2018-09-11 RX ADMIN — OXYCODONE HYDROCHLORIDE 5 MG: 5 TABLET ORAL at 14:34

## 2018-09-11 RX ADMIN — OMEPRAZOLE 40 MG: 20 CAPSULE, DELAYED RELEASE ORAL at 06:24

## 2018-09-11 RX ADMIN — OXYCODONE HYDROCHLORIDE 10 MG: 10 TABLET ORAL at 07:43

## 2018-09-11 RX ADMIN — ASPIRIN 325 MG: 325 TABLET, DELAYED RELEASE ORAL at 03:07

## 2018-09-11 RX ADMIN — DULOXETINE HYDROCHLORIDE 30 MG: 30 CAPSULE, DELAYED RELEASE ORAL at 06:24

## 2018-09-11 RX ADMIN — GABAPENTIN 1200 MG: 400 CAPSULE ORAL at 06:24

## 2018-09-11 RX ADMIN — ACETAMINOPHEN 650 MG: 325 TABLET, FILM COATED ORAL at 12:11

## 2018-09-11 RX ADMIN — KETOROLAC TROMETHAMINE 30 MG: 30 INJECTION, SOLUTION INTRAMUSCULAR at 12:11

## 2018-09-11 RX ADMIN — OXYCODONE HYDROCHLORIDE 5 MG: 5 TABLET ORAL at 12:11

## 2018-09-11 ASSESSMENT — COGNITIVE AND FUNCTIONAL STATUS - GENERAL
STANDING UP FROM CHAIR USING ARMS: A LITTLE
DRESSING REGULAR LOWER BODY CLOTHING: A LITTLE
SUGGESTED CMS G CODE MODIFIER DAILY ACTIVITY: CJ
DAILY ACTIVITIY SCORE: 21
MOBILITY SCORE: 18
MOVING FROM LYING ON BACK TO SITTING ON SIDE OF FLAT BED: A LITTLE
HELP NEEDED FOR BATHING: A LITTLE
MOVING TO AND FROM BED TO CHAIR: A LITTLE
HELP NEEDED FOR BATHING: A LITTLE
SUGGESTED CMS G CODE MODIFIER DAILY ACTIVITY: CJ
DAILY ACTIVITIY SCORE: 21
WALKING IN HOSPITAL ROOM: A LITTLE
DRESSING REGULAR LOWER BODY CLOTHING: A LITTLE
CLIMB 3 TO 5 STEPS WITH RAILING: A LITTLE
SUGGESTED CMS G CODE MODIFIER MOBILITY: CK
TOILETING: A LITTLE
TOILETING: A LITTLE
TURNING FROM BACK TO SIDE WHILE IN FLAT BAD: A LITTLE

## 2018-09-11 ASSESSMENT — PAIN SCALES - GENERAL
PAINLEVEL_OUTOF10: 8
PAINLEVEL_OUTOF10: 9

## 2018-09-11 ASSESSMENT — PATIENT HEALTH QUESTIONNAIRE - PHQ9
1. LITTLE INTEREST OR PLEASURE IN DOING THINGS: NOT AT ALL
SUM OF ALL RESPONSES TO PHQ9 QUESTIONS 1 AND 2: 0
2. FEELING DOWN, DEPRESSED, IRRITABLE, OR HOPELESS: NOT AT ALL

## 2018-09-11 ASSESSMENT — GAIT ASSESSMENTS
GAIT LEVEL OF ASSIST: STAND BY ASSIST
ASSISTIVE DEVICE: CRUTCHES
DISTANCE (FEET): 200
DEVIATION: STEP TO

## 2018-09-11 ASSESSMENT — ACTIVITIES OF DAILY LIVING (ADL): TOILETING: INDEPENDENT

## 2018-09-11 NOTE — THERAPY
"Occupational Therapy Evaluation completed.   Functional Status:  Pt is a 63 y/o female, admit for R TKA. Pt lives with her daughter, who will be able to assist with care , if needed. Pt presents with c/o pain during ADLS and functional mobility, however, is able to complete ADLS with Supervision to SBA, Functional transfers with SBA. Pt was educated regarding techniques for ADLS and functional transfers , post surgery. Pt will be seen for initial evaluation and treatment only, as she is safe and functional in this setting.  Plan of Care: Patient with no further skilled OT needs in the acute care setting at this time  Discharge Recommendations:  Equipment: No Equipment Needed. Post-acute therapy Discharge to home with outpatient or home health for additional skilled therapy services.    See \"Rehab Therapy-Acute\" Patient Summary Report for complete documentation.    "

## 2018-09-11 NOTE — PROGRESS NOTES
No complaints  Pain controlled    Tolerating PO  LE with neg homans, no sign of DVT  Bandage stable with no signs of drainage  ABD soft  No complaints of orthostasis    1. DC home on crutches/ walker  2. DC home on home meds per home doses  3. DC home on Percocet, ASA BID, and Outpt PT.  4. Follow up [facility] 10-14 days  5. Call for Fevers, drainage, redness, shortness of breath, or increasing lower extremity swelling particularly in the calf.  6. Start Aspirin 325mg per day after Lovenox completed. Use for thirty days.  7. Remove bandage on postop day 5, call prior if drainage or wetness.

## 2018-09-11 NOTE — FLOWSHEET NOTE
09/10/18 2000   Incentive Spirometry Group   Incentive Spirometry Instruction Yes   Breathing Exercises Yes   Incentive Spirometer Volume 2500 mL   Incentive Spirometer Date Last Changed 09/10/18   Incentive Spirometer Next Change Date (Q 30 Days) 10/10/18

## 2018-09-11 NOTE — CARE PLAN
Problem: Nutritional:  Goal: Achieve adequate nutritional intake  Patient will consume 50% of meals  Outcome: DISCHARGED-GOAL NOT MET Date Met: 09/11/18

## 2018-09-11 NOTE — PROGRESS NOTES
Pt requesting to take her home meds:  Gabapentin, clonidine, paxil, and dexilant. will page for orders.

## 2018-09-11 NOTE — PROGRESS NOTES
Pt arrived to the unit, states 8/10 pain. Pt drift off to sleep while sitting in bed. Pt ambulated 50+ feet, tolerated well. CMS intact. Pt able to dorsi/plantar flex w/out difficulty. Polar ice to the RT knee. Surgical dressing CDI. Pt notified to call for assistance.

## 2018-09-11 NOTE — PROGRESS NOTES
Discharging pt home per MD order. Discussed discharge instructions, follow up appointments, prescriptions, and home care for Total Knee Revision. Pt voiding and ambulating without difficulty, pain controlled, tolerating diet. Family at bedside, all questions answered. Pt discharged off unit with hospital escort at 1450.

## 2018-09-11 NOTE — CARE PLAN
Problem: Discharge Barriers/Planning  Goal: Patient's continuum of care needs will be met  Outcome: PROGRESSING AS EXPECTED  Plan to d/c post PT & OT.

## 2018-09-11 NOTE — PROGRESS NOTES
Paged Dr. moyer for orders regarding pt's home meds, waiting for call back.    Pt is also wanting polar ice pad off, states that it is contributing more pain to her because of her arthritis. Pt educated that it is there to help with inflammation. Pt verbalized understanding of this however states that if pain gets too much, she will remove it herself.    2226 2nd page for Dr. Moyer placed, waiting for call back.

## 2018-09-11 NOTE — CARE PLAN
Problem: Safety  Goal: Will remain free from injury  Outcome: PROGRESSING AS EXPECTED  Bed alarm on, pt educated on fall risk.     Problem: Pain Management  Goal: Pain level will decrease to patient's comfort goal  Outcome: PROGRESSING AS EXPECTED  Pt notified to inform the RN of any pain greater than comfort goal.

## 2018-09-11 NOTE — PROGRESS NOTES
Call back received from Md asher Villanueva for pt to have her home doses of gabapentin, clonidine, paxil and dexilant.

## 2018-09-11 NOTE — PROGRESS NOTES
"Total Joint Replacement Program Rounding     Inpatient bedside rounding completed to address quality of care provided by total joint replacement program IDT and overall patient experience at [facility].    Patient Satisfaction addressed. Patient/family updated on POC during hospital stay.  Thorough safety education completed including use of call light prior to all mobility throughout the entirety of the hospital stay. Also educated on ankle pumps and incentive inspirometry use to prevent post-op complications.    Pt reports she hasn't smoked since her surgery this spring. Will have assist of family and neighbors at home.     No further questions/concerns at this time. Please see \"MyRounding\" for further details of rounding discussion. RN updated.         "

## 2018-09-11 NOTE — DIETARY
"Nutrition services: Day 1 of admit.  Lynette Correa is a 62 y.o. female with admitting DX of R knee replacement.     Consult received for poor PO PTA.     Pt with PMH of Substance abuse, Seizures, Peripheral Neuropathy, Heart Burn, Hep C, Disorder of Thyroid, Depression, Chronic Pain, Arthritis, Arrhythmia, Asthma and Alcohol Abuse. Pt presented for R TKA and underwent on 9/10. Pt advanced to Regular diet 9/10. Per Orthopedic progress note, pt to discharge home and currently with pending discharge at this time. RD will follow pending discharge.       Assessment:  Height: 165.1 cm (5' 5\")  Weight: 61 kg (134 lb 7.7 oz)  Body mass index is 22.38 kg/m².   Diet/Intake: Regular no PO documented at this time    Labs, MAR and Chart reviewed (last BM 9/9).     Recommendations/Plan:  1. Diet as ordered   2. Encourage intake of 50% or greater.   3. Document intake of all meals  as % taken in ADL's to provide interdisciplinary communication across all shifts.   4. Monitor weight.  5. Nutrition rep will continue to see patient for ongoing meal and snack preferences.           "

## 2018-09-11 NOTE — CARE PLAN
Problem: Safety  Goal: Will remain free from injury  Outcome: PROGRESSING AS EXPECTED  Bed locked and low, controls on call light button and bed alarm on.  Instructed to call for needs/help  Educated on knee precautions.  Will continue to monitor.    Problem: Infection  Goal: Will remain free from infection  Outcome: PROGRESSING AS EXPECTED  Remains afebrile, sx dressings c/d/i

## 2018-09-11 NOTE — PROGRESS NOTES
Pt able to dorsi/plantar flex right foot and wiggle toes without problems, w/ pain to right knee, medicated per MAR. Pedal pulses 2+, palpable. Sx dressing to r/knee c/d/i, not removed. Pt has ambulated using ffw multiple times, tolerates fine.    Pt instructed with the use of call light button, bed alarm on as pt still does not use call light button for needs/help.

## 2018-09-11 NOTE — PROGRESS NOTES
Received report from NOC RN; assumed pt care. Pt A&Ox4, sitting up in bed. Numbness noted to bilateral feet, per pt this is her baseline. Pt able to dorsi/plantar flex w/out difficulty. Educated pt on the importance of using her call light. Pt understands. Plan to d/c home today.

## 2018-09-11 NOTE — THERAPY
"Physical Therapy Evaluation completed.   Bed Mobility:     Transfers: Sit to Stand: Stand by Assist  Gait: Level Of Assist: Stand by Assist with Crutches x 200 feet     Plan of Care:  Discharge Recommendations: Equipment: No Equipment Needed. Post-acute therapy Discharge to home with outpatient or home health for additional skilled therapy services.  Pt doing really well No equipment needs understands HEP  See \"Rehab Therapy-Acute\" Patient Summary Report for complete documentation.     "

## 2018-09-11 NOTE — DISCHARGE INSTRUCTIONS
1. DC home on crutches/ walker  2. DC home on home meds per home doses  3. DC home on Percocet, Lovenox X14 days, and Outpt PT.  4. Follow up [facility] 10-14 days  5. Call for Fevers, drainage, redness, shortness of breath, or increasing lower extremity swelling particularly in the calf.  6. Start Aspirin 325mg per day after Lovenox completed. Use for thirty days.  7. Remove bandage on postop day 5, call prior if drainage or wetness.    Discharge Instructions    Discharged to home by car with relative. Discharged via wheelchair, hospital escort: Yes   Special equipment needed: Not Applicable    Be sure to schedule a follow-up appointment with your primary care doctor or any specialists as instructed.     Discharge Plan:   Influenza Vaccine Indication: Patient Refuses    I understand that a diet low in cholesterol, fat, and sodium is recommended for good health. Unless I have been given specific instructions below for another diet, I accept this instruction as my diet prescription.   Other diet: Regular    Special Instructions: Discharge instructions for the Orthopedic Patient    Follow up with Primary Care Physician within 2 weeks of discharge to home, regarding:  Review of medications and diagnostic testing.  Surveillance for medical complications.  Workup and treatment of osteoporosis, if appropriate.     -Is this a Joint Replacement patient? Yes Total Joint Knee Replacement Discharge Instructions    Pain  - The goal is to slowly wean off the prescription pain medicine.  - Ice can be used for pain control.  20 minutes at a time is recommended, and never directly against your skin or incision.  - Most patients are off the pain pills by 3 weeks; others may require a low level of pain medications for many months.  If your pain continues to be severe, follow up with your physician.  Infection    Knee joint infections; occur in fewer than 2% of patients. The most common causes of infection following total knee  replacement surgery are from bacteria that enter the bloodstream during dental procedures, urinary tract infections, or skin infections. These bacteria can lodge around your knee replacement and cause an infection.  - Keep the incision as clean and dry as possible.  - Always wash your hands before touching your incision.  - Skin infections tend to develop around 7-10 days after surgery; most can be treated with oral antibiotics.  - Dental Care should be delayed for 3 months after surgery, your surgeon recommends taking a dose of antibiotics 1 hour prior to any dental procedure. After 2 years, most surgeons recommend antibiotics only before an extensive procedure.  Ask your surgeon what he recommends.  - Signs and symptoms of infection can include:  low grade fever, redness, pain, swelling and drainage from your incision.  Notify your surgeon immediately if you develop any of these symptoms.  Other instructions  - Bowel habits - constipation is extremely common and is caused by a combination of anesthesia, lack of mobility and pain medicine.  Use stool softeners or laxatives if necessary. It is important not to ignore this problem, as bowel obstructions can be a serious complication after joint replacement surgery.  - Mood/Energy Level - Many patients experience a lack of energy and endurance for up to 2-3 months after surgery.  Some may also feel down and can even become depressed.  This is likely due to the postoperative anemia, change in activity level, lack of sleep, pain medicine and just the emotional reaction to the surgery itself that is a big disruption in a person’s life.  This usually passes.  If symptoms persist, follow up with your primary physician.  - Returning to work - Your surgeon will give you more specific instructions. Depending on the type of activities you perform, it may be 6 to 8 weeks before you return to work.   Generally, if you work a sedentary job requiring little standing or walking,  most patients may return within 2-6 weeks.  Manual labor jobs involving walking, lifting and standing may take longer. Your surgeon’s office can provide a release to part-time or light duty work early on in your recovery and progress you to full duty as able.    - Driving - If your left knee was replaced and you have an automatic transmission, you may be able to begin driving in a week or so, provided you are no longer taking narcotic pain medication. If your right knee was replaced, avoid driving for 6 to 8 weeks. Remember that your reflexes may not be as sharp as before your surgery. Ask your surgeon for specific instructions.   - Avoiding falls - A fall during the first few weeks after surgery can damage your new knee and may result in a need for further surgery.   throw rugs and tack down loose carpeting.  Be aware of floor hazards such as pets, small objects or uneven surfaces.    - [employer] Metal Detectors - The sensitivity of metal detectors varies and it is likely that your prosthesis will cause an alarm.  Inform the  of your artificial joint.  Diet  - Resume your normal diet as tolerated.  - It is important to achieve a healthy nutritional status by eating a well balanced diet on a regular basis.  - Your physician may recommend that you take iron and vitamin supplements.   - Continue to drink plenty of fluids.  Shower/Bathing  - You may shower as soon as you get home from the hospital unless otherwise instructed.  - Keep your incision out of water.  To keep the incision dry when showering, cover it with a plastic bag or plastic wrap.  - Pat incision dry if it gets wet.  Don’t rub.  - Do not submerge in a bath until staples are out and the incision is completely healed. (Approximately 6-8 weeks)  Dressing Change:  Procedure (if recommended by your physician)  - Wash hands.  - Open all new dressing change materials.  - Remove old dressing and discard.  - Inspect incision for redness,  increase in clear drainage, yellow/green drainage, odor and surrounding skin hot to touch.  -  ABD (large gauze) pad or “island dressing” by one corner and lay over the incision.  Be careful not to touch the inside of the dressing that will lay over the incision.  - Secure in place as instructed (Ace wrap or tape).    Swelling/Bruising    - Swelling can last from 3-6 months.  - Elevate your leg higher than your heart while reclining.   The first week you are home you should elevate your leg an equal amount of time, as you are active.    - Anti-inflammatory pills can be taken once you have stopped the blood thinners.  - The swelling is usually worse after you go home since you are upright for longer periods of time.  - Bruising is common and can involve the entire leg including the thigh, calf and even foot. Bruising often does not appear until after you arrive home and it can be quite dramatic- purple, black, and green.  The bruising you can see is not usually concerning and will subside without any treatment.      Blood Clot Prevention  Blood clots in the legs and the less common, but frightening, clots that travel to the lungs are a real focus of our preventative. Most patients are at standard risk for them, but those patients who are at higher risk include people who have had previous clots, a family history of clotting, smoking, diabetes, obesity, advanced age, use of estrogen and a sedentary lifestyle.    - Signs of blood clots in legs - Swelling in thigh, calf or ankle that does not go down with elevation.  Pain, heat and tenderness in calf, back of calf or groin area.  NOTE: blood clots can occur in either leg.  - You have been receiving anticoagulant therapy (blood thinners) in the hospital and you may be instructed to continue at home depending on your risk factors.  - Your risk for developing a clot continues for up to 2-3 months after surgery.  You should avoid prolonged sitting and dehydration  during that time (long air trips and car trips).  If you do take a trip during this time, please get up and move around every 1- 1.5 hours.  - If you are prescribed blood-thinning medication for home, follow instructions as directed. (Handouts provided if applicable).      Activity  Once home, you should continue to stay active. The key is to remember not to overdo it! While you can expect some good days and some bad days, you should notice a gradual improvement and a gradual increase in your endurance over the next 6 to 12 months. Exercise is a critical component of home care, particularly during the first few weeks after surgery.     - Normal activities of daily living You should be able to resume most within 3 to 6 weeks following surgery. Some pain with activity and at night is common for several weeks after surgery  -  Physical Therapy Exercises - Continue to do the exercises prescribed for at least two months after surgery. Riding a stationary bicycle can help maintain muscle tone and keep your knee flexible. Try to achieve the maximum degree of bending and extension possible. (handout provided by Therapist).  - Sexual Activity -. Your surgeon can tell you when it safe to resume sexual activity.    - Sleeping Positions - You can safely sleep on your back, on either side, or on your stomach.   - Other Activities - Walk as much as you like, but remember that walking is no substitute for the exercises your doctor and physical therapist will prescribe. Lower impact activities are preferred.  If you have specific questions, consult your Surgeon.    When to Call the Doctor   Call the physician if:   - Fever over 100.5? F  - Increased pain, drainage, redness, odor or heat around the incision area  - Shaking chills  - Increased knee pain with activity and rest  - Increased pain in calf, tenderness or redness above or below the knee  - Increased swelling of calf, ankle, foot  - Sudden increased shortness of breath,  sudden onset of chest pain, localized chest pain with coughing  - Incision opening  Or, if there are any questions or concerns about medications or care.       -Is this patient being discharged with medication to prevent blood clots?  Yes, Lovenox Enoxaparin injection  What is this medicine?  ENOXAPARIN (ee nox a PA rin) is used after knee, hip, or abdominal surgeries to prevent blood clotting. It is also used to treat existing blood clots in the lungs or in the veins.  This medicine may be used for other purposes; ask your health care provider or pharmacist if you have questions.  COMMON BRAND NAME(S): Lovenox  What should I tell my health care provider before I take this medicine?  They need to know if you have any of these conditions:  -bleeding disorders, hemorrhage, or hemophilia  -infection of the heart or heart valves  -kidney or liver disease  -previous stroke  -prosthetic heart valve  -recent surgery or delivery of a baby  -ulcer in the stomach or intestine, diverticulitis, or other bowel disease  -an unusual or allergic reaction to enoxaparin, heparin, pork or pork products, other medicines, foods, dyes, or preservatives  -pregnant or trying to get pregnant  -breast-feeding  How should I use this medicine?  This medicine is for injection under the skin. It is usually given by a health-care professional. You or a family member may be trained on how to give the injections. If you are to give yourself injections, make sure you understand how to use the syringe, measure the dose if necessary, and give the injection. To avoid bruising, do not rub the site where this medicine has been injected. Do not take your medicine more often than directed. Do not stop taking except on the advice of your doctor or health care professional.  Make sure you receive a puncture-resistant container to dispose of the needles and syringes once you have finished with them. Do not reuse these items. Return the container to your  doctor or health care professional for proper disposal.  Talk to your pediatrician regarding the use of this medicine in children. Special care may be needed.  Overdosage: If you think you have taken too much of this medicine contact a poison control center or emergency room at once.  NOTE: This medicine is only for you. Do not share this medicine with others.  What if I miss a dose?  If you miss a dose, take it as soon as you can. If it is almost time for your next dose, take only that dose. Do not take double or extra doses.  What may interact with this medicine?  -aspirin and aspirin-like medicines  -certain medicines that treat or prevent blood clots  -dipyridamole  -NSAIDs, medicines for pain and inflammation, like ibuprofen or naproxen  This list may not describe all possible interactions. Give your health care provider a list of all the medicines, herbs, non-prescription drugs, or dietary supplements you use. Also tell them if you smoke, drink alcohol, or use illegal drugs. Some items may interact with your medicine.  What should I watch for while using this medicine?  Visit your doctor or health care professional for regular checks on your progress. Your condition will be monitored carefully while you are receiving this medicine.  Notify your doctor or health care professional and seek emergency treatment if you develop breathing problems; changes in vision; chest pain; severe, sudden headache; pain, swelling, warmth in the leg; trouble speaking; sudden numbness or weakness of the face, arm, or leg. These can be signs that your condition has gotten worse.  If you are going to have surgery, tell your doctor or health care professional that you are taking this medicine.  Do not stop taking this medicine without first talking to your doctor. Be sure to refill your prescription before you run out of medicine.  Avoid sports and activities that might cause injury while you are using this medicine. Severe falls or  injuries can cause unseen bleeding. Be careful when using sharp tools or knives. Consider using an electric razor. Take special care brushing or flossing your teeth. Report any injuries, bruising, or red spots on the skin to your doctor or health care professional.  What side effects may I notice from receiving this medicine?  Side effects that you should report to your doctor or health care professional as soon as possible:  -allergic reactions like skin rash, itching or hives, swelling of the face, lips, or tongue  -feeling faint or lightheaded, falls  -signs and symptoms of bleeding such as bloody or black, tarry stools; red or dark-brown urine; spitting up blood or brown material that looks like coffee grounds; red spots on the skin; unusual bruising or bleeding from the eye, gums, or nose  Side effects that usually do not require medical attention (report to your doctor or health care professional if they continue or are bothersome):  -pain, redness, or irritation at site where injected  This list may not describe all possible side effects. Call your doctor for medical advice about side effects. You may report side effects to FDA at 5-338-FDA-5650.  Where should I keep my medicine?  Keep out of the reach of children.  Store at room temperature between 15 and 30 degrees C (59 and 86 degrees F). Do not freeze. If your injections have been specially prepared, you may need to store them in the refrigerator. Ask your pharmacist. Throw away any unused medicine after the expiration date.  NOTE: This sheet is a summary. It may not cover all possible information. If you have questions about this medicine, talk to your doctor, pharmacist, or health care provider.  © 2018 [employer]/Gold Standard (2015-04-21 16:06:21)      · Is patient discharged on Warfarin / Coumadin?   No     Depression / Suicide Risk    As you are discharged from this [facility] facility, it is important to learn how to keep safe from harming  yourself.    Recognize the warning signs:  · Abrupt changes in personality, positive or negative- including increase in energy   · Giving away possessions  · Change in eating patterns- significant weight changes-  positive or negative  · Change in sleeping patterns- unable to sleep or sleeping all the time   · Unwillingness or inability to communicate  · Depression  · Unusual sadness, discouragement and loneliness  · Talk of wanting to die  · Neglect of personal appearance   · Rebelliousness- reckless behavior  · Withdrawal from people/activities they love  · Confusion- inability to concentrate     If you or a loved one observes any of these behaviors or has concerns about self-harm, here's what you can do:  · Talk about it- your feelings and reasons for harming yourself  · Remove any means that you might use to hurt yourself (examples: pills, rope, extension cords, firearm)  · Get professional help from the community (Mental Health, Substance Abuse, psychological counseling)  · Do not be alone:Call your Safe Contact- someone whom you trust who will be there for you.  · Call your local CRISIS HOTLINE 422-9585 or 243-014-1685  · Call your local Children's Mobile Crisis Response Team [place] (960) 716-6884 or www.[employer]  · Call the toll free National Suicide Prevention Hotlines   · National Suicide Prevention Lifeline 297-942-FDIP (0078)  · National Hope Line Network 800-SUICIDE (949-8016)

## 2018-09-12 NOTE — OP REPORT
DATE OF OPERATION: 9/10/2018    PREOPERATIVE DIAGNOSIS: Failed painful arthroplasty right knee.   POSTOPERATIVE DIAGNOSIS: Failed painful arthroplasty right knee.     PROCEDURE: 1. Right total knee revision arthroplasty.       2. Intraoperative block for relief of extensive postoperative pain.                            3. 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   Observer Joseph Robins, MS 3    ANESTHESIOLOGIST: Dr. Sg Jaimes    ANESTHESIA: General with intraoperative local block.       WEIGHT BEARING: as tolerated    FINDINGS:   1. Stable joint fluid with no evidence of sepsis  2. Extensive synovitic changes diffusely in the anterior medial lateral superior and posterior joint        IMPLANTS: Nahomi LCCK design knee with a size E femur with a 5 mm distal medial augment with a 15 mm x 1 30 sharp fluted proximal stem on a 10 mm posterior stabilized polyethylene with a size 5 AP wedge tibia with a 10 mm medial augment and a 16 x 75 sharp fluted stem.      PROCEDURE IN DETAIL: The patient was taken to the operating room where he   underwent general anesthetic supine position. Right lower extremity isolated,   prepped using Hibiclens, alcohol ChloraPrep, draped using sterile technique.   An appropriate timeout was undertaken. The patient's right lower extremity was   addressed. The body-exhaust uniforms and perioperative antibiotics were   given. Air Isolation draping utilized.    Surgery was initiated after an appropriate timeout was undertaken. The anterior aspect of the knee was addressed through prior scar. The widened areas of the scar were excised and subcutaneously removed.    A medial parapatellar incision was utilized with immediate identification of clear synovial fluid with no evidence of sepsis. Extensive synovitic changes were encountered and excised. An extensive synovectomy in the medial and lateral gutter, suprapatellar pouch and later in the  posterior aspect of the joint were undertaken. Stabilization with the cautery was utilized.      Intraoperative periarticular and extensive neeraj-incisional block was undertaken. This was undertaken with a combination of Toradol ropivacaine and morphine to treat postoperative pain. This was done extensively throughout the pericapsular tissues, followed by infusion into the subcutaneous tissues. This obtained a significant intraoperative improvement in anesthesia requirements as well as pain management and vitals stabilization.    At this point the excision and removal of the prior prosthesis was undertaken with careful use of flexible osteotomes and with removal of the screw devices on the tibia. Moderate synovitic changes were encountered as noted. Moderate osteolytic changes were encountered in the distal femur however the distal femoral length was able to be maintained. The prior femoral component had a bridging of cement laterally that brought the prosthesis into varus. This was addressed with excision of the distal femur with freshening of the cuts. The remaining chamfer cuts were stable and were utilized. As noted the posterior condyles were stable.        The intramedullary guide system was utilized on the femur and tibia and the appropriate cuts were made. The posterior femoral condyles were stable and did not require further resection. The osteophytic changes and osteolytic changes of the distal femur were identified curettaged and debrided. Similar areas in the proximal tibia were identified curettage debrided and investigated. The intramedullary guide system was utilized on the femur and tibia for localizations.    Appropriate trialing was undertaken after reaming of both the tibia and femur. Excellent position was obtained. A 10 mm step cut was undertaken on the medial proximal tibia and a 5 mm step cut on the distal femur medial femoral condyle. At the completion of this extensive synovitic decompression  and stabilization the implantation was undertaken with a cemented femur cemented tibia and insertion of the standard polyethylene    The patella component was noted to be stable and compatible with this design and therefore was left in situ and not revised.    At the completion of the implantation and clearance of the excess cement the tourniquet was let down, pulse lavage repeated. The closure was undertaken after obtaining hemostasis. Interrupted #2 Tycron were utilized proximally and then #1 Quil suture was utilized to close the deep layer.  Marshalls layer was closed and pulse lavage was repeated. Subcutaneous closure with 0  and 2-0 Monocryl were completed. Repeat anesthetization with BRIDGETT Casas was undertaken. Staples were utilized on the skin. The patient placed into a sterile bandage, awoken From the anesthetic and taken to the recovery room. The patient tolerated the   procedure very well with no signs of complications.

## 2018-11-19 DIAGNOSIS — Z01.812 PRE-OPERATIVE LABORATORY EXAMINATION: ICD-10-CM

## 2018-11-19 LAB
ALBUMIN SERPL BCP-MCNC: 4.1 G/DL (ref 3.2–4.9)
ALBUMIN/GLOB SERPL: 1.4 G/DL
ALP SERPL-CCNC: 133 U/L (ref 30–99)
ALT SERPL-CCNC: 14 U/L (ref 2–50)
ANION GAP SERPL CALC-SCNC: 6 MMOL/L (ref 0–11.9)
APPEARANCE UR: CLEAR
AST SERPL-CCNC: 43 U/L (ref 12–45)
BILIRUB SERPL-MCNC: 0.3 MG/DL (ref 0.1–1.5)
BILIRUB UR QL STRIP.AUTO: NEGATIVE
BUN SERPL-MCNC: 14 MG/DL (ref 8–22)
CALCIUM SERPL-MCNC: 9.2 MG/DL (ref 8.5–10.5)
CHLORIDE SERPL-SCNC: 104 MMOL/L (ref 96–112)
CO2 SERPL-SCNC: 26 MMOL/L (ref 20–33)
COLOR UR: YELLOW
CREAT SERPL-MCNC: 0.88 MG/DL (ref 0.5–1.4)
GLOBULIN SER CALC-MCNC: 3 G/DL (ref 1.9–3.5)
GLUCOSE SERPL-MCNC: 118 MG/DL (ref 65–99)
GLUCOSE UR STRIP.AUTO-MCNC: NEGATIVE MG/DL
KETONES UR STRIP.AUTO-MCNC: NEGATIVE MG/DL
LEUKOCYTE ESTERASE UR QL STRIP.AUTO: NEGATIVE
MICRO URNS: NORMAL
NITRITE UR QL STRIP.AUTO: NEGATIVE
PH UR STRIP.AUTO: 6 [PH]
POTASSIUM SERPL-SCNC: 4.3 MMOL/L (ref 3.6–5.5)
PROT SERPL-MCNC: 7.1 G/DL (ref 6–8.2)
PROT UR QL STRIP: NEGATIVE MG/DL
RBC UR QL AUTO: NEGATIVE
SODIUM SERPL-SCNC: 136 MMOL/L (ref 135–145)
SP GR UR STRIP.AUTO: 1.01
UROBILINOGEN UR STRIP.AUTO-MCNC: 0.2 MG/DL

## 2018-11-19 PROCEDURE — 87640 STAPH A DNA AMP PROBE: CPT | Mod: XU

## 2018-11-19 PROCEDURE — 87641 MR-STAPH DNA AMP PROBE: CPT

## 2018-11-19 PROCEDURE — 81003 URINALYSIS AUTO W/O SCOPE: CPT

## 2018-11-19 PROCEDURE — 36415 COLL VENOUS BLD VENIPUNCTURE: CPT

## 2018-11-19 PROCEDURE — 85027 COMPLETE CBC AUTOMATED: CPT

## 2018-11-19 PROCEDURE — 87086 URINE CULTURE/COLONY COUNT: CPT

## 2018-11-19 PROCEDURE — 80053 COMPREHEN METABOLIC PANEL: CPT

## 2018-11-19 RX ORDER — IBUPROFEN 800 MG/1
800 TABLET ORAL EVERY 8 HOURS PRN
Status: ON HOLD | COMMUNITY
End: 2018-11-27

## 2018-11-19 RX ORDER — MELOXICAM 7.5 MG/1
7.5 TABLET ORAL DAILY
COMMUNITY
End: 2019-04-26

## 2018-11-19 NOTE — OR NURSING
"Pre-admit appointment completed. \"Preparing for your procedure\" sheet given to pt along with verbal and written instructions. Pt instructed to continue regularly prescribed medications through the day before surgery. Pt instructed to take the following medications the day of surgery with a sip of water, per anesthesia protocol; clonidine, dexilant, neurontin, and if needed- albuterol nebulizer and MDI     Pt to bring MDI DOS.   "

## 2018-11-19 NOTE — OR NURSING
TOTAL JOINT REPLACEMENT SURGERY   PreAdmit Appointment  Pre-Operative Education Note       1) Did you take a Total Joint Replacement Pre-Operative Education class?  Where?  Answer: yes at Dr Guzmán's office      DISCHARGE PLANNING NOTE - TOTAL JOINT    Procedure: Procedure(s):  KNEE REVISION TOTAL - W/POLY EXCHANGE AND RODARTE  Procedure Date: 11/26/2018  Insurance:    Equipment currently available at home? Walker and crutches  Steps into the home? 5  Steps within the home? 0  Toilet height? standard  Type of shower? Walk in and tub  Who will be with you during your recovery? daughter  Is Outpatient Physical Therapy set up after surgery? Yes  Did you take the Total Joint Class and where? Yes    Plan: pt given home safety checklist and equipment resource guide.

## 2018-11-20 LAB
ERYTHROCYTE [DISTWIDTH] IN BLOOD BY AUTOMATED COUNT: 50.7 FL (ref 35.9–50)
HCT VFR BLD AUTO: 37.3 % (ref 37–47)
HGB BLD-MCNC: 11.6 G/DL (ref 12–16)
MCH RBC QN AUTO: 25.8 PG (ref 27–33)
MCHC RBC AUTO-ENTMCNC: 31.1 G/DL (ref 33.6–35)
MCV RBC AUTO: 83.1 FL (ref 81.4–97.8)
PLATELET # BLD AUTO: 400 K/UL (ref 164–446)
PMV BLD AUTO: 10.8 FL (ref 9–12.9)
RBC # BLD AUTO: 4.49 M/UL (ref 4.2–5.4)
SCCMEC + MECA PNL NOSE NAA+PROBE: NEGATIVE
SCCMEC + MECA PNL NOSE NAA+PROBE: POSITIVE
WBC # BLD AUTO: 10.5 K/UL (ref 4.8–10.8)

## 2018-11-21 LAB
BACTERIA UR CULT: NORMAL
SIGNIFICANT IND 70042: NORMAL
SITE SITE: NORMAL
SOURCE SOURCE: NORMAL

## 2018-11-26 ENCOUNTER — HOSPITAL ENCOUNTER (INPATIENT)
Facility: MEDICAL CENTER | Age: 63
LOS: 1 days | DRG: 489 | End: 2018-11-27
Attending: ORTHOPAEDIC SURGERY | Admitting: ORTHOPAEDIC SURGERY
Payer: MEDICARE

## 2018-11-26 ENCOUNTER — APPOINTMENT (OUTPATIENT)
Dept: RADIOLOGY | Facility: MEDICAL CENTER | Age: 63
DRG: 489 | End: 2018-11-26
Attending: ORTHOPAEDIC SURGERY
Payer: MEDICARE

## 2018-11-26 PROCEDURE — 94760 N-INVAS EAR/PLS OXIMETRY 1: CPT

## 2018-11-26 PROCEDURE — 160009 HCHG ANES TIME/MIN: Performed by: ORTHOPAEDIC SURGERY

## 2018-11-26 PROCEDURE — 73560 X-RAY EXAM OF KNEE 1 OR 2: CPT | Mod: RT

## 2018-11-26 PROCEDURE — 700101 HCHG RX REV CODE 250

## 2018-11-26 PROCEDURE — 160002 HCHG RECOVERY MINUTES (STAT): Performed by: ORTHOPAEDIC SURGERY

## 2018-11-26 PROCEDURE — 700101 HCHG RX REV CODE 250: Performed by: ORTHOPAEDIC SURGERY

## 2018-11-26 PROCEDURE — 502000 HCHG MISC OR IMPLANTS RC 0278: Performed by: ORTHOPAEDIC SURGERY

## 2018-11-26 PROCEDURE — 160041 HCHG SURGERY MINUTES - EA ADDL 1 MIN LEVEL 4: Performed by: ORTHOPAEDIC SURGERY

## 2018-11-26 PROCEDURE — 770001 HCHG ROOM/CARE - MED/SURG/GYN PRIV*

## 2018-11-26 PROCEDURE — 700105 HCHG RX REV CODE 258: Performed by: ORTHOPAEDIC SURGERY

## 2018-11-26 PROCEDURE — 500002 HCHG ADHESIVE, DERMABOND: Performed by: ORTHOPAEDIC SURGERY

## 2018-11-26 PROCEDURE — 700102 HCHG RX REV CODE 250 W/ 637 OVERRIDE(OP): Performed by: ANESTHESIOLOGY

## 2018-11-26 PROCEDURE — 700105 HCHG RX REV CODE 258

## 2018-11-26 PROCEDURE — 501838 HCHG SUTURE GENERAL: Performed by: ORTHOPAEDIC SURGERY

## 2018-11-26 PROCEDURE — 700112 HCHG RX REV CODE 229: Performed by: ORTHOPAEDIC SURGERY

## 2018-11-26 PROCEDURE — 160048 HCHG OR STATISTICAL LEVEL 1-5: Performed by: ORTHOPAEDIC SURGERY

## 2018-11-26 PROCEDURE — 700111 HCHG RX REV CODE 636 W/ 250 OVERRIDE (IP): Performed by: ORTHOPAEDIC SURGERY

## 2018-11-26 PROCEDURE — 700111 HCHG RX REV CODE 636 W/ 250 OVERRIDE (IP)

## 2018-11-26 PROCEDURE — 700111 HCHG RX REV CODE 636 W/ 250 OVERRIDE (IP): Performed by: ANESTHESIOLOGY

## 2018-11-26 PROCEDURE — 700102 HCHG RX REV CODE 250 W/ 637 OVERRIDE(OP): Performed by: ORTHOPAEDIC SURGERY

## 2018-11-26 PROCEDURE — 0SPC09Z REMOVAL OF LINER FROM RIGHT KNEE JOINT, OPEN APPROACH: ICD-10-PCS | Performed by: ORTHOPAEDIC SURGERY

## 2018-11-26 PROCEDURE — 160035 HCHG PACU - 1ST 60 MINS PHASE I: Performed by: ORTHOPAEDIC SURGERY

## 2018-11-26 PROCEDURE — A9270 NON-COVERED ITEM OR SERVICE: HCPCS | Performed by: ORTHOPAEDIC SURGERY

## 2018-11-26 PROCEDURE — 160029 HCHG SURGERY MINUTES - 1ST 30 MINS LEVEL 4: Performed by: ORTHOPAEDIC SURGERY

## 2018-11-26 PROCEDURE — 502579 HCHG PACK, TOTAL KNEE: Performed by: ORTHOPAEDIC SURGERY

## 2018-11-26 PROCEDURE — A9270 NON-COVERED ITEM OR SERVICE: HCPCS | Performed by: ANESTHESIOLOGY

## 2018-11-26 PROCEDURE — 0SUV09Z SUPPLEMENT RIGHT KNEE JOINT, TIBIAL SURFACE WITH LINER, OPEN APPROACH: ICD-10-PCS | Performed by: ORTHOPAEDIC SURGERY

## 2018-11-26 PROCEDURE — A4314 CATH W/DRAINAGE 2-WAY LATEX: HCPCS | Performed by: ORTHOPAEDIC SURGERY

## 2018-11-26 PROCEDURE — A6222 GAUZE <=16 IN NO W/SAL W/O B: HCPCS | Performed by: ORTHOPAEDIC SURGERY

## 2018-11-26 DEVICE — IMPLANTABLE DEVICE: Type: IMPLANTABLE DEVICE | Status: FUNCTIONAL

## 2018-11-26 RX ORDER — BACITRACIN 50000 [IU]/1
INJECTION, POWDER, FOR SOLUTION INTRAMUSCULAR
Status: DISCONTINUED | OUTPATIENT
Start: 2018-11-26 | End: 2018-11-26 | Stop reason: HOSPADM

## 2018-11-26 RX ORDER — ONDANSETRON 2 MG/ML
4 INJECTION INTRAMUSCULAR; INTRAVENOUS EVERY 4 HOURS PRN
Status: DISCONTINUED | OUTPATIENT
Start: 2018-11-26 | End: 2018-11-27 | Stop reason: HOSPADM

## 2018-11-26 RX ORDER — KETOROLAC TROMETHAMINE 30 MG/ML
INJECTION, SOLUTION INTRAMUSCULAR; INTRAVENOUS
Status: DISCONTINUED | OUTPATIENT
Start: 2018-11-26 | End: 2018-11-26 | Stop reason: HOSPADM

## 2018-11-26 RX ORDER — ENEMA 19; 7 G/133ML; G/133ML
1 ENEMA RECTAL
Status: DISCONTINUED | OUTPATIENT
Start: 2018-11-26 | End: 2018-11-27 | Stop reason: HOSPADM

## 2018-11-26 RX ORDER — SODIUM CHLORIDE, SODIUM LACTATE, POTASSIUM CHLORIDE, AND CALCIUM CHLORIDE .6; .31; .03; .02 G/100ML; G/100ML; G/100ML; G/100ML
500 INJECTION, SOLUTION INTRAVENOUS ONCE
Status: DISCONTINUED | OUTPATIENT
Start: 2018-11-26 | End: 2018-11-26 | Stop reason: HOSPADM

## 2018-11-26 RX ORDER — OXYCODONE HYDROCHLORIDE 5 MG/1
5 TABLET ORAL
Status: DISCONTINUED | OUTPATIENT
Start: 2018-11-26 | End: 2018-11-27 | Stop reason: HOSPADM

## 2018-11-26 RX ORDER — ONDANSETRON 2 MG/ML
4 INJECTION INTRAMUSCULAR; INTRAVENOUS
Status: DISCONTINUED | OUTPATIENT
Start: 2018-11-26 | End: 2018-11-26 | Stop reason: HOSPADM

## 2018-11-26 RX ORDER — IPRATROPIUM BROMIDE AND ALBUTEROL SULFATE 2.5; .5 MG/3ML; MG/3ML
3 SOLUTION RESPIRATORY (INHALATION)
Status: DISCONTINUED | OUTPATIENT
Start: 2018-11-26 | End: 2018-11-26 | Stop reason: HOSPADM

## 2018-11-26 RX ORDER — OXYCODONE HYDROCHLORIDE 10 MG/1
10 TABLET ORAL
Status: DISCONTINUED | OUTPATIENT
Start: 2018-11-26 | End: 2018-11-27 | Stop reason: HOSPADM

## 2018-11-26 RX ORDER — DIPHENHYDRAMINE HYDROCHLORIDE 50 MG/ML
12.5 INJECTION INTRAMUSCULAR; INTRAVENOUS
Status: DISCONTINUED | OUTPATIENT
Start: 2018-11-26 | End: 2018-11-26 | Stop reason: HOSPADM

## 2018-11-26 RX ORDER — HYDROMORPHONE HYDROCHLORIDE 1 MG/ML
0.1 INJECTION, SOLUTION INTRAMUSCULAR; INTRAVENOUS; SUBCUTANEOUS
Status: DISCONTINUED | OUTPATIENT
Start: 2018-11-26 | End: 2018-11-26 | Stop reason: HOSPADM

## 2018-11-26 RX ORDER — MIDAZOLAM HYDROCHLORIDE 1 MG/ML
1 INJECTION INTRAMUSCULAR; INTRAVENOUS
Status: DISCONTINUED | OUTPATIENT
Start: 2018-11-26 | End: 2018-11-26 | Stop reason: HOSPADM

## 2018-11-26 RX ORDER — OXYCODONE HYDROCHLORIDE 10 MG/1
10 TABLET ORAL
Status: DISCONTINUED | OUTPATIENT
Start: 2018-11-26 | End: 2018-11-26 | Stop reason: HOSPADM

## 2018-11-26 RX ORDER — SODIUM CHLORIDE, SODIUM GLUCONATE, SODIUM ACETATE, POTASSIUM CHLORIDE AND MAGNESIUM CHLORIDE 526; 502; 368; 37; 30 MG/100ML; MG/100ML; MG/100ML; MG/100ML; MG/100ML
500 INJECTION, SOLUTION INTRAVENOUS CONTINUOUS
Status: DISCONTINUED | OUTPATIENT
Start: 2018-11-26 | End: 2018-11-26 | Stop reason: HOSPADM

## 2018-11-26 RX ORDER — HYDROMORPHONE HYDROCHLORIDE 1 MG/ML
0.4 INJECTION, SOLUTION INTRAMUSCULAR; INTRAVENOUS; SUBCUTANEOUS
Status: DISCONTINUED | OUTPATIENT
Start: 2018-11-26 | End: 2018-11-26 | Stop reason: HOSPADM

## 2018-11-26 RX ORDER — OXYCODONE HCL 5 MG/5 ML
10 SOLUTION, ORAL ORAL
Status: COMPLETED | OUTPATIENT
Start: 2018-11-26 | End: 2018-11-26

## 2018-11-26 RX ORDER — AMOXICILLIN 250 MG
1 CAPSULE ORAL NIGHTLY
Status: DISCONTINUED | OUTPATIENT
Start: 2018-11-26 | End: 2018-11-27 | Stop reason: HOSPADM

## 2018-11-26 RX ORDER — DIPHENHYDRAMINE HYDROCHLORIDE 50 MG/ML
25 INJECTION INTRAMUSCULAR; INTRAVENOUS EVERY 6 HOURS PRN
Status: DISCONTINUED | OUTPATIENT
Start: 2018-11-26 | End: 2018-11-27 | Stop reason: HOSPADM

## 2018-11-26 RX ORDER — ROPIVACAINE HYDROCHLORIDE 5 MG/ML
INJECTION, SOLUTION EPIDURAL; INFILTRATION; PERINEURAL
Status: DISCONTINUED | OUTPATIENT
Start: 2018-11-26 | End: 2018-11-26 | Stop reason: HOSPADM

## 2018-11-26 RX ORDER — POLYETHYLENE GLYCOL 3350 17 G/17G
1 POWDER, FOR SOLUTION ORAL 2 TIMES DAILY PRN
Status: DISCONTINUED | OUTPATIENT
Start: 2018-11-26 | End: 2018-11-27 | Stop reason: HOSPADM

## 2018-11-26 RX ORDER — HYDROMORPHONE HYDROCHLORIDE 1 MG/ML
0.2 INJECTION, SOLUTION INTRAMUSCULAR; INTRAVENOUS; SUBCUTANEOUS
Status: DISCONTINUED | OUTPATIENT
Start: 2018-11-26 | End: 2018-11-26 | Stop reason: HOSPADM

## 2018-11-26 RX ORDER — OXYCODONE HYDROCHLORIDE 5 MG/1
5 TABLET ORAL
Status: DISCONTINUED | OUTPATIENT
Start: 2018-11-26 | End: 2018-11-26 | Stop reason: HOSPADM

## 2018-11-26 RX ORDER — MEPERIDINE HYDROCHLORIDE 25 MG/ML
12.5 INJECTION INTRAMUSCULAR; INTRAVENOUS; SUBCUTANEOUS
Status: DISCONTINUED | OUTPATIENT
Start: 2018-11-26 | End: 2018-11-26 | Stop reason: HOSPADM

## 2018-11-26 RX ORDER — OXYCODONE HCL 5 MG/5 ML
10 SOLUTION, ORAL ORAL
Status: DISCONTINUED | OUTPATIENT
Start: 2018-11-26 | End: 2018-11-26 | Stop reason: HOSPADM

## 2018-11-26 RX ORDER — HYDROMORPHONE HYDROCHLORIDE 1 MG/ML
0.5 INJECTION, SOLUTION INTRAMUSCULAR; INTRAVENOUS; SUBCUTANEOUS
Status: DISCONTINUED | OUTPATIENT
Start: 2018-11-26 | End: 2018-11-27 | Stop reason: HOSPADM

## 2018-11-26 RX ORDER — OXYCODONE HCL 5 MG/5 ML
5 SOLUTION, ORAL ORAL
Status: DISCONTINUED | OUTPATIENT
Start: 2018-11-26 | End: 2018-11-26 | Stop reason: HOSPADM

## 2018-11-26 RX ORDER — OXYCODONE HCL 5 MG/5 ML
5 SOLUTION, ORAL ORAL
Status: COMPLETED | OUTPATIENT
Start: 2018-11-26 | End: 2018-11-26

## 2018-11-26 RX ORDER — BISACODYL 10 MG
10 SUPPOSITORY, RECTAL RECTAL
Status: DISCONTINUED | OUTPATIENT
Start: 2018-11-26 | End: 2018-11-27 | Stop reason: HOSPADM

## 2018-11-26 RX ORDER — SCOLOPAMINE TRANSDERMAL SYSTEM 1 MG/1
1 PATCH, EXTENDED RELEASE TRANSDERMAL
Status: DISCONTINUED | OUTPATIENT
Start: 2018-11-26 | End: 2018-11-27 | Stop reason: HOSPADM

## 2018-11-26 RX ORDER — HALOPERIDOL 5 MG/ML
1 INJECTION INTRAMUSCULAR
Status: DISCONTINUED | OUTPATIENT
Start: 2018-11-26 | End: 2018-11-26 | Stop reason: HOSPADM

## 2018-11-26 RX ORDER — KETOROLAC TROMETHAMINE 30 MG/ML
15 INJECTION, SOLUTION INTRAMUSCULAR; INTRAVENOUS EVERY 6 HOURS
Status: DISCONTINUED | OUTPATIENT
Start: 2018-11-26 | End: 2018-11-27 | Stop reason: HOSPADM

## 2018-11-26 RX ORDER — SODIUM CHLORIDE, SODIUM LACTATE, POTASSIUM CHLORIDE, CALCIUM CHLORIDE 600; 310; 30; 20 MG/100ML; MG/100ML; MG/100ML; MG/100ML
INJECTION, SOLUTION INTRAVENOUS ONCE
Status: COMPLETED | OUTPATIENT
Start: 2018-11-26 | End: 2018-11-26

## 2018-11-26 RX ORDER — DEXAMETHASONE SODIUM PHOSPHATE 4 MG/ML
4 INJECTION, SOLUTION INTRA-ARTICULAR; INTRALESIONAL; INTRAMUSCULAR; INTRAVENOUS; SOFT TISSUE
Status: DISCONTINUED | OUTPATIENT
Start: 2018-11-26 | End: 2018-11-27 | Stop reason: HOSPADM

## 2018-11-26 RX ORDER — DOCUSATE SODIUM 100 MG/1
100 CAPSULE, LIQUID FILLED ORAL 2 TIMES DAILY
Status: DISCONTINUED | OUTPATIENT
Start: 2018-11-26 | End: 2018-11-27 | Stop reason: HOSPADM

## 2018-11-26 RX ORDER — AMOXICILLIN 250 MG
1 CAPSULE ORAL
Status: DISCONTINUED | OUTPATIENT
Start: 2018-11-26 | End: 2018-11-27 | Stop reason: HOSPADM

## 2018-11-26 RX ADMIN — OXYCODONE HYDROCHLORIDE 10 MG: 10 TABLET ORAL at 20:20

## 2018-11-26 RX ADMIN — OXYCODONE HYDROCHLORIDE 10 MG: 5 SOLUTION ORAL at 15:36

## 2018-11-26 RX ADMIN — FENTANYL CITRATE 50 MCG: 50 INJECTION, SOLUTION INTRAMUSCULAR; INTRAVENOUS at 15:42

## 2018-11-26 RX ADMIN — OXYCODONE HYDROCHLORIDE 10 MG: 10 TABLET ORAL at 16:40

## 2018-11-26 RX ADMIN — VANCOMYCIN HYDROCHLORIDE 1000 MG: 1 INJECTION, POWDER, LYOPHILIZED, FOR SOLUTION INTRAVENOUS at 12:03

## 2018-11-26 RX ADMIN — DIPHENHYDRAMINE HYDROCHLORIDE 25 MG: 50 INJECTION, SOLUTION INTRAMUSCULAR; INTRAVENOUS at 20:20

## 2018-11-26 RX ADMIN — DOCUSATE SODIUM 100 MG: 100 CAPSULE, LIQUID FILLED ORAL at 16:40

## 2018-11-26 RX ADMIN — KETOROLAC TROMETHAMINE 15 MG: 30 INJECTION, SOLUTION INTRAMUSCULAR at 19:52

## 2018-11-26 RX ADMIN — OXYCODONE HYDROCHLORIDE 10 MG: 10 TABLET ORAL at 23:30

## 2018-11-26 RX ADMIN — LIDOCAINE HYDROCHLORIDE 0.5 ML: 10 INJECTION, SOLUTION INFILTRATION; PERINEURAL at 12:03

## 2018-11-26 RX ADMIN — FENTANYL CITRATE 50 MCG: 50 INJECTION, SOLUTION INTRAMUSCULAR; INTRAVENOUS at 15:37

## 2018-11-26 RX ADMIN — SODIUM CHLORIDE, SODIUM LACTATE, POTASSIUM CHLORIDE, CALCIUM CHLORIDE 1000 ML: 600; 310; 30; 20 INJECTION, SOLUTION INTRAVENOUS at 12:04

## 2018-11-26 RX ADMIN — STANDARDIZED SENNA CONCENTRATE AND DOCUSATE SODIUM 1 TABLET: 8.6; 5 TABLET, FILM COATED ORAL at 19:52

## 2018-11-26 ASSESSMENT — COPD QUESTIONNAIRES
COPD SCREENING SCORE: 6
DURING THE PAST 4 WEEKS HOW MUCH DID YOU FEEL SHORT OF BREATH: SOME OF THE TIME
HAVE YOU SMOKED AT LEAST 100 CIGARETTES IN YOUR ENTIRE LIFE: YES
DO YOU EVER COUGH UP ANY MUCUS OR PHLEGM?: NO/ONLY WITH OCCASIONAL COLDS OR INFECTIONS

## 2018-11-26 ASSESSMENT — PAIN SCALES - GENERAL
PAINLEVEL_OUTOF10: 9
PAINLEVEL_OUTOF10: 9
PAINLEVEL_OUTOF10: 8
PAINLEVEL_OUTOF10: 7
PAINLEVEL_OUTOF10: 8
PAINLEVEL_OUTOF10: 6
PAINLEVEL_OUTOF10: 9
PAINLEVEL_OUTOF10: 9

## 2018-11-26 ASSESSMENT — LIFESTYLE VARIABLES
ALCOHOL_USE: NO
EVER_SMOKED: YES

## 2018-11-26 ASSESSMENT — COGNITIVE AND FUNCTIONAL STATUS - GENERAL
SUGGESTED CMS G CODE MODIFIER DAILY ACTIVITY: CJ
SUGGESTED CMS G CODE MODIFIER MOBILITY: CJ
DRESSING REGULAR UPPER BODY CLOTHING: A LITTLE
DRESSING REGULAR LOWER BODY CLOTHING: A LITTLE
STANDING UP FROM CHAIR USING ARMS: A LITTLE
CLIMB 3 TO 5 STEPS WITH RAILING: A LITTLE
TOILETING: A LITTLE
MOBILITY SCORE: 21
HELP NEEDED FOR BATHING: A LITTLE
DAILY ACTIVITIY SCORE: 20
WALKING IN HOSPITAL ROOM: A LITTLE

## 2018-11-26 NOTE — OR NURSING
Spoke with pharmacist regarding Vancomycin administration time in relation to surgery cut time and she stated we are within parameters but could also administer Clindamycin 600mg IV due to pt's allergy to PCN

## 2018-11-26 NOTE — OP REPORT
DATE OF OPERATION: 11/26/2018    PREOPERATIVE DIAGNOSIS: Osteoarthritis right knee with prior primary and revision total knee arthroplasty  Instability with prior total knee arthroplasty revision  POSTOPERATIVE DIAGNOSIS: Osteoarthritis, right knee.  With prior primary and revision total knee arthroplasty  Instability with prior total knee arthroplasty revision    PROCEDURE: 1.  Revision right total knee arthroplasty for instability.       2. Intraoperative block for relief of extensive postoperative pain.                             3.  Evaluation under anesthesia  SURGEON: Yovani Guzmán MD.   ASSISTANT: Amanda Adhikari    ANESTHESIOLOGIST: Dr. Harris.    ANESTHESIA: General with intraoperative local block.       WEIGHT BEARING: as tolerated    FINDINGS:   1.  Gross LCL and MCL instability in mid flexion and terminal flexion  Full stability in full extension.  2.  Post revision reconstruction showing excellent stability to MCL LCL and.      IMPLANTS: [employer] NexGen knee system with conversion to a CCK 14 mm polyethylene from a prior 10 mm posterior stabilized polyethylene    PROCEDURE IN DETAIL: The patient was taken to the operating room where he   underwent general anesthetic supine position. Right lower extremity isolated,   prepped using Hibiclens, alcohol ChloraPrep, draped using sterile technique.   An appropriate timeout was undertaken. The patient's right lower extremity was   addressed. The body-exhaust uniforms and perioperative antibiotics were   given. Air Isolation draping utilized.  Due to the revision nature a vancomycin dosing was undertaken.    Surgery was initiated with a incision through the previous incision.  The widened area of the incision were excised.  Skin and subcutaneous tissue were incised. Hemostasis obtained.  Careful attention paid to minimizing the subcutaneous flaps.  The prior sutures that were absorbable were identified and removed as were the nonabsorbable sutures.  The    Luis's layer was dissected separately. The medial parapatellar quad snip   was undertaken. The soft tissues were elevated off the proximal medial tibia.   The mobilization of the periarticular tissues was undertaken given the prior arthroplasty.  At this point the extensive synovectomy and debridement and pulse irrigation was undertaken.  Intraoperative periarticular and extensive neeraj-incisional block was undertaken. This was undertaken with a combination of Toradol ropivacaine and morphine to treat postoperative pain. This was done extensively throughout the pericapsular tissues, followed by infusion into the subcutaneous tissues. This obtained a significant intraoperative improvement in anesthesia requirements as well as pain management and vitals stabilization.    The polyethylene from the tibia was then removed after removing the locking screw.  This obtained full mobilization.  The posterior aspect of the joint was then addressed and a Myers utilized to elevate the soft tissues posteriorly.  No evidence of palpable or definable loose bodies were noted.  Pulse lavage was undertaken and the hemostasis obtained.  At this point trial reductions were undertaken with the revision CCK polyethylene.  This was noted at 1012 and 14 mm as well as 17.  Significant excess tightening was noted at 17 and therefore a 14 polyethylene utilized of the CCK variant.  This obtained excellent reconstruction and stability of the MCL LCL with full extension to 4 degrees and flexion of 130 degrees.    Following this revision the knee was bathed in dilute sterile Betadine for 3 minutes.  Hemostasis obtained and repeat anesthetization with Exparel undertaken.    The closure was undertaken after obtaining hemostasis. Interrupted #2 Tycron were utilized proximally and then #1 Quil suture was utilized to close the deep layer.  Marshalls layer was closed and pulse lavage was repeated. Subcutaneous closure with 0  and 2-0 Monocryl were  completed. Staples   were utilized on the skin.  Dermabond was used in addition.  The patient placed into a sterile bandage, awoken   From the anesthetic and taken to the recovery room. The patient tolerated the   procedure very well with no signs of complications.

## 2018-11-27 VITALS
DIASTOLIC BLOOD PRESSURE: 68 MMHG | HEIGHT: 65 IN | BODY MASS INDEX: 22.88 KG/M2 | TEMPERATURE: 98.4 F | HEART RATE: 59 BPM | WEIGHT: 137.35 LBS | RESPIRATION RATE: 18 BRPM | SYSTOLIC BLOOD PRESSURE: 133 MMHG | OXYGEN SATURATION: 95 %

## 2018-11-27 LAB
HCT VFR BLD AUTO: 33.8 % (ref 37–47)
HGB BLD-MCNC: 10.7 G/DL (ref 12–16)

## 2018-11-27 PROCEDURE — G8989 SELF CARE D/C STATUS: HCPCS | Mod: CJ

## 2018-11-27 PROCEDURE — 85014 HEMATOCRIT: CPT

## 2018-11-27 PROCEDURE — A9270 NON-COVERED ITEM OR SERVICE: HCPCS | Performed by: ORTHOPAEDIC SURGERY

## 2018-11-27 PROCEDURE — G8987 SELF CARE CURRENT STATUS: HCPCS | Mod: CJ

## 2018-11-27 PROCEDURE — G8979 MOBILITY GOAL STATUS: HCPCS | Mod: CJ

## 2018-11-27 PROCEDURE — 700102 HCHG RX REV CODE 250 W/ 637 OVERRIDE(OP): Performed by: ORTHOPAEDIC SURGERY

## 2018-11-27 PROCEDURE — G8978 MOBILITY CURRENT STATUS: HCPCS | Mod: CJ

## 2018-11-27 PROCEDURE — 700111 HCHG RX REV CODE 636 W/ 250 OVERRIDE (IP): Performed by: ORTHOPAEDIC SURGERY

## 2018-11-27 PROCEDURE — 97165 OT EVAL LOW COMPLEX 30 MIN: CPT

## 2018-11-27 PROCEDURE — 97162 PT EVAL MOD COMPLEX 30 MIN: CPT

## 2018-11-27 PROCEDURE — G8988 SELF CARE GOAL STATUS: HCPCS | Mod: CJ

## 2018-11-27 PROCEDURE — 36415 COLL VENOUS BLD VENIPUNCTURE: CPT

## 2018-11-27 PROCEDURE — G8980 MOBILITY D/C STATUS: HCPCS | Mod: CJ

## 2018-11-27 PROCEDURE — 700112 HCHG RX REV CODE 229: Performed by: ORTHOPAEDIC SURGERY

## 2018-11-27 PROCEDURE — 85018 HEMOGLOBIN: CPT

## 2018-11-27 RX ORDER — GABAPENTIN 400 MG/1
1200 CAPSULE ORAL 3 TIMES DAILY
Status: DISCONTINUED | OUTPATIENT
Start: 2018-11-27 | End: 2018-11-27 | Stop reason: HOSPADM

## 2018-11-27 RX ADMIN — GABAPENTIN 1200 MG: 400 CAPSULE ORAL at 08:56

## 2018-11-27 RX ADMIN — KETOROLAC TROMETHAMINE 15 MG: 30 INJECTION, SOLUTION INTRAMUSCULAR at 01:42

## 2018-11-27 RX ADMIN — KETOROLAC TROMETHAMINE 15 MG: 30 INJECTION, SOLUTION INTRAMUSCULAR at 08:57

## 2018-11-27 RX ADMIN — GABAPENTIN 1200 MG: 400 CAPSULE ORAL at 13:44

## 2018-11-27 RX ADMIN — OXYCODONE HYDROCHLORIDE 10 MG: 10 TABLET ORAL at 13:44

## 2018-11-27 RX ADMIN — OXYCODONE HYDROCHLORIDE 10 MG: 10 TABLET ORAL at 08:55

## 2018-11-27 RX ADMIN — OXYCODONE HYDROCHLORIDE 10 MG: 10 TABLET ORAL at 05:09

## 2018-11-27 RX ADMIN — DOCUSATE SODIUM 100 MG: 100 CAPSULE, LIQUID FILLED ORAL at 05:09

## 2018-11-27 RX ADMIN — KETOROLAC TROMETHAMINE 15 MG: 30 INJECTION, SOLUTION INTRAMUSCULAR at 13:47

## 2018-11-27 RX ADMIN — ASPIRIN 325 MG: 325 TABLET, DELAYED RELEASE ORAL at 01:42

## 2018-11-27 ASSESSMENT — COGNITIVE AND FUNCTIONAL STATUS - GENERAL
SUGGESTED CMS G CODE MODIFIER DAILY ACTIVITY: CJ
TOILETING: A LITTLE
DRESSING REGULAR LOWER BODY CLOTHING: A LITTLE
DAILY ACTIVITIY SCORE: 21
HELP NEEDED FOR BATHING: A LITTLE

## 2018-11-27 ASSESSMENT — PAIN SCALES - GENERAL
PAINLEVEL_OUTOF10: 8
PAINLEVEL_OUTOF10: 7
PAINLEVEL_OUTOF10: 8
PAINLEVEL_OUTOF10: 6

## 2018-11-27 ASSESSMENT — GAIT ASSESSMENTS
ASSISTIVE DEVICE: CRUTCHES
GAIT LEVEL OF ASSIST: STAND BY ASSIST
DEVIATION: STEP TO
DISTANCE (FEET): 150

## 2018-11-27 ASSESSMENT — ACTIVITIES OF DAILY LIVING (ADL): TOILETING: INDEPENDENT

## 2018-11-27 NOTE — THERAPY
"Physical Therapy Evaluation completed.   Bed Mobility:     Transfers: Sit to Stand: Stand by Assist  Gait: Level Of Assist: Stand by Assist with Crutches  X 150 feetPlan of Care: Patient with no further skilled PT needs in the acute care setting at this time  Discharge Recommendations: Equipment: No Equipment Needed. Post-acute therapy Discharge to home with outpatient or home health for additional skilled therapy services.  63 year old female S/P R TKR Pt lives at home alone but daughter will stay with her.Pt was safe with transfers,ambulation and stairs,she understands HEP,has no equipment needs.Pt is safe and ready for home  See \"Rehab Therapy-Acute\" Patient Summary Report for complete documentation.     "

## 2018-11-27 NOTE — CARE PLAN
Problem: Infection  Goal: Will remain free from infection  Outcome: PROGRESSING AS EXPECTED    Intervention: Implement standard precautions and perform hand washing before and after patient contact  Patient educated and understands the importance of proper hand hygiene for herself, her visitors, and her care team.      Problem: Venous Thromboembolism (VTW)/Deep Vein Thrombosis (DVT) Prevention:  Goal: Patient will participate in Venous Thrombosis (VTE)/Deep Vein Thrombosis (DVT)Prevention Measures  Outcome: PROGRESSING AS EXPECTED    Intervention: Ensure patient wears graduated elastic stockings (MEG hose) and/or SCDs, if ordered, when in bed or chair (Remove at least once per shift for skin check)  Patient is wearing SCDs and MEG hose, also receiving ASA therapy.

## 2018-11-27 NOTE — PROGRESS NOTES
Patient arrived to unit on hospital bed.  Safety precautions in place.  Patient reports 9/10 pain.  Medicated per MAR.  Plan of care discussed.  SCDs, polar ice in place.  No needs at this time.  Will continue to monitor.

## 2018-11-27 NOTE — PROGRESS NOTES
Progress Note               Author: Yovani Guzmán Date & Time created: 2018  6:34 AM     Interval History:  No complaints  Pain controlled  Tolerating PO    Review of Systems:  ROS    Physical Exam:  Physical Exam  LE with neg homans, no sign of DVT  Bandage stable with no signs of drainage  ABD soft  No complaints of orthostasis  Labs:          No results for input(s): SODIUM, POTASSIUM, CHLORIDE, CO2, BUN, CREATININE, MAGNESIUM, PHOSPHORUS, CALCIUM in the last 72 hours.  No results for input(s): ALTSGPT, ASTSGOT, ALKPHOSPHAT, TBILIRUBIN, DBILIRUBIN, GAMMAGT, AMYLASE, LIPASE, ALB, PREALBUMIN, GLUCOSE in the last 72 hours.  Recent Labs      18   0506   HEMOGLOBIN  10.7*   HEMATOCRIT  33.8*         Hemodynamics:  Temp (24hrs), Av.6 °C (97.9 °F), Min:36.4 °C (97.5 °F), Max:36.9 °C (98.4 °F)  Temperature: 36.8 °C (98.3 °F)  Pulse  Av.8  Min: 63  Max: 81Heart Rate (Monitored): 69  Blood Pressure : 127/74, NIBP: 141/81     Respiratory:    Respiration: 18, Pulse Oximetry: 94 %, O2 Daily Delivery Respiratory : Silicone Nasal Cannula        RUL Breath Sounds: Clear, RML Breath Sounds: Diminished, RLL Breath Sounds: Diminished, GABRIELLE Breath Sounds: Diminished;Clear, LLL Breath Sounds: Diminished  Fluids:    Intake/Output Summary (Last 24 hours) at 18 0634  Last data filed at 18 0500   Gross per 24 hour   Intake           4177.5 ml   Output             2300 ml   Net           1877.5 ml        GI/Nutrition:  Orders Placed This Encounter   Procedures   • Diet Order Regular     Standing Status:   Standing     Number of Occurrences:   1     Order Specific Question:   Diet:     Answer:   Regular [1]     Medical Decision Making, by Problem:  There are no active hospital problems to display for this patient.      Plan:  1. DC home on crutches/ walker  2. DC home on home meds per home doses  3. DC home on Percocet, ASA BID, and Outpt PT.  4. Follow up [facility] 10-14 days  5. Call for Fevers,  drainage, redness, shortness of breath, or increasing lower extremity swelling particularly in the calf.  6. Start Aspirin 325mg BID. Use for thirty days.  7. Remove bandage on postop day 5, call prior if drainage or wetness.    Quality-Core Measures

## 2018-11-27 NOTE — DISCHARGE INSTRUCTIONS
1. DC home on crutches/ walker   2. DC home on home meds per home doses   3. DC home on Percocet, ASA BID, and Outpt PT.   4. Follow up [facility] 10-14 days   5. Call for Fevers, drainage, redness, shortness of breath, or increasing lower extremity swelling particularly in the calf.   6. Start Aspirin 325mg BID. Use for thirty days.   7. Remove bandage on postop day 5, call prior if drainage or wetness.      Discharge Instructions    Discharged to home by car with friend. Discharged via wheelchair, hospital escort: Yes.  Special equipment needed: Walker    Be sure to schedule a follow-up appointment with your primary care doctor or any specialists as instructed.     Discharge Plan:   Pneumococcal Vaccine Administered/Refused: Not given - Patient refused pneumococcal vaccine  Influenza Vaccine Indication: Patient Refuses    I understand that a diet low in cholesterol, fat, and sodium is recommended for good health. Unless I have been given specific instructions below for another diet, I accept this instruction as my diet prescription.   Other diet: Regular    Special Instructions: Discharge instructions for the Orthopedic Patient    Follow up with Primary Care Physician within 2 weeks of discharge to home, regarding:  Review of medications and diagnostic testing.  Surveillance for medical complications.  Workup and treatment of osteoporosis, if appropriate.     -Is this a Joint Replacement patient? Yes Total Joint Knee Replacement Discharge Instructions    Pain  - The goal is to slowly wean off the prescription pain medicine.  - Ice can be used for pain control.  20 minutes at a time is recommended, and never directly against your skin or incision.  - Most patients are off the pain pills by 3 weeks; others may require a low level of pain medications for many months.  If your pain continues to be severe, follow up with your physician.  Infection    Knee joint infections; occur in fewer than 2% of patients. The most  common causes of infection following total knee replacement surgery are from bacteria that enter the bloodstream during dental procedures, urinary tract infections, or skin infections. These bacteria can lodge around your knee replacement and cause an infection.  - Keep the incision as clean and dry as possible.  - Always wash your hands before touching your incision.  - Skin infections tend to develop around 7-10 days after surgery; most can be treated with oral antibiotics.  - Dental Care should be delayed for 3 months after surgery, your surgeon recommends taking a dose of antibiotics 1 hour prior to any dental procedure. After 2 years, most surgeons recommend antibiotics only before an extensive procedure.  Ask your surgeon what he recommends.  - Signs and symptoms of infection can include:  low grade fever, redness, pain, swelling and drainage from your incision.  Notify your surgeon immediately if you develop any of these symptoms.  Other instructions  - Bowel habits - constipation is extremely common and is caused by a combination of anesthesia, lack of mobility and pain medicine.  Use stool softeners or laxatives if necessary. It is important not to ignore this problem, as bowel obstructions can be a serious complication after joint replacement surgery.  - Mood/Energy Level - Many patients experience a lack of energy and endurance for up to 2-3 months after surgery.  Some may also feel down and can even become depressed.  This is likely due to the postoperative anemia, change in activity level, lack of sleep, pain medicine and just the emotional reaction to the surgery itself that is a big disruption in a person’s life.  This usually passes.  If symptoms persist, follow up with your primary physician.  - Returning to work - Your surgeon will give you more specific instructions. Depending on the type of activities you perform, it may be 6 to 8 weeks before you return to work.   Generally, if you work a  sedentary job requiring little standing or walking, most patients may return within 2-6 weeks.  Manual labor jobs involving walking, lifting and standing may take longer. Your surgeon’s office can provide a release to part-time or light duty work early on in your recovery and progress you to full duty as able.    - Driving - If your left knee was replaced and you have an automatic transmission, you may be able to begin driving in a week or so, provided you are no longer taking narcotic pain medication. If your right knee was replaced, avoid driving for 6 to 8 weeks. Remember that your reflexes may not be as sharp as before your surgery. Ask your surgeon for specific instructions.   - Avoiding falls - A fall during the first few weeks after surgery can damage your new knee and may result in a need for further surgery.   throw rugs and tack down loose carpeting.  Be aware of floor hazards such as pets, small objects or uneven surfaces.    - [employer] Metal Detectors - The sensitivity of metal detectors varies and it is likely that your prosthesis will cause an alarm.  Inform the  of your artificial joint.  Diet  - Resume your normal diet as tolerated.  - It is important to achieve a healthy nutritional status by eating a well balanced diet on a regular basis.  - Your physician may recommend that you take iron and vitamin supplements.   - Continue to drink plenty of fluids.  Shower/Bathing  - You may shower as soon as you get home from the hospital unless otherwise instructed.  - Keep your incision out of water.  To keep the incision dry when showering, cover it with a plastic bag or plastic wrap.  - Pat incision dry if it gets wet.  Don’t rub.  - Do not submerge in a bath until staples are out and the incision is completely healed. (Approximately 6-8 weeks)  Dressing Change:  Procedure (if recommended by your physician)  - Wash hands.  - Open all new dressing change materials.  - Remove old  dressing and discard.  - Inspect incision for redness, increase in clear drainage, yellow/green drainage, odor and surrounding skin hot to touch.  -  ABD (large gauze) pad or “island dressing” by one corner and lay over the incision.  Be careful not to touch the inside of the dressing that will lay over the incision.  - Secure in place as instructed (Ace wrap or tape).    Swelling/Bruising    - Swelling can last from 3-6 months.  - Elevate your leg higher than your heart while reclining.   The first week you are home you should elevate your leg an equal amount of time, as you are active.    - Anti-inflammatory pills can be taken once you have stopped the blood thinners.  - The swelling is usually worse after you go home since you are upright for longer periods of time.  - Bruising is common and can involve the entire leg including the thigh, calf and even foot. Bruising often does not appear until after you arrive home and it can be quite dramatic- purple, black, and green.  The bruising you can see is not usually concerning and will subside without any treatment.      Blood Clot Prevention  Blood clots in the legs and the less common, but frightening, clots that travel to the lungs are a real focus of our preventative. Most patients are at standard risk for them, but those patients who are at higher risk include people who have had previous clots, a family history of clotting, smoking, diabetes, obesity, advanced age, use of estrogen and a sedentary lifestyle.    - Signs of blood clots in legs - Swelling in thigh, calf or ankle that does not go down with elevation.  Pain, heat and tenderness in calf, back of calf or groin area.  NOTE: blood clots can occur in either leg.  - You have been receiving anticoagulant therapy (blood thinners) in the hospital and you may be instructed to continue at home depending on your risk factors.  - Your risk for developing a clot continues for up to 2-3 months after surgery.   You should avoid prolonged sitting and dehydration during that time (long air trips and car trips).  If you do take a trip during this time, please get up and move around every 1- 1.5 hours.  - If you are prescribed blood-thinning medication for home, follow instructions as directed. (Handouts provided if applicable).      Activity  Once home, you should continue to stay active. The key is to remember not to overdo it! While you can expect some good days and some bad days, you should notice a gradual improvement and a gradual increase in your endurance over the next 6 to 12 months. Exercise is a critical component of home care, particularly during the first few weeks after surgery.     - Normal activities of daily living You should be able to resume most within 3 to 6 weeks following surgery. Some pain with activity and at night is common for several weeks after surgery  -  Physical Therapy Exercises - Continue to do the exercises prescribed for at least two months after surgery. Riding a stationary bicycle can help maintain muscle tone and keep your knee flexible. Try to achieve the maximum degree of bending and extension possible. (handout provided by Therapist).  - Sexual Activity -. Your surgeon can tell you when it safe to resume sexual activity.    - Sleeping Positions - You can safely sleep on your back, on either side, or on your stomach.   - Other Activities - Walk as much as you like, but remember that walking is no substitute for the exercises your doctor and physical therapist will prescribe. Lower impact activities are preferred.  If you have specific questions, consult your Surgeon.    When to Call the Doctor   Call the physician if:   - Fever over 100.5? F  - Increased pain, drainage, redness, odor or heat around the incision area  - Shaking chills  - Increased knee pain with activity and rest  - Increased pain in calf, tenderness or redness above or below the knee  - Increased swelling of calf,  ankle, foot  - Sudden increased shortness of breath, sudden onset of chest pain, localized chest pain with coughing  - Incision opening  Or, if there are any questions or concerns about medications or care.       -Is this patient being discharged with medication to prevent blood clots?  Yes, Aspirin Aspirin, ASA oral tablets  What is this medicine?  ASPIRIN (AS pir in) is a pain reliever. It is used to treat mild pain and fever. This medicine is also used as directed by a doctor to prevent and to treat heart attacks, to prevent strokes, and to treat arthritis or inflammation.  This medicine may be used for other purposes; ask your health care provider or pharmacist if you have questions.  COMMON BRAND NAME(S): Aspir-Low, Aspir-Sherry, Aspirtab, Faustino Advanced Aspirin, Faustino Aspirin, Faustino Aspirin Extra Strength, Faustino Aspirin Plus, Fasutino Extra Strength, Faustino Extra Strength Plus, Faustino Genuine Aspirin, Meshify Womens Aspirin, Bufferin, Bufferin Extra Strength, Bufferin Low Dose  What should I tell my health care provider before I take this medicine?  They need to know if you have any of these conditions:  -anemia  -asthma  -bleeding problems  -child with chickenpox, the flu, or other viral infection  -diabetes  -gout  -if you frequently drink alcohol containing drinks  -kidney disease  -liver disease  -low level of vitamin K  -lupus  -smoke tobacco  -stomach ulcers or other problems  -an unusual or allergic reaction to aspirin, tartrazine dye, other medicines, dyes, or preservatives  -pregnant or trying to get pregnant  -breast-feeding  How should I use this medicine?  Take this medicine by mouth with a glass of water. Follow the directions on the package or prescription label. You can take this medicine with or without food. If it upsets your stomach, take it with food. Do not take your medicine more often than directed.  Talk to your pediatrician regarding the use of this medicine in children. While this drug may be  prescribed for children as young as 12 years of age for selected conditions, precautions do apply. Children and teenagers should not use this medicine to treat chicken pox or flu symptoms unless directed by a doctor.  Patients over 65 years old may have a stronger reaction and need a smaller dose.  Overdosage: If you think you have taken too much of this medicine contact a poison control center or emergency room at once.  NOTE: This medicine is only for you. Do not share this medicine with others.  What if I miss a dose?  If you are taking this medicine on a regular schedule and miss a dose, take it as soon as you can. If it is almost time for your next dose, take only that dose. Do not take double or extra doses.  What may interact with this medicine?  Do not take this medicine with any of the following medications:  -cidofovir  -ketorolac  -probenecid  This medicine may also interact with the following medications:  -alcohol  -alendronate  -bismuth subsalicylate  -flavocoxid  -herbal supplements like feverfew, garlic, aggie, ginkgo biloba, horse chestnut  -medicines for diabetes or glaucoma like acetazolamide, methazolamide  -medicines for gout  -medicines that treat or prevent blood clots like enoxaparin, heparin, ticlopidine, warfarin  -other aspirin and aspirin-like medicines  -NSAIDs, medicines for pain and inflammation, like ibuprofen or naproxen  -pemetrexed  -sulfinpyrazone  -varicella live vaccine  This list may not describe all possible interactions. Give your health care provider a list of all the medicines, herbs, non-prescription drugs, or dietary supplements you use. Also tell them if you smoke, drink alcohol, or use illegal drugs. Some items may interact with your medicine.  What should I watch for while using this medicine?  If you are treating yourself for pain, tell your doctor or health care professional if the pain lasts more than 10 days, if it gets worse, or if there is a new or different  kind of pain. Tell your doctor if you see redness or swelling. Also, check with your doctor if you have a fever that lasts for more than 3 days. Only take this medicine to prevent heart attacks or blood clotting if prescribed by your doctor or health care professional.  Do not take aspirin or aspirin-like medicines with this medicine. Too much aspirin can be dangerous. Always read the labels carefully.  This medicine can irritate your stomach or cause bleeding problems. Do not smoke cigarettes or drink alcohol while taking this medicine. Do not lie down for 30 minutes after taking this medicine to prevent irritation to your throat.  If you are scheduled for any medical or dental procedure, tell your healthcare provider that you are taking this medicine. You may need to stop taking this medicine before the procedure.  This medicine may be used to treat migraines. If you take migraine medicines for 10 or more days a month, your migraines may get worse. Keep a diary of headache days and medicine use. Contact your healthcare professional if your migraine attacks occur more frequently.  What side effects may I notice from receiving this medicine?  Side effects that you should report to your doctor or health care professional as soon as possible:  -allergic reactions like skin rash, itching or hives, swelling of the face, lips, or tongue  -breathing problems  -changes in hearing, ringing in the ears  -confusion  -general ill feeling or flu-like symptoms  -pain on swallowing  -redness, blistering, peeling or loosening of the skin, including inside the mouth or nose  -signs and symptoms of bleeding such as bloody or black, tarry stools; red or dark-brown urine; spitting up blood or brown material that looks like coffee grounds; red spots on the skin; unusual bruising or bleeding from the eye, gums, or nose  -trouble passing urine or change in the amount of urine  -unusually weak or tired  -yellowing of the eyes or  skin  Side effects that usually do not require medical attention (report to your doctor or health care professional if they continue or are bothersome):  -diarrhea or constipation  -headache  -nausea, vomiting  -stomach gas, heartburn  This list may not describe all possible side effects. Call your doctor for medical advice about side effects. You may report side effects to FDA at 6-893-IGU-6769.  Where should I keep my medicine?  Keep out of the reach of children.  Store at room temperature between 15 and 30 degrees C (59 and 86 degrees F). Protect from heat and moisture. Do not use this medicine if it has a strong vinegar smell. Throw away any unused medicine after the expiration date.  NOTE: This sheet is a summary. It may not cover all possible information. If you have questions about this medicine, talk to your doctor, pharmacist, or health care provider.  © 2018 [employer]vier/Gold Standard (2014-08-19 11:30:31)      · Is patient discharged on Warfarin / Coumadin?   No     Depression / Suicide Risk    As you are discharged from this [facility] facility, it is important to learn how to keep safe from harming yourself.    Recognize the warning signs:  · Abrupt changes in personality, positive or negative- including increase in energy   · Giving away possessions  · Change in eating patterns- significant weight changes-  positive or negative  · Change in sleeping patterns- unable to sleep or sleeping all the time   · Unwillingness or inability to communicate  · Depression  · Unusual sadness, discouragement and loneliness  · Talk of wanting to die  · Neglect of personal appearance   · Rebelliousness- reckless behavior  · Withdrawal from people/activities they love  · Confusion- inability to concentrate     If you or a loved one observes any of these behaviors or has concerns about self-harm, here's what you can do:  · Talk about it- your feelings and reasons for harming yourself  · Remove any means that you might use  to hurt yourself (examples: pills, rope, extension cords, firearm)  · Get professional help from the community (Mental Health, Substance Abuse, psychological counseling)  · Do not be alone:Call your Safe Contact- someone whom you trust who will be there for you.  · Call your local CRISIS HOTLINE 123-5712 or 261-190-9622  · Call your local Children's Mobile Crisis Response Team [place] (899) 279-3487 or www.[employer]  · Call the toll free National Suicide Prevention Hotlines   · National Suicide Prevention Lifeline 573-084-PQDL (7205)  · National Hope Line Network 800-SUICIDE (832-5153)

## 2018-11-27 NOTE — CARE PLAN
Problem: Safety  Goal: Will remain free from injury  Outcome: PROGRESSING AS EXPECTED  PT WILL BE FREE FROM INJURY, INSTRUCTION FOR USE OF CALL/BR CALL LIGHT GIVEN.    Problem: Knowledge Deficit  Goal: Knowledge of disease process/condition, treatment plan, diagnostic tests, and medications will improve    Intervention: Assess knowledge level of disease process/condition, treatment plan, diagnostic tests, and medications  PT AND FAMILY MEMBER  UPDATED ON PTS PLAN OF CARE,NURSES COMMUNICATIONS WITH DOCTORS.

## 2018-11-27 NOTE — FLOWSHEET NOTE
11/26/18 1715   Events/Summary/Plan   Events/Summary/Plan IS and O2   Non-Invasive Resp Device Site Inspection Completed Intact   Incentive Spirometry Group   Incentive Spirometry Instruction Yes   Breathing Exercises Yes   Incentive Spirometer Volume 2500 mL   Incentive Spirometer Date Last Changed 11/26/18   Incentive Spirometer Next Change Date (Q 30 Days) 12/26/18   Chest Exam   Respiration 16   Pulse 63   Breath Sounds   RUL Breath Sounds Clear;Diminished   RML Breath Sounds Diminished   RLL Breath Sounds Diminished   GABRIELLE Breath Sounds Clear;Diminished   LLL Breath Sounds Diminished   Secretions   Cough Non Productive   Oximetry   #Pulse Oximetry (Single Determination) Yes   Oxygen   Home O2 Use Prior To Admission? (Pt used to have O2 before but not anymore)   Pulse Oximetry 97 %   O2 (LPM) 2   O2 Daily Delivery Respiratory  Silicone Nasal Cannula

## 2018-11-27 NOTE — THERAPY
"Occupational Therapy Evaluation completed.   Functional Status:  Pt is a 62 y/o female, admit for R TKA- revision. Pt lives alone, has supportive family that will assist with care after d/c. PLOF- I with AMB ADLS . Pt presents with minimal c/o pain with ADLS and functional mobility, is at the Supervised to SBA level for ADLS and functional transfers. Pt was educated regarding techniques for ADLS and functional transfers, post surgery. Pt will be seen for initial evaluation and treatment only, as she is functional and safe in this setting.  Plan of Care: Patient with no further skilled OT needs in the acute care setting at this time  Discharge Recommendations:  Equipment: No Equipment Needed. Post-acute therapy Discharge to home with outpatient or home health for additional skilled therapy services.    See \"Rehab Therapy-Acute\" Patient Summary Report for complete documentation.    "

## 2018-11-27 NOTE — PROGRESS NOTES
"Total Joint Replacement Program Rounding     Inpatient bedside rounding completed to address quality of care provided by total joint replacement program IDT and overall patient experience at [facility].    Patient Satisfaction addressed. Patient/family updated on POC during hospital stay.  Thorough safety education completed including use of call light prior to all mobility throughout the entirety of the hospital stay. Also educated on ankle pumps and incentive inspirometry use to prevent post-op complications.  Pt states she will use call light prior to all mobility going forward to promote highest level of safety during hospital stay and understands that failure of call light use will result in chair/bed alarm use.     No further questions/concerns at this time. Please see \"MyRounding\" for further details of rounding discussion. RN updated.         "

## 2018-11-27 NOTE — PROGRESS NOTES
Bedside report received from nightshift nurse. Plan of care was established after report was given. Patient is alert and oriented x4 and also states pain ranks at about 8/10 during morning assessment. Currently on room air, nightshift nurse informed that this patient usually is on 2L of oxygen per nasal cannula overnight as she tends to destat while asleep. Dressing on right lower extremity  currently covered with ACE wrap. Polarized pad is additionally being used. SCDs are in place.

## 2018-11-27 NOTE — OR NURSING
Respirations easy.  Dressing clean and dry.  Post-op X-ray done in PACU. Palpable pedal pulses, feet warm with brisk cap refill, nailbeds of toes pink, toes mobile. Patient stating she is having pain, but it is tolerable. She is singing limericks loudly.  Report to floor.

## 2018-11-27 NOTE — CARE PLAN
Problem: Communication  Goal: The ability to communicate needs accurately and effectively will improve  Outcome: PROGRESSING AS EXPECTED    Intervention: [place] patient and significant other/support system to call light to alert staff of needs  Patient oriented to surroundings and unit policies/routines.  Educated and understands the use of the call button.  Patient calls appropriately.      Problem: Safety  Goal: Will remain free from falls  Outcome: PROGRESSING AS EXPECTED    Intervention: Implement fall precautions  Patient educated and understands the fall precautions in place to prevent falls.  Bed alarm is on, IV pole closest to bathroom, treaded slipper socks on, and bedrails closest to bathroom down.  Patient also educated and understands the use of the call button for any assistance with mobility.

## 2019-02-18 ENCOUNTER — APPOINTMENT (OUTPATIENT)
Dept: RADIOLOGY | Facility: IMAGING CENTER | Age: 64
End: 2019-02-18
Attending: PHYSICIAN ASSISTANT
Payer: MEDICARE

## 2019-02-18 ENCOUNTER — OFFICE VISIT (OUTPATIENT)
Dept: URGENT CARE | Facility: PHYSICIAN GROUP | Age: 64
End: 2019-02-18
Payer: MEDICARE

## 2019-02-18 VITALS
RESPIRATION RATE: 16 BRPM | DIASTOLIC BLOOD PRESSURE: 76 MMHG | SYSTOLIC BLOOD PRESSURE: 128 MMHG | OXYGEN SATURATION: 98 % | WEIGHT: 122 LBS | TEMPERATURE: 98 F | BODY MASS INDEX: 20.33 KG/M2 | HEIGHT: 65 IN | HEART RATE: 74 BPM

## 2019-02-18 DIAGNOSIS — S22.32XA CLOSED FRACTURE OF ONE RIB OF LEFT SIDE, INITIAL ENCOUNTER: ICD-10-CM

## 2019-02-18 DIAGNOSIS — R07.81 RIB PAIN ON LEFT SIDE: ICD-10-CM

## 2019-02-18 PROCEDURE — 71101 X-RAY EXAM UNILAT RIBS/CHEST: CPT | Mod: TC,LT | Performed by: PHYSICIAN ASSISTANT

## 2019-02-18 PROCEDURE — 99214 OFFICE O/P EST MOD 30 MIN: CPT | Performed by: PHYSICIAN ASSISTANT

## 2019-02-18 RX ORDER — HYDROCODONE BITARTRATE AND ACETAMINOPHEN 5; 325 MG/1; MG/1
1 TABLET ORAL EVERY 4 HOURS PRN
Qty: 12 TAB | Refills: 0 | Status: SHIPPED | OUTPATIENT
Start: 2019-02-18 | End: 2019-02-25

## 2019-02-19 ASSESSMENT — ENCOUNTER SYMPTOMS
CHILLS: 0
ABDOMINAL PAIN: 0
NAUSEA: 0
COUGH: 0
SHORTNESS OF BREATH: 0
DIZZINESS: 0
VOMITING: 0
SPUTUM PRODUCTION: 0
DIARRHEA: 0
FEVER: 0

## 2019-02-20 NOTE — PROGRESS NOTES
"Subjective:      Lynette Correa is a 63 y.o. female who presents with Rib Injury (fell a few weeks ago, had a kid jump on her last week, fell last night )        Patient is accompanied by her neighbor.     Rib Injury   This is a new problem. The current episode started 1 to 4 weeks ago (1 week). The problem occurs constantly. The problem has been unchanged. Pertinent negatives include no abdominal pain, chest pain, chills, congestion, coughing, fever, nausea, rash or vomiting. The symptoms are aggravated by coughing and bending. She has tried NSAIDs for the symptoms. The treatment provided mild relief.     Patient presents to urgent care reporting a 1 week history of left sided rib pain. She reports she initially fell to the ground on the left side about 1 week ago, then a few days ago a child she babystis jumped on the same area and she felt a pop in the location. Since that time it has been very painful, worsened with deep breathing and coughing. No SOB.     Review of Systems   Constitutional: Negative for chills and fever.   HENT: Negative for congestion.    Respiratory: Negative for cough, sputum production and shortness of breath.    Cardiovascular: Negative for chest pain.   Gastrointestinal: Negative for abdominal pain, diarrhea, nausea and vomiting.   Genitourinary: Negative.    Musculoskeletal:        + rib pain   Skin: Negative for rash.   Neurological: Negative for dizziness.        Objective:     /76   Pulse 74   Temp 36.7 °C (98 °F)   Resp 16   Ht 1.651 m (5' 5\")   Wt 55.3 kg (122 lb)   LMP 01/01/1995   SpO2 98%   BMI 20.30 kg/m²      Physical Exam   Constitutional: She is oriented to person, place, and time. She appears well-developed and well-nourished. No distress.   HENT:   Head: Normocephalic and atraumatic.   Right Ear: External ear normal.   Left Ear: External ear normal.   Mouth/Throat: Oropharynx is clear and moist. No oropharyngeal exudate.   Eyes: Pupils are equal, round, and " reactive to light. Conjunctivae are normal. Right eye exhibits no discharge. Left eye exhibits no discharge.   Neck: Normal range of motion.   Cardiovascular: Normal rate, regular rhythm and normal heart sounds.    No murmur heard.  Pulmonary/Chest: Effort normal and breath sounds normal. No respiratory distress. She has no wheezes. She has no rales.       Pain with deep inspiration. No erythema, edema, or ecchymosis present. + TTP over left lateral ribs.    Musculoskeletal: Normal range of motion.   Lymphadenopathy:     She has no cervical adenopathy.   Neurological: She is alert and oriented to person, place, and time.   Skin: Skin is warm and dry. She is not diaphoretic.   Psychiatric: She has a normal mood and affect. Her behavior is normal.   Nursing note and vitals reviewed.         PMH:  has a past medical history of Alcohol abuse; Anesthesia; Anesthesia (9-23-16); Arrhythmia; Arthritis (9-23-16); ASTHMA; Bowel habit changes (9-23-16); Breath shortness; Bronchitis (2015); Chronic pain (01/2018, 11/19/18); Cold (10/29/2018); COPD (chronic obstructive pulmonary disease) ([facility]); Dental disorder; Depression; Disorder of thyroid; Emphysema of lung ([facility]); Heart burn; Hepatitis C (01/2018); migraines; Indigestion; Jaundice; kidney stones; Neuropathy, peripheral; Pain; Pain (08/31/2018); Pneumonia (2015); Risk for falls; Seizure ([facility]) (2015); Sleep apnea ([id number]); Substance abuse ([facility]); Ulcer; and Unspecified hemorrhagic conditions.  MEDS:   Current Outpatient Prescriptions:   •  ibuprofen (MOTRIN) 100 MG/5ML Suspension, Take 10 mg/kg by mouth every 6 hours as needed., Disp: , Rfl:   •  HYDROcodone-acetaminophen (NORCO) 5-325 MG Tab per tablet, Take 1 Tab by mouth every four hours as needed for up to 7 days., Disp: 12 Tab, Rfl: 0  •  duloxetine (CYMBALTA) 30 MG Cap DR Particles, Take 30 mg by mouth every day., Disp: , Rfl:   •  paroxetine (PAXIL) 10 MG Tab, Take 20 mg by mouth every day., Disp: , Rfl:   •   "gabapentin (NEURONTIN) 800 MG tablet, Take 1,200 mg by mouth 3 times a day., Disp: , Rfl: 6  •  albuterol (PROVENTIL) 2.5mg/3ml Nebu Soln solution for nebulization, 2.5 mg by Nebulization route every four hours as needed for Shortness of Breath., Disp: , Rfl:   •  Dexlansoprazole (DEXILANT) 60 MG CAPSULE DELAYED RELEASE delayed-release capsule, Take 60 mg by mouth every day., Disp: , Rfl:   •  clonidine (CATAPRES) 0.1 MG TABS, Take 0.1 mg by mouth 2 times a day., Disp: , Rfl:   •  meloxicam (MOBIC) 7.5 MG Tab, Take 7.5 mg by mouth every day., Disp: , Rfl:   •  Probiotic Product (PROBIOTIC PO), Take 1 Cap by mouth every day., Disp: , Rfl:   •  albuterol 108 (90 BASE) MCG/ACT Aero Soln inhalation aerosol, Inhale 2 Puffs by mouth every 6 hours as needed for Shortness of Breath., Disp: , Rfl:   ALLERGIES:   Allergies   Allergen Reactions   • Codeine Rash     Difficulty breathing, and itching   • Morphine Anaphylaxis     Headache; throat swelling   • Pcn [Penicillins]      All \"cillin\" family, per patient  ---   I broke out in \"trench\" mouth     SURGHX:   Past Surgical History:   Procedure Laterality Date   • KNEE REVISION TOTAL Right 11/26/2018    Procedure: KNEE REVISION TOTAL - W/POLY EXCHANGE;  Surgeon: Yovani Guzmán M.D.;  Location: [facility];  Service: Orthopedics   • KNEE REVISION TOTAL Right 9/10/2018    Procedure: KNEE REVISION TOTAL WITH INTRA OPERATIVE BLOCK FOR POST OPERATIVE PAIN RELIEF, EXTENSIVE SYNOVECTOMY;  Surgeon: Yovani Guzmán M.D.;  Location: [facility];  Service: Orthopedics   • KNEE ARTHROPLASTY TOTAL Left 5/14/2018    Procedure: KNEE ARTHROPLASTY TOTAL, SECONDARY DIFFICULTY- WITH INTRAOPERATIVE BLOCK FOR POST OPERATIVE PAIN RELIEF;  Surgeon: Yovani Guzmán M.D.;  Location: [facility];  Service: Orthopedics   • SHOULDER ARTHROPLASTY TOTAL Left 2/12/2018    Procedure: SHOULDER ARTHROPLASTY TOTAL;  Surgeon: Yovani Guzmán M.D.;  Location: " SURGERY [facility];  Service: Orthopedics   • LUMBAR LAMINECTOMY DISKECTOMY  10/8/2016    Procedure: LUMBAR LAMINECTOMY DISKECTOMY FPRAMINOTOMY L3-4- POSTERIOR L3-4 STABILIZATION WITH INSTRUMENTATION  WITH MEDTRONICS INSTRUMENTS;  Surgeon: Preet Spear M.D.;  Location: SURGERY [facility];  Service:    • WRIST ORIF Left 3/3/2016    Procedure: WRIST ORIF;  Surgeon: Campbell Love M.D.;  Location: SURGERY [facility];  Service:    • MASS EXCISION GENERAL  10/4/2011    Performed by GANSER, JOHN H at SURGERY [facility]   • LUMBAR LAMINECTOMY DISKECTOMY  1/8/2011    Performed by PREET SPEAR at [facility]   • CERVICAL FUSION POSTERIOR  7/20/2010    Performed by PREET SPEAR at [facility]   • CERVICAL DECOMPRESSION POSTERIOR  7/20/2010    Performed by PREET SPEAR at [facility]   • KNEE ARTHROSCOPY Left 2008    left knee   • CARPAL TUNNEL RELEASE Right 2005   • OTHER CARDIAC SURGERY  2005    ablation  and small hole in heart valve repaired by dr medel   • KNEE ARTHROPLASTY TOTAL Right 2003    right   • HYSTERECTOMY, TOTAL ABDOMINAL  1995   • BIOPSY GENERAL      right shin biopsy    • BLOCK EPIDURAL STEROID INJECTION  2015-7/2018   • OTHER ORTHOPEDIC SURGERY      right index finger sewn back on   • PRIMARY C SECTION  1989/1988/1993    x 3   • TONSILLECTOMY  as child     SOCHX:  reports that she quit smoking about 2 years ago. Her smoking use included Cigarettes. She has a 20.00 pack-year smoking history. She has never used smokeless tobacco. She reports that she does not drink alcohol or use drugs.  FH: family history includes Alcohol abuse in her brother; Anxiety disorder in her mother; Cancer in her father, mother, and paternal uncle; Dementia in her paternal grandmother and paternal uncle; Depression in her mother; Diabetes in her brother.       Assessment/Plan:     1. Closed fracture of one rib of left side, initial encounter  - [id number] (WITH  1-VIEW CXR) LEFT; Future  Impression       1.  Acute minimally displaced fracture posterior lateral aspect of the left 7th rib.    2.  No underlying pulmonary consolidation or pneumothorax.       - HYDROcodone-acetaminophen (NORCO) 5-325 MG Tab per tablet; Take 1 Tab by mouth every four hours as needed for up to 7 days.  Dispense: 12 Tab; Refill: 0   - Will cause sedation, avoid driving, operating heavy machinery, and drinking alcohol  - Consent for Opiate Prescription    Encouraged icing the area 2-3 times daily. OTC nsaids as needed for mild-moderate pain, Norco given to take as needed for severe pain. Discussed taking deep breaths once hourly to prevent development of secondary respiratory infection. Call or return to office if symptoms persist or worsen. The patient demonstrated a good understanding and agreed with the treatment plan.    [facility] Aware web site evaluation: I have obtained and reviewed patient utilization report from [facility] pharmacy database prior to writing prescription for controlled substance II, III or IV per [place] bill . Based on the report and my clinical assessment the prescription is medically necessary.

## 2019-04-26 ENCOUNTER — OFFICE VISIT (OUTPATIENT)
Dept: URGENT CARE | Facility: PHYSICIAN GROUP | Age: 64
End: 2019-04-26
Payer: MEDICARE

## 2019-04-26 ENCOUNTER — APPOINTMENT (OUTPATIENT)
Dept: RADIOLOGY | Facility: IMAGING CENTER | Age: 64
End: 2019-04-26
Attending: PHYSICIAN ASSISTANT
Payer: MEDICARE

## 2019-04-26 VITALS
BODY MASS INDEX: 20.83 KG/M2 | OXYGEN SATURATION: 96 % | TEMPERATURE: 98.6 F | RESPIRATION RATE: 16 BRPM | HEART RATE: 94 BPM | SYSTOLIC BLOOD PRESSURE: 124 MMHG | WEIGHT: 125 LBS | DIASTOLIC BLOOD PRESSURE: 72 MMHG | HEIGHT: 65 IN

## 2019-04-26 DIAGNOSIS — S39.012A ACUTE MYOFASCIAL STRAIN OF LUMBOSACRAL REGION, INITIAL ENCOUNTER: ICD-10-CM
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
## 2019-04-26 DIAGNOSIS — S60.212A CONTUSION OF LEFT WRIST, INITIAL ENCOUNTER: Primary | ICD-10-CM
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
## 2019-04-26 DIAGNOSIS — S60.212A CONTUSION OF LEFT WRIST, INITIAL ENCOUNTER: ICD-10-CM

## 2019-04-26 PROCEDURE — 72100 X-RAY EXAM L-S SPINE 2/3 VWS: CPT | Mod: TC | Performed by: PHYSICIAN ASSISTANT

## 2019-04-26 PROCEDURE — 73090 X-RAY EXAM OF FOREARM: CPT | Mod: TC,LT | Performed by: PHYSICIAN ASSISTANT

## 2019-04-26 PROCEDURE — 99214 OFFICE O/P EST MOD 30 MIN: CPT | Performed by: PHYSICIAN ASSISTANT

## 2019-04-26 RX ORDER — CELECOXIB 100 MG/1
100 CAPSULE ORAL 2 TIMES DAILY
Qty: 60 CAP | Refills: 0 | Status: SHIPPED | OUTPATIENT
Start: 2019-04-26 | End: 2019-06-02

## 2019-04-26 RX ORDER — METHYLPREDNISOLONE 4 MG/1
TABLET ORAL
Qty: 21 TAB | Refills: 0 | Status: SHIPPED | OUTPATIENT
Start: 2019-04-26 | End: 2019-06-02

## 2019-04-26 RX ORDER — CYCLOBENZAPRINE HCL 5 MG
5-10 TABLET ORAL 3 TIMES DAILY PRN
Qty: 30 TAB | Refills: 0 | Status: SHIPPED | OUTPATIENT
Start: 2019-04-26 | End: 2019-06-02

## 2019-04-26 NOTE — PROGRESS NOTES
"Subjective:      Pt is a 63 y.o. female who presents with Fall (LT arm, landed on back, and arm, bruised X today )            HPI  This is a new problem. Pt notes fell this morning at home and injured left wrist and arm with bruising and swelling and is concerned about her lumbar region which has had multiple surgeries and hardware placed. Pt has not taken any Rx medications for this condition. Pt states the pain is a 7-8/10, aching in nature and worse this monring. Pt denies CP, SOB, NVD, paresthesias, headaches, dizziness, change in vision, hives, or other joint pain. The pt's medication list, problem list, and allergies have been evaluated and reviewed during today's visit.    PMH:  Past Medical History:   Diagnosis Date   • Alcohol abuse    • Anesthesia     \"Mother had a stroke with anesthesia\"   • Anesthesia 9-23-16    \"Only with Morphine\"  Migraines & PONV    • Arrhythmia     \"2015 detected a flutter\", does not see a cardiologist, small hole heart valve 10 years ago   • Arthritis 9-23-16    \"Everywhere\" 1/18, psoriatic arthritis   • ASTHMA    • Bowel habit changes 9-23-16    Diarrhea often, resolved   • Breath shortness     11/19/18-Occassional due to meds and denies change in past 4 weeks.    • Bronchitis 2015   • Chronic pain 01/2018, 11/19/18    lower spine, feet, hips, hands shoulders   • Cold 10/29/2018    11/19/18-Denies fevers but still has a cough. Pt will inform Dr Guzmán.   • COPD (chronic obstructive pulmonary disease) (HCC)    • Dental disorder     Full Set Dentures   • Depression    • Disorder of thyroid     11-/19/18-no meds. partial thyroidectomy.   • Emphysema of lung (HCC)    • Heart burn    • Hepatitis C 01/2018    Dx. 1969, no therapy   • Hx of migraines    • Indigestion     stomach ulcers    • Jaundice     Age 15, related to hep C   • kidney stones     kidney stones   • Neuropathy, peripheral    • Pain     \"everywhere\"   • Pain 08/31/2018    all over   • Pneumonia 2015   • Risk for falls  "    HX of falls   • Seizure (HCC) 2015    x1 @ [place]- withdrawal from Fentanyl   • Sleep apnea [id number] 11/19/18-never had sleep study but wore oxygen at night for approx 1 year.    • Substance abuse ([facility])    • Ulcer    • Unspecified hemorrhagic conditions     PROLONGED BLEEDING TIME-11/19/18 under control.       PSH:  Past Surgical History:   Procedure Laterality Date   • KNEE REVISION TOTAL Right 11/26/2018    Procedure: KNEE REVISION TOTAL - W/POLY EXCHANGE;  Surgeon: Yovani Guzmán M.D.;  Location: [facility];  Service: Orthopedics   • KNEE REVISION TOTAL Right 9/10/2018    Procedure: KNEE REVISION TOTAL WITH INTRA OPERATIVE BLOCK FOR POST OPERATIVE PAIN RELIEF, EXTENSIVE SYNOVECTOMY;  Surgeon: Yovani Guzmán M.D.;  Location: [facility];  Service: Orthopedics   • KNEE ARTHROPLASTY TOTAL Left 5/14/2018    Procedure: KNEE ARTHROPLASTY TOTAL, SECONDARY DIFFICULTY- WITH INTRAOPERATIVE BLOCK FOR POST OPERATIVE PAIN RELIEF;  Surgeon: Yovani Guzmán M.D.;  Location: [facility];  Service: Orthopedics   • SHOULDER ARTHROPLASTY TOTAL Left 2/12/2018    Procedure: SHOULDER ARTHROPLASTY TOTAL;  Surgeon: Yovani Guzmán M.D.;  Location: [facility];  Service: Orthopedics   • LUMBAR LAMINECTOMY DISKECTOMY  10/8/2016    Procedure: LUMBAR LAMINECTOMY DISKECTOMY FPRAMINOTOMY L3-4- POSTERIOR L3-4 STABILIZATION WITH INSTRUMENTATION  WITH MEDTRONICS INSTRUMENTS;  Surgeon: Preet Spear M.D.;  Location: [facility];  Service:    • WRIST ORIF Left 3/3/2016    Procedure: WRIST ORIF;  Surgeon: Campbell Love M.D.;  Location: [facility];  Service:    • MASS EXCISION GENERAL  10/4/2011    Performed by GANSER, JOHN H at [facility]   • LUMBAR LAMINECTOMY DISKECTOMY  1/8/2011    Performed by PREET SPEAR at [facility]   • CERVICAL FUSION POSTERIOR  7/20/2010    Performed by PREET SPEAR at [facility]   •  CERVICAL DECOMPRESSION POSTERIOR  2010    Performed by PREET SPEAR at SURGERY [facility]   • KNEE ARTHROSCOPY Left 2008    left knee   • CARPAL TUNNEL RELEASE Right    • OTHER CARDIAC SURGERY      ablation  and small hole in heart valve repaired by dr medel   • KNEE ARTHROPLASTY TOTAL Right     right   • HYSTERECTOMY, TOTAL ABDOMINAL     • BIOPSY GENERAL      right shin biopsy    • BLOCK EPIDURAL STEROID INJECTION  -2018   • OTHER ORTHOPEDIC SURGERY      right index finger sewn back on   • PRIMARY C SECTION  //1993    x 3   • TONSILLECTOMY  as child       Fam Hx:    family history includes Alcohol abuse in her brother; Anxiety disorder in her mother; Cancer in her father, mother, and paternal uncle; Dementia in her paternal grandmother and paternal uncle; Depression in her mother; Diabetes in her brother.  Family Status   Relation Status   • Mo    • Fa    • Bro (Not Specified)   • PUnc (Not Specified)   • PGMo (Not Specified)       Soc HX:  Social History     Social History   • Marital status: Legally      Spouse name: N/A   • Number of children: N/A   • Years of education: N/A     Occupational History   • Not on file.     Social History Main Topics   • Smoking status: Former Smoker     Packs/day: 0.50     Years: 40.00     Types: Cigarettes     Quit date: 2017   • Smokeless tobacco: Never Used      Comment: 1 ppd 10 yrs   • Alcohol use No   • Drug use: No   • Sexual activity: Not on file     Other Topics Concern   • Not on file     Social History Narrative   • No narrative on file         Medications:    Current Outpatient Prescriptions:   •  duloxetine (CYMBALTA) 30 MG Cap DR Particles, Take 30 mg by mouth every day., Disp: , Rfl:   •  paroxetine (PAXIL) 10 MG Tab, Take 20 mg by mouth every day., Disp: , Rfl:   •  gabapentin (NEURONTIN) 800 MG tablet, Take 1,200 mg by mouth 3 times a day., Disp: , Rfl: 6  •  Dexlansoprazole (DEXILANT) 60 MG  "CAPSULE DELAYED RELEASE delayed-release capsule, Take 60 mg by mouth every day., Disp: , Rfl:   •  clonidine (CATAPRES) 0.1 MG TABS, Take 0.1 mg by mouth 2 times a day., Disp: , Rfl:   •  ibuprofen (MOTRIN) 100 MG/5ML Suspension, Take 10 mg/kg by mouth every 6 hours as needed., Disp: , Rfl:   •  meloxicam (MOBIC) 7.5 MG Tab, Take 7.5 mg by mouth every day., Disp: , Rfl:   •  Probiotic Product (PROBIOTIC PO), Take 1 Cap by mouth every day., Disp: , Rfl:   •  albuterol (PROVENTIL) 2.5mg/3ml Nebu Soln solution for nebulization, 2.5 mg by Nebulization route every four hours as needed for Shortness of Breath., Disp: , Rfl:   •  albuterol 108 (90 BASE) MCG/ACT Aero Soln inhalation aerosol, Inhale 2 Puffs by mouth every 6 hours as needed for Shortness of Breath., Disp: , Rfl:       Allergies:  Codeine; Morphine; and Pcn [penicillins]    ROS  Constitutional: Negative for fever, chills and malaise/fatigue.   HENT: Negative for congestion and sore throat.    Eyes: Negative for blurred vision, double vision and photophobia.   Respiratory: Negative for cough and shortness of breath.  Cardiovascular: Negative for chest pain and palpitations.   Gastrointestinal: Negative for heartburn, nausea, vomiting, abdominal pain, diarrhea and constipation.   Genitourinary: Negative for dysuria and flank pain.   Musculoskeletal: POS for left forearm and wrist and lumbar joint pain and myalgias.   Skin: Negative for itching and rash.   Neurological: Negative for dizziness, tingling and headaches.   Endo/Heme/Allergies: Does not bruise/bleed easily.   Psychiatric/Behavioral: Negative for depression. The patient is not nervous/anxious.           Objective:     /72   Pulse 94   Temp 37 °C (98.6 °F)   Resp 16   Ht 1.651 m (5' 5\")   Wt 56.7 kg (125 lb)   LMP 01/01/1995   SpO2 96%   BMI 20.80 kg/m²      Physical Exam   Musculoskeletal:        Left wrist: She exhibits decreased range of motion, tenderness, bony tenderness and swelling. " She exhibits no effusion, no crepitus, no deformity and no laceration.        Lumbar back: She exhibits decreased range of motion, tenderness, pain and spasm. She exhibits no bony tenderness, no swelling, no edema, no deformity, no laceration and normal pulse.        Back:         Arms:        Constitutional: PT is oriented to person, place, and time. PT appears well-developed and well-nourished. No distress.   HENT:   Head: Normocephalic and atraumatic.   Mouth/Throat: Oropharynx is clear and moist. No oropharyngeal exudate.   Eyes: Conjunctivae normal and EOM are normal. Pupils are equal, round, and reactive to light.   Neck: Normal range of motion. Neck supple. No thyromegaly present.   Cardiovascular: Normal rate, regular rhythm, normal heart sounds and intact distal pulses.  Exam reveals no gallop and no friction rub.    No murmur heard.  Pulmonary/Chest: Effort normal and breath sounds normal. No respiratory distress. PT has no wheezes. PT has no rales. Pt exhibits no tenderness.   Abdominal: Soft. Bowel sounds are normal. PT exhibits no distension and no mass. There is no tenderness. There is no rebound and no guarding.   Neurological: PT is alert and oriented to person, place, and time. PT has normal reflexes. No cranial nerve deficit.   Skin: Skin is warm and dry. No rash noted. PT is not diaphoretic. No erythema.       Psychiatric: PT has a normal mood and affect. PT behavior is normal. Judgment and thought content normal.       RADS:  Narrative       4/26/2019 12:42 PM    HISTORY/REASON FOR EXAM:  Pain/Deformity Following Trauma.    TECHNIQUE/EXAM DESCRIPTION AND NUMBER OF VIEWS:  2 views of the LEFT forearm.    COMPARISON: None    FINDINGS:    BONE MINERALIZATION: Slightly decreased.  JOINTS: Mild radiocarpal, STT and CMC-1 arthrosis. No erosions.  FRACTURE: Sequela of prior distal radial ORIF. No evidence of acute fracture. Old, healed fifth metacarpal fracture.  DISLOCATION: None.  SOFT TISSUES: Soft  tissue swelling of the forearm.   Impression       1.  Soft tissue swelling of the forearm. No acute osseous abnormality.  2.  Sequela of prior distal radial ORIF without evidence of hardware complication.   Reading Provider Reading Date   Jules Glynn M.D. Apr 26, 2019   Signing Provider Signing Date Signing Time   Jules Glynn M.D. Apr 26, 2019  1:02 PM              Assessment/Plan:     1. Contusion of left wrist, initial encounter    - DX-FOREARM LEFT; Future    2. Acute myofascial strain of lumbosacral region, initial encounter    - DX-LUMBAR SPINE-2 OR 3 VIEWS; Future    3. Fall at home, initial encounter    Flexeril  Medrol  Celebrex  RICE therapy discussed  Gentle ROM exercises discussed  WBAT left wrist  Ice/heat therapy discussed  OTC ibuprofen for pain control  Rest, fluids encouraged.  AVS with medical info given.  Pt was in full understanding and agreement with the plan.  Follow-up as needed if symptoms worsen or fail to improve.

## 2019-06-02 ENCOUNTER — HOSPITAL ENCOUNTER (EMERGENCY)
Facility: MEDICAL CENTER | Age: 64
End: 2019-06-02
Attending: EMERGENCY MEDICINE
Payer: MEDICARE

## 2019-06-02 ENCOUNTER — APPOINTMENT (OUTPATIENT)
Dept: RADIOLOGY | Facility: MEDICAL CENTER | Age: 64
End: 2019-06-02
Attending: EMERGENCY MEDICINE
Payer: MEDICARE

## 2019-06-02 VITALS
RESPIRATION RATE: 18 BRPM | OXYGEN SATURATION: 100 % | BODY MASS INDEX: 22.01 KG/M2 | SYSTOLIC BLOOD PRESSURE: 142 MMHG | TEMPERATURE: 97.4 F | HEART RATE: 67 BPM | DIASTOLIC BLOOD PRESSURE: 79 MMHG | WEIGHT: 132.28 LBS

## 2019-06-02 DIAGNOSIS — T07.XXXA ABRASIONS OF MULTIPLE SITES: ICD-10-CM
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
## 2019-06-02 DIAGNOSIS — T14.8XXA HEMATOMA: ICD-10-CM

## 2019-06-02 DIAGNOSIS — S01.81XA FACIAL LACERATION, INITIAL ENCOUNTER: ICD-10-CM

## 2019-06-02 PROCEDURE — 304999 HCHG REPAIR-SIMPLE/INTERMED LEVEL 1

## 2019-06-02 PROCEDURE — A9270 NON-COVERED ITEM OR SERVICE: HCPCS | Performed by: EMERGENCY MEDICINE

## 2019-06-02 PROCEDURE — 304217 HCHG IRRIGATION SYSTEM

## 2019-06-02 PROCEDURE — 73030 X-RAY EXAM OF SHOULDER: CPT | Mod: LT

## 2019-06-02 PROCEDURE — 73590 X-RAY EXAM OF LOWER LEG: CPT | Mod: LT

## 2019-06-02 PROCEDURE — 99284 EMERGENCY DEPT VISIT MOD MDM: CPT

## 2019-06-02 PROCEDURE — 700102 HCHG RX REV CODE 250 W/ 637 OVERRIDE(OP): Performed by: EMERGENCY MEDICINE

## 2019-06-02 PROCEDURE — 303353 HCHG DERMABOND SKIN ADHESIVE

## 2019-06-02 RX ORDER — HYDROCODONE BITARTRATE AND ACETAMINOPHEN 5; 325 MG/1; MG/1
1-2 TABLET ORAL EVERY 6 HOURS PRN
Qty: 20 TAB | Refills: 0 | Status: SHIPPED | OUTPATIENT
Start: 2019-06-02 | End: 2019-06-05

## 2019-06-02 RX ORDER — OXYCODONE HYDROCHLORIDE AND ACETAMINOPHEN 5; 325 MG/1; MG/1
1 TABLET ORAL ONCE
Status: COMPLETED | OUTPATIENT
Start: 2019-06-02 | End: 2019-06-02

## 2019-06-02 RX ORDER — PAROXETINE HYDROCHLORIDE 20 MG/1
20 TABLET, FILM COATED ORAL DAILY
Status: SHIPPED | COMMUNITY
End: 2021-12-06

## 2019-06-02 RX ADMIN — OXYCODONE HYDROCHLORIDE AND ACETAMINOPHEN 1 TABLET: 5; 325 TABLET ORAL at 07:22

## 2019-06-02 NOTE — ED NOTES
Daughter is here, follow-up re explained to her and p[t given a wheelchair ride to the car. Stable to POV

## 2019-06-02 NOTE — ED NOTES
Released with all follow-up, instructions to be discussed with daughter at this pt permission. 1 Rx given and instructed on narcotic use. Wound care written instruction also provided and care of dermabond. Advised ice to all sore areas although pt stated she will not use ice.

## 2019-06-02 NOTE — ED NOTES
"Assumed care, warm wet wash clothes to face to clean up old blood, pt states she \"may have passed out when she hit for 1 second\".  "

## 2019-06-02 NOTE — ED PROVIDER NOTES
"ED Provider Note    CHIEF COMPLAINT  Chief Complaint   Patient presents with   • GLF     pt fell while walking down concrete steps this AM, laceration to left eye and cheek, swelling to left shoulder, denies LOC       HPI  Lynette Correa is a 63 y.o. female who presents for evaluation after a fall.  The patient was home and states that she fell down 3 steps.  Her glasses broke and she sustained left facial lacerations.  She is also complaining of left lower leg pain.  She has been ambulatory.  She denies any numbness.  She states she is up-to-date on her tetanus.  She is a chronic pain patient secondary to her psoriatic arthritis and has a narcotic prescription score of 391.  She is not on blood thinners.    REVIEW OF SYSTEMS  See HPI for further details. All other systems negative.    PAST MEDICAL HISTORY  Past Medical History:   Diagnosis Date   • Alcohol abuse    • Anesthesia     \"Mother had a stroke with anesthesia\"   • Anesthesia 9-23-16    \"Only with Morphine\"  Migraines & PONV    • Arrhythmia     \"2015 detected a flutter\", does not see a cardiologist, small hole heart valve 10 years ago   • Arthritis 9-23-16    \"Everywhere\" 1/18, psoriatic arthritis   • ASTHMA    • Bowel habit changes 9-23-16    Diarrhea often, resolved   • Breath shortness     11/19/18-Occassional due to meds and denies change in past 4 weeks.    • Bronchitis 2015   • Chronic pain 01/2018, 11/19/18    lower spine, feet, hips, hands shoulders   • Cold 10/29/2018    11/19/18-Denies fevers but still has a cough. Pt will inform Dr Guzmán.   • COPD (chronic obstructive pulmonary disease) (HCC)    • Dental disorder     Full Set Dentures   • Depression    • Disorder of thyroid     11-/19/18-no meds. partial thyroidectomy.   • Emphysema of lung (HCC)    • Heart burn    • Hepatitis C 01/2018    Dx. 1969, no therapy   • Hx of migraines    • Indigestion     stomach ulcers    • Jaundice     Age 15, related to hep C   • kidney stones     kidney " "stones   • Neuropathy, peripheral    • Pain     \"everywhere\"   • Pain 08/31/2018    all over   • Pneumonia 2015   • Risk for falls     HX of falls   • Seizure (HCC) 2015    x1 @ [place]- withdrawal from Fentanyl   • Sleep apnea [id number] 11/19/18-never had sleep study but wore oxygen at night for approx 1 year.    • Substance abuse (HCC)    • Ulcer    • Unspecified hemorrhagic conditions     PROLONGED BLEEDING TIME-11/19/18 under control.       FAMILY HISTORY  Family History   Problem Relation Age of Onset   • Anxiety disorder Mother    • Depression Mother    • Cancer Mother    • Cancer Father    • Alcohol abuse Brother    • Diabetes Brother         3 brothers   • Cancer Paternal Uncle    • Dementia Paternal Uncle    • Dementia Paternal Grandmother        SOCIAL HISTORY  Social History     Social History   • Marital status: Legally      Spouse name: N/A   • Number of children: N/A   • Years of education: N/A     Social History Main Topics   • Smoking status: Former Smoker     Packs/day: 0.50     Years: 40.00     Types: Cigarettes     Quit date: 1/26/2017   • Smokeless tobacco: Never Used      Comment: 1 ppd 10 yrs   • Alcohol use No   • Drug use: No   • Sexual activity: Not on file     Other Topics Concern   • Not on file     Social History Narrative   • No narrative on file       SURGICAL HISTORY  Past Surgical History:   Procedure Laterality Date   • KNEE REVISION TOTAL Right 11/26/2018    Procedure: KNEE REVISION TOTAL - W/POLY EXCHANGE;  Surgeon: Yovani Guzmán M.D.;  Location: [facility];  Service: Orthopedics   • KNEE REVISION TOTAL Right 9/10/2018    Procedure: KNEE REVISION TOTAL WITH INTRA OPERATIVE BLOCK FOR POST OPERATIVE PAIN RELIEF, EXTENSIVE SYNOVECTOMY;  Surgeon: Yovani Guzmán M.D.;  Location: [facility];  Service: Orthopedics   • KNEE ARTHROPLASTY TOTAL Left 5/14/2018    Procedure: KNEE ARTHROPLASTY TOTAL, SECONDARY DIFFICULTY- WITH INTRAOPERATIVE " "BLOCK FOR POST OPERATIVE PAIN RELIEF;  Surgeon: Yovani Guzmán M.D.;  Location: SURGERY [facility];  Service: Orthopedics   • SHOULDER ARTHROPLASTY TOTAL Left 2/12/2018    Procedure: SHOULDER ARTHROPLASTY TOTAL;  Surgeon: Yovani Guzmán M.D.;  Location: SURGERY [facility];  Service: Orthopedics   • LUMBAR LAMINECTOMY DISKECTOMY  10/8/2016    Procedure: LUMBAR LAMINECTOMY DISKECTOMY FPRAMINOTOMY L3-4- POSTERIOR L3-4 STABILIZATION WITH INSTRUMENTATION  WITH Xyleme INSTRUMENTS;  Surgeon: Preet Spear M.D.;  Location: SURGERY [facility];  Service:    • WRIST ORIF Left 3/3/2016    Procedure: WRIST ORIF;  Surgeon: Campbell Love M.D.;  Location: SURGERY [facility];  Service:    • MASS EXCISION GENERAL  10/4/2011    Performed by GANSER, JOHN H at SURGERY [facility]   • LUMBAR LAMINECTOMY DISKECTOMY  1/8/2011    Performed by PREET SPEAR at SURGERY [facility]   • CERVICAL FUSION POSTERIOR  7/20/2010    Performed by PREET SPEAR at SURGERY [facility]   • CERVICAL DECOMPRESSION POSTERIOR  7/20/2010    Performed by PREET SPEAR at SURGERY [facility]   • KNEE ARTHROSCOPY Left 2008    left knee   • CARPAL TUNNEL RELEASE Right 2005   • OTHER CARDIAC SURGERY  2005    ablation  and small hole in heart valve repaired by dr medel   • KNEE ARTHROPLASTY TOTAL Right 2003    right   • HYSTERECTOMY, TOTAL ABDOMINAL  1995   • BIOPSY GENERAL      right shin biopsy    • BLOCK EPIDURAL STEROID INJECTION  2015-7/2018   • OTHER ORTHOPEDIC SURGERY      right index finger sewn back on   • PRIMARY C SECTION  1989/1988/1993    x 3   • TONSILLECTOMY  as child       CURRENT MEDICATIONS  Home Medications    **Home medications have not yet been reviewed for this encounter**         ALLERGIES  Allergies   Allergen Reactions   • Codeine Rash     Difficulty breathing, and itching   • Morphine Anaphylaxis     Headache; throat swelling   • Pcn [Penicillins]      All \"cillin\" family, per patient  ---   " "I broke out in \"trench\" mouth       PHYSICAL EXAM  VITAL SIGNS: /79   Pulse 83   Temp 36.3 °C (97.4 °F) (Temporal)   Resp 18   Wt 60 kg (132 lb 4.4 oz)   LMP 01/01/1995   SpO2 100%   BMI 22.01 kg/m²   Constitutional: Well developed, Well nourished.  HENT: Normocephalic, left infraorbital ecchymosis.  Left lateral eyebrow abrasion and left cheek laceration.  No malocclusion.  She is edentulous.  Eyes:  EOMI, Conjunctiva normal, No discharge.   Neck: Normal range of motion.   Cardiovascular: Normal heart rate.   Thorax & Lungs: No respiratory distress. No chest tenderness.   Abdomen: Soft and nontender.   Skin: Warm, Dry.  Musculoskeletal: Good range of motion in all major joints.  Hematoma to the anterolateral aspect of the mid left lower leg.  Neurologic: Awake and alert, No focal deficits noted.       RADIOLOGY/PROCEDURES  DX-SHOULDER 2+ LEFT   Final Result      No acute bony findings.      DX-TIBIA AND FIBULA LEFT   Final Result      No acute bony abnormality.            Laceration Repair Procedure Note    Indication: Laceration    Procedure: The patient was placed in the appropriate position and anesthesia around the laceration was obtained by infiltration using 1% Lidocaine with epinephrine. The area was then thoroughly washed with normal saline. The laceration was closed with Dermabond. There were no additional lacerations requiring repair.      Total repaired wound length: 3 cm.     Other Items: None    The patient tolerated the procedure well.    Complications: None          COURSE & MEDICAL DECISION MAKING  Pertinent Labs & Imaging studies reviewed. (See chart for details)  This is a 63-year-old here for evaluation after a fall.  She had no loss of consciousness.  She is neurologically intact.  She is not on any blood thinners.  X-rays of the left tib-fib and left shoulder show no acute bony abnormalities.  The abrasion to her left eyebrow is cleaned.  The left cheek laceration is repaired with " Dermabond per the procedure note above.  Patient is treated with Norco here in the emergency department.  At this point the patient will be discharged home.  I have explained to her that she can expect bruising to appear.  She Massimo has periorbital ecchymosis on the left.  I will have her follow-up with her primary care provider.  She is provided a prescription for Norco.  She does get Ultram prescriptions for chronic pain but she states that it is not effective in this situation.  She is given a discharge instruction sheet on adhesive wound closure care, abrasions, and hematomas.    FINAL IMPRESSION  1.  Fall  2.  Facial laceration  3.  Left lower leg hematoma  4.  Abrasions  5.  Periorbital ecchymosis         Electronically signed by: Robe Sanchez, 6/2/2019 7:19 AM

## 2019-06-02 NOTE — ED TRIAGE NOTES
Chief Complaint   Patient presents with   • GLF     pt fell while walking down concrete steps this AM, laceration to left eye and cheek, swelling to left shoulder, denies LOC      /79   Pulse 83   Temp 36.3 °C (97.4 °F) (Temporal)   Resp 18   Wt 60 kg (132 lb 4.4 oz)   LMP 01/01/1995   SpO2 100%   BMI 22.01 kg/m²       Pt BIB [facility] for above concern, bandages placed to left side of face by EMS. Pt alert, c/o pain on face and shoulder.

## 2020-05-13 NOTE — ED NOTES
May 13, 2020       Gallo Barksdale MD  [address] [address]  VIA Facsimile: [phone]      Patient: Jaxson Flores   YOB: 1958   Date of Visit: 5/13/2020       Dear Dr. Barksdale:    Thank you for referring Jaxson Flores to me for evaluation. Below are my notes for this visit with him.    If you have questions, please do not hesitate to call me. I look forward to following your patient along with you.      Sincerely,        Marco A Titus MD        CC: No Recipients  Marco A Titus MD  5/13/2020  1:56 PM  Sign when Signing Visit    CONSULTATION NOTE - CARDIOLOGY    Chief Complaint   Patient presents with   • Coronary Artery Disease    • Hypertension   • Hypercholesterolemia     Referring physician: Gallo Barksdale MD    HISTORY OF PRESENT ILLNESS  Mr. Flores is a 61 year old male with past history of hypertension and hyperlipidemia who underwent a stress test in late January 2020. He experienced left precordial chest heaviness at peak exercise resolving within 20 seconds in recovery. Chest discomfort does not occur at rest or with activities of daily living.      In May 2020, coronary angiography revealed a subtotal occlusion of the proximal right coronary artery and diffuse disease in the middle left anterior descending artery involving the second diagonal branch. Revascularization is recommended.     Mr. 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 He quit smoking 19 years ago and has not had an aortic abdominal aneurysm screening.     MEDICATIONS:  Outpatient Medications Prior to Visit   Medication Sig Dispense Refill   • cyanocobalamin (VITAMIN B-12) 100 MCG tablet Take 50 mcg by mouth daily.     • tamsulosin (FLOMAX) 0.4 MG Cap Take 0.4 mg by mouth daily after a meal.     • losartan (COZAAR) 50 MG tablet      • lansoprazole (PREVACID) 30 MG capsule      • atorvastatin (LIPITOR) 40 MG tablet Take 1 tablet by mouth daily. 90 tablet 3   • metoPROLOL  Med rec updated and complete  Allergies reviewed  Pt reports no vitamins.  Pt reports no antibiotics in the last 30 days.       succinate (TOPROL-XL) 50 MG 24 hr tablet Take 50 mg by mouth 2 times daily.     • doxepin (SINEQUAN) 10 MG/ML solution 0.6 mg.     • metoPROLOL succinate (TOPROL-XL) 50 MG 24 hr tablet Take 1 tablet by mouth daily. 90 tablet 3     No facility-administered medications prior to visit.      Long-Term Medications   Medication Sig Dispense Refill   • metoPROLOL succinate (TOPROL-XL) 50 MG 24 hr tablet Take 1 tablet by mouth 2 times daily. 180 tablet 0   • tamsulosin (FLOMAX) 0.4 MG Cap Take 0.4 mg by mouth daily after a meal.     • losartan (COZAAR) 50 MG tablet      • lansoprazole (PREVACID) 30 MG capsule      • atorvastatin (LIPITOR) 40 MG tablet Take 1 tablet by mouth daily. 90 tablet 3   • clopidogrel (PLAVIX) 75 MG tablet Take 1 tablet by mouth daily. 90 tablet 3   • [DISCONTINUED] metoPROLOL succinate (TOPROL-XL) 50 MG 24 hr tablet Take 50 mg by mouth 2 times daily.       ALLERGIES:  ALLERGIES:  No Known Allergies     Past Medical History:  Past Medical History:   Diagnosis Date   • Abnormal cardiac CT angiography 2020    May 6th, 2020   • Coronary artery disease of native artery of native heart with stable angina pectoris (CMS/HCC) 2020   • Essential hypertension with goal blood pressure less than 130/80 2020   • Insomnia 2020     Past surgical history:  No past surgical history on file.    Family History:  Family History   Problem Relation Age of Onset   • Cancer Father         bladder     Social History:  Social History     Tobacco Use   • Smoking status: Former Smoker     Last attempt to quit:      Years since quittin.3   • Smokeless tobacco: Never Used   Substance Use Topics   • Alcohol use: Not Currently   • Drug use: Never      SUBJECTIVE  Review of Systems   Constitution: Negative for decreased appetite, fever, weight gain and weight loss.   HEENT: Negative for hoarse voice and nosebleeds.    Eyes: Negative for blurred vision and visual disturbance.   Cardiovascular: Negative for  chest pain, dyspnea on exertion, leg swelling, near-syncope, orthopnea, palpitations and paroxysmal nocturnal dyspnea.   Respiratory: Negative for cough, hemoptysis and shortness of breath.    Endocrine: Negative for cold intolerance and heat intolerance.   Hematologic/Lymphatic: Negative for adenopathy.  Skin: Negative for rash and suspicious lesions.   Musculoskeletal: Negative for back pain, falls, joint pain, muscle cramps and muscle weakness.   Gastrointestinal: Negative for abdominal pain, dysphagia, heartburn, nausea and vomiting.   Genitourinary: Negative for frequency and nocturia.   Neurological: Negative for dizziness, focal weakness, headaches, light-headedness, loss of balance and vertigo.   Psychiatric/Behavioral: The patient does not have insomnia.    All other systems reviewed and are negative.    OBJECTIVE   Physical Exam   Visit Vitals  /69 (BP Location: LUE - Left upper extremity)   Pulse 63   Temp 98.1 °F (36.7 °C) (Temporal)   Resp 16   Ht 5' 4\" (1.626 m)   Wt 82.9 kg (182 lb 12.8 oz)   BMI 31.38 kg/m²     Constitutional: He appears healthy.   HEENT: Normocephalic, atraumatic  Eyes: Conjunctivae are normal.   Neck: Thyroid normal. No JVD present. No neck adenopathy.    Pulmonary/Chest: Breath sounds normal. No rales. No tenderness.   Cardiac: Normal first and second heart sounds. No murmurs.  No rubs or gallops.  No abnormality at the entry site right radial.  Peripheral vessels: Normal upper extremity pulses.  Femoral and popliteal pulses palpable.  No femoral bruits.  Normal pedal pulses.    Abdominal: Soft. No distension and no mass. There is no splenomegaly or hepatomegaly. There is no tenderness.   Musculoskeletal: Normal range of motion.   Neurological:  Alert and oriented to person, place, and time. Intact cranial nerves.   Skin: Skin is warm.     CARDIAC TESTING/IMAGING  I have reviewed the pertinent imaging study reports. These are the pertinent findings:    CHEMISTRY  CREATININE      Date Value Reference Range   05/06/2020 0.94 0.67 - 1.30 mg/dL     POTASSIUM      Date Value Reference Range   05/06/2020 4.1 3.4 - 5.1 mmol/L     HEMOGLOBIN     Date Value Reference Range   05/06/2020 12.6 13.0 - 17.0 g/dL     LIPID PANEL  CHOLESTEROL     Date Value Reference Range   05/01/2020 148 <200 mg/dL                  Desirable: < 200         Borderline High: 200 - 239                          High: > 240      LDL     Date Value Reference Range   05/01/2020 85 <100 mg/dL            Near Optimal: 100 - 129         Borderline High: 130 - 159                          High: 160 - 189                  Very High: > 190      HDL     Date Value Reference Range   05/01/2020 44 > 39 mg/dL                          Low: < 40         Borderline Low: 40 - 49            Near Optimal: 50 -59                    Optimal: > 60      TRIGLYCERIDES     Date Value Reference Range   05/01/2020 95 <150 mg/dL                    Normal: < 150        Borderline High: 150 - 199                         High: 200 - 499                 Very High: > 500      PROCEDURES  · Coronary angiography - 05/06/2020: Codominant coronary anatomy No significant left main disease No significant circumflex disease Subtotal occlusion of the proximal RCA with left-to-right collaterals Diffuse disease in the mid LAD involving the second diagonal branch.  Lesion is in the 80 to 90% range proximally and in the 70 to 80% range distally.    · Stress Test - 01/25/2020: Exercised for 7 minutes and 30 seconds on a Sumit protocol achieving 9.8 METs. Equivocal electrocardiographic response to exercise. Good functional exercise capacity. Exertional chest heaviness. Consider further workup.     ASSESSMENT/PLAN  Coronary artery disease of native artery of native heart with stable angina pectoris (CMS/HCC)  Coronary angiography was done in May 2020 showing a subtotal occlusion of the proximal right coronary artery and diffuse severe disease in the middle left  anterior descending artery involving the second diagonal branch. Revascularization is recommended due to the extent of jeopardized myocardium as well as LAD involvement in a severe manner in a patient who is symptomatic with moderate exertion. The patient was advised of his options including percutaneous as well as surgery.  He was advised that with surgery he is less likely to require repeat revascularization then with PCI.  This was explained to him in layman's terms.  After much discussion and questions back-and-forth, he opted for percutaneous intervention.  He understands the risks associated with this procedure.  Understands the risks associated with recurrence.     Essential hypertension with goal blood pressure less than 130/80  He is aware of his blood pressure goal less than 130/80. Controlled with combination of losartan and metoprolol. However, he did not know how often to take his metoprolol. He was advised to take 50 mg twice a day.        Summary of your Discharge Medications          Accurate as of May 13, 2020  1:56 PM. Always use your most recent med list.            Take these Medications      Details   atorvastatin 40 MG tablet  Commonly known as:  LIPITOR   Take 1 tablet by mouth daily.     clopidogrel 75 MG tablet  Commonly known as:  Plavix   Take 1 tablet by mouth daily.     cyanocobalamin 100 MCG tablet  Commonly known as:  Vitamin B-12   Take 50 mcg by mouth daily.     lansoprazole 30 MG capsule  Commonly known as:  PREVACID      losartan 50 MG tablet  Commonly known as:  COZAAR      metoPROLOL succinate 50 MG 24 hr tablet  Commonly known as:  TOPROL-XL   Take 1 tablet by mouth 2 times daily.     tamsulosin 0.4 MG Cap  Commonly known as:  FLOMAX   Take 0.4 mg by mouth daily after a meal.          Jaxson was seen today for follow-up.    Diagnoses and all orders for this visit:    Coronary artery disease of native artery of native heart with stable angina pectoris (CMS/[facility])  -     clopidogrel  (PLAVIX) 75 MG tablet; Take 1 tablet by mouth daily.    Essential hypertension with goal blood pressure less than 130/80    Other orders  -     metoPROLOL succinate (TOPROL-XL) 50 MG 24 hr tablet; Take 1 tablet by mouth 2 times daily.        Plan of care discussed with the patient.  All questions were answered.  Patient verbalized understanding and agrees with the plan. Primary care issues are being followed by the primary care physician. A letter has been sent to the referring physician.    Return in about 2 weeks (around 5/27/2020).    On 05/13/2020, Miah SCHWARZ scribed the services personally performed by Dr. Titus.      I have reviewed and revised the note above. The documentation recorded by the scribe accurately reflects history obtained, actions preformed and decisions made by me.     Marco A Titus MD  [facility]  [facility]

## 2020-10-11 NOTE — ED NOTES
"Pt up to BR steady self.    Pt denies suicidal ideations. \"I don't know why my family keeps doing this to me.\" Pt was informed of the texts his family relayed to ER staff. \"Why? Because I told them Goodbye?\"  Pt was given support and updated plan of care.   Pt is pacing in his room.  " Navneet

## 2021-03-03 DIAGNOSIS — Z23 NEED FOR VACCINATION: ICD-10-CM

## 2021-10-25 RX ORDER — CLONIDINE HYDROCHLORIDE 0.1 MG/1
0.1 TABLET ORAL
Qty: 60 TABLET | Status: CANCELLED | OUTPATIENT
Start: 2021-10-25

## 2021-10-25 NOTE — TELEPHONE ENCOUNTER
Last seen: 07/22/21 by Dr. Sorensen  Next appt: None     Was the patient seen in the last year in this department? Yes   Does patient have an active prescription for medications requested? No   Received Request Via: Pharmacy

## 2021-10-26 ENCOUNTER — OFFICE VISIT (OUTPATIENT)
Dept: INTERNAL MEDICINE | Facility: OTHER | Age: 66
End: 2021-10-26
Payer: MEDICARE

## 2021-10-26 DIAGNOSIS — M79.10 MUSCLE PAIN: ICD-10-CM

## 2021-10-26 DIAGNOSIS — N64.52 DISCHARGE FROM NIPPLE: ICD-10-CM
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
## 2021-10-26 DIAGNOSIS — Z13.220 SCREENING FOR HYPERLIPIDEMIA: ICD-10-CM
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
## 2021-10-26 DIAGNOSIS — L40.50 PSORIATIC ARTHRITIS (HCC): ICD-10-CM

## 2021-10-26 PROBLEM — J45.51 SEVERE PERSISTENT ASTHMA WITH (ACUTE) EXACERBATION: Status: ACTIVE | Noted: 2020-10-12

## 2021-10-26 PROBLEM — F12.10 CANNABIS ABUSE: Status: ACTIVE | Noted: 2020-10-12

## 2021-10-26 PROBLEM — H54.40 BLINDNESS OF RIGHT EYE WITH NORMAL VISION IN CONTRALATERAL EYE: Status: ACTIVE | Noted: 2020-10-12

## 2021-10-26 PROBLEM — G89.29 CHRONIC LOW BACK PAIN: Status: ACTIVE | Noted: 2020-10-12

## 2021-10-26 PROBLEM — M54.50 CHRONIC LOW BACK PAIN: Status: ACTIVE | Noted: 2020-10-12

## 2021-10-26 PROBLEM — J44.9 CHRONIC OBSTRUCTIVE PULMONARY DISEASE (HCC): Status: ACTIVE | Noted: 2020-10-12

## 2021-10-26 PROBLEM — F17.200 TOBACCO DEPENDENCE: Status: ACTIVE | Noted: 2021-01-19

## 2021-10-26 PROBLEM — M43.22 FUSION OF SPINE, CERVICAL REGION: Status: ACTIVE | Noted: 2020-10-12

## 2021-10-26 PROBLEM — K21.9 GASTROESOPHAGEAL REFLUX DISEASE: Status: ACTIVE | Noted: 2020-10-12

## 2021-10-26 PROBLEM — F19.20 COMBINED DRUG DEPENDENCE, CONTINUOUS ABUSE (HCC): Status: ACTIVE | Noted: 2020-10-12

## 2021-10-26 PROBLEM — B19.20 VIRAL HEPATITIS C: Status: ACTIVE | Noted: 2020-10-12

## 2021-10-26 PROCEDURE — 99213 OFFICE O/P EST LOW 20 MIN: CPT | Mod: GE | Performed by: STUDENT IN AN ORGANIZED HEALTH CARE EDUCATION/TRAINING PROGRAM

## 2021-10-26 RX ORDER — CLONIDINE HYDROCHLORIDE 0.1 MG/1
0.1 TABLET ORAL
Qty: 90 TABLET | Refills: 0 | Status: SHIPPED | OUTPATIENT
Start: 2021-10-26 | End: 2022-01-24 | Stop reason: SDUPTHER

## 2021-10-26 RX ORDER — DEXLANSOPRAZOLE 60 MG/1
60 CAPSULE, DELAYED RELEASE ORAL DAILY
Qty: 90 CAPSULE | Refills: 0 | Status: SHIPPED | OUTPATIENT
Start: 2021-10-26 | End: 2021-12-06 | Stop reason: SDUPTHER

## 2021-10-26 RX ORDER — ALBUTEROL SULFATE 90 UG/1
2 AEROSOL, METERED RESPIRATORY (INHALATION) EVERY 6 HOURS PRN
Qty: 8 G | Refills: 1 | Status: SHIPPED | OUTPATIENT
Start: 2021-10-26 | End: 2021-10-29

## 2021-10-26 RX ORDER — DULOXETIN HYDROCHLORIDE 30 MG/1
30 CAPSULE, DELAYED RELEASE ORAL DAILY
Qty: 90 CAPSULE | Refills: 0 | Status: SHIPPED | OUTPATIENT
Start: 2021-10-26 | End: 2022-02-07 | Stop reason: SDUPTHER

## 2021-10-26 RX ORDER — GABAPENTIN 800 MG/1
1200 TABLET ORAL 3 TIMES DAILY
Qty: 300 TABLET | Refills: 1 | Status: SHIPPED | OUTPATIENT
Start: 2021-10-26 | End: 2022-03-08 | Stop reason: SDUPTHER

## 2021-10-28 ASSESSMENT — ENCOUNTER SYMPTOMS
CHILLS: 0
DOUBLE VISION: 0
PALPITATIONS: 0
FEVER: 0
SORE THROAT: 0
BLURRED VISION: 0
BRUISES/BLEEDS EASILY: 0
VOMITING: 0
LOSS OF CONSCIOUSNESS: 0
COUGH: 0
MYALGIAS: 0
ABDOMINAL PAIN: 0
HEADACHES: 0
MEMORY LOSS: 0
INSOMNIA: 0
POLYDIPSIA: 0
SHORTNESS OF BREATH: 0
FALLS: 1
NAUSEA: 0

## 2021-10-28 NOTE — PROGRESS NOTES
Established Patient    Lynette Correa is a 65 y.o. female who presents today with the following:    CC: fall at home     HPI: 64 yo F w/ PMH of tobacco dependence, drug dependence, untreated Hep C, GERD, psoriatic arthritis, asthma, nipple discharge, marijuana use, and low back pain presents today after a mechanical ground level fall at her home 2-3 weeks ago. She has some tenderness at the anatomical snuff box of her R hand and some mild tenderness at her L patella. Several labs were ordered by her PCP during a 7/22/21 office visit for work up of muscle pain and psoriatic arthritis. The patient did not do any labs and has lost the lab paperwork so new labs were ordered through [facility] during this visit. Patient also requested that all of her medications be refilled during this visit.    Review of Systems   Constitutional: Negative for chills and fever.        Clear or chidi colored nipple discharge   HENT: Negative for hearing loss and sore throat.    Eyes: Negative for blurred vision and double vision.   Respiratory: Negative for cough and shortness of breath.    Cardiovascular: Negative for chest pain and palpitations.   Gastrointestinal: Negative for abdominal pain, nausea and vomiting.   Genitourinary: Negative for dysuria and hematuria.   Musculoskeletal: Positive for falls and joint pain. Negative for myalgias.   Skin: Negative for itching and rash.   Neurological: Negative for loss of consciousness and headaches.   Endo/Heme/Allergies: Negative for polydipsia. Does not bruise/bleed easily.   Psychiatric/Behavioral: Negative for memory loss. The patient does not have insomnia.          Patient Active Problem List    Diagnosis Date Noted   • Screening for hyperlipidemia 10/26/2021   • Muscle pain 07/22/2021   • Tobacco dependence 01/19/2021   • Blindness of right eye with normal vision in contralateral eye 10/12/2020   • Cannabis abuse 10/12/2020   • Chronic low back pain 10/12/2020   • Chronic obstructive  pulmonary disease ([facility]) 10/12/2020   • Combined drug dependence, continuous abuse ([facility]) 10/12/2020   • Discharge from [facility] 10/12/2020   • Fusion of spine, cervical region 10/12/2020   • Gastroesophageal reflux disease 10/12/2020   • Psoriatic arthritis ([facility]) 10/12/2020   • Severe persistent asthma with (acute) exacerbation 10/12/2020   • Viral hepatitis C 10/12/2020   • Posttraumatic stress disorder 2017   • Major depressive disorder, recurrent episode with anxious distress ([facility]) 2017   • Chronic pain 2017   • Alcohol abuse, in remission 2017   • Lumbar spinal stenosis 10/08/2016   • Closed die-punch intra-articular fracture of distal end of left radius 2016       Social History     Tobacco Use   • Smoking status: Former Smoker     Packs/day: 0.50     Years: 40.00     Pack years: 20.00     Types: Cigarettes     Quit date: 2017     Years since quittin.7   • Smokeless tobacco: Never Used   • Tobacco comment: 1 ppd 10 yrs   Substance Use Topics   • Alcohol use: No   • Drug use: No       Current Outpatient Medications   Medication Sig Dispense Refill   • albuterol 108 (90 Base) MCG/ACT Aero Soln inhalation aerosol Inhale 2 Puffs every 6 hours as needed for Shortness of Breath. 8 g 1   • cloNIDine (CATAPRES) 0.1 MG Tab Take 1 Tablet by mouth at bedtime. 90 Tablet 0   • gabapentin (NEURONTIN) 800 MG tablet Take 1.5 Tablets by mouth 3 times a day. 300 Tablet 1   • Dexlansoprazole (DEXILANT) 60 MG CAPSULE DELAYED RELEASE delayed-release capsule Take 60 mg by mouth every day. 90 Capsule 0   • DULoxetine (CYMBALTA) 30 MG Cap DR Particles Take 1 Capsule by mouth every day. 90 Capsule 0   • PARoxetine (PAXIL) 20 MG Tab Take 20 mg by mouth every day.     • albuterol (PROVENTIL) 2.5mg/3ml Nebu Soln solution for nebulization 2.5 mg by Nebulization route every four hours as needed for Shortness of Breath.       No current facility-administered medications for this visit.       LMP 1995      Physical Exam  General: Well developed, well nourished female, in no distress.  Eyes: Conjuntiva without any obvious injection or erythema.   Cardiovascular: Heart is regular with no murmurs  Lungs: Clear to auscultation bilaterally. No wheezes, rhonci or crackles heard. Respiratory effort is normal.  Abd: Soft, non-tender  Ext: No edema  Musculoskeletal: R hand has tenderness near anatomical snuffbox with concern for scaphoid fracure, tenderness and possible joint effusion near L patella      Assessment and Plan    1. Muscle pain  -hx of chronic, diffuse pain with unclear etiology  -PCP ordered multiple labs for workup, labs reordered today as patient did not complete them  -RF, KAT, HLA-B27, CBC, CMP, TSH    2. Discharge from nipple  -reports continued clear and rust colored discharge  -referred at previous visit for mammography    3. Psoriatic arthritis (HCC)  -labs reordered  -RF, TSH, KAT, HLA-B27, ESR    4. Screening for hyperlipidemia  -lipid panel ordered    5. Fall in home, initial encounter  -xray of R hand  -xray of L knee    [facility] patient case discussed with Dr. Blair    Follow up with PCP in 1 month.    Signed by: Hal Garcia M.D.

## 2021-10-29 RX ORDER — ALBUTEROL SULFATE 90 UG/1
2 AEROSOL, METERED RESPIRATORY (INHALATION) EVERY 6 HOURS
Qty: 25.5 G | Refills: 6 | Status: SHIPPED | OUTPATIENT
Start: 2021-10-29 | End: 2022-01-27

## 2021-12-01 RX ORDER — ALBUTEROL SULFATE 2.5 MG/3ML
2.5 SOLUTION RESPIRATORY (INHALATION) EVERY 4 HOURS PRN
Qty: 1 EACH | Refills: 1 | Status: SHIPPED | OUTPATIENT
Start: 2021-12-01 | End: 2022-04-19

## 2021-12-01 NOTE — TELEPHONE ENCOUNTER
Caroline Refill     Last seen: 10/26/21 by Dr. Garcia  Next appt: 12/6/21 with Dr. Angie Sorensen    Was the patient seen in the last year in this department? Yes   Does patient have an active prescription for medications requested? No   Received Request Via: Pharmacy

## 2021-12-06 ENCOUNTER — OFFICE VISIT (OUTPATIENT)
Dept: INTERNAL MEDICINE | Facility: OTHER | Age: 66
End: 2021-12-06
Payer: MEDICARE

## 2021-12-06 VITALS
HEIGHT: 65 IN | OXYGEN SATURATION: 95 % | BODY MASS INDEX: 23.29 KG/M2 | SYSTOLIC BLOOD PRESSURE: 153 MMHG | WEIGHT: 139.8 LBS | DIASTOLIC BLOOD PRESSURE: 72 MMHG | HEART RATE: 89 BPM | TEMPERATURE: 98.4 F

## 2021-12-06 DIAGNOSIS — Z13.220 SCREENING FOR HYPERLIPIDEMIA: ICD-10-CM

## 2021-12-06 DIAGNOSIS — L40.50 PSORIATIC ARTHRITIS (HCC): ICD-10-CM

## 2021-12-06 DIAGNOSIS — M79.10 MUSCLE PAIN: ICD-10-CM

## 2021-12-06 DIAGNOSIS — F17.200 TOBACCO DEPENDENCE: ICD-10-CM

## 2021-12-06 PROCEDURE — 99214 OFFICE O/P EST MOD 30 MIN: CPT | Mod: GC,25

## 2021-12-06 PROCEDURE — 96372 THER/PROPH/DIAG INJ SC/IM: CPT | Mod: GC

## 2021-12-06 RX ORDER — DEXLANSOPRAZOLE 60 MG/1
60 CAPSULE, DELAYED RELEASE ORAL DAILY
Qty: 90 CAPSULE | Refills: 3 | Status: SHIPPED | OUTPATIENT
Start: 2021-12-06 | End: 2023-02-06 | Stop reason: SDUPTHER

## 2021-12-06 RX ORDER — TIZANIDINE 2 MG/1
TABLET ORAL
COMMUNITY
Start: 2021-11-29 | End: 2022-04-19

## 2021-12-06 RX ORDER — ALBUTEROL SULFATE 2.5 MG/3ML
SOLUTION RESPIRATORY (INHALATION)
COMMUNITY
End: 2022-04-19

## 2021-12-06 RX ORDER — DULOXETIN HYDROCHLORIDE 60 MG/1
CAPSULE, DELAYED RELEASE ORAL
COMMUNITY
Start: 2021-11-29 | End: 2021-12-06

## 2021-12-06 RX ORDER — KETOROLAC TROMETHAMINE 30 MG/ML
30 INJECTION, SOLUTION INTRAMUSCULAR; INTRAVENOUS ONCE
Status: COMPLETED | OUTPATIENT
Start: 2021-12-06 | End: 2021-12-06

## 2021-12-06 RX ORDER — ALBUTEROL SULFATE 90 UG/1
AEROSOL, METERED RESPIRATORY (INHALATION)
COMMUNITY
Start: 2021-08-24 | End: 2021-12-06

## 2021-12-06 RX ADMIN — KETOROLAC TROMETHAMINE 30 MG: 30 INJECTION, SOLUTION INTRAMUSCULAR; INTRAVENOUS at 15:42

## 2021-12-06 NOTE — PATIENT INSTRUCTIONS
-Your labs are still ordered, please go to any [facility] lab and get the work-up done.  -We will give you a one-time dose of ketorolac 30 mg to help with the pain.  -Please return to clinic in 5 weeks

## 2021-12-07 ASSESSMENT — ENCOUNTER SYMPTOMS
DIZZINESS: 0
SORE THROAT: 0
NECK PAIN: 1
EYE REDNESS: 0
FALLS: 0
FEVER: 0
ABDOMINAL PAIN: 0
FOCAL WEAKNESS: 0
VOMITING: 0
FLANK PAIN: 0
MYALGIAS: 1
DIARRHEA: 0
SHORTNESS OF BREATH: 0
BRUISES/BLEEDS EASILY: 0
BACK PAIN: 1
TINGLING: 0
NAUSEA: 0
EYE DISCHARGE: 0
BLOOD IN STOOL: 0
CONSTIPATION: 0
DEPRESSION: 0
SEIZURES: 0
NERVOUS/ANXIOUS: 0
WHEEZING: 0
DOUBLE VISION: 0
BLURRED VISION: 1
CLAUDICATION: 0
CHILLS: 0
HEARTBURN: 0
PALPITATIONS: 0
SENSORY CHANGE: 0
COUGH: 0
HEADACHES: 0

## 2021-12-07 ASSESSMENT — LIFESTYLE VARIABLES: SUBSTANCE_ABUSE: 1

## 2021-12-08 NOTE — ASSESSMENT & PLAN NOTE
Patient has history of chronic diffuse pain with unclear etiology.  Labs ordered last time were not completed due to loss of paperwork 2nd time.  Patient was urged to get lab work done, and that she does not need paperwork, labs are already in the system.  -RF, KAT, HLA-B27, CBC, CMP, TSH

## 2021-12-08 NOTE — PROGRESS NOTES
Subjective:     CC: Pain and fatigue    HPI:   Lynette presents today with the chief complaint of pain and fatigue.  Her past medical history significant for muscle pain, tobacco dependence, drug dependence use of marijuana, GERD, psoriatic arthritis, and asthma.  She has been seen by the clinic twice in the last 3 months for this work-up.  However, has been unable to get lab work-up done.  She lost her lab paperwork twice so new lab work-up was ordered through [facility], and she was counseled to go to [facility] lab to have her blood drawn, as it is futile to give her medications were treated without having objective data from labs.  Patient is agreeable and says that she will go for lab work this week.  Also complains of body aches, and says that her joints are hurting secondary to her psoriatic arthritis.  Says that in the past she was given a one-time ketorolac IM shot that helped her.    Problem   Muscle Pain   Screening for Hyperlipidemia   Tobacco Dependence   Psoriatic Arthritis (Hcc)       Health Maintenance: Completed    ROS:  Review of Systems   Constitutional: Positive for malaise/fatigue. Negative for chills and fever.   HENT: Negative for congestion, hearing loss and sore throat.    Eyes: Positive for blurred vision. Negative for double vision, discharge and redness.   Respiratory: Negative for cough, shortness of breath and wheezing.    Cardiovascular: Negative for chest pain, palpitations, claudication and leg swelling.   Gastrointestinal: Negative for abdominal pain, blood in stool, constipation, diarrhea, heartburn, melena, nausea and vomiting.   Genitourinary: Negative for dysuria, flank pain, frequency, hematuria and urgency.   Musculoskeletal: Positive for back pain, joint pain, myalgias and neck pain. Negative for falls.   Skin: Negative for rash.   Neurological: Negative for dizziness, tingling, sensory change, focal weakness, seizures and headaches.   Endo/Heme/Allergies: Does not bruise/bleed easily.  "  Psychiatric/Behavioral: Positive for substance abuse. Negative for depression and suicidal ideas. The patient is not nervous/anxious.        Objective:     Exam:  /72 (BP Location: Left arm, Patient Position: Sitting, BP Cuff Size: Adult)   Pulse 89   Temp 36.9 °C (98.4 °F) (Temporal)   Ht 1.651 m (5' 5\")   Wt 63.4 kg (139 lb 12.8 oz)   LMP 01/01/1995   SpO2 95%   BMI 23.26 kg/m²  Body mass index is 23.26 kg/m².    Physical Exam  Constitutional:       Appearance: Normal appearance. She is normal weight.   HENT:      Head: Normocephalic and atraumatic.      Mouth/Throat:      Mouth: Mucous membranes are moist.      Pharynx: Oropharynx is clear.   Eyes:      General: No scleral icterus.     Extraocular Movements: Extraocular movements intact.      Conjunctiva/sclera: Conjunctivae normal.      Pupils: Pupils are equal, round, and reactive to light.   Neck:      Vascular: No carotid bruit.   Cardiovascular:      Rate and Rhythm: Normal rate and regular rhythm.      Pulses: Normal pulses.      Heart sounds: Normal heart sounds. No murmur heard.  No friction rub.   Pulmonary:      Effort: Pulmonary effort is normal. No respiratory distress.      Breath sounds: Normal breath sounds. No wheezing, rhonchi or rales.   Abdominal:      General: Abdomen is flat. Bowel sounds are normal. There is no distension.      Palpations: Abdomen is soft. There is no mass.      Tenderness: There is no abdominal tenderness. There is no right CVA tenderness or left CVA tenderness.   Musculoskeletal:         General: Normal range of motion.      Right hand: Deformity present.      Left hand: Deformity present.      Cervical back: Normal range of motion and neck supple. No rigidity.      Right lower leg: No edema.      Left lower leg: No edema.   Skin:     General: Skin is warm.      Coloration: Skin is not jaundiced.      Findings: No lesion or rash.   Neurological:      General: No focal deficit present.      Mental Status: She " is alert and oriented to person, place, and time. Mental status is at baseline.      Cranial Nerves: No cranial nerve deficit.      Sensory: No sensory deficit.      Gait: Gait normal.      Deep Tendon Reflexes: Reflexes normal.   Psychiatric:         Mood and Affect: Mood normal.         Behavior: Behavior normal.         Thought Content: Thought content normal.       Labs: No new labs were reviewed today.    Assessment & Plan:     66 y.o. female with the following -     Problem List Items Addressed This Visit     Muscle pain     Patient has history of chronic diffuse pain with unclear etiology.  Labs ordered last time were not completed due to loss of paperwork 2nd time.  Patient was urged to get lab work done, and that she does not need paperwork, labs are already in the system.  -RF, KAT, HLA-B27, CBC, CMP, TSH         Psoriatic arthritis (HCC)     Labs reordered.  -RF, KAT, HLA-B27, ESR, TSH         Relevant Medications    tizanidine (ZANAFLEX) 2 MG tablet    Tobacco dependence     Patient smokes 1 pack of cigarettes a day. Counseled patient to decrease smoking, and indulged in lozenges or hard candy to help with the cravings.         Screening for hyperlipidemia     -Lipid panel ordered               I spent a total of 30 minutes with record review, exam, communication with the patient, communication with other providers, and documentation of this encounter.    Return in about 5 weeks (around 1/10/2022).    Please note that this dictation was created using voice recognition software. I have made every reasonable attempt to correct obvious errors, but I expect that there are errors of grammar and possibly content that I did not discover before finalizing the note.

## 2021-12-08 NOTE — ASSESSMENT & PLAN NOTE
Patient smokes 1 pack of cigarettes a day. Counseled patient to decrease smoking, and indulged in lozenges or hard candy to help with the cravings.

## 2022-01-24 RX ORDER — CLONIDINE HYDROCHLORIDE 0.1 MG/1
0.1 TABLET ORAL
Qty: 90 TABLET | Refills: 0 | Status: SHIPPED | OUTPATIENT
Start: 2022-01-24 | End: 2022-03-21 | Stop reason: SDUPTHER

## 2022-01-24 NOTE — TELEPHONE ENCOUNTER
Last seen: 10/26/21 by Dr. Garcia  Next appt: 01/28/22 with Dr. Sorensen    Was the patient seen in the last year in this department? Yes   Does patient have an active prescription for medications requested? No   Received Request Via: Pharmacy

## 2022-02-04 ENCOUNTER — HOSPITAL ENCOUNTER (EMERGENCY)
Facility: MEDICAL CENTER | Age: 67
End: 2022-02-04
Attending: EMERGENCY MEDICINE
Payer: MEDICARE

## 2022-02-04 VITALS
HEART RATE: 72 BPM | TEMPERATURE: 98 F | BODY MASS INDEX: 23.32 KG/M2 | WEIGHT: 140 LBS | OXYGEN SATURATION: 94 % | DIASTOLIC BLOOD PRESSURE: 76 MMHG | SYSTOLIC BLOOD PRESSURE: 154 MMHG | HEIGHT: 65 IN | RESPIRATION RATE: 18 BRPM

## 2022-02-04 DIAGNOSIS — R52 DIFFUSE PAIN: ICD-10-CM

## 2022-02-04 PROCEDURE — 99284 EMERGENCY DEPT VISIT MOD MDM: CPT

## 2022-02-04 PROCEDURE — 700102 HCHG RX REV CODE 250 W/ 637 OVERRIDE(OP): Performed by: EMERGENCY MEDICINE

## 2022-02-04 PROCEDURE — A9270 NON-COVERED ITEM OR SERVICE: HCPCS | Performed by: EMERGENCY MEDICINE

## 2022-02-04 PROCEDURE — 700111 HCHG RX REV CODE 636 W/ 250 OVERRIDE (IP): Performed by: EMERGENCY MEDICINE

## 2022-02-04 RX ORDER — OXYCODONE HYDROCHLORIDE AND ACETAMINOPHEN 5; 325 MG/1; MG/1
1 TABLET ORAL ONCE
Status: COMPLETED | OUTPATIENT
Start: 2022-02-04 | End: 2022-02-04

## 2022-02-04 RX ORDER — PREDNISONE 20 MG/1
60 TABLET ORAL ONCE
Status: COMPLETED | OUTPATIENT
Start: 2022-02-04 | End: 2022-02-04

## 2022-02-04 RX ORDER — PREDNISONE 20 MG/1
60 TABLET ORAL DAILY
Qty: 15 TABLET | Refills: 0 | Status: SHIPPED | OUTPATIENT
Start: 2022-02-04 | End: 2022-02-09

## 2022-02-04 RX ADMIN — PREDNISONE 60 MG: 20 TABLET ORAL at 21:33

## 2022-02-04 RX ADMIN — OXYCODONE HYDROCHLORIDE AND ACETAMINOPHEN 1 TABLET: 5; 325 TABLET ORAL at 21:33

## 2022-02-05 NOTE — ED NOTES
PT began to ambulate to women's restroom and was unsteady on her feet. ED staff assisted PT to and from restroom.

## 2022-02-05 NOTE — ED TRIAGE NOTES
Chief Complaint   Patient presents with   • Back Pain     Pt states that she has had right upper back pain that is decribed as a ball and is stabbing in nature that has been hurting for about 2 wks.    • Breast Pain     Pt states left sided Breast pain that pt describes hurts on the inside, pt states drainage from breast that is a chidi color.   • Fatigue     Explained to pt triage process, made pt aware to tell this RN/staff of any changes/concerns, pt verbalized understanding of process and instructions given. Pt to MANUELA hogan.

## 2022-02-05 NOTE — ED NOTES
AVS discussed with patient. Pt taken to lobby via wheelchair. Pt taking lyft home- able to stand and transfer independently

## 2022-02-05 NOTE — ED NOTES
Pt taken to exam room by wheelchair. Pt states her back pain has been ongoing for two weeks. Pt difficult historian

## 2022-02-05 NOTE — ED PROVIDER NOTES
"ED Provider Note    CHIEF COMPLAINT  Chief Complaint   Patient presents with   • Back Pain     Pt states that she has had right upper back pain that is decribed as a ball and is stabbing in nature that has been hurting for about 2 wks.    • Breast Pain     Pt states left sided Breast pain that pt describes hurts on the inside, pt states drainage from breast that is a chidi color.   • Fatigue       HPI  Lynette Correa is a 66 y.o. female who presents complaints including diffuse back pain, left breast pain, generalized malaise.  The patient states that she has arthritis and has pain everywhere.  She states she has been having pain over the last 30 to 45 days.  She is unaware of any fevers.  She states this is atraumatic in nature.  She is also noted some periodic drainage from the left breast.  She is never had a mammogram.  Again she has not had any associated fevers.  She is had no nausea or vomiting.  She is unaware of any sick contacts.    REVIEW OF SYSTEMS  See HPI for further details. All other systems are negative.     PAST MEDICAL HISTORY  Past Medical History:   Diagnosis Date   • Cold 10/29/2018    11/19/18-Denies fevers but still has a cough. Pt will inform Dr Guzmán.   • Pain 08/31/2018    all over   • Hepatitis C 01/2018    Dx. 1969, no therapy   • Bowel habit changes 9-23-16    Diarrhea often, resolved   • Arthritis 9-23-16    \"Everywhere\" 1/18, psoriatic arthritis   • Anesthesia 9-23-16    \"Only with Morphine\"  Migraines & PONV    • Bronchitis 2015   • Pneumonia 2015   • Seizure (HCC) 2015    x1 @ [place]- withdrawal from Fentanyl   • Alcohol abuse    • Anesthesia     \"Mother had a stroke with anesthesia\"   • Arrhythmia     \"2015 detected a flutter\", does not see a cardiologist, small hole heart valve 10 years ago   • ASTHMA    • Breath shortness     11/19/18-Occassional due to meds and denies change in past 4 weeks.    • Chronic pain 01/2018, 11/19/18    lower spine, feet, hips, hands shoulders " "  • COPD (chronic obstructive pulmonary disease) ([facility])    • Dental disorder     Full Set Dentures   • Depression    • Disorder of thyroid     -no meds. partial thyroidectomy.   • Emphysema of lung ([facility])    • Heart burn    • Hx of migraines    • Indigestion     stomach ulcers    • Jaundice     Age 15, related to hep C   • kidney stones     kidney stones   • Neuropathy, peripheral    • Pain     \"everywhere\"   • Risk for falls     HX of falls   • Sleep apnea [id number]-never had sleep study but wore oxygen at night for approx 1 year.    • Substance abuse ([facility])    • Ulcer    • Unspecified hemorrhagic conditions     PROLONGED BLEEDING TIME-18 under control.       FAMILY HISTORY  [unfilled]    SOCIAL HISTORY  Social History     Socioeconomic History   • Marital status: Legally      Spouse name: Not on file   • Number of children: Not on file   • Years of education: Not on file   • Highest education level: Not on file   Occupational History   • Not on file   Tobacco Use   • Smoking status: Former Smoker     Packs/day: 0.50     Years: 40.00     Pack years: 20.00     Types: Cigarettes     Quit date: 2017     Years since quittin.0   • Smokeless tobacco: Never Used   • Tobacco comment: 1 ppd 10 yrs   Substance and Sexual Activity   • Alcohol use: No   • Drug use: No   • Sexual activity: Not on file   Other Topics Concern   • Not on file   Social History Narrative   • Not on file     Social Determinants of Health     Financial Resource Strain:    • Difficulty of Paying Living Expenses: Not on file   Food Insecurity:    • Worried About Running Out of Food in the Last Year: Not on file   • Ran Out of Food in the Last Year: Not on file   Transportation Needs:    • Lack of Transportation (Medical): Not on file   • Lack of Transportation (Non-Medical): Not on file   Physical Activity:    • Days of Exercise per Week: Not on file   • Minutes of Exercise per Session: Not on file   Stress:    • " Feeling of Stress : Not on file   Social Connections:    • Frequency of Communication with Friends and Family: Not on file   • Frequency of Social Gatherings with Friends and Family: Not on file   • Attends Zoroastrianism Services: Not on file   • Active Member of Clubs or Organizations: Not on file   • Attends Club or Organization Meetings: Not on file   • Marital Status: Not on file   Intimate Partner Violence:    • Fear of Current or Ex-Partner: Not on file   • Emotionally Abused: Not on file   • Physically Abused: Not on file   • Sexually Abused: Not on file   Housing Stability:    • Unable to Pay for Housing in the Last Year: Not on file   • Number of Places Lived in the Last Year: Not on file   • Unstable Housing in the Last Year: Not on file       SURGICAL HISTORY  Past Surgical History:   Procedure Laterality Date   • KNEE REVISION TOTAL Right 11/26/2018    Procedure: KNEE REVISION TOTAL - W/POLY EXCHANGE;  Surgeon: Yovani Guzmán M.D.;  Location: [facility];  Service: Orthopedics   • KNEE REVISION TOTAL Right 9/10/2018    Procedure: KNEE REVISION TOTAL WITH INTRA OPERATIVE BLOCK FOR POST OPERATIVE PAIN RELIEF, EXTENSIVE SYNOVECTOMY;  Surgeon: Yovani Guzmán M.D.;  Location: [facility];  Service: Orthopedics   • KNEE ARTHROPLASTY TOTAL Left 5/14/2018    Procedure: KNEE ARTHROPLASTY TOTAL, SECONDARY DIFFICULTY- WITH INTRAOPERATIVE BLOCK FOR POST OPERATIVE PAIN RELIEF;  Surgeon: Yovani Guzmán M.D.;  Location: [facility];  Service: Orthopedics   • SHOULDER ARTHROPLASTY TOTAL Left 2/12/2018    Procedure: SHOULDER ARTHROPLASTY TOTAL;  Surgeon: Yovani Guzmán M.D.;  Location: [facility];  Service: Orthopedics   • LUMBAR LAMINECTOMY DISKECTOMY  10/8/2016    Procedure: LUMBAR LAMINECTOMY DISKECTOMY FPRAMINOTOMY L3-4- POSTERIOR L3-4 STABILIZATION WITH INSTRUMENTATION  WITH [employer] INSTRUMENTS;  Surgeon: Baldemar Restrepo M.D.;  Location: [facility]  "TOWER ORS;  Service:    • ORIF, WRIST Left 3/3/2016    Procedure: WRIST ORIF;  Surgeon: Campbell Love M.D.;  Location: SURGERY [facility];  Service:    • MASS EXCISION GENERAL  10/4/2011    Performed by GANSER, JOHN H at SURGERY [facility]   • LUMBAR LAMINECTOMY DISKECTOMY  1/8/2011    Performed by PREET SPEAR at SURGERY [facility]   • CERVICAL FUSION POSTERIOR  7/20/2010    Performed by PREET SPEAR at SURGERY [facility]   • CERVICAL DECOMPRESSION POSTERIOR  7/20/2010    Performed by PREET SPEAR at SURGERY [facility]   • KNEE ARTHROSCOPY Left 2008    left knee   • CARPAL TUNNEL RELEASE Right 2005   • OTHER CARDIAC SURGERY  2005    ablation  and small hole in heart valve repaired by dr medel   • KNEE ARTHROPLASTY TOTAL Right 2003    right   • HYSTERECTOMY, TOTAL ABDOMINAL  1995   • BIOPSY GENERAL      right shin biopsy    • BLOCK EPIDURAL STEROID INJECTION  2015-7/2018   • OTHER ORTHOPEDIC SURGERY      right index finger sewn back on   • PRIMARY C SECTION  1989/1988/1993    x 3   • TONSILLECTOMY  as child       CURRENT MEDICATIONS  Home Medications    **Home medications have not yet been reviewed for this encounter**         ALLERGIES  Allergies   Allergen Reactions   • Codeine Rash     Difficulty breathing, and itching   • Morphine Anaphylaxis     Headache; throat swelling   • Pcn [Penicillins]      All \"cillin\" family, per patient  ---   I broke out in \"trench\" mouth       PHYSICAL EXAM  VITAL SIGNS: /84   Pulse 72   Temp 36.6 °C (97.9 °F) (Temporal)   Resp 18   Ht 1.651 m (5' 5\") Comment: Simultaneous filing. User may not have seen previous data.  Wt 63.5 kg (140 lb)   LMP 01/01/1995   SpO2 99%   BMI 23.30 kg/m²       Constitutional: Chronically ill in appearance and tearful but no acute distress  HENT: Normocephalic, Atraumatic, Bilateral external ears normal, Oropharynx moist, No oral exudates, Nose normal.   Eyes: PERRLA, EOMI, Conjunctiva normal, No discharge. "   Neck: Normal range of motion, No tenderness, Supple, No stridor.   Lymphatic: No lymphadenopathy noted.   Cardiovascular: Normal heart rate, Normal rhythm, No murmurs, No rubs, No gallops.   Thorax & Lungs: Symmetrically diminished throughout, No respiratory distress, No wheezing, No chest tenderness.   Abdomen: Bowel sounds normal, Soft, No tenderness, No masses, No pulsatile masses.   Skin: Warm, Dry, No erythema, No rash.   Back: Diffuse paraspinal discomfort throughout the thoracic and lumbar region.   Extremities: Intact distal pulses, No edema, No tenderness, No cyanosis, No clubbing.   Neurologic: Alert & oriented x 3, Normal motor function, Normal sensory function, No focal deficits noted.   Psychiatric: Tearful    COURSE & MEDICAL DECISION MAKING  Pertinent Labs & Imaging studies reviewed. (See chart for details)  This a 66-year-old female who presents the emerge department with diffuse pain.  Clinically do not appreciate any evidence of an infectious process.  She is had this ongoing for greater than 30 days therefore an infection would be very unlikely.  She does not have any focal midline tenderness in the spine to support an epidural abscess in fact she is hyperesthetic throughout the whole back and there could be some psychogenic component.  She states she also severe pain in her hands where she has arthritis.  She states that she is been taken Motrin with no relief and she cannot give any narcotics.  The patient will be treated with one-time dose of pain medication here in the emerge department as well as prednisone will discharge home on 4 more days of prednisone.  She is instructed to follow-up with her primary care doctor.  Also give her referral to the breast clinic for mammogram.  I do not appreciate any evidence of an abscess or infection to the left breast.  There is no focal area of tenderness in the left breast.  The patient will also follow-up with her primary care doctor she is  hypertensive here in the emerge department I suspect hypertension is more from her pain and anxiety.  She is aware this needs to be rechecked in 10 to 14 days.    FINAL IMPRESSION  1.  Diffuse back pain  2.  Diffuse arthralgias  3.  Generalized malaise  4.  Left breast pain  5.  Hypertension    Disposition  The patient will be discharged in stable condition         Electronically signed by: Rivera Brenner M.D., 2/4/2022 9:21 PM

## 2022-02-05 NOTE — DISCHARGE INSTRUCTIONS
Take Tylenol as needed for pain control and follow-up with your primary care provider as well as the breast clinic as discussed

## 2022-02-07 NOTE — TELEPHONE ENCOUNTER
Last seen: 12/6/21 by Dr. Vazquez Next appt: None      Does patient have an active prescription for medications requested? No    Received Request Via: Pharmacy

## 2022-02-08 RX ORDER — DULOXETIN HYDROCHLORIDE 30 MG/1
30 CAPSULE, DELAYED RELEASE ORAL DAILY
Qty: 90 CAPSULE | Refills: 0 | Status: SHIPPED | OUTPATIENT
Start: 2022-02-08 | End: 2022-02-10 | Stop reason: SDUPTHER

## 2022-02-10 NOTE — TELEPHONE ENCOUNTER
Duloxetine Refill    Last seen: 12/6/21 by Dr. Vazquez  Next appt: ? with  ?    Was the patient seen in the last year in this department? Yes   Does patient have an active prescription for medications requested? No   Received Request Via: Pharmacy

## 2022-02-11 RX ORDER — DULOXETIN HYDROCHLORIDE 30 MG/1
30 CAPSULE, DELAYED RELEASE ORAL DAILY
Qty: 90 CAPSULE | Refills: 0 | Status: SHIPPED | OUTPATIENT
Start: 2022-02-11 | End: 2022-04-19

## 2022-03-08 RX ORDER — GABAPENTIN 800 MG/1
1200 TABLET ORAL 3 TIMES DAILY
Qty: 300 TABLET | Refills: 1 | Status: SHIPPED | OUTPATIENT
Start: 2022-03-08 | End: 2022-04-27 | Stop reason: SDUPTHER

## 2022-03-08 NOTE — TELEPHONE ENCOUNTER
Last seen: 12/6/21 by Dr. Vazquez  Next appt: 03/24/22     Does patient have an active prescription for medications requested? No    Received Request Via: Pharmacy

## 2022-03-21 NOTE — TELEPHONE ENCOUNTER
Last seen: 12.06.222 by Dr. fry  Next appt: 03.24.22 with Dr. martell    Was the patient seen in the last year in this department? Yes   Does patient have an active prescription for medications requested? No   Received Request Via: Pharmacy

## 2022-03-22 RX ORDER — CLONIDINE HYDROCHLORIDE 0.1 MG/1
0.1 TABLET ORAL
Qty: 90 TABLET | Refills: 0 | Status: SHIPPED | OUTPATIENT
Start: 2022-03-22 | End: 2022-04-29

## 2022-03-24 ENCOUNTER — OFFICE VISIT (OUTPATIENT)
Dept: INTERNAL MEDICINE | Facility: OTHER | Age: 67
End: 2022-03-24
Payer: MEDICARE

## 2022-03-24 VITALS
SYSTOLIC BLOOD PRESSURE: 142 MMHG | DIASTOLIC BLOOD PRESSURE: 77 MMHG | WEIGHT: 139.6 LBS | HEIGHT: 65 IN | BODY MASS INDEX: 23.26 KG/M2 | HEART RATE: 77 BPM | OXYGEN SATURATION: 100 % | TEMPERATURE: 99.5 F

## 2022-03-24 DIAGNOSIS — L40.50 PSORIATIC ARTHRITIS (HCC): ICD-10-CM

## 2022-03-24 DIAGNOSIS — Z13.220 SCREENING FOR HYPERLIPIDEMIA: ICD-10-CM

## 2022-03-24 DIAGNOSIS — F17.200 TOBACCO DEPENDENCE: ICD-10-CM

## 2022-03-24 DIAGNOSIS — F33.9 MAJOR DEPRESSIVE DISORDER, RECURRENT EPISODE WITH ANXIOUS DISTRESS (HCC): ICD-10-CM

## 2022-03-24 DIAGNOSIS — Z12.31 ENCOUNTER FOR SCREENING MAMMOGRAM FOR MALIGNANT NEOPLASM OF BREAST: ICD-10-CM

## 2022-03-24 DIAGNOSIS — M79.10 MUSCLE PAIN: ICD-10-CM

## 2022-03-24 DIAGNOSIS — N64.52 DISCHARGE FROM NIPPLE: ICD-10-CM

## 2022-03-24 PROCEDURE — 99213 OFFICE O/P EST LOW 20 MIN: CPT | Mod: GE | Performed by: STUDENT IN AN ORGANIZED HEALTH CARE EDUCATION/TRAINING PROGRAM

## 2022-03-26 ASSESSMENT — ENCOUNTER SYMPTOMS
LOSS OF CONSCIOUSNESS: 0
VOMITING: 0
SORE THROAT: 0
FALLS: 0
MEMORY LOSS: 0
PALPITATIONS: 0
FEVER: 0
NAUSEA: 0
POLYDIPSIA: 0
DEPRESSION: 1
CHILLS: 0
WEAKNESS: 1
BRUISES/BLEEDS EASILY: 0
INSOMNIA: 0
DOUBLE VISION: 0
HEADACHES: 0
ABDOMINAL PAIN: 0
SHORTNESS OF BREATH: 0
MYALGIAS: 0
COUGH: 0
BLURRED VISION: 0

## 2022-03-26 ASSESSMENT — LIFESTYLE VARIABLES: SUBSTANCE_ABUSE: 0

## 2022-03-26 NOTE — PROGRESS NOTES
Established Patient    Lynette Correa is a 66 y.o. female who presents today with the following:    CC: follow up visit, request lab slips    HPI: 67 yo F here for follow up visit and to request reprint of lab paperwork previously ordered during past medical visits last year. She was recently seen on 2/4/22 at the [facility] ED for back pain, breast pain, and fatigue but no lab work was done during that ED visit and she was discharged. Today she was given her lab slips for the workup pertaining to her muscle pain and psoriatric arthritis, she was also referred to mammography again for the rust colored nipple discharge. Prior to the end of the visit patient was asked about depressive symptoms and she stated she had depressive symptoms but denied suicidal ideation or self-harm. Counseling was discussed with the patient and she was amenable and grateful for a referral for counseling.    Review of Systems   Constitutional: Negative for chills and fever.   HENT: Negative for hearing loss and sore throat.    Eyes: Negative for blurred vision and double vision.   Respiratory: Negative for cough and shortness of breath.    Cardiovascular: Negative for chest pain and palpitations.   Gastrointestinal: Negative for abdominal pain, nausea and vomiting.   Genitourinary: Negative for dysuria and hematuria.   Musculoskeletal: Negative for falls and myalgias.   Skin: Negative for itching and rash.   Neurological: Positive for weakness. Negative for loss of consciousness and headaches.   Endo/Heme/Allergies: Negative for polydipsia. Does not bruise/bleed easily.   Psychiatric/Behavioral: Positive for depression. Negative for memory loss, substance abuse and suicidal ideas. The patient does not have insomnia.        Patient Active Problem List    Diagnosis Date Noted   • Screening for hyperlipidemia 10/26/2021   • Muscle pain 07/22/2021   • Tobacco dependence 01/19/2021   • Blindness of right eye with normal vision in contralateral eye  10/12/2020   • Cannabis abuse 10/12/2020   • Chronic low back pain 10/12/2020   • Chronic obstructive pulmonary disease (HCC) 10/12/2020   • Combined drug dependence, continuous abuse ([facility]) 10/12/2020   [facility]   • Fusion of spine, cervical region 10/12/2020   • Gastroesophageal reflux disease 10/12/2020   • Psoriatic arthritis ([facility]) 10/12/2020   • Severe persistent asthma with (acute) exacerbation 10/12/2020   • Viral hepatitis C 10/12/2020   • Posttraumatic stress disorder 2017   • Major depressive disorder, recurrent episode with anxious distress ([facility]) 2017   • Chronic pain 2017   • Alcohol abuse, in remission 2017   • Lumbar spinal stenosis 10/08/2016   • Closed die-punch intra-articular fracture of distal end of left radius 2016       Social History     Tobacco Use   • Smoking status: Former Smoker     Packs/day: 0.50     Years: 40.00     Pack years: 20.00     Types: Cigarettes     Quit date: 2017     Years since quittin.1   • Smokeless tobacco: Never Used   • Tobacco comment: 1 ppd 10 yrs   Substance Use Topics   • Alcohol use: No   • Drug use: No       Current Outpatient Medications   Medication Sig Dispense Refill   • cloNIDine (CATAPRES) 0.1 MG Tab Take 1 Tablet by mouth at bedtime. 90 Tablet 0   • gabapentin (NEURONTIN) 800 MG tablet Take 1.5 Tablets by mouth 3 times a day. 300 Tablet 1   • DULoxetine (CYMBALTA) 60 MG Cap DR Particles delayed-release capsule Take 60 mg by mouth every day.     • tizanidine (ZANAFLEX) 2 MG tablet      • Dexlansoprazole (DEXILANT) 60 MG CAPSULE DELAYED RELEASE delayed-release capsule Take 60 mg by mouth every day. 90 Capsule 3   • dexamethasone (DECADRON) 20 MG/5ML Solution injection  (Patient not taking: Reported on 3/24/2022)     • methylPREDNISolone acetate (DEPO-MEDROL) 40 MG/ML Suspension      • DULoxetine (CYMBALTA) 30 MG Cap DR Particles Take 1 Capsule by mouth every day. (Patient not taking: Reported on  "3/24/2022) 90 Capsule 0   • albuterol (PROVENTIL) 2.5mg/3ml Nebu Soln solution for nebulization ALBUTEROL SULFATE (2.5 MG/3ML) 0.083% NEBU (Patient not taking: Reported on 12/6/2021)     • albuterol (PROVENTIL) 2.5mg/3ml Nebu Soln solution for nebulization Take 3 mL by nebulization every four hours as needed for Shortness of Breath. (Patient not taking: Reported on 12/6/2021) 1 Each 1     No current facility-administered medications for this visit.       /77 (BP Location: Left arm, Patient Position: Sitting, BP Cuff Size: Adult)   Pulse 77   Temp 37.5 °C (99.5 °F) (Temporal)   Ht 1.651 m (5' 5\")   Wt 63.3 kg (139 lb 9.6 oz)   LMP 01/01/1995   SpO2 100%   BMI 23.23 kg/m²     Physical Exam  General: Well developed, well nourished female, in no distress.  Eyes: Conjuntiva without any obvious injection or erythema.   Cardiovascular: Heart is regular with no murmurs  Lungs: Clear to auscultation bilaterally. No wheezes, rhonci or crackles heard. Respiratory effort is normal.  Abd: Soft, non-tender  Ext: No edema  Neuro: no focal deficit, A&Ox3      Assessment and Plan    1. Discharge from nipple  -reports continued clear and rust colored discharge  -referred at a previous visit for mammography, new referral placed    2. Encounter for screening mammogram for malignant neoplasm of breast  -referral for mamogram    3. Muscle pain  -hx of chronic, diffuse pain with unclear etiology  -PCP ordered multiple labs for workup, labs reordered again in Epic last visit as patient did not complete them, reprinted again today  -RF, KAT, HLA-B27, CBC, CMP, TSH    4. Psoriatic arthritis (HCC)  -labs reprinted  -RF, TSH, KAT, HLA-B27, ESR    5. Tobacco dependence  Resolved, denies smoking but used to smoke 1 ppd    6. Screening for hyperlipidemia  -lipid panel order reprinted    7. Major depressive disorder, recurrent episode with anxious distress (HCC)  -referred for counseling    [facility] patient case discussed with  " Nancy    Follow up with PCP Dr. CORY Sorensen in a month    Signed by: Hal Garcia M.D.

## 2022-04-12 ENCOUNTER — TELEPHONE (OUTPATIENT)
Dept: INTERNAL MEDICINE | Facility: OTHER | Age: 67
End: 2022-04-12
Payer: MEDICARE

## 2022-04-12 RX ORDER — GABAPENTIN 600 MG/1
600 TABLET ORAL EVERY 4 HOURS
Qty: 90 TABLET | Refills: 0 | Status: SHIPPED | OUTPATIENT
Start: 2022-04-12 | End: 2022-04-19

## 2022-04-12 NOTE — TELEPHONE ENCOUNTER
Caller Name: Lynette Correa  Call Back Number: [phone]    How would the patient prefer to be contacted with a response: Phone call OK to leave a detailed message    Medication Management:patient called and is requesting for her Gabapentin to be changed due to insurance not covering it at what she is taking currently.      Please change prescription to Gabapentin 600mg, take 1 pill every 4 hours for 90 pills, 30 day count.    Please review and send if appropriate

## 2022-04-18 ENCOUNTER — APPOINTMENT (OUTPATIENT)
Dept: RADIOLOGY | Facility: MEDICAL CENTER | Age: 67
End: 2022-04-18
Attending: EMERGENCY MEDICINE
Payer: MEDICARE

## 2022-04-18 ENCOUNTER — HOSPITAL ENCOUNTER (OUTPATIENT)
Facility: MEDICAL CENTER | Age: 67
End: 2022-04-20
Attending: EMERGENCY MEDICINE | Admitting: INTERNAL MEDICINE
Payer: MEDICARE

## 2022-04-18 DIAGNOSIS — R51.9 ACUTE INTRACTABLE HEADACHE, UNSPECIFIED HEADACHE TYPE: ICD-10-CM

## 2022-04-18 DIAGNOSIS — U07.1 COVID-19 VIRUS INFECTION: ICD-10-CM

## 2022-04-18 DIAGNOSIS — R68.89 FLU-LIKE SYMPTOMS: ICD-10-CM

## 2022-04-18 DIAGNOSIS — G89.29 OTHER CHRONIC PAIN: ICD-10-CM

## 2022-04-18 DIAGNOSIS — R11.2 NAUSEA AND VOMITING, INTRACTABILITY OF VOMITING NOT SPECIFIED, UNSPECIFIED VOMITING TYPE: ICD-10-CM

## 2022-04-18 DIAGNOSIS — R11.0 NAUSEA: ICD-10-CM

## 2022-04-18 DIAGNOSIS — J45.51 SEVERE PERSISTENT ASTHMA WITH (ACUTE) EXACERBATION: ICD-10-CM

## 2022-04-18 DIAGNOSIS — U07.1 LAB TEST POSITIVE FOR DETECTION OF COVID-19 VIRUS: ICD-10-CM

## 2022-04-18 DIAGNOSIS — F43.10 POSTTRAUMATIC STRESS DISORDER: ICD-10-CM

## 2022-04-18 DIAGNOSIS — R55 NEAR SYNCOPE: ICD-10-CM

## 2022-04-18 LAB
ALBUMIN SERPL BCP-MCNC: 4.1 G/DL (ref 3.2–4.9)
ALBUMIN/GLOB SERPL: 1.5 G/DL
ALP SERPL-CCNC: 107 U/L (ref 30–99)
ALT SERPL-CCNC: 8 U/L (ref 2–50)
ANION GAP SERPL CALC-SCNC: 13 MMOL/L (ref 7–16)
ANISOCYTOSIS BLD QL SMEAR: ABNORMAL
APTT PPP: 30.9 SEC (ref 24.7–36)
AST SERPL-CCNC: 34 U/L (ref 12–45)
BASOPHILS # BLD AUTO: 0 % (ref 0–1.8)
BASOPHILS # BLD: 0 K/UL (ref 0–0.12)
BILIRUB SERPL-MCNC: 0.3 MG/DL (ref 0.1–1.5)
BUN SERPL-MCNC: 16 MG/DL (ref 8–22)
CALCIUM SERPL-MCNC: 8.7 MG/DL (ref 8.4–10.2)
CHLORIDE SERPL-SCNC: 101 MMOL/L (ref 96–112)
CO2 SERPL-SCNC: 19 MMOL/L (ref 20–33)
CREAT SERPL-MCNC: 0.7 MG/DL (ref 0.5–1.4)
EKG IMPRESSION: NORMAL
EOSINOPHIL # BLD AUTO: 0 K/UL (ref 0–0.51)
EOSINOPHIL NFR BLD: 0 % (ref 0–6.9)
ERYTHROCYTE [DISTWIDTH] IN BLOOD BY AUTOMATED COUNT: 45.2 FL (ref 35.9–50)
FLUAV RNA SPEC QL NAA+PROBE: NEGATIVE
FLUBV RNA SPEC QL NAA+PROBE: NEGATIVE
GFR SERPLBLD CREATININE-BSD FMLA CKD-EPI: 94 ML/MIN/1.73 M 2
GLOBULIN SER CALC-MCNC: 2.8 G/DL (ref 1.9–3.5)
GLUCOSE SERPL-MCNC: 103 MG/DL (ref 65–99)
HCT VFR BLD AUTO: 23.8 % (ref 37–47)
HGB BLD-MCNC: 6.4 G/DL (ref 12–16)
HYPOCHROMIA BLD QL SMEAR: ABNORMAL
INR PPP: 1.04 (ref 0.87–1.13)
LYMPHOCYTES # BLD AUTO: 1.36 K/UL (ref 1–4.8)
LYMPHOCYTES NFR BLD: 22 % (ref 22–41)
MANUAL DIFF BLD: NORMAL
MCH RBC QN AUTO: 16.4 PG (ref 27–33)
MCHC RBC AUTO-ENTMCNC: 26.9 G/DL (ref 33.6–35)
MCV RBC AUTO: 60.9 FL (ref 81.4–97.8)
MICROCYTES BLD QL SMEAR: ABNORMAL
MONOCYTES # BLD AUTO: 0.37 K/UL (ref 0–0.85)
MONOCYTES NFR BLD AUTO: 6 % (ref 0–13.4)
NEUTROPHILS # BLD AUTO: 4.46 K/UL (ref 2–7.15)
NEUTROPHILS NFR BLD: 72 % (ref 44–72)
NRBC # BLD AUTO: 0 K/UL
NRBC BLD-RTO: 0 /100 WBC
OVALOCYTES BLD QL SMEAR: NORMAL
PLATELET # BLD AUTO: 430 K/UL (ref 164–446)
PLATELET BLD QL SMEAR: NORMAL
PMV BLD AUTO: 9.4 FL (ref 9–12.9)
POIKILOCYTOSIS BLD QL SMEAR: NORMAL
POLYCHROMASIA BLD QL SMEAR: NORMAL
POTASSIUM SERPL-SCNC: 4.3 MMOL/L (ref 3.6–5.5)
PROT SERPL-MCNC: 6.9 G/DL (ref 6–8.2)
PROTHROMBIN TIME: 12.8 SEC (ref 12–14.6)
RBC # BLD AUTO: 3.91 M/UL (ref 4.2–5.4)
RBC BLD AUTO: PRESENT
RSV RNA SPEC QL NAA+PROBE: NEGATIVE
SARS-COV-2 RNA RESP QL NAA+PROBE: DETECTED
SODIUM SERPL-SCNC: 133 MMOL/L (ref 135–145)
SPECIMEN SOURCE: ABNORMAL
TROPONIN T SERPL-MCNC: 13 NG/L (ref 6–19)
WBC # BLD AUTO: 6.2 K/UL (ref 4.8–10.8)

## 2022-04-18 PROCEDURE — 93005 ELECTROCARDIOGRAM TRACING: CPT | Performed by: EMERGENCY MEDICINE

## 2022-04-18 PROCEDURE — 36415 COLL VENOUS BLD VENIPUNCTURE: CPT

## 2022-04-18 PROCEDURE — 0241U HCHG SARS-COV-2 COVID-19 NFCT DS RESP RNA 4 TRGT MIC: CPT

## 2022-04-18 PROCEDURE — 85007 BL SMEAR W/DIFF WBC COUNT: CPT

## 2022-04-18 PROCEDURE — 85610 PROTHROMBIN TIME: CPT

## 2022-04-18 PROCEDURE — 86850 RBC ANTIBODY SCREEN: CPT

## 2022-04-18 PROCEDURE — 700117 HCHG RX CONTRAST REV CODE 255: Performed by: EMERGENCY MEDICINE

## 2022-04-18 PROCEDURE — 80053 COMPREHEN METABOLIC PANEL: CPT

## 2022-04-18 PROCEDURE — 70496 CT ANGIOGRAPHY HEAD: CPT | Mod: ME

## 2022-04-18 PROCEDURE — 85025 COMPLETE CBC W/AUTO DIFF WBC: CPT

## 2022-04-18 PROCEDURE — 94760 N-INVAS EAR/PLS OXIMETRY 1: CPT

## 2022-04-18 PROCEDURE — C9803 HOPD COVID-19 SPEC COLLECT: HCPCS | Performed by: EMERGENCY MEDICINE

## 2022-04-18 PROCEDURE — 86900 BLOOD TYPING SEROLOGIC ABO: CPT

## 2022-04-18 PROCEDURE — 71045 X-RAY EXAM CHEST 1 VIEW: CPT

## 2022-04-18 PROCEDURE — 85730 THROMBOPLASTIN TIME PARTIAL: CPT

## 2022-04-18 PROCEDURE — 70498 CT ANGIOGRAPHY NECK: CPT | Mod: MG

## 2022-04-18 PROCEDURE — 99285 EMERGENCY DEPT VISIT HI MDM: CPT

## 2022-04-18 PROCEDURE — 84484 ASSAY OF TROPONIN QUANT: CPT

## 2022-04-18 PROCEDURE — 86901 BLOOD TYPING SEROLOGIC RH(D): CPT

## 2022-04-18 RX ADMIN — IOHEXOL 100 ML: 350 INJECTION, SOLUTION INTRAVENOUS at 23:50

## 2022-04-18 ASSESSMENT — LIFESTYLE VARIABLES
TOTAL SCORE: 0
HAVE YOU EVER FELT YOU SHOULD CUT DOWN ON YOUR DRINKING: NO
DO YOU DRINK ALCOHOL: NO
CONSUMPTION TOTAL: INCOMPLETE
EVER HAD A DRINK FIRST THING IN THE MORNING TO STEADY YOUR NERVES TO GET RID OF A HANGOVER: NO
TOTAL SCORE: 0
HAVE PEOPLE ANNOYED YOU BY CRITICIZING YOUR DRINKING: NO
TOTAL SCORE: 0
EVER FELT BAD OR GUILTY ABOUT YOUR DRINKING: NO

## 2022-04-19 ENCOUNTER — APPOINTMENT (OUTPATIENT)
Dept: RADIOLOGY | Facility: MEDICAL CENTER | Age: 67
End: 2022-04-19
Attending: EMERGENCY MEDICINE
Payer: MEDICARE

## 2022-04-19 PROBLEM — U07.1 COVID-19 VIRUS INFECTION: Status: ACTIVE | Noted: 2022-04-19

## 2022-04-19 PROBLEM — D50.9 MICROCYTIC ANEMIA: Status: ACTIVE | Noted: 2022-04-19

## 2022-04-19 LAB
ABO GROUP BLD: NORMAL
ANION GAP SERPL CALC-SCNC: 14 MMOL/L (ref 7–16)
ANISOCYTOSIS BLD QL SMEAR: ABNORMAL
APPEARANCE UR: CLEAR
BACTERIA #/AREA URNS HPF: ABNORMAL /HPF
BARCODED ABORH UBTYP: 5100
BARCODED PRD CODE UBPRD: NORMAL
BARCODED UNIT NUM UBUNT: NORMAL
BASOPHILS # BLD AUTO: 0 % (ref 0–1.8)
BASOPHILS # BLD: 0 K/UL (ref 0–0.12)
BILIRUB UR QL STRIP.AUTO: NEGATIVE
BLD GP AB SCN SERPL QL: NORMAL
BUN SERPL-MCNC: 13 MG/DL (ref 8–22)
CALCIUM SERPL-MCNC: 8.8 MG/DL (ref 8.4–10.2)
CHLORIDE SERPL-SCNC: 102 MMOL/L (ref 96–112)
CO2 SERPL-SCNC: 18 MMOL/L (ref 20–33)
COLOR UR: YELLOW
COMPONENT R 8504R: NORMAL
CREAT SERPL-MCNC: 0.67 MG/DL (ref 0.5–1.4)
CRP SERPL HS-MCNC: <0.3 MG/DL (ref 0–0.75)
D DIMER PPP IA.FEU-MCNC: 0.67 UG/ML (FEU) (ref 0–0.5)
EOSINOPHIL # BLD AUTO: 0.11 K/UL (ref 0–0.51)
EOSINOPHIL NFR BLD: 2 % (ref 0–6.9)
EPI CELLS #/AREA URNS HPF: ABNORMAL /HPF
ERYTHROCYTE [DISTWIDTH] IN BLOOD BY AUTOMATED COUNT: 57.1 FL (ref 35.9–50)
FERRITIN SERPL-MCNC: 15.6 NG/ML (ref 10–291)
GFR SERPLBLD CREATININE-BSD FMLA CKD-EPI: 95 ML/MIN/1.73 M 2
GLUCOSE SERPL-MCNC: 92 MG/DL (ref 65–99)
GLUCOSE UR STRIP.AUTO-MCNC: NEGATIVE MG/DL
HCT VFR BLD AUTO: 28.7 % (ref 37–47)
HGB BLD-MCNC: 7.7 G/DL (ref 12–16)
HYPOCHROMIA BLD QL SMEAR: ABNORMAL
IRON SATN MFR SERPL: 6 % (ref 15–55)
IRON SERPL-MCNC: 26 UG/DL (ref 40–170)
KETONES UR STRIP.AUTO-MCNC: NEGATIVE MG/DL
LEUKOCYTE ESTERASE UR QL STRIP.AUTO: ABNORMAL
LG PLATELETS BLD QL SMEAR: NORMAL
LYMPHOCYTES # BLD AUTO: 1.87 K/UL (ref 1–4.8)
LYMPHOCYTES NFR BLD: 34 % (ref 22–41)
MANUAL DIFF BLD: NORMAL
MCH RBC QN AUTO: 17.4 PG (ref 27–33)
MCHC RBC AUTO-ENTMCNC: 26.8 G/DL (ref 33.6–35)
MCV RBC AUTO: 64.9 FL (ref 81.4–97.8)
MICRO URNS: ABNORMAL
MICROCYTES BLD QL SMEAR: ABNORMAL
MONOCYTES # BLD AUTO: 0.39 K/UL (ref 0–0.85)
MONOCYTES NFR BLD AUTO: 7 % (ref 0–13.4)
MUCOUS THREADS #/AREA URNS HPF: ABNORMAL /HPF
NEUTROPHILS # BLD AUTO: 3.14 K/UL (ref 2–7.15)
NEUTROPHILS NFR BLD: 57 % (ref 44–72)
NITRITE UR QL STRIP.AUTO: NEGATIVE
NRBC # BLD AUTO: 0 K/UL
NRBC BLD-RTO: 0 /100 WBC
OVALOCYTES BLD QL SMEAR: NORMAL
PH UR STRIP.AUTO: 6 [PH] (ref 5–8)
PLATELET # BLD AUTO: 450 K/UL (ref 164–446)
PLATELET BLD QL SMEAR: NORMAL
PMV BLD AUTO: 9.6 FL (ref 9–12.9)
POIKILOCYTOSIS BLD QL SMEAR: NORMAL
POLYCHROMASIA BLD QL SMEAR: NORMAL
POTASSIUM SERPL-SCNC: 4.4 MMOL/L (ref 3.6–5.5)
PROCALCITONIN SERPL-MCNC: 0.03 NG/ML
PRODUCT TYPE UPROD: NORMAL
PROT UR QL STRIP: NEGATIVE MG/DL
RBC # BLD AUTO: 4.42 M/UL (ref 4.2–5.4)
RBC # URNS HPF: ABNORMAL /HPF
RBC BLD AUTO: PRESENT
RBC UR QL AUTO: ABNORMAL
RH BLD: NORMAL
SODIUM SERPL-SCNC: 134 MMOL/L (ref 135–145)
SP GR UR STRIP.AUTO: <=1.005
TIBC SERPL-MCNC: 401 UG/DL (ref 250–450)
UIBC SERPL-MCNC: 375 UG/DL (ref 110–370)
UNIT STATUS USTAT: NORMAL
WBC # BLD AUTO: 5.5 K/UL (ref 4.8–10.8)
WBC #/AREA URNS HPF: ABNORMAL /HPF

## 2022-04-19 PROCEDURE — 82728 ASSAY OF FERRITIN: CPT

## 2022-04-19 PROCEDURE — 86140 C-REACTIVE PROTEIN: CPT

## 2022-04-19 PROCEDURE — 80048 BASIC METABOLIC PNL TOTAL CA: CPT

## 2022-04-19 PROCEDURE — 700102 HCHG RX REV CODE 250 W/ 637 OVERRIDE(OP): Performed by: INTERNAL MEDICINE

## 2022-04-19 PROCEDURE — 84145 PROCALCITONIN (PCT): CPT

## 2022-04-19 PROCEDURE — 36430 TRANSFUSION BLD/BLD COMPNT: CPT

## 2022-04-19 PROCEDURE — G0378 HOSPITAL OBSERVATION PER HR: HCPCS

## 2022-04-19 PROCEDURE — 96376 TX/PRO/DX INJ SAME DRUG ADON: CPT

## 2022-04-19 PROCEDURE — P9016 RBC LEUKOCYTES REDUCED: HCPCS

## 2022-04-19 PROCEDURE — 85025 COMPLETE CBC W/AUTO DIFF WBC: CPT

## 2022-04-19 PROCEDURE — 700111 HCHG RX REV CODE 636 W/ 250 OVERRIDE (IP): Performed by: INTERNAL MEDICINE

## 2022-04-19 PROCEDURE — 99220 PR INITIAL OBSERVATION CARE,LEVL III: CPT | Performed by: INTERNAL MEDICINE

## 2022-04-19 PROCEDURE — 96375 TX/PRO/DX INJ NEW DRUG ADDON: CPT

## 2022-04-19 PROCEDURE — 86923 COMPATIBILITY TEST ELECTRIC: CPT

## 2022-04-19 PROCEDURE — A9270 NON-COVERED ITEM OR SERVICE: HCPCS | Performed by: INTERNAL MEDICINE

## 2022-04-19 PROCEDURE — 700111 HCHG RX REV CODE 636 W/ 250 OVERRIDE (IP): Performed by: EMERGENCY MEDICINE

## 2022-04-19 PROCEDURE — 81001 URINALYSIS AUTO W/SCOPE: CPT

## 2022-04-19 PROCEDURE — 85379 FIBRIN DEGRADATION QUANT: CPT

## 2022-04-19 PROCEDURE — 83540 ASSAY OF IRON: CPT

## 2022-04-19 PROCEDURE — 85007 BL SMEAR W/DIFF WBC COUNT: CPT

## 2022-04-19 PROCEDURE — 83550 IRON BINDING TEST: CPT

## 2022-04-19 PROCEDURE — 96374 THER/PROPH/DIAG INJ IV PUSH: CPT | Mod: XU

## 2022-04-19 PROCEDURE — 700117 HCHG RX CONTRAST REV CODE 255: Performed by: EMERGENCY MEDICINE

## 2022-04-19 PROCEDURE — 36415 COLL VENOUS BLD VENIPUNCTURE: CPT

## 2022-04-19 PROCEDURE — 74177 CT ABD & PELVIS W/CONTRAST: CPT | Mod: ME

## 2022-04-19 RX ORDER — HYDROMORPHONE HYDROCHLORIDE 1 MG/ML
0.5 INJECTION, SOLUTION INTRAMUSCULAR; INTRAVENOUS; SUBCUTANEOUS ONCE
Status: COMPLETED | OUTPATIENT
Start: 2022-04-19 | End: 2022-04-19

## 2022-04-19 RX ORDER — AMOXICILLIN 250 MG
2 CAPSULE ORAL 2 TIMES DAILY
Status: DISCONTINUED | OUTPATIENT
Start: 2022-04-19 | End: 2022-04-20 | Stop reason: HOSPADM

## 2022-04-19 RX ORDER — GABAPENTIN 300 MG/1
600 CAPSULE ORAL 3 TIMES DAILY
Status: DISCONTINUED | OUTPATIENT
Start: 2022-04-19 | End: 2022-04-20 | Stop reason: HOSPADM

## 2022-04-19 RX ORDER — GABAPENTIN 600 MG/1
800 TABLET ORAL EVERY 4 HOURS
Status: DISCONTINUED | OUTPATIENT
Start: 2022-04-19 | End: 2022-04-19

## 2022-04-19 RX ORDER — ACETAMINOPHEN 325 MG/1
650 TABLET ORAL EVERY 6 HOURS PRN
Status: DISCONTINUED | OUTPATIENT
Start: 2022-04-19 | End: 2022-04-19

## 2022-04-19 RX ORDER — DEXLANSOPRAZOLE 60 MG/1
60 CAPSULE, DELAYED RELEASE ORAL DAILY
Status: DISCONTINUED | OUTPATIENT
Start: 2022-04-19 | End: 2022-04-19

## 2022-04-19 RX ORDER — ACETAMINOPHEN 325 MG/1
650 TABLET ORAL EVERY 6 HOURS PRN
Status: DISCONTINUED | OUTPATIENT
Start: 2022-04-19 | End: 2022-04-20 | Stop reason: HOSPADM

## 2022-04-19 RX ORDER — IBUPROFEN 600 MG/1
600 TABLET ORAL EVERY 6 HOURS PRN
Status: DISCONTINUED | OUTPATIENT
Start: 2022-04-19 | End: 2022-04-20 | Stop reason: HOSPADM

## 2022-04-19 RX ORDER — CLONIDINE HYDROCHLORIDE 0.1 MG/1
0.1 TABLET ORAL
Status: DISCONTINUED | OUTPATIENT
Start: 2022-04-19 | End: 2022-04-20 | Stop reason: HOSPADM

## 2022-04-19 RX ORDER — ONDANSETRON 2 MG/ML
4 INJECTION INTRAMUSCULAR; INTRAVENOUS EVERY 6 HOURS PRN
Status: DISCONTINUED | OUTPATIENT
Start: 2022-04-19 | End: 2022-04-20 | Stop reason: HOSPADM

## 2022-04-19 RX ORDER — ONDANSETRON 4 MG/1
4 TABLET, ORALLY DISINTEGRATING ORAL EVERY 6 HOURS PRN
Status: DISCONTINUED | OUTPATIENT
Start: 2022-04-19 | End: 2022-04-20 | Stop reason: HOSPADM

## 2022-04-19 RX ORDER — GUAIFENESIN/DEXTROMETHORPHAN 100-10MG/5
10 SYRUP ORAL EVERY 6 HOURS PRN
Status: DISCONTINUED | OUTPATIENT
Start: 2022-04-19 | End: 2022-04-20 | Stop reason: HOSPADM

## 2022-04-19 RX ORDER — LABETALOL HYDROCHLORIDE 5 MG/ML
10 INJECTION, SOLUTION INTRAVENOUS EVERY 4 HOURS PRN
Status: DISCONTINUED | OUTPATIENT
Start: 2022-04-19 | End: 2022-04-20 | Stop reason: HOSPADM

## 2022-04-19 RX ORDER — OMEPRAZOLE 20 MG/1
40 CAPSULE, DELAYED RELEASE ORAL DAILY
Status: DISCONTINUED | OUTPATIENT
Start: 2022-04-19 | End: 2022-04-20 | Stop reason: HOSPADM

## 2022-04-19 RX ORDER — POLYETHYLENE GLYCOL 3350 17 G/17G
1 POWDER, FOR SOLUTION ORAL
Status: DISCONTINUED | OUTPATIENT
Start: 2022-04-19 | End: 2022-04-20 | Stop reason: HOSPADM

## 2022-04-19 RX ORDER — DULOXETIN HYDROCHLORIDE 30 MG/1
60 CAPSULE, DELAYED RELEASE ORAL DAILY
Status: DISCONTINUED | OUTPATIENT
Start: 2022-04-19 | End: 2022-04-20 | Stop reason: HOSPADM

## 2022-04-19 RX ORDER — BISACODYL 10 MG
10 SUPPOSITORY, RECTAL RECTAL
Status: DISCONTINUED | OUTPATIENT
Start: 2022-04-19 | End: 2022-04-20 | Stop reason: HOSPADM

## 2022-04-19 RX ORDER — ONDANSETRON 2 MG/ML
4 INJECTION INTRAMUSCULAR; INTRAVENOUS ONCE
Status: COMPLETED | OUTPATIENT
Start: 2022-04-19 | End: 2022-04-19

## 2022-04-19 RX ADMIN — OMEPRAZOLE 40 MG: 20 CAPSULE, DELAYED RELEASE ORAL at 05:45

## 2022-04-19 RX ADMIN — IOHEXOL 50 ML: 350 INJECTION, SOLUTION INTRAVENOUS at 01:28

## 2022-04-19 RX ADMIN — HYDROMORPHONE HYDROCHLORIDE 0.5 MG: 1 INJECTION, SOLUTION INTRAMUSCULAR; INTRAVENOUS; SUBCUTANEOUS at 07:27

## 2022-04-19 RX ADMIN — SENNOSIDES AND DOCUSATE SODIUM 2 TABLET: 50; 8.6 TABLET ORAL at 18:30

## 2022-04-19 RX ADMIN — DULOXETINE HYDROCHLORIDE 60 MG: 30 CAPSULE, DELAYED RELEASE ORAL at 05:46

## 2022-04-19 RX ADMIN — ONDANSETRON 4 MG: 2 INJECTION INTRAMUSCULAR; INTRAVENOUS at 01:02

## 2022-04-19 RX ADMIN — GABAPENTIN 600 MG: 300 CAPSULE ORAL at 18:30

## 2022-04-19 RX ADMIN — GABAPENTIN 600 MG: 300 CAPSULE ORAL at 05:46

## 2022-04-19 RX ADMIN — ACETAMINOPHEN 650 MG: 325 TABLET, FILM COATED ORAL at 21:16

## 2022-04-19 RX ADMIN — GABAPENTIN 600 MG: 300 CAPSULE ORAL at 12:18

## 2022-04-19 RX ADMIN — CLONIDINE HYDROCHLORIDE 0.1 MG: 0.1 TABLET ORAL at 21:13

## 2022-04-19 RX ADMIN — HYDROMORPHONE HYDROCHLORIDE 0.5 MG: 1 INJECTION, SOLUTION INTRAMUSCULAR; INTRAVENOUS; SUBCUTANEOUS at 01:02

## 2022-04-19 ASSESSMENT — ENCOUNTER SYMPTOMS
STRIDOR: 0
FALLS: 0
DIZZINESS: 0
WEAKNESS: 1
FEVER: 0
SPUTUM PRODUCTION: 0
DIARRHEA: 0
MYALGIAS: 1
CONSTIPATION: 0
ABDOMINAL PAIN: 0
DEPRESSION: 0
HEADACHES: 1
CHILLS: 0
TINGLING: 0
COUGH: 1
SHORTNESS OF BREATH: 0
NAUSEA: 0
VOMITING: 0
PALPITATIONS: 0
LOSS OF CONSCIOUSNESS: 0

## 2022-04-19 ASSESSMENT — LIFESTYLE VARIABLES
TOTAL SCORE: 0
ON A TYPICAL DAY WHEN YOU DRINK ALCOHOL HOW MANY DRINKS DO YOU HAVE: 0
TOTAL SCORE: 0
HAVE YOU EVER FELT YOU SHOULD CUT DOWN ON YOUR DRINKING: NO
EVER HAD A DRINK FIRST THING IN THE MORNING TO STEADY YOUR NERVES TO GET RID OF A HANGOVER: NO
AVERAGE NUMBER OF DAYS PER WEEK YOU HAVE A DRINK CONTAINING ALCOHOL: 0
CONSUMPTION TOTAL: NEGATIVE
HOW MANY TIMES IN THE PAST YEAR HAVE YOU HAD 5 OR MORE DRINKS IN A DAY: 0
HAVE PEOPLE ANNOYED YOU BY CRITICIZING YOUR DRINKING: NO
ALCOHOL_USE: NO
TOTAL SCORE: 0
EVER FELT BAD OR GUILTY ABOUT YOUR DRINKING: NO

## 2022-04-19 ASSESSMENT — COGNITIVE AND FUNCTIONAL STATUS - GENERAL
SUGGESTED CMS G CODE MODIFIER MOBILITY: CH
MOBILITY SCORE: 24
SUGGESTED CMS G CODE MODIFIER DAILY ACTIVITY: CH
DAILY ACTIVITIY SCORE: 24

## 2022-04-19 ASSESSMENT — PATIENT HEALTH QUESTIONNAIRE - PHQ9
1. LITTLE INTEREST OR PLEASURE IN DOING THINGS: NOT AT ALL
2. FEELING DOWN, DEPRESSED, IRRITABLE, OR HOPELESS: NOT AT ALL
SUM OF ALL RESPONSES TO PHQ9 QUESTIONS 1 AND 2: 0

## 2022-04-19 ASSESSMENT — FIBROSIS 4 INDEX: FIB4 SCORE: 1.82

## 2022-04-19 ASSESSMENT — PAIN DESCRIPTION - PAIN TYPE
TYPE: CHRONIC PAIN
TYPE: ACUTE PAIN

## 2022-04-19 NOTE — PROGRESS NOTES
Monitor Summary     Rhythm:SR-ST   Measurements: 0.20/0.08/0.36  ECTOPIES: rTRIG, rBIG        Normal Values  Rhythm SR  HR Range    Measurements 0.12-0.20 / 0.06-0.10  / 0.30-0.52

## 2022-04-19 NOTE — PROGRESS NOTES
4 Eyes Skin Assessment Completed by Shirley RN and TIFFANIE Wood.    Head WDL  Ears WDL  Nose WDL  Mouth WDL  Neck WDL  Breast/Chest WDL  Shoulder Blades WDL  Spine Scar  (R) Arm/Elbow/Hand WDL  (L) Arm/Elbow/Hand WDL  Abdomen WDL  Groin WDL  Scrotum/Coccyx/Buttocks WDL  (R) Leg Scar  (L) Leg Scar  (R) Heel/Foot/Toe dry, cracked  (L) Heel/Foot/Toe dry, cracked        Devices In Places Tele Box, Blood Pressure Cuff and Pulse Ox      Interventions In Place Pillows and Pressure Redistribution Mattress    Possible Skin Injury No    Pictures Uploaded Into Epic N/A  Wound Consult Placed N/A  RN Wound Prevention Protocol Ordered No

## 2022-04-19 NOTE — ASSESSMENT & PLAN NOTE
"- Significant anemia, acute on chronic  -Hemoccult was negative in the ER  -Microcytic aspect is new from previous labs however most recent labs 2018  -I do feel she is likely deficient in iron, obtain iron panel  -May require IV iron versus oral iron, will await the results of the iron panel first  -Iron deficiency anemia versus chronic disease  -ERP has ordered 1 unit of blood, repeat hemoglobin posttransfusion  -Transfuse to keep hemoglobin greater than 7\"    Spoke with Pharmacy. Ferritin pending. If low or normal, IV iron to be ordered.  Follow up to Dr. Monterroso GI  "

## 2022-04-19 NOTE — H&P
"Hospital Medicine History & Physical Note    Date of Service  4/19/2022    Primary Care Physician  Angie Sorensen M.D.    Consultants  None    Specialist Names: None    Code Status  Full Code    Chief Complaint  Chief Complaint   Patient presents with   • Headache     Reports headache \"that came out of nowhere\" and preceded the syncope. Reports headache was initially on right side, but now is throughout her head.   • Body Aches     Cough and bodyaches \"all winter\", worsened over the last month.       History of Presenting Illness  Lynette Corera is a 66 y.o. female who presented 4/18/2022 with body aches and cough, stating she has been sick all winter.  She has not been vaccinated against COVID.  She states she has worsened over the last 3 to 4 days, cough and body aches are worsening.  She has developed fatigue and weakness as well.  Today she was in her chair, had a sudden pressure on the right side of her head, states it is worse than her usual migraine.  Patient states the pain was so bad it almost caused her to pass out but she did slide to the floor never losing consciousness.  She states this happened a couple of times and then family called EMS and patient was brought to the emergency department.  She does have a history of alcohol abuse, last drink was 15 years ago.  I did discuss the case including labs with the ER physician.    I discussed the plan of care with ERP.    Review of Systems  Review of Systems   Constitutional: Positive for malaise/fatigue. Negative for chills and fever.   HENT: Negative for congestion.    Respiratory: Positive for cough. Negative for sputum production, shortness of breath and stridor.    Cardiovascular: Negative for chest pain, palpitations and leg swelling.   Gastrointestinal: Negative for abdominal pain, constipation, diarrhea, nausea and vomiting.   Genitourinary: Negative for dysuria and urgency.   Musculoskeletal: Positive for myalgias. Negative for falls. "   Neurological: Positive for weakness and headaches. Negative for dizziness, tingling and loss of consciousness.   Psychiatric/Behavioral: Negative for depression and suicidal ideas.   All other systems reviewed and are negative.      Past Medical History   has a past medical history of Alcohol abuse, Anesthesia, Anesthesia (9-23-16), Arrhythmia, Arthritis (9-23-16), ASTHMA, Bowel habit changes (9-23-16), Breath shortness, Bronchitis (2015), Chronic pain (01/2018, 11/19/18), Cold (10/29/2018), COPD (chronic obstructive pulmonary disease) (HCC), Dental disorder, Depression, Disorder of thyroid, Emphysema of lung (HCC), Heart burn, Hepatitis C (01/2018), migraines, Indigestion, Jaundice, kidney stones, Neuropathy, peripheral, Pain, Pain (08/31/2018), Pneumonia (2015), Risk for falls, Seizure ([facility]) (2015), Sleep apnea ([id number]), Substance abuse (HCC), Ulcer, and Unspecified hemorrhagic conditions.    Surgical History   has a past surgical history that includes cervical fusion posterior (7/20/2010); cervical decompression posterior (7/20/2010); tonsillectomy (as child); lumbar laminectomy diskectomy (1/8/2011); mass excision general (10/4/2011); orif, wrist (Left, 3/3/2016); primary c section (1989/1988/1993); lumbar laminectomy diskectomy (10/8/2016); shoulder arthroplasty total (Left, 2/12/2018); hysterectomy, total abdominal (1995); carpal tunnel release (Right, 2005); knee arthroplasty total (Right, 2003); other orthopedic surgery; knee arthroscopy (Left, 2008); knee arthroplasty total (Left, 5/14/2018); knee revision total (Right, 9/10/2018); biopsy general; block epidural steroid injection (2015-7/2018); other cardiac surgery (2005); and knee revision total (Right, 11/26/2018).     Family History  family history includes Alcohol abuse in her brother; Anxiety disorder in her mother; Cancer in her father, mother, and paternal uncle; Dementia in her paternal grandmother and paternal uncle; Depression in her mother;  "Diabetes in her brother.   Family history reviewed with patient. There is no family history that is pertinent to the chief complaint.     Social History   reports that she quit smoking about 5 years ago. Her smoking use included cigarettes. She has a 20.00 pack-year smoking history. She has never used smokeless tobacco. She reports that she does not drink alcohol and does not use drugs.    Allergies  Allergies   Allergen Reactions   • Codeine Rash     Difficulty breathing, and itching   • Morphine Anaphylaxis     Headache; throat swelling   • Pcn [Penicillins]      All \"cillin\" family, per patient  ---   I broke out in \"trench\" mouth       Medications  Prior to Admission Medications   Prescriptions Last Dose Informant Patient Reported? Taking?   DULoxetine (CYMBALTA) 30 MG Cap DR Particles   No No   Sig: Take 1 Capsule by mouth every day.   Patient not taking: Reported on 3/24/2022   DULoxetine (CYMBALTA) 60 MG Cap DR Particles delayed-release capsule   Yes No   Sig: Take 60 mg by mouth every day.   Dexlansoprazole (DEXILANT) 60 MG CAPSULE DELAYED RELEASE delayed-release capsule   No No   Sig: Take 60 mg by mouth every day.   albuterol (PROVENTIL) 2.5mg/3ml Nebu Soln solution for nebulization   No No   Sig: Take 3 mL by nebulization every four hours as needed for Shortness of Breath.   Patient not taking: Reported on 12/6/2021   albuterol (PROVENTIL) 2.5mg/3ml Nebu Soln solution for nebulization   Yes No   Sig: ALBUTEROL SULFATE (2.5 MG/3ML) 0.083% NEBU   Patient not taking: Reported on 12/6/2021   cloNIDine (CATAPRES) 0.1 MG Tab   No No   Sig: Take 1 Tablet by mouth at bedtime.   dexamethasone (DECADRON) 20 MG/5ML Solution injection   Yes No   Patient not taking: Reported on 3/24/2022   gabapentin (NEURONTIN) 600 MG tablet   No No   Sig: Take 1 Tablet by mouth every 4 hours.   gabapentin (NEURONTIN) 800 MG tablet   No No   Sig: Take 1.5 Tablets by mouth 3 times a day.   methylPREDNISolone acetate (DEPO-MEDROL) 40 " MG/ML Suspension   Yes No   tizanidine (ZANAFLEX) 2 MG tablet   Yes No      Facility-Administered Medications: None       Physical Exam  Temp:  [36.7 °C (98 °F)] 36.7 °C (98 °F)  Pulse:  [61-75] 61  Resp:  [12-18] 12  BP: (148)/(83) 148/83  SpO2:  [98 %-99 %] 99 %  Blood Pressure : 148/83   Temperature: 36.7 °C (98 °F)   Pulse: 61   Respiration: 12   Pulse Oximetry: 99 %       Physical Exam  Vitals and nursing note reviewed.   Constitutional:       General: She is not in acute distress.     Appearance: She is well-developed. She is not diaphoretic.   HENT:      Head: Normocephalic and atraumatic.   Neck:      Trachea: No tracheal deviation.   Cardiovascular:      Rate and Rhythm: Normal rate and regular rhythm.   Pulmonary:      Effort: Pulmonary effort is normal. No respiratory distress.   Musculoskeletal:         General: Normal range of motion.         Laboratory:  Recent Labs     04/18/22 2236   WBC 6.2   RBC 3.91*   HEMOGLOBIN 6.4*   HEMATOCRIT 23.8*   MCV 60.9*   MCH 16.4*   MCHC 26.9*   RDW 45.2   PLATELETCT 430   MPV 9.4     Recent Labs     04/18/22 2236   SODIUM 133*   POTASSIUM 4.3   CHLORIDE 101   CO2 19*   GLUCOSE 103*   BUN 16   CREATININE 0.70   CALCIUM 8.7     Recent Labs     04/18/22 2236   ALTSGPT 8   ASTSGOT 34   ALKPHOSPHAT 107*   TBILIRUBIN 0.3   GLUCOSE 103*     Recent Labs     04/18/22 2236   APTT 30.9   INR 1.04     No results for input(s): NTPROBNP in the last 72 hours.      Recent Labs     04/18/22  2236   TROPONINT 13       Imaging:  CT-CTA HEAD WITH & W/O-POST PROCESS   Final Result         1.  No large vessel occlusion or aneurysm identified      CT-CTA NECK WITH & W/O-POST PROCESSING   Final Result         1.  Approximately 50% stenosis at the origin the left vertebral artery. Otherwise no high-grade stenosis, aneurysm, dissection, or occlusion identified   2.  Enlargement of main pulmonary arteries, consider pulmonary arterial hypertension         DX-CHEST-PORTABLE (1 VIEW)   Final  Result         1.  No acute cardiopulmonary disease.   2.  Atherosclerosis      CT-ABDOMEN-PELVIS WITH    (Results Pending)       X-Ray:  I have personally reviewed the images and compared with prior images.    Assessment/Plan:  I anticipate this patient is appropriate for observation status at this time.    * COVID-19 virus infection- (present on admission)  Assessment & Plan  - Patient is not vaccinated  -No significant changes on chest x-ray  -Patient is not hypoxic, not requiring oxygen, no need for steroid at this time    Microcytic anemia- (present on admission)  Assessment & Plan  - Significant anemia, acute on chronic  -Hemoccult was negative in the ER  -Microcytic aspect is new from previous labs however most recent labs 2018  -I do feel she is likely deficient in iron, obtain iron panel  -May require IV iron versus oral iron, will await the results of the iron panel first  -Iron deficiency anemia versus chronic disease  -ERP has ordered 1 unit of blood, repeat hemoglobin posttransfusion  -Transfuse to keep hemoglobin greater than 7    GERD (gastroesophageal reflux disease)- (present on admission)  Assessment & Plan  - Continue home PPI    Chronic obstructive pulmonary disease (HCC)- (present on admission)  Assessment & Plan  - No acute exacerbation however she does have significant risk for exacerbation with new COVID infection    Major depressive disorder, recurrent episode with anxious distress (HCC)- (present on admission)  Assessment & Plan  - Continue home Cymbalta      VTE prophylaxis: SCDs/TEDs

## 2022-04-19 NOTE — CARE PLAN
The patient is Watcher - Medium risk of patient condition declining or worsening    Shift Goals  Clinical Goals: Monitor Hgb  Patient Goals: pain control    Progress made toward(s) clinical / shift goals:      Problem: Knowledge Deficit - Standard  Goal: Patient and family/care givers will demonstrate understanding of plan of care, disease process/condition, diagnostic tests and medications  Outcome: Progressing   Discussed POC with pt. Will monitor Hgb through out the night.          Patient is not progressing towards the following goals:      Problem: Pain - Standard  Goal: Alleviation of pain or a reduction in pain to the patient’s comfort goal  Outcome: Not Progressing   Pt continues to be in 10/10 pain. Pt is crying, MD notified of pts pain.

## 2022-04-19 NOTE — PROGRESS NOTES
"Hospital Medicine Daily Progress Note    Date of Service  4/19/2022    Chief Complaint  Lynette Correa is a 66 y.o. female admitted 4/18/2022 with Syncope (States she passed out twice today. [place] herself fainting and got weak, slid down to ground from her chair before passing out. Denies head injury. States this occurred at about 1500 today.), Headache (Reports headache \"that came out of nowhere\" and preceded the syncope. Reports headache was initially on right side, but now is throughout her head.), and Body Aches (Cough and bodyaches \"all winter\", worsened over the last month.)    Hospital Course  No notes on file  Sheis not vaccinated for CoVID. She has a remote history of alcohol abuse. She presents with body aches, cough, weakness, fatigue, headache. Pain became severe that she nearly passed out, sliding to the floor.   At the ED, she is afebrile, normotensive. Not hypoxic.  CTA HnN no large vessel occlusion, 50% stenosis L vertebral artery otherwise no high grade stenosis, possible pulmonary hypertension  CXR no acute process  Anemic with Hb at 6.4  CT Ab ordered.  CT came back:  1.  Hazy linear density left lung base, appearance suggests atelectasis or early infiltrates.  2.  Moderate quantity of stool in colon, appearance favors changes of constipation  3.  Hepatomegaly  4.  Atherosclerosis and atherosclerotic coronary artery disease  She received blood transfusion., Hb now 7.7    Interval Problem Update  4/19. Occult blood stool showed trace blood. I spoke with Dr. Monterroso, GI who recommended outpatient GI eval and iron panel. Meanwhile she is not requiring O2.    I have personally seen and examined the patient at bedside. I discussed the plan of care with patient, bedside RN and GI.    Consultants/Specialty      Code Status  Full Code    Disposition  Patient is medically cleared pending stable H/H for discharge.   Anticipate discharge to to home with close outpatient follow-up.  I have placed the " appropriate orders for post-discharge needs.    Review of Systems  ROS     Physical Exam  Temp:  [36.4 °C (97.5 °F)-36.7 °C (98 °F)] 36.4 °C (97.5 °F)  Pulse:  [61-76] 63  Resp:  [12-21] 18  BP: (111-172)/(54-83) 172/73  SpO2:  [94 %-100 %] 98 %    Physical Exam    Fluids    Intake/Output Summary (Last 24 hours) at 4/19/2022 0956  Last data filed at 4/19/2022 0315  Gross per 24 hour   Intake 306.67 ml   Output --   Net 306.67 ml       Laboratory  Recent Labs     04/18/22 2236 04/19/22  0334   WBC 6.2 5.5   RBC 3.91* 4.42   HEMOGLOBIN 6.4* 7.7*   HEMATOCRIT 23.8* 28.7*   MCV 60.9* 64.9*   MCH 16.4* 17.4*   MCHC 26.9* 26.8*   RDW 45.2 57.1*   PLATELETCT 430 450*   MPV 9.4 9.6     Recent Labs     04/18/22 2236 04/19/22  0334   SODIUM 133* 134*   POTASSIUM 4.3 4.4   CHLORIDE 101 102   CO2 19* 18*   GLUCOSE 103* 92   BUN 16 13   CREATININE 0.70 0.67   CALCIUM 8.7 8.8     Recent Labs     04/18/22 2236   APTT 30.9   INR 1.04               Imaging  CT-ABDOMEN-PELVIS WITH   Final Result         1.  Hazy linear density left lung base, appearance suggests atelectasis or early infiltrates.   2.  Moderate quantity of stool in colon, appearance favors changes of constipation   3.  Hepatomegaly   4.  Atherosclerosis and atherosclerotic coronary artery disease      CT-CTA HEAD WITH & W/O-POST PROCESS   Final Result         1.  No large vessel occlusion or aneurysm identified      CT-CTA NECK WITH & W/O-POST PROCESSING   Final Result         1.  Approximately 50% stenosis at the origin the left vertebral artery. Otherwise no high-grade stenosis, aneurysm, dissection, or occlusion identified   2.  Enlargement of main pulmonary arteries, consider pulmonary arterial hypertension         DX-CHEST-PORTABLE (1 VIEW)   Final Result         1.  No acute cardiopulmonary disease.   2.  Atherosclerosis           Assessment/Plan  * COVID-19 virus infection, anemia- (present on admission)  Assessment & Plan  - Patient is not vaccinated  -No  "significant changes on chest x-ray  -Patient is not hypoxic, not requiring oxygen, no need for steroid at this time\"    Follow mask and quarantine measures, hygiene/hnad washing and reporting to [facility]  After 5-10d of being symptom free, can do vaccinations    Microcytic anemia- (present on admission)  Assessment & Plan  - Significant anemia, acute on chronic  -Hemoccult was negative in the ER  -Microcytic aspect is new from previous labs however most recent labs 2018  -I do feel she is likely deficient in iron, obtain iron panel  -May require IV iron versus oral iron, will await the results of the iron panel first  -Iron deficiency anemia versus chronic disease  -ERP has ordered 1 unit of blood, repeat hemoglobin posttransfusion  -Transfuse to keep hemoglobin greater than 7\"    Spoke with Pharmacy. Ferritin pending. If low or normal, IV iron to be ordered.  Follow up to Dr. Monterroso, GI    GERD (gastroesophageal reflux disease)- (present on admission)  Assessment & Plan  - Continue home PPI    Chronic obstructive pulmonary disease (HCC)- (present on admission)  Assessment & Plan  - No acute exacerbation however she does have significant risk for exacerbation with new COVID infection\"    Inhalers and follow up Pulmonology    Major depressive disorder, recurrent episode with anxious distress (HCC)- (present on admission)  Assessment & Plan  - Continue home Cymbalta       VTE prophylaxis: SCDs/TEDs    I have performed a physical exam and reviewed and updated ROS and Plan today (4/19/2022). In review of yesterday's note (4/18/2022), there are no changes except as documented above.      "

## 2022-04-19 NOTE — PROGRESS NOTES
Report called by ER RN. Pt brought up to the floor with belongings in hand. Pt oriented to room and provided safety education with no further questions at this time. Admit profile and vitals intiated. Safety precautions in place and call light within reach.

## 2022-04-19 NOTE — PROGRESS NOTES
Spiritual Care Note    Patient Information     Patient's Name: Lynette Correa   MRN: [id number]    YOB: 1955   Age and Gender: 66 y.o. female   Service Area:    Room (and Bed): [address] [place] [address]   Ethnicity or Nationality:     Primary Language:    Yazdanism/Spiritual preference: Episcopal   Place of Residence: [place]   Family/Friends/Others Present:    Clinical Team Present:    Medical Diagnosis(-es)/Procedure(s):    Code Status: Full Code    Date of Admission: 4/18/2022   Length of Stay: 0 days        Spiritual Care Provider Information:  Name of Spiritual Care Provider: Rigoberto Dial  Title of Spiritual Care Provider: Manager  Phone Number: [phone]  E-mail: [email or web address]    minutes    Spiritual Screen Results:    Gen Nursing  Spiritual Screen  Was spiritual care education provided to the patient?: Yes     Palliative Care  PC Yazdanism/Spiritual Screening  Was spiritual care education provided to the patient?: Yes      Encounter/Request Information  Encounter/Request Type   Visited With: Patient  Nature of the Visit: Initial,On shift  General Visit: Yes  Referral From/ Origin of Request: [facility] nursing  Referral To: Community clergy    Religous Needs/Values  Yazdanism Needs Visit  Yazdanism Needs: Prayer    Spiritual Assessment   Spiritual Care Encounters  Interventions: Referred to community .    Notes:  Referred pt to volunteer community , [facility].  visited pt, who is Covid+, and had conversation and prayer to relieve emotional suffering.

## 2022-04-19 NOTE — ED TRIAGE NOTES
"Chief Complaint   Patient presents with   • Syncope     States she passed out twice today. [place] herself fainting and got weak, slid down to ground from her chair before passing out. Denies head injury. States this occurred at about 1500 today.   • Headache     Reports headache \"that came out of nowhere\" and preceded the syncope. Reports headache was initially on right side, but now is throughout her head.   • Body Aches     Cough and bodyaches \"all winter\", worsened over the last month.     ED Triage Vitals [04/18/22 2201]   Enc Vitals Group      Blood Pressure  148/83      Pulse 75      Respiration 18      Temperature 36.7 °C (98 °F)      Temp src Temporal      Pulse Oximetry 98 %      Weight 63 kg (138 lb 14.2 oz)      Height 1.651 m (5' 5\")     NIHSS done in triage was limited by patient being positioned in a wheelchair.  "

## 2022-04-19 NOTE — ASSESSMENT & PLAN NOTE
"- No acute exacerbation however she does have significant risk for exacerbation with new COVID infection\"    Inhalers and follow up Pulmonology  "

## 2022-04-19 NOTE — ASSESSMENT & PLAN NOTE
"- Patient is not vaccinated  -No significant changes on chest x-ray  -Patient is not hypoxic, not requiring oxygen, no need for steroid at this time\"    Follow mask and quarantine measures, hygiene/hnad washing and reporting to [facility]  After 5-10d of being symptom free, can do vaccinations  "

## 2022-04-19 NOTE — ED NOTES
Patient crying in room c/o back pain and all over body pain. Hospitalist called and received order for dilaudid.

## 2022-04-20 VITALS
HEIGHT: 65 IN | DIASTOLIC BLOOD PRESSURE: 87 MMHG | OXYGEN SATURATION: 99 % | BODY MASS INDEX: 23.36 KG/M2 | TEMPERATURE: 98.6 F | WEIGHT: 140.21 LBS | RESPIRATION RATE: 18 BRPM | HEART RATE: 66 BPM | SYSTOLIC BLOOD PRESSURE: 132 MMHG

## 2022-04-20 PROBLEM — F41.9 ANXIETY: Status: ACTIVE | Noted: 2022-04-20

## 2022-04-20 LAB
ERYTHROCYTE [DISTWIDTH] IN BLOOD BY AUTOMATED COUNT: 55.8 FL (ref 35.9–50)
HCT VFR BLD AUTO: 29 % (ref 37–47)
HGB BLD-MCNC: 7.9 G/DL (ref 12–16)
MCH RBC QN AUTO: 17.6 PG (ref 27–33)
MCHC RBC AUTO-ENTMCNC: 27.2 G/DL (ref 33.6–35)
MCV RBC AUTO: 64.7 FL (ref 81.4–97.8)
PLATELET # BLD AUTO: 466 K/UL (ref 164–446)
PMV BLD AUTO: 9.2 FL (ref 9–12.9)
RBC # BLD AUTO: 4.48 M/UL (ref 4.2–5.4)
WBC # BLD AUTO: 4.9 K/UL (ref 4.8–10.8)

## 2022-04-20 PROCEDURE — A9270 NON-COVERED ITEM OR SERVICE: HCPCS | Performed by: INTERNAL MEDICINE

## 2022-04-20 PROCEDURE — 85027 COMPLETE CBC AUTOMATED: CPT

## 2022-04-20 PROCEDURE — G0378 HOSPITAL OBSERVATION PER HR: HCPCS

## 2022-04-20 PROCEDURE — 700102 HCHG RX REV CODE 250 W/ 637 OVERRIDE(OP): Performed by: INTERNAL MEDICINE

## 2022-04-20 PROCEDURE — 99217 PR OBSERVATION CARE DISCHARGE: CPT | Performed by: INTERNAL MEDICINE

## 2022-04-20 RX ORDER — GUAIFENESIN/DEXTROMETHORPHAN 100-10MG/5
10 SYRUP ORAL EVERY 6 HOURS PRN
Qty: 840 ML | COMMUNITY
Start: 2022-04-20 | End: 2022-04-29

## 2022-04-20 RX ORDER — ONDANSETRON 4 MG/1
4 TABLET, ORALLY DISINTEGRATING ORAL EVERY 6 HOURS PRN
Qty: 10 TABLET | Refills: 0 | Status: SHIPPED | OUTPATIENT
Start: 2022-04-20 | End: 2023-06-13

## 2022-04-20 RX ORDER — OXYCODONE HYDROCHLORIDE 5 MG/1
5 TABLET ORAL EVERY 8 HOURS PRN
Qty: 9 TABLET | Refills: 0 | Status: SHIPPED | OUTPATIENT
Start: 2022-04-20 | End: 2022-04-23

## 2022-04-20 RX ORDER — AMOXICILLIN 250 MG
2 CAPSULE ORAL 2 TIMES DAILY
Qty: 30 TABLET | Refills: 0 | COMMUNITY
Start: 2022-04-20 | End: 2022-04-29

## 2022-04-20 RX ORDER — ALPRAZOLAM 0.5 MG/1
0.5 TABLET ORAL 2 TIMES DAILY PRN
Qty: 6 TABLET | Refills: 0 | Status: SHIPPED | OUTPATIENT
Start: 2022-04-20 | End: 2022-04-23

## 2022-04-20 RX ORDER — POLYETHYLENE GLYCOL 3350 17 G/17G
POWDER, FOR SOLUTION ORAL
Refills: 3 | COMMUNITY
Start: 2022-04-20 | End: 2022-04-29

## 2022-04-20 RX ORDER — ACETAMINOPHEN 325 MG/1
650 TABLET ORAL EVERY 6 HOURS PRN
Qty: 30 TABLET | Refills: 0 | COMMUNITY
Start: 2022-04-20 | End: 2022-04-29

## 2022-04-20 RX ADMIN — OMEPRAZOLE 40 MG: 20 CAPSULE, DELAYED RELEASE ORAL at 05:36

## 2022-04-20 RX ADMIN — GABAPENTIN 600 MG: 300 CAPSULE ORAL at 12:48

## 2022-04-20 RX ADMIN — GABAPENTIN 600 MG: 300 CAPSULE ORAL at 05:36

## 2022-04-20 RX ADMIN — DULOXETINE HYDROCHLORIDE 60 MG: 30 CAPSULE, DELAYED RELEASE ORAL at 05:36

## 2022-04-20 RX ADMIN — SENNOSIDES AND DOCUSATE SODIUM 2 TABLET: 50; 8.6 TABLET ORAL at 05:36

## 2022-04-20 RX ADMIN — IBUPROFEN 600 MG: 600 TABLET, FILM COATED ORAL at 00:10

## 2022-04-20 ASSESSMENT — FIBROSIS 4 INDEX: FIB4 SCORE: 1.82

## 2022-04-20 ASSESSMENT — PAIN DESCRIPTION - PAIN TYPE: TYPE: CHRONIC PAIN

## 2022-04-20 NOTE — CARE PLAN
The patient is Watcher - Medium risk of patient condition declining or worsening    Shift Goals  Clinical Goals: Monitor Hgb, Discharge  Patient Goals: Go home    Progress made toward(s) clinical / shift goals:      Pt has remained stable, vitals signs are WDL. Pt has no worsening COVID symptoms. Continuing to monitor Hgb, awaiting for labs this morning. Possible discharge depending on lab results. RN to monitor labs.     Problem: Pain - Standard  Goal: Alleviation of pain or a reduction in pain to the patient’s comfort goal  Outcome: Progressing     Problem: Knowledge Deficit - Standard  Goal: Patient and family/care givers will demonstrate understanding of plan of care, disease process/condition, diagnostic tests and medications  Outcome: Progressing       Patient is not progressing towards the following goals:

## 2022-04-20 NOTE — FACE TO FACE
Face to Face Supporting Documentation - Home Health    The encounter with this patient was in whole or in part the primary reason for home health admission.    Date of encounter:   Patient:                    MRN:                       YOB: 2024  Lynette Correa  [id number]  1955     Home health to see patient for:  Skilled Nursing care for assessment, interventions & education, Physical Therapy evaluation and treatment and Occupational therapy evaluation and treatment    Skilled need for:  Medication Management respiratory issues, CoVID recovering    Skilled nursing interventions to include:  Comment: as above    Homebound status evidenced by:  Needs the assistance of another person in order to leave the home. Leaving home requires a considerable and taxing effort. There is a normal inability to leave the home.    Community Physician to provide follow up care: Angie Sorensen M.D.     Optional Interventions? No      I certify the face to face encounter for this home health care referral meets the CMS requirements and the encounter/clinical assessment with the patient was, in whole, or in part, for the medical condition(s) listed above, which is the primary reason for home health care. Based on my clinical findings: the service(s) are medically necessary, support the need for home health care, and the homebound criteria are met.  I certify that this patient has had a face to face encounter by myself.  Antolin العلي M.D. - NPI: [id number]

## 2022-04-20 NOTE — DISCHARGE INSTRUCTIONS
Discharge Instructions    Discharged to home by car with relative. Discharged via wheelchair, hospital escort: Yes.  Special equipment needed: Not Applicable    Be sure to schedule a follow-up appointment with your primary care doctor or any specialists as instructed.     Discharge Plan:   Diet Plan: Discussed  Activity Level: Discussed  Confirmed Follow up Appointment: Appointment Scheduled  Confirmed Symptoms Management: Discussed  Medication Reconciliation Updated: Yes    I understand that a diet low in cholesterol, fat, and sodium is recommended for good health. Unless I have been given specific instructions below for another diet, I accept this instruction as my diet prescription.   Other diet: Cardiac    Special Instructions: None    · Is patient discharged on Warfarin / Coumadin?   No       Doctors Note     Chaim Monterroso M.D.  [address] [place] [address] [phone]     In 1 week  GI follow up for Anemia per MD العلي     [facility] PULMONARY [facility]IC[facility]  [address]  [facility] [address] [phone]  Call  Please call [facility] to schedule a hospital follow up. This facility accepts your insurance. Thank you.   Follow up Angie Sorensen M.D. in 1 week  Follow up with [facility]/Pulmonology in 2 weeks for asthma/RAD  Follow up with Dr. Monterroso, GI in 1 week for GI work-up of anemia  Follow up or be referred to Psychiatry for severe anxiety in 1 week  Follow up or be referred to a rheumatologist for RA and pain control in 1 week  Return to ER in the event of new or worsening symptoms. Please note importance of compliance and the patient has agreed to proceed with all medical recommendations and follow up plan indicated above. All medications come with benefits and risks. Risks may include permanent injury or death and these risks can be minimized with close reassessment and monitoring. Please make it to your scheduled follow ups with your primary  provider, and/or specialists clinic.  Quarantine, mask, hand washing, proper hygiene and seeing a doctor if you have fever, hypoxia or respiratory/GI symptoms or symptoms of thrombosis (stroke/chest pain/new extremity swelling) are MUSTS. Self isolation if still symptomatic or if fever (must self isolate until NO fever for 72 hours from last temp without use of anti-fever medications).  As advised by the Centers for Disease Control and Prevention (CDC), please isolate yourself for at least 10 days since the onset of your symptoms AND one day without a fever without the use of fever reducing medications AND with improved symptoms.  This is true for everyone, even if your test is negative because of high rates of falsely negative results.  Restrict activities outside your home for 10 days. Do not go to work, school, public areas, or restaurants. Avoid using public transportation, ride-sharing, or taxis.  If your symptoms worsen or you need a return to work note please call the COVID line [phone].  If you become sicker (even if your initial COVID test is negative), have difficulty breathing, chest pain, you are unable to eat or drink enough, or have severe vomiting, diarrhea or weakness, you may need to return to the Emergency Department or contact your clinic provider for re-evaluation.  If you need care and are coming into the hospital or one of our clinics, inform the healthcare facility by phone that you have been tested for COVID-19 prior to entry.  Put on a facemask before you enter the facility or before emergency services arrive if you have called 911.    Unless you have severe difficulty breathing you will be expected to wear your mask throughout your hospital/clinic visit in order to protect our staff and other employees.  Postpone all elective medical appointments within your isolation period, including but not limited to physical therapy, dental, chiropractic, routine check-ups, etc., until cleared by  the healthline, local health department, or your personal physician. If you have any upcoming elective appointments, please contact that office directly in order to cancel, reschedule or be evaluated for a potential virtual visit.  IF YOU LIVE WITH OTHERS  Please have ALL household members quarantine until your test results are back.  They should not go into public, to  or school, or go to work.  If any of your household members need a work note, please have them call the [place] hotline at [phone].  If living with others, try to have your own bathroom and bedroom if possible.  Please try and wipe down high-touch surfaces (counters, tabletops, doorknobs, bathroom fixtures, toilets, phones, keyboards, tablets, and bedside tables) at least twice a day. Avoid sharing personal household items. You should not share dishes, drinking glasses, cups, eating utensils, towels, or bedding with other people in your home. After using these items, they should be washed thoroughly with soap and water.  Also, clean any surfaces that may have blood, stool, or body fluids on them. Use a household cleaning spray or wipe, according to the label instructions. Labels contain instructions for safe and effective use of the cleaning product including precautions you should take when applying the product, such as wearing gloves and making sure you have good ventilation during use of the product.   Clean your hands often. Wash your hands often with soap and water for at least 20 seconds. If soap and water are not available, clean your hands with an alcohol-based hand  that contains at least 60% alcohol, covering all surfaces of your hands and rubbing them together until they feel dry. Soap and water should be used preferentially if hands are visibly dirty. Avoid touching your eyes, nose, and mouth with unwashed hands.   Cover your coughs and sneezes    Please see the resources below for more information.    CDC Corona  Website https://www.cdc.gov/coronavirus/2019-ncov/index.html  General Information https://www.cdc.gov/coronavirus/2019-ncov/faq.html   [facility]: [phone]  [facility] Line [phone]  Anemia    Anemia is a condition in which you do not have enough red blood cells or hemoglobin. Hemoglobin is a substance in red blood cells that carries oxygen. When you do not have enough red blood cells or hemoglobin (are anemic), your body cannot get enough oxygen and your organs may not work properly. As a result, you may feel very tired or have other problems.  What are the causes?  Common causes of anemia include:  · Excessive bleeding. Anemia can be caused by excessive bleeding inside or outside the body, including bleeding from the intestine or from periods in women.  · Poor nutrition.  · Long-lasting (chronic) kidney, thyroid, and liver disease.  · Bone marrow disorders.  · Cancer and treatments for cancer.  · HIV (human immunodeficiency virus) and AIDS (acquired immunodeficiency syndrome).  · Treatments for HIV and AIDS.  · Spleen problems.  · Blood disorders.  · Infections, medicines, and autoimmune disorders that destroy red blood cells.  What are the signs or symptoms?  Symptoms of this condition include:  · Minor weakness.  · Dizziness.  · Headache.  · Feeling heartbeats that are irregular or faster than normal (palpitations).  · Shortness of breath, especially with exercise.  · Paleness.  · Cold sensitivity.  · Indigestion.  · Nausea.  · Difficulty sleeping.  · Difficulty concentrating.  Symptoms may occur suddenly or develop slowly. If your anemia is mild, you may not have symptoms.  How is this diagnosed?  This condition is diagnosed based on:  · Blood tests.  · Your medical history.  · A physical exam.  · Bone marrow biopsy.  Your health care provider may also check your stool (feces) for blood and may do additional testing to look for the cause of your bleeding.  You may also have  other tests, including:  · Imaging tests, such as a CT scan or MRI.  · Endoscopy.  · Colonoscopy.  How is this treated?  Treatment for this condition depends on the cause. If you continue to lose a lot of blood, you may need to be treated at a hospital. Treatment may include:  · Taking supplements of iron, vitamin B12, or folic acid.  · Taking a hormone medicine (erythropoietin) that can help to stimulate red blood cell growth.  · Having a blood transfusion. This may be needed if you lose a lot of blood.  · Making changes to your diet.  · Having surgery to remove your spleen.  Follow these instructions at home:  · Take over-the-counter and prescription medicines only as told by your health care provider.  · Take supplements only as told by your health care provider.  · Follow any diet instructions that you were given.  · Keep all follow-up visits as told by your health care provider. This is important.  Contact a health care provider if:  · You develop new bleeding anywhere in the body.  Get help right away if:  · You are very weak.  · You are short of breath.  · You have pain in your abdomen or chest.  · You are dizzy or feel faint.  · You have trouble concentrating.  · You have bloody or black, tarry stools.  · You vomit repeatedly or you vomit up blood.  Summary  · Anemia is a condition in which you do not have enough red blood cells or enough of a substance in your red blood cells that carries oxygen (hemoglobin).  · Symptoms may occur suddenly or develop slowly.  · If your anemia is mild, you may not have symptoms.  · This condition is diagnosed with blood tests as well as a medical history and physical exam. Other tests may be needed.  · Treatment for this condition depends on the cause of the anemia.  This information is not intended to replace advice given to you by your health care provider. Make sure you discuss any questions you have with your health care provider.  Document Released: 01/25/2006 Document  Revised: 11/30/2018 Document Reviewed: 01/19/2018  [employer] Patient Education © 2020 [employer]    COVID-19  COVID-19 is a respiratory infection that is caused by a virus called severe acute respiratory syndrome coronavirus 2 (SARS-CoV-2). The disease is also known as coronavirus disease or novel coronavirus. In some people, the virus may not cause any symptoms. In others, it may cause a serious infection. The infection can get worse quickly and can lead to complications, such as:  · Pneumonia, or infection of the lungs.  · Acute respiratory distress syndrome or ARDS. This is fluid build-up in the lungs.  · Acute respiratory failure. This is a condition in which there is not enough oxygen passing from the lungs to the body.  · Sepsis or septic shock. This is a serious bodily reaction to an infection.  · Blood clotting problems.  · Secondary infections due to bacteria or fungus.  The virus that causes COVID-19 is contagious. This means that it can spread from person to person through droplets from coughs and sneezes (respiratory secretions).  What are the causes?  This illness is caused by a virus. You may catch the virus by:  · Breathing in droplets from an infected person's cough or sneeze.  · Touching something, like a table or a doorknob, that was exposed to the virus (contaminated) and then touching your mouth, nose, or eyes.  What increases the risk?  Risk for infection  You are more likely to be infected with this virus if you:  · Live in or travel to an area with a COVID-19 outbreak.  · Come in contact with a sick person who recently traveled to an area with a COVID-19 outbreak.  · Provide care for or live with a person who is infected with COVID-19.  Risk for serious illness  You are more likely to become seriously ill from the virus if you:  · Are 65 years of age or older.  · Have a long-term disease that lowers your body's ability to fight infection (immunocompromised).  · Live in a nursing home or  long-term care facility.  · Have a long-term (chronic) disease such as:  ? Chronic lung disease, including chronic obstructive pulmonary disease or asthma  ? Heart disease.  ? Diabetes.  ? Chronic kidney disease.  ? Liver disease.  · Are obese.  What are the signs or symptoms?  Symptoms of this condition can range from mild to severe. Symptoms may appear any time from 2 to 14 days after being exposed to the virus. They include:  · A fever.  · A cough.  · Difficulty breathing.  · Chills.  · Muscle pains.  · A sore throat.  · Loss of taste or smell.  Some people may also have stomach problems, such as nausea, vomiting, or diarrhea.  Other people may not have any symptoms of COVID-19.  How is this diagnosed?  This condition may be diagnosed based on:  · Your signs and symptoms, especially if:  ? You live in an area with a COVID-19 outbreak.  ? You recently traveled to or from an area where the virus is common.  ? You provide care for or live with a person who was diagnosed with COVID-19.  · A physical exam.  · Lab tests, which may include:  ? A nasal swab to take a sample of fluid from your nose.  ? A throat swab to take a sample of fluid from your throat.  ? A sample of mucus from your lungs (sputum).  ? Blood tests.  · Imaging tests, which may include, X-rays, CT scan, or ultrasound.  How is this treated?  At present, there is no medicine to treat COVID-19. Medicines that treat other diseases are being used on a trial basis to see if they are effective against COVID-19.  Your health care provider will talk with you about ways to treat your symptoms. For most people, the infection is mild and can be managed at home with rest, fluids, and over-the-counter medicines.  Treatment for a serious infection usually takes places in a hospital intensive care unit (ICU). It may include one or more of the following treatments. These treatments are given until your symptoms improve.  · Receiving fluids and medicines through an  IV.  · Supplemental oxygen. Extra oxygen is given through a tube in the nose, a face mask, or a velásquez.  · Positioning you to lie on your stomach (prone position). This makes it easier for oxygen to get into the lungs.  · Continuous positive airway pressure (CPAP) or bi-level positive airway pressure (BPAP) machine. This treatment uses mild air pressure to keep the airways open. A tube that is connected to a motor delivers oxygen to the body.  · Ventilator. This treatment moves air into and out of the lungs by using a tube that is placed in your windpipe.  · Tracheostomy. This is a procedure to create a hole in the neck so that a breathing tube can be inserted.  · Extracorporeal membrane oxygenation (ECMO). This procedure gives the lungs a chance to recover by taking over the functions of the heart and lungs. It supplies oxygen to the body and removes carbon dioxide.  Follow these instructions at home:  Lifestyle  · If you are sick, stay home except to get medical care. Your health care provider will tell you how long to stay home. Call your health care provider before you go for medical care.  · Rest at home as told by your health care provider.  · Do not use any products that contain nicotine or tobacco, such as cigarettes, e-cigarettes, and chewing tobacco. If you need help quitting, ask your health care provider.  · Return to your normal activities as told by your health care provider. Ask your health care provider what activities are safe for you.  General instructions  · Take over-the-counter and prescription medicines only as told by your health care provider.  · Drink enough fluid to keep your urine pale yellow.  · Keep all follow-up visits as told by your health care provider. This is important.  How is this prevented?    There is no vaccine to help prevent COVID-19 infection. However, there are steps you can take to protect yourself and others from this virus.  To protect yourself:   · Do not travel to areas  where COVID-19 is a risk. The areas where COVID-19 is reported change often. To identify high-risk areas and travel restrictions, check the CDC travel website: wwwnc.cdc.gov/travel/notices  · If you live in, or must travel to, an area where COVID-19 is a risk, take precautions to avoid infection.  ? Stay away from people who are sick.  ? Wash your hands often with soap and water for 20 seconds. If soap and water are not available, use an alcohol-based hand .  ? Avoid touching your mouth, face, eyes, or nose.  ? Avoid going out in public, follow guidance from your state and local health authorities.  ? If you must go out in public, wear a cloth face covering or face mask.  ? Disinfect objects and surfaces that are frequently touched every day. This may include:  § Counters and tables.  § Doorknobs and light switches.  § Sinks and faucets.  § Electronics, such as phones, remote controls, keyboards, computers, and tablets.  To protect others:  If you have symptoms of COVID-19, take steps to prevent the virus from spreading to others.  · If you think you have a COVID-19 infection, contact your health care provider right away. Tell your health care team that you think you may have a COVID-19 infection.  · Stay home. Leave your house only to seek medical care. Do not use public transport.  · Do not travel while you are sick.  · Wash your hands often with soap and water for 20 seconds. If soap and water are not available, use alcohol-based hand .  · Stay away from other members of your household. Let healthy household members care for children and pets, if possible. If you have to care for children or pets, wash your hands often and wear a mask. If possible, stay in your own room, separate from others. Use a different bathroom.  · Make sure that all people in your household wash their hands well and often.  · Cough or sneeze into a tissue or your sleeve or elbow. Do not cough or sneeze into your hand or  into the air.  · Wear a cloth face covering or face mask.  Where to find more information  · Centers for Disease Control and Prevention: www.cdc.gov/coronavirus/2019-ncov/index.html  · World Health Organization: www.who.int/health-topics/coronavirus  Contact a health care provider if:  · You live in or have traveled to an area where COVID-19 is a risk and you have symptoms of the infection.  · You have had contact with someone who has COVID-19 and you have symptoms of the infection.  Get help right away if:  · You have trouble breathing.  · You have pain or pressure in your chest.  · You have confusion.  · You have bluish lips and fingernails.  · You have difficulty waking from sleep.  · You have symptoms that get worse.  These symptoms may represent a serious problem that is an emergency. Do not wait to see if the symptoms will go away. Get medical help right away. Call your local emergency services (911 in the [place] Do not drive yourself to the hospital. Let the emergency medical personnel know if you think you have COVID-19.  Summary  · COVID-19 is a respiratory infection that is caused by a virus. It is also known as coronavirus disease or novel coronavirus. It can cause serious infections, such as pneumonia, acute respiratory distress syndrome, acute respiratory failure, or sepsis.  · The virus that causes COVID-19 is contagious. This means that it can spread from person to person through droplets from coughs and sneezes.  · You are more likely to develop a serious illness if you are 65 years of age or older, have a weak immunity, live in a nursing home, or have chronic disease.  · There is no medicine to treat COVID-19. Your health care provider will talk with you about ways to treat your symptoms.  · Take steps to protect yourself and others from infection. Wash your hands often and disinfect objects and surfaces that are frequently touched every day. Stay away from people who are sick and wear a mask if you  are sick.  This information is not intended to replace advice given to you by your health care provider. Make sure you discuss any questions you have with your health care provider.  Document Released: 01/23/2020 Document Revised: 05/14/2020 Document Reviewed: 01/23/2020  [employer]vier Patient Education © 2020 [employer]      Depression / Suicide Risk    As you are discharged from this [facility] facility, it is important to learn how to keep safe from harming yourself.    Recognize the warning signs:  · Abrupt changes in personality, positive or negative- including increase in energy   · Giving away possessions  · Change in eating patterns- significant weight changes-  positive or negative  · Change in sleeping patterns- unable to sleep or sleeping all the time   · Unwillingness or inability to communicate  · Depression  · Unusual sadness, discouragement and loneliness  · Talk of wanting to die  · Neglect of personal appearance   · Rebelliousness- reckless behavior  · Withdrawal from people/activities they love  · Confusion- inability to concentrate     If you or a loved one observes any of these behaviors or has concerns about self-harm, here's what you can do:  · Talk about it- your feelings and reasons for harming yourself  · Remove any means that you might use to hurt yourself (examples: pills, rope, extension cords, firearm)  · Get professional help from the community (Mental Health, Substance Abuse, psychological counseling)  · Do not be alone:Call your Safe Contact- someone whom you trust who will be there for you.  · Call your local CRISIS HOTLINE 431-3437 or 127-781-9157  · Call your local Children's Mobile Crisis Response Team [place] (271) 053-0523 or www.[employer]  · Call the toll free National Suicide Prevention Hotlines   · National Suicide Prevention Lifeline 237-128-UZZZ (7381)  · National Hope Line Network 800-SUICIDE (399-3906)

## 2022-04-20 NOTE — DISCHARGE PLANNING
Anticipated discharge disposition: home with HH     Action: notified by bedside RN that pt will need HH for DC and MD has placed order.   Made phone call to pt and discussed HH. Choice form completed and faxed to [facility] (previously [facility])     Barriers to discharge: HH acceptance    Plan: care coordination will follow up for HH acceptance

## 2022-04-20 NOTE — DISCHARGE SUMMARY
"Discharge Summary    CHIEF COMPLAINT ON ADMISSION  Chief Complaint   Patient presents with   • Syncope     States she passed out twice today. [place] herself fainting and got weak, slid down to ground from her chair before passing out. Denies head injury. States this occurred at about 1500 today.   • Headache     Reports headache \"that came out of nowhere\" and preceded the syncope. Reports headache was initially on right side, but now is throughout her head.   • Body Aches     Cough and bodyaches \"all winter\", worsened over the last month.       Reason for Admission  EMS     Admission Date  4/18/2022    CODE STATUS  Full Code    HPI & HOSPITAL COURSE  This is a 66 y.o. female here with Syncope (States she passed out twice today. Dalzell herself fainting and got weak, slid down to ground from her chair before passing out. Denies head injury. States this occurred at about 1500 today.), Headache (Reports headache \"that came out of nowhere\" and preceded the syncope. Reports headache was initially on right side, but now is throughout her head.), and Body Aches (Cough and bodyaches \"all winter\", worsened over the last month.)  Please review Dr. Enrike Ramsey D.O. notes for further details of history of present illness, past medical/social/family histories, allergies and medications.  Sheis not vaccinated for CoVID. She has a remote history of alcohol abuse. She presents with body aches, cough, weakness, fatigue, headache. Pain became severe that she nearly passed out, sliding to the floor.   At the ED, she is afebrile, normotensive. Not hypoxic.  CTA HnN no large vessel occlusion, 50% stenosis L vertebral artery otherwise no high grade stenosis, possible pulmonary hypertension  CXR no acute process  Anemic with Hb at 6.4  CT Ab ordered.  CT came back:  1.  Hazy linear density left lung base, appearance suggests atelectasis or early infiltrates.  2.  Moderate quantity of stool in colon, appearance favors changes of " constipation  3.  Hepatomegaly  4.  Atherosclerosis and atherosclerotic coronary artery disease  She received blood transfusion., Hb now 7.7  Occult blood came back trace. I spoke with Dr. Monterroso, GI. He recommended iron supplemnts, PPI and outpatient follow up for EGD/colonoscopy.   She is NOT hypoxic and will follow up with [facility] for asymptomatic CoVID. Usually 5-10d of being symptom free is required before she can be eligible for flu and CoVID vaccinations. She is severely anxious and has rheumatoid arthritis. She declined Psychiatry consult, she denies harm to self or others. She will be given pain and anti anxiety meds but she is to follow up with her primary who will refer her to Psychiatry and Rheumatology to address her severe anxiety and RA.     At discharge date, Lynette Correa afebrile and hemodynamically stable.  Lynette Correa wanted to be discharged today.    Discharge Physical Exam  General/Constitutional: No acute distress. Older appearing. thin  Head: Normocephalic, atraumatic  ENT: Oral mucosa is moist. No obvious pharyngeal exudates  Eyes: Pink conjunctiva, no scleral icterus  Neck: Supple, no lymphadenopathy  Cardiovascular: Normal rate and regular rhythm. S1,2 noted. No murmurs, gallops or rubs.  Pulmonary: Clear to auscultation bilaterally. No wheezes, rales or rhonchi.  Abdominal: Soft, nontender, not distended, bowel sounds normoactive. No guarding or peritoneal signs.  Musculoskeletal: No tenderness to palpation of chest wall. Rheumatoid defromities of hands. Mild swelling and tenderness (out of proportion to exam)  Neurologic: Alert and oriented. Grossly nonfocal, moving all extremities.  Genitourinary: No gross hematuria  Skin: No obvious rash. Pale  Psychiatric: Labile mood, severely anxious about going home.  Vitals Reviewed  Labs Reviewed  Imaging reviewed  Nursing notes reviewed      Imaging  CT-ABDOMEN-PELVIS WITH   Final Result         1.  Hazy linear density left lung  base, appearance suggests atelectasis or early infiltrates.   2.  Moderate quantity of stool in colon, appearance favors changes of constipation   3.  Hepatomegaly   4.  Atherosclerosis and atherosclerotic coronary artery disease      CT-CTA HEAD WITH & W/O-POST PROCESS   Final Result         1.  No large vessel occlusion or aneurysm identified      CT-CTA NECK WITH & W/O-POST PROCESSING   Final Result         1.  Approximately 50% stenosis at the origin the left vertebral artery. Otherwise no high-grade stenosis, aneurysm, dissection, or occlusion identified   2.  Enlargement of main pulmonary arteries, consider pulmonary arterial hypertension         DX-CHEST-PORTABLE (1 VIEW)   Final Result         1.  No acute cardiopulmonary disease.   2.  Atherosclerosis              Therefore, she is discharged in fair and stable condition to home with organized home healthcare and close outpatient follow-up.    Discharge Date  4/20/2022    FOLLOW UP ITEMS POST DISCHARGE      DISCHARGE DIAGNOSES  Principal Problem:    COVID-19 virus infection, anemia POA: Yes  Active Problems:    Major depressive disorder, recurrent episode with anxious distress (HCC) POA: Yes    Chronic obstructive pulmonary disease (HCC) POA: Yes    GERD (gastroesophageal reflux disease) POA: Yes    Microcytic anemia POA: Yes    Anxiety POA: Unknown        FOLLOW UP  Future Appointments   Date Time Provider Department Center   4/26/2022  1:40 PM [facility] M.D.  [address] [place] [address] [place] [id number] [phone]    In 1 week  GI follow up for Anemia per MD العلي    [facility] PULMONARY MEDICINE  [address] [place] [address], [address]  [facility] [id number] [phone]  Call  Please call [facility] Pulmonary Med to schedule a hospital follow up. This facility accepts your insurance. Thank you.   Follow up Angie Sorensen M.D. in 1 week  Follow up with [facility]/Pulmonology in 2 weeks for  asthma/RAD  Follow up with Dr. Monterroso, GI in 1 week for GI work-up of anemia  Follow up or be referred to Psychiatry for severe anxiety in 1 week  Follow up or be referred to a rheumatologist for RA and pain control in 1 week  Return to ER in the event of new or worsening symptoms. Please note importance of compliance and the patient has agreed to proceed with all medical recommendations and follow up plan indicated above. All medications come with benefits and risks. Risks may include permanent injury or death and these risks can be minimized with close reassessment and monitoring. Please make it to your scheduled follow ups with your primary provider, and/or specialists clinic.  Quarantine, mask, hand washing, proper hygiene and seeing a doctor if you have fever, hypoxia or respiratory/GI symptoms or symptoms of thrombosis (stroke/chest pain/new extremity swelling) are MUSTS. Self isolation if still symptomatic or if fever (must self isolate until NO fever for 72 hours from last temp without use of anti-fever medications).  As advised by the Centers for Disease Control and Prevention (CDC), please isolate yourself for at least 10 days since the onset of your symptoms AND one day without a fever without the use of fever reducing medications AND with improved symptoms.  This is true for everyone, even if your test is negative because of high rates of falsely negative results.  Restrict activities outside your home for 10 days. Do not go to work, school, public areas, or restaurants. Avoid using public transportation, ride-sharing, or taxis.  If your symptoms worsen or you need a return to work note please call the COVID line [phone].  If you become sicker (even if your initial COVID test is negative), have difficulty breathing, chest pain, you are unable to eat or drink enough, or have severe vomiting, diarrhea or weakness, you may need to return to the Emergency Department or contact your clinic provider for  re-evaluation.  If you need care and are coming into the hospital or one of our clinics, inform the healthcare facility by phone that you have been tested for COVID-19 prior to entry.  Put on a facemask before you enter the facility or before emergency services arrive if you have called 911.    Unless you have severe difficulty breathing you will be expected to wear your mask throughout your hospital/clinic visit in order to protect our staff and other employees.  Postpone all elective medical appointments within your isolation period, including but not limited to physical therapy, dental, chiropractic, routine check-ups, etc., until cleared by the healthline, local health department, or your personal physician. If you have any upcoming elective appointments, please contact that office directly in order to cancel, reschedule or be evaluated for a potential virtual visit.  IF YOU LIVE WITH OTHERS  Please have ALL household members quarantine until your test results are back.  They should not go into public, to  or school, or go to work.  If any of your household members need a work note, please have them call the COVID hotline at [phone].  If living with others, try to have your own bathroom and bedroom if possible.  Please try and wipe down high-touch surfaces (counters, tabletops, doorknobs, bathroom fixtures, toilets, phones, keyboards, tablets, and bedside tables) at least twice a day. Avoid sharing personal household items. You should not share dishes, drinking glasses, cups, eating utensils, towels, or bedding with other people in your home. After using these items, they should be washed thoroughly with soap and water.  Also, clean any surfaces that may have blood, stool, or body fluids on them. Use a household cleaning spray or wipe, according to the label instructions. Labels contain instructions for safe and effective use of the cleaning product including precautions you should take when applying  the product, such as wearing gloves and making sure you have good ventilation during use of the product.   Clean your hands often. Wash your hands often with soap and water for at least 20 seconds. If soap and water are not available, clean your hands with an alcohol-based hand  that contains at least 60% alcohol, covering all surfaces of your hands and rubbing them together until they feel dry. Soap and water should be used preferentially if hands are visibly dirty. Avoid touching your eyes, nose, and mouth with unwashed hands.   Cover your coughs and sneezes    Please see the resources below for more information.    CDC Corona Website https://www.cdc.gov/coronavirus/2019-ncov/index.html  General Information https://www.cdc.gov/coronavirus/2019-ncov/faq.html   [facility]: [phone]  [facility] Line [phone]      MEDICATIONS ON DISCHARGE     Medication List      START taking these medications      Instructions   acetaminophen 325 MG Tabs  Commonly known as: Tylenol   Take 2 Tablets by mouth every 6 hours as needed.  Dose: 650 mg     ALPRAZolam 0.5 MG Tabs  Commonly known as: XANAX   Take 1 Tablet by mouth 2 times a day as needed for Sleep or Anxiety for up to 3 days.  Dose: 0.5 mg     guaiFENesin dextromethorphan 100-10 MG/5ML Syrp syrup  Commonly known as: ROBITUSSIN DM   Take 10 mL by mouth every 6 hours as needed for Cough.  Dose: 10 mL     ondansetron 4 MG Tbdp  Commonly known as: ZOFRAN ODT   Take 1 Tablet by mouth every 6 hours as needed for Nausea (give PO if no IV route available).  Dose: 4 mg     oxyCODONE immediate-release 5 MG Tabs  Commonly known as: ROXICODONE   Take 1 Tablet by mouth every 8 hours as needed for Severe Pain for up to 3 days.  Dose: 5 mg     polyethylene glycol/lytes 17 g Pack  Commonly known as: MIRALAX   Take  by mouth 1 time a day as needed (if sennosides and docusate ineffective after 24 hours).     senna-docusate 8.6-50 MG Tabs  Commonly  "known as: PERICOLACE or SENOKOT S   Take 2 Tablets by mouth 2 times a day.  Dose: 2 Tablet        CONTINUE taking these medications      Instructions   cloNIDine 0.1 MG Tabs  Commonly known as: CATAPRES   Take 1 Tablet by mouth at bedtime.  Dose: 0.1 mg     Dexilant 60 MG Cpdr delayed-release capsule  Generic drug: Dexlansoprazole   Take 60 mg by mouth every day.  Dose: 60 mg     DULoxetine 60 MG Cpep delayed-release capsule  Commonly known as: CYMBALTA   Take 60 mg by mouth every day.  Dose: 60 mg     gabapentin 800 MG tablet  Commonly known as: NEURONTIN   Take 1.5 Tablets by mouth 3 times a day.  Dose: 1,200 mg            Allergies  Allergies   Allergen Reactions   • Codeine Rash     Difficulty breathing, and itching   • Morphine Anaphylaxis     Headache; throat swelling   • Pcn [Penicillins]      All \"cillin\" family, per patient  ---   I broke out in \"trench\" mouth       DIET  Orders Placed This Encounter   Procedures   • Diet Order Diet: Regular     Standing Status:   Standing     Number of Occurrences:   1     Order Specific Question:   Diet:     Answer:   Regular [1]       ACTIVITY  Avoid heavy lifting or strenuous activity      CONSULTATIONS      PROCEDURES  CT-ABDOMEN-PELVIS WITH    Result Date: 4/19/2022 4/19/2022 1:06 AM HISTORY/REASON FOR EXAM:  Abdominal distension; Abdominal pain, acute, nonlocalized; IV contrast only. TECHNIQUE/EXAM DESCRIPTION:   CT scan of the abdomen and pelvis with contrast. Contrast-enhanced helical scanning was obtained from the diaphragmatic domes through the pubic symphysis following the bolus administration of nonionic contrast without complication. 50 mL of Omnipaque 350 nonionic contrast was administered without complication. Low dose optimization technique was utilized for this CT exam including automated exposure control and adjustment of the mA and/or kV according to patient size. COMPARISON: May 27, 2017 FINDINGS: Lower Chest: Hazy linear density at the left lung " base is seen. Liver: Hepatomegaly is seen. Spleen: Unremarkable. Pancreas: Unremarkable. Gallbladder: No calcified stones. Biliary: Nondilated. Adrenal glands: Normal. Kidneys: Unremarkable without hydronephrosis. Contrast within the bilateral urinary outflow tract is seen compatible with recent contrast-enhanced CT. Bowel: Moderate quantity of stool in the colon is seen. No obstruction or acute inflammation. The appendix is incompletely visualized, visualized portions of the appendix appear unremarkable. Lymph nodes: No adenopathy. Vasculature: Atherosclerotic changes are seen including atherosclerotic coronary artery calcifications. Peritoneum: Unremarkable without ascites. Musculoskeletal: No acute or destructive process. Pelvis: No adenopathy or free fluid.     1.  Hazy linear density left lung base, appearance suggests atelectasis or early infiltrates. 2.  Moderate quantity of stool in colon, appearance favors changes of constipation 3.  Hepatomegaly 4.  Atherosclerosis and atherosclerotic coronary artery disease    CT-CTA HEAD WITH & W/O-POST PROCESS    Result Date: 4/19/2022 4/18/2022 11:05 PM HISTORY/REASON FOR EXAM:  Headache, sudden, severe; Subarachnoid hemorrhage (SAH) suspected; Diffuse headache followed by syncope, evaluate for subarachnoid hemorrhage TECHNIQUE/EXAM DESCRIPTION: CT angiogram of the [place] without and with contrast.  Initial precontrast images were obtained of the head from the skull base through the vertex.  Postcontrast images were obtained of the [place] following the power injection of nonionic contrast at 5.0 mL/sec. Thin-section helical images were obtained with overlapping reconstruction interval. Coronal and sagittal multiplanar volume reformats were generated.  3D angiographic images were reviewed on PACS.  Maximum intensity projection (MIP) images were generated and reviewed. 100 mL of Omnipaque 350 nonionic contrast was injected intravenously. Low dose  optimization technique was utilized for this CT exam including automated exposure control and adjustment of the mA and/or kV according to patient size. COMPARISON:  None. FINDINGS: The posterior circulation shows the distal vertebral arteries to be patent. The vertebrobasilar confluence is intact. The basilar artery is patent. No aneurysm or occlusive lesion is evident. Atherosclerotic changes of the bilateral intracranial internal carotid arteries are seen with less than 50% stenosis, otherwise the anterior circulation shows no stenotic or occlusive lesion. No aneurysm is evident about the [place]. The brain is unremarkable. 3D angiographic/MIP images of the vasculature confirm the vascular findings as described above.     1.  No large vessel occlusion or aneurysm identified    CT-CTA NECK WITH & W/O-POST PROCESSING    Result Date: 4/18/2022 4/18/2022 11:05 PM HISTORY/REASON FOR EXAM:  Carotid artery aneurysm suspected; Headache followed by syncope, evaluate for aneurysm TECHNIQUE/EXAM DESCRIPTION:  CT angiogram of the neck with contrast. Postcontrast images were obtained of the neck from the great vessels through the skull base following the power injection of nonionic contrast at 5.0 mL/sec. Thin-section helical images were obtained with overlapping reconstruction interval. Coronal and oblique multiplanar volume reformats were generated. Cervical internal carotid artery percent stenosis is calculated using the standard method according to the NASCET criteria wherein a segment of uniform caliber mid or distal cervical internal carotid is used as the reference denominator. 3D angiographic images were reviewed on PACS.  Maximum intensity projection (MIP) images were generated and reviewed 100 mL of Omnipaque 350 nonionic contrast was injected intravenously. Low dose optimization technique was utilized for this CT exam including automated exposure control and adjustment of the mA and/or kV according to  patient size. COMPARISON: None FINDINGS: Pulmonary arteries: Enlargement and the main pulmonary arteries is seen. Aortic arch: conventional branching pattern. There is atherosclerotic plaque of the aorta. Right common carotid artery: Patent Right internal carotid artery: Normal in appearance Left common carotid artery is patent. Left internal carotid artery: Atherosclerotic plaque without significant stenosis (less than 50%). The right vertebral artery is patent without dissection or stenosis. Atherosclerotic calcification in the origin of the left vertebral artery is seen resulting in approximately 50% stenosis, otherwise the left vertebral artery is patent without dissection or stenosis. Vertebrobasilar confluence: The vertebrobasilar confluence appears normal. Lung apices are clear The soft tissues of the neck are within normal limits. Postsurgical changes of anterior and posterior cervical spine fusion are seen. 3D angiographic/MIP images of the vasculature confirm the vascular findings as described above.     1.  Approximately 50% stenosis at the origin the left vertebral artery. Otherwise no high-grade stenosis, aneurysm, dissection, or occlusion identified 2.  Enlargement of main pulmonary arteries, consider pulmonary arterial hypertension     DX-CHEST-PORTABLE (1 VIEW)    Result Date: 4/18/2022 4/18/2022 10:47 PM HISTORY/REASON FOR EXAM: Shortness of Breath TECHNIQUE/EXAM DESCRIPTION:  Single AP view of the chest. COMPARISON: September 23, 2016 FINDINGS: The cardiac silhouette appears within normal limits. Atherosclerotic calcification of the aorta is noted.  The central pulmonary vasculature appears normal. The lungs appear well expanded bilaterally.  Bilateral lungs are clear. No significant pleural effusions are identified. The bony structures appear age-appropriate.     1.  No acute cardiopulmonary disease. 2.  Atherosclerosis      LABORATORY  Lab Results   Component Value Date    SODIUM 134 (L)  04/19/2022    POTASSIUM 4.4 04/19/2022    CHLORIDE 102 04/19/2022    CO2 18 (L) 04/19/2022    GLUCOSE 92 04/19/2022    BUN 13 04/19/2022    CREATININE 0.67 04/19/2022        Lab Results   Component Value Date    WBC 4.9 04/20/2022    HEMOGLOBIN 7.9 (L) 04/20/2022    HEMATOCRIT 29.0 (L) 04/20/2022    PLATELETCT 466 (H) 04/20/2022        Total time of the discharge process exceeds 35 minutes.

## 2022-04-20 NOTE — PROGRESS NOTES
Patient rested quietly through the night with pain being well controled,  Patient voices no complaints at this time

## 2022-04-20 NOTE — CARE PLAN
The patient is     Shift Goals  Clinical Goals: safety  Patient Goals: safety    Progress made toward(s) clinical / shift goals:  ongoing    Patient is not progressing towards the following goals:

## 2022-04-20 NOTE — DISCHARGE PLANNING
Received Choice form at [phone]  Agency/Facility Name: [facility]  Referral sent per Choice form @ [phone]

## 2022-04-21 NOTE — DISCHARGE PLANNING
Notified by [facility] trish Bojorquez that pt was declined due to insurance being out of network.   Made phone call to pt to notify HH  With [facility] is out of network. Choice form completed for 1. [facility] 2. [facility]  And faxed to [facility]

## 2022-04-21 NOTE — DISCHARGE PLANNING
Received Choice form at [phone]  Agency/Facility Name: [facility]  Referral sent per Choice form @ [phone]

## 2022-04-22 NOTE — DISCHARGE PLANNING
Agency/Facility Name: [facility]  Spoke To: Ambreen  Outcome: Per Ambreen, pt has been accepted

## 2022-04-26 ENCOUNTER — OFFICE VISIT (OUTPATIENT)
Dept: INTERNAL MEDICINE | Facility: OTHER | Age: 67
End: 2022-04-26
Payer: MEDICARE

## 2022-04-26 VITALS
HEIGHT: 65 IN | SYSTOLIC BLOOD PRESSURE: 161 MMHG | OXYGEN SATURATION: 97 % | WEIGHT: 139 LBS | HEART RATE: 84 BPM | TEMPERATURE: 98.2 F | BODY MASS INDEX: 23.16 KG/M2 | DIASTOLIC BLOOD PRESSURE: 80 MMHG

## 2022-04-26 DIAGNOSIS — N64.52 DISCHARGE FROM NIPPLE: ICD-10-CM

## 2022-04-26 DIAGNOSIS — F33.9 MAJOR DEPRESSIVE DISORDER, RECURRENT EPISODE WITH ANXIOUS DISTRESS (HCC): ICD-10-CM

## 2022-04-26 DIAGNOSIS — U07.1 COVID-19 VIRUS INFECTION: ICD-10-CM

## 2022-04-26 DIAGNOSIS — D50.9 MICROCYTIC ANEMIA: ICD-10-CM

## 2022-04-26 DIAGNOSIS — F43.10 POSTTRAUMATIC STRESS DISORDER: ICD-10-CM

## 2022-04-26 DIAGNOSIS — M79.10 MUSCLE PAIN: ICD-10-CM

## 2022-04-26 DIAGNOSIS — G89.29 OTHER CHRONIC PAIN: ICD-10-CM

## 2022-04-26 PROCEDURE — 99213 OFFICE O/P EST LOW 20 MIN: CPT | Mod: GE

## 2022-04-26 ASSESSMENT — PATIENT HEALTH QUESTIONNAIRE - PHQ9
6. FEELING BAD ABOUT YOURSELF - OR THAT YOU ARE A FAILURE OR HAVE LET YOURSELF OR YOUR FAMILY DOWN: NEARLY EVERY DAY
7. TROUBLE CONCENTRATING ON THINGS, SUCH AS READING THE NEWSPAPER OR WATCHING TELEVISION: NEARLY EVERY DAY
8. MOVING OR SPEAKING SO SLOWLY THAT OTHER PEOPLE COULD HAVE NOTICED. OR THE OPPOSITE, BEING SO FIGETY OR RESTLESS THAT YOU HAVE BEEN MOVING AROUND A LOT MORE THAN USUAL: NEARLY EVERY DAY
9. THOUGHTS THAT YOU WOULD BE BETTER OFF DEAD, OR OF HURTING YOURSELF: NOT AT ALL
4. FEELING TIRED OR HAVING LITTLE ENERGY: NEARLY EVERY DAY
3. TROUBLE FALLING OR STAYING ASLEEP OR SLEEPING TOO MUCH: NEARLY EVERY DAY
5. POOR APPETITE OR OVEREATING: NEARLY EVERY DAY
7. TROUBLE CONCENTRATING ON THINGS, SUCH AS READING THE NEWSPAPER OR WATCHING TELEVISION: NEARLY EVERY DAY
SUM OF ALL RESPONSES TO PHQ9 QUESTIONS 1 AND 2: 3
5. POOR APPETITE OR OVEREATING: NEARLY EVERY DAY
1. LITTLE INTEREST OR PLEASURE IN DOING THINGS: NOT AT ALL
2. FEELING DOWN, DEPRESSED, IRRITABLE, OR HOPELESS: NEARLY EVERY DAY
2. FEELING DOWN, DEPRESSED, IRRITABLE, OR HOPELESS: NEARLY EVERY DAY
1. LITTLE INTEREST OR PLEASURE IN DOING THINGS: NOT AT ALL
SUM OF ALL RESPONSES TO PHQ QUESTIONS 1-9: 21
SUM OF ALL RESPONSES TO PHQ QUESTIONS 1-9: 21
8. MOVING OR SPEAKING SO SLOWLY THAT OTHER PEOPLE COULD HAVE NOTICED. OR THE OPPOSITE, BEING SO FIGETY OR RESTLESS THAT YOU HAVE BEEN MOVING AROUND A LOT MORE THAN USUAL: NEARLY EVERY DAY
6. FEELING BAD ABOUT YOURSELF - OR THAT YOU ARE A FAILURE OR HAVE LET YOURSELF OR YOUR FAMILY DOWN: NEARLY EVERY DAY
9. THOUGHTS THAT YOU WOULD BE BETTER OFF DEAD, OR OF HURTING YOURSELF: NOT AT ALL
4. FEELING TIRED OR HAVING LITTLE ENERGY: NEARLY EVERY DAY
3. TROUBLE FALLING OR STAYING ASLEEP OR SLEEPING TOO MUCH: NEARLY EVERY DAY
SUM OF ALL RESPONSES TO PHQ9 QUESTIONS 1 AND 2: 3

## 2022-04-26 ASSESSMENT — FIBROSIS 4 INDEX: FIB4 SCORE: 1.75

## 2022-04-26 NOTE — PATIENT INSTRUCTIONS
1. Chest Xray    2. Labs for muscle pain    3. COVID-19, if worsen, go to ER    4. Psychiatry referral, for depression    5. Get mammogram when able

## 2022-04-26 NOTE — PROGRESS NOTES
Teaching Physician Attestation      Level of Participation    I discussed with the resident physician the patient's history, exam, assessment and plan in detail.  Topics listed in my addendum were the focus of the visit.  Healthcare maintenance was not addressed this visit unless listed as a topic in my addendum.  I agree with plan as written along with the following additions/modifications:      covid +  -Patient tested COVID-positive on the 18th, 9 days ago.  She has a cough and shortness of breath, although she did not seek care after the COVID test until now.  She states both of these symptoms are improving.  No fevers.  On exam she has faint crackles at the bilateral bases otherwise she is in no respiratory distress, speaking in full sentences, no accessory muscle use, nontoxic.  She is satting 97% on room air.  She is not immunized.    Plan  -Although patient is in an elevated risk group for poor outcomes given her age and lack of immunization status, given she is at least 9 days out from the onset of symptoms and improving it is less likely that she would develop a serious complication.  Given crackles we will obtain a chest x-ray to evaluate the status.  She has no cardiac referable symptoms or signs on exam.  As she is past 5 days since onset of symptoms she is not eligible for the oral medications.  As she is saturating 97% on room air she would not meet criteria for steroids.  At this point will follow closely.  Strict instructions of any worsening to seek emergency department care were provided, also she was instructed to wear a mask in public until she is no longer coughing.    Chronic issues of pain and nipple discharge, not fully addressed today.  -Advised patient on Monday of next week she will be well outside of the quarantine period, assuming she is feeling well to please follow-up with labs and imaging to complete further work-up.    Unclear indication for gabapentin, but appears patient has been  chronically on this.  As such, given that she is acutely ill and to prevent withdrawal we will refill at her current dosing.  Once she is stable, close follow-up we will begin weaning this unless there is a clear indication for continuation.    Return to clinic in 1 to 2 weeks for close follow-up on above issues.    Addendum: on Review after precepting I see the patient has a significant anemia of 7.9 on last lab studies.  Of note, she serially increased from 6.4-7.9 over a 3-day time interval, which is somewhat reassuring.  Patient's RBC indices are consistent with iron deficiency anemia, and she has a low iron and a borderline low ferritin, making iron deficiency anemia the most likely diagnosis.  She does have a significant microcytosis, however her Metzner's index is 14.4 which again suggests iron deficiency anemia.  On review of the discharge summary from recent admission, it appears patient was actually transfused and occult blood was positive, GI was consulted and recommended outpatient EGD/colonoscopy.  Patient reports on the phone on 4/29 that she has had jet black stools which have slightly improved but continued to be dark as an outpatient.  She reports that she is short of breath, although she is in the setting of recovering from COVID, although this in itself would be concerning for COVID or for anemia.  She also reports severe abdominal pain.  Patient was advised to report back to the hospital for evaluation, given her low hemoglobin and continued dark schools with severe abdominal pain she needs to be evaluated for an active GI bleed.  Dr. Johnson called after my discussion to encourage this.  Direct signout was given to the [facility] triage desk, appreciate support.

## 2022-04-26 NOTE — PROGRESS NOTES
Date of Service:  4/26/2022    CC: COVID-19 hospitalization follow-up, follow up for muscular pain    HPI:  Lynette Correa  is a 66 y.o. female with a PMH of lumbar spinal stenosis, major depressive disorder, chronic pain, alcohol abuse (in remission), PTSD, chronic low back pain, muscle pain, microcytic anemia and recent COVID-19 infection (4/18/2022).   She presents for general follow up and for post-hospital discharge follow up. Patient was admitted on 4/18 after testing positive for COVID-19, patient was not vaccinated for COVID. She has not required any oxygen at home, but describes shortness of breath and coughing. She has noticed that this has continued since the 18th.     Patient was in clear distress during encounter, she was crying and extremely anxious. Patient has been in pain for some time, she has yet to get key labs drawn since October of 2021 to rule out autoimmune cause of joint and muscular pain. Explained to patient that we need to take her current symptoms one step at a time. Tried to stress, getting better from her COVID-19 infection, so we could then work on narrowing down possible causes of her chronic pain, fatigue and anemia.     Patient was very distractible. PSQ-9 was performed, patient scored a 21. This was a very high and concerning level. Discussed having her follow with a psychiatrist for further evaluation and support. Patient was agreeable. Patient denies any suicidal ideation, or homicidal ideation.      Patient has noted continued pain in her joints and muscles throughout. She has been alternating ibprofen and tylenol around the clock. She has tried both hot and cold packs but states that they have not worked. Her most pain is located in her arms, hands and legs. She also notes tingling in her legs and feeling cold after getting COVID-19. But denies any fever or chills.     She also has concerns because [employer] has denied her prescription of Gabapentin. I will attempt  to write a script, but am unsure that it will be approved, without a definitive diagnosis for her muscular pain.   Review of systems:  Review of Systems   Constitutional: Positive for chills and malaise/fatigue. Negative for diaphoresis, fever and weight loss.   HENT: Negative.  Negative for congestion and sinus pain.    Eyes: Positive for blurred vision.   Respiratory: Positive for cough and shortness of breath. Negative for hemoptysis, wheezing and stridor.    Cardiovascular: Positive for chest pain (From coughing). Negative for palpitations, leg swelling and PND.   Gastrointestinal: Positive for abdominal pain and nausea. Negative for blood in stool, constipation, diarrhea and vomiting.   Genitourinary: Negative.    Musculoskeletal: Positive for back pain, joint pain and myalgias.   Skin: Negative.    Neurological: Positive for tingling (Legs) and weakness. Negative for dizziness, tremors and headaches.   Endo/Heme/Allergies: Negative.    Psychiatric/Behavioral: Positive for depression. Negative for substance abuse and suicidal ideas. The patient is nervous/anxious.         Past Medical History:  Patient Active Problem List    Diagnosis Date Noted   • Anxiety 04/20/2022   • COVID-19 virus infection, anemia 04/19/2022   • Microcytic anemia 04/19/2022   • Screening for hyperlipidemia 10/26/2021   • Muscle pain 07/22/2021   • Tobacco dependence 01/19/2021   • Blindness of right eye with normal vision in contralateral eye 10/12/2020   • Cannabis abuse 10/12/2020   • Chronic low back pain 10/12/2020   • Chronic obstructive pulmonary disease (HCC) 10/12/2020   • Combined drug dependence, continuous abuse (HCC) 10/12/2020   • Discharge from [facility] 10/12/2020   • Fusion of spine, cervical region 10/12/2020   • GERD (gastroesophageal reflux disease) 10/12/2020   • Psoriatic arthritis (HCC) 10/12/2020   • Severe persistent asthma with (acute) exacerbation 10/12/2020   • Viral hepatitis C 10/12/2020   • Posttraumatic stress  disorder 12/13/2017   • Major depressive disorder, recurrent episode with anxious distress (HCC) 08/04/2017   • Chronic pain 08/04/2017   • Alcohol abuse, in remission 08/04/2017   • Lumbar spinal stenosis 10/08/2016   • Closed die-punch intra-articular fracture of distal end of left radius 03/03/2016       Past Surgical History:    has a past surgical history that includes cervical fusion posterior (7/20/2010); cervical decompression posterior (7/20/2010); tonsillectomy (as child); lumbar laminectomy diskectomy (1/8/2011); mass excision general (10/4/2011); orif, wrist (Left, 3/3/2016); primary c section (1989/1988/1993); lumbar laminectomy diskectomy (10/8/2016); shoulder arthroplasty total (Left, 2/12/2018); hysterectomy, total abdominal (1995); carpal tunnel release (Right, 2005); knee arthroplasty total (Right, 2003); other orthopedic surgery; knee arthroscopy (Left, 2008); knee arthroplasty total (Left, 5/14/2018); knee revision total (Right, 9/10/2018); biopsy general; block epidural steroid injection (2015-7/2018); other cardiac surgery (2005); and knee revision total (Right, 11/26/2018).    Medications:  Current Outpatient Medications   Medication Sig Dispense Refill   • acetaminophen (TYLENOL) 325 MG Tab Take 2 Tablets by mouth every 6 hours as needed. 30 Tablet 0   • guaiFENesin dextromethorphan (ROBITUSSIN DM) 100-10 MG/5ML Syrup syrup Take 10 mL by mouth every 6 hours as needed for Cough. 840 mL    • ondansetron (ZOFRAN ODT) 4 MG TABLET DISPERSIBLE Take 1 Tablet by mouth every 6 hours as needed for Nausea (give PO if no IV route available). 10 Tablet 0   • senna-docusate (PERICOLACE OR SENOKOT S) 8.6-50 MG Tab Take 2 Tablets by mouth 2 times a day. 30 Tablet 0   • polyethylene glycol/lytes (MIRALAX) 17 g Pack Take  by mouth 1 time a day as needed (if sennosides and docusate ineffective after 24 hours).  3   • cloNIDine (CATAPRES) 0.1 MG Tab Take 1 Tablet by mouth at bedtime. 90 Tablet 0   • gabapentin  "(NEURONTIN) 800 MG tablet Take 1.5 Tablets by mouth 3 times a day. 300 Tablet 1   • DULoxetine (CYMBALTA) 60 MG Cap DR Particles delayed-release capsule Take 60 mg by mouth every day.     • Dexlansoprazole (DEXILANT) 60 MG CAPSULE DELAYED RELEASE delayed-release capsule Take 60 mg by mouth every day. 90 Capsule 3     No current facility-administered medications for this visit.       Allergies:  Allergies   Allergen Reactions   • Codeine Rash     Difficulty breathing, and itching   • Morphine Anaphylaxis     Headache; throat swelling   • Pcn [Penicillins]      All \"cillin\" family, per patient  ---   I broke out in \"trench\" mouth       Family History:   family history includes Alcohol abuse in her brother; Anxiety disorder in her mother; Cancer in her father, mother, and paternal uncle; Dementia in her paternal grandmother and paternal uncle; Depression in her mother; Diabetes in her brother.     Social History:    Social History     Tobacco Use   • Smoking status: Former Smoker     Packs/day: 0.50     Years: 40.00     Pack years: 20.00     Types: Cigarettes     Quit date: 2017     Years since quittin.2   • Smokeless tobacco: Never Used   • Tobacco comment: 1 ppd 10 yrs   Substance Use Topics   • Alcohol use: No   • Drug use: No       Physical Exam:  Vitals:    22 1354   BP: (!) 161/80   Pulse: 84   Temp: 36.8 °C (98.2 °F)   SpO2: 97%     Body mass index is 23.13 kg/m².  Physical Exam  Constitutional:       Comments: Slender   HENT:      Head: Normocephalic and atraumatic.      Nose: Nose normal.      Mouth/Throat:      Mouth: Mucous membranes are dry.      Pharynx: Oropharynx is clear.   Eyes:      General: No scleral icterus.        Right eye: No discharge.         Left eye: No discharge.      Extraocular Movements: Extraocular movements intact.      Pupils: Pupils are equal, round, and reactive to light.   Cardiovascular:      Rate and Rhythm: Normal rate.      Pulses: Normal pulses.      Heart " sounds: No murmur heard.    No gallop.   Pulmonary:      Effort: No respiratory distress.      Breath sounds: Rhonchi (At bases) and rales present. No wheezing.      Comments: No accessory muscle use  Abdominal:      General: Abdomen is flat. Bowel sounds are normal. There is no distension.      Palpations: There is no mass.      Tenderness: There is abdominal tenderness (Epigastric). There is no guarding.   Musculoskeletal:         General: No swelling or tenderness. Normal range of motion.      Cervical back: Normal range of motion. No rigidity or tenderness.      Right lower leg: No edema.      Left lower leg: No edema.      Comments: Generalized weakness throughout. 4/5 strength bilaterally in lower and upper extremities   Lymphadenopathy:      Cervical: No cervical adenopathy.   Skin:     General: Skin is warm.      Coloration: Skin is not jaundiced or pale.   Neurological:      General: No focal deficit present.      Mental Status: She is alert and oriented to person, place, and time.   Psychiatric:         Mood and Affect: Mood is anxious and depressed. Affect is tearful.         Behavior: Behavior is not aggressive.         Thought Content: Thought content does not include homicidal or suicidal ideation.         Judgment: Judgment is impulsive.          Labs:  none    Imaging:  none    Assessment/Plan:  Chronic pain  Patient has had chronic pain. Has been encouraged to get labs to allow for better follow through on proper diagnosis. Differential includes psoriatic arthritis, hyperthyroidism, rheumatoid athritis, fiber myalgia or other autoimmune disorder.     Plan:   Reordered labs:  -CBC  -CMP  -TSH, RF, KAT, Sed Rate, HLA-B27  -Consider referral to rheumatology    Major depressive disorder, recurrent episode with anxious distress (HCC)  Patient has extreme anxiety and signs of major depression. Scored 21 on PSQ-9. Spoke to patient about speaking with a professional to allow for better management of her  anxiety and depression.     Plan:  -Psychiatry referral  -Continue to offer support in clinic as needed    Discharge from nipple  Patient has not been able to get mammogram. Discussed the importance of following through to identify causes of discharge. Will touch base at next visit.     Muscle pain  See chronic pain A/P    COVID-19 virus infection, anemia  Recent COVID-19 infection and hospitalization. Discussed the importance of wearing mask until cough resolves. Talked about being able to interact in public after 7 days of initial test. Because patient has history of COPD and has had continued coughing and shortness of breath, we will follow closely.     Plan:  -2 view Chest Xray    Microcytic anemia  Microcytic anemia of unknown origin. Will recheck CBC and then address at next visit.        Patient has been on gabapentin for some time, not initially prescribed, but refilled in this clinic. She currently does not have a diagnosis that would indicate the use of gabapentin. Until there is a better diagnosis for her muscular and joint pain, we will need to slowly wean the gabapentin. Because patient is acutely ill, we will revisit this at another time.     All imaging results and lab results and consult notes are reviewed at this visit.  Follow up: Return in about 2 weeks (around 5/10/2022).    Angie Sorensen MD  Internal Medicine PGY-1

## 2022-04-27 RX ORDER — GABAPENTIN 800 MG/1
1200 TABLET ORAL 3 TIMES DAILY
Qty: 300 TABLET | Refills: 1 | Status: SHIPPED | OUTPATIENT
Start: 2022-04-27 | End: 2022-04-29

## 2022-04-27 ASSESSMENT — LIFESTYLE VARIABLES: SUBSTANCE_ABUSE: 0

## 2022-04-27 ASSESSMENT — ENCOUNTER SYMPTOMS
DIAPHORESIS: 0
HEMOPTYSIS: 0
CONSTIPATION: 0
STRIDOR: 0
WEIGHT LOSS: 0
MYALGIAS: 1
BLOOD IN STOOL: 0
WHEEZING: 0
NAUSEA: 1
BLURRED VISION: 1
COUGH: 1
NERVOUS/ANXIOUS: 1
DIZZINESS: 0
SHORTNESS OF BREATH: 1
PND: 0
DEPRESSION: 1
DIARRHEA: 0
SINUS PAIN: 0
HEADACHES: 0
TINGLING: 1
FEVER: 0
VOMITING: 0
ABDOMINAL PAIN: 1
BACK PAIN: 1
CHILLS: 1
PALPITATIONS: 0
WEAKNESS: 1
TREMORS: 0

## 2022-04-28 ENCOUNTER — TELEPHONE (OUTPATIENT)
Dept: INTERNAL MEDICINE | Facility: OTHER | Age: 67
End: 2022-04-28
Payer: MEDICARE

## 2022-04-28 DIAGNOSIS — M48.061 SPINAL STENOSIS OF LUMBAR REGION, UNSPECIFIED WHETHER NEUROGENIC CLAUDICATION PRESENT: ICD-10-CM

## 2022-04-28 NOTE — TELEPHONE ENCOUNTER
VOICEMAIL  1. Caller Name: Anabella ()                      Call Back Number: [phone]    2. Message: Anabella  from [facility] called and lm requesting an order for a 4 wheel walker so she can take it to [facility] for the patient. She asked that we fax the order for her to her so she can walk in and get it at [phone]. Anabella stated if we had any questions we can reach her at the number below.    3. Patient approves office to leave a detailed voicemail/MyChart message: yes      Anabella ()  Phone # [phone]  Fax # [phone]

## 2022-04-28 NOTE — ASSESSMENT & PLAN NOTE
Recent COVID-19 infection and hospitalization. Discussed the importance of wearing mask until cough resolves. Talked about being able to interact in public after 7 days of initial test. Because patient has history of COPD and has had continued coughing and shortness of breath, we will follow closely.     Plan:  -2 view Chest Xray

## 2022-04-28 NOTE — ASSESSMENT & PLAN NOTE
Patient has had chronic pain. Has been encouraged to get labs to allow for better follow through on proper diagnosis. Differential includes psoriatic arthritis, hyperthyroidism, rheumatoid athritis, fiber myalgia or other autoimmune disorder.     Plan:   Reordered labs:  -CBC  -CMP  -TSH, RF, KAT, Sed Rate, HLA-B27  -Consider referral to rheumatology

## 2022-04-28 NOTE — ASSESSMENT & PLAN NOTE
Patient has not been able to get mammogram. Discussed the importance of following through to identify causes of discharge. Will touch base at next visit.

## 2022-04-28 NOTE — ASSESSMENT & PLAN NOTE
Patient has extreme anxiety and signs of major depression. Scored 21 on PSQ-9. Spoke to patient about speaking with a professional to allow for better management of her anxiety and depression.     Plan:  -Psychiatry referral  -Continue to offer support in clinic as needed

## 2022-04-29 ENCOUNTER — HOSPITAL ENCOUNTER (EMERGENCY)
Facility: MEDICAL CENTER | Age: 67
End: 2022-04-29
Attending: EMERGENCY MEDICINE
Payer: MEDICARE

## 2022-04-29 ENCOUNTER — TELEPHONE (OUTPATIENT)
Dept: INTERNAL MEDICINE | Facility: OTHER | Age: 67
End: 2022-04-29
Payer: MEDICARE

## 2022-04-29 ENCOUNTER — HOSPITAL ENCOUNTER (EMERGENCY)
Facility: MEDICAL CENTER | Age: 67
End: 2022-04-29
Payer: MEDICARE

## 2022-04-29 ENCOUNTER — TELEPHONE (OUTPATIENT)
Dept: INTERNAL MEDICINE | Facility: OTHER | Age: 67
End: 2022-04-29

## 2022-04-29 ENCOUNTER — APPOINTMENT (OUTPATIENT)
Dept: RADIOLOGY | Facility: MEDICAL CENTER | Age: 67
End: 2022-04-29
Attending: EMERGENCY MEDICINE
Payer: MEDICARE

## 2022-04-29 VITALS
WEIGHT: 139.99 LBS | RESPIRATION RATE: 16 BRPM | TEMPERATURE: 98 F | BODY MASS INDEX: 23.32 KG/M2 | SYSTOLIC BLOOD PRESSURE: 148 MMHG | HEART RATE: 76 BPM | DIASTOLIC BLOOD PRESSURE: 94 MMHG | HEIGHT: 65 IN | OXYGEN SATURATION: 99 %

## 2022-04-29 DIAGNOSIS — D50.9 IRON DEFICIENCY ANEMIA, UNSPECIFIED IRON DEFICIENCY ANEMIA TYPE: ICD-10-CM

## 2022-04-29 DIAGNOSIS — R10.84 GENERALIZED ABDOMINAL PAIN: ICD-10-CM

## 2022-04-29 DIAGNOSIS — K92.2 GASTROINTESTINAL HEMORRHAGE, UNSPECIFIED GASTROINTESTINAL HEMORRHAGE TYPE: ICD-10-CM

## 2022-04-29 LAB
ALBUMIN SERPL BCP-MCNC: 4.1 G/DL (ref 3.2–4.9)
ALBUMIN/GLOB SERPL: 1.4 G/DL
ALP SERPL-CCNC: 105 U/L (ref 30–99)
ALT SERPL-CCNC: 14 U/L (ref 2–50)
ANION GAP SERPL CALC-SCNC: 11 MMOL/L (ref 7–16)
ANISOCYTOSIS BLD QL SMEAR: ABNORMAL
AST SERPL-CCNC: 40 U/L (ref 12–45)
BASOPHILS # BLD AUTO: 0.7 % (ref 0–1.8)
BASOPHILS # BLD: 0.07 K/UL (ref 0–0.12)
BILIRUB SERPL-MCNC: 0.3 MG/DL (ref 0.1–1.5)
BUN SERPL-MCNC: 13 MG/DL (ref 8–22)
CALCIUM SERPL-MCNC: 8.9 MG/DL (ref 8.4–10.2)
CHLORIDE SERPL-SCNC: 108 MMOL/L (ref 96–112)
CO2 SERPL-SCNC: 18 MMOL/L (ref 20–33)
COMMENT 1642: NORMAL
CREAT SERPL-MCNC: 0.73 MG/DL (ref 0.5–1.4)
EOSINOPHIL # BLD AUTO: 0.12 K/UL (ref 0–0.51)
EOSINOPHIL NFR BLD: 1.2 % (ref 0–6.9)
ERYTHROCYTE [DISTWIDTH] IN BLOOD BY AUTOMATED COUNT: 76.8 FL (ref 35.9–50)
GFR SERPLBLD CREATININE-BSD FMLA CKD-EPI: 89 ML/MIN/1.73 M 2
GLOBULIN SER CALC-MCNC: 2.9 G/DL (ref 1.9–3.5)
GLUCOSE SERPL-MCNC: 90 MG/DL (ref 65–99)
HCT VFR BLD AUTO: 30.8 % (ref 37–47)
HGB BLD-MCNC: 8.3 G/DL (ref 12–16)
HYPOCHROMIA BLD QL SMEAR: ABNORMAL
IMM GRANULOCYTES # BLD AUTO: 0.04 K/UL (ref 0–0.11)
IMM GRANULOCYTES NFR BLD AUTO: 0.4 % (ref 0–0.9)
LYMPHOCYTES # BLD AUTO: 1.24 K/UL (ref 1–4.8)
LYMPHOCYTES NFR BLD: 12.6 % (ref 22–41)
MACROCYTES BLD QL SMEAR: ABNORMAL
MCH RBC QN AUTO: 19.2 PG (ref 27–33)
MCHC RBC AUTO-ENTMCNC: 26.9 G/DL (ref 33.6–35)
MCV RBC AUTO: 71.1 FL (ref 81.4–97.8)
MICROCYTES BLD QL SMEAR: ABNORMAL
MONOCYTES # BLD AUTO: 0.84 K/UL (ref 0–0.85)
MONOCYTES NFR BLD AUTO: 8.6 % (ref 0–13.4)
NEUTROPHILS # BLD AUTO: 7.51 K/UL (ref 2–7.15)
NEUTROPHILS NFR BLD: 76.5 % (ref 44–72)
NRBC # BLD AUTO: 0 K/UL
NRBC BLD-RTO: 0 /100 WBC
PLATELET # BLD AUTO: 544 K/UL (ref 164–446)
PLATELET BLD QL SMEAR: NORMAL
PMV BLD AUTO: 9.5 FL (ref 9–12.9)
POLYCHROMASIA BLD QL SMEAR: NORMAL
POTASSIUM SERPL-SCNC: 4.4 MMOL/L (ref 3.6–5.5)
PROT SERPL-MCNC: 7 G/DL (ref 6–8.2)
RBC # BLD AUTO: 4.33 M/UL (ref 4.2–5.4)
RBC BLD AUTO: PRESENT
SODIUM SERPL-SCNC: 137 MMOL/L (ref 135–145)
TARGETS BLD QL SMEAR: NORMAL
TROPONIN T SERPL-MCNC: 13 NG/L (ref 6–19)
WBC # BLD AUTO: 9.8 K/UL (ref 4.8–10.8)

## 2022-04-29 PROCEDURE — 36415 COLL VENOUS BLD VENIPUNCTURE: CPT

## 2022-04-29 PROCEDURE — 80053 COMPREHEN METABOLIC PANEL: CPT

## 2022-04-29 PROCEDURE — 71045 X-RAY EXAM CHEST 1 VIEW: CPT

## 2022-04-29 PROCEDURE — 84484 ASSAY OF TROPONIN QUANT: CPT

## 2022-04-29 PROCEDURE — 96374 THER/PROPH/DIAG INJ IV PUSH: CPT

## 2022-04-29 PROCEDURE — 85025 COMPLETE CBC W/AUTO DIFF WBC: CPT

## 2022-04-29 PROCEDURE — 96375 TX/PRO/DX INJ NEW DRUG ADDON: CPT

## 2022-04-29 PROCEDURE — 700111 HCHG RX REV CODE 636 W/ 250 OVERRIDE (IP): Performed by: EMERGENCY MEDICINE

## 2022-04-29 PROCEDURE — 99285 EMERGENCY DEPT VISIT HI MDM: CPT

## 2022-04-29 RX ORDER — ALPRAZOLAM 0.5 MG/1
0.5 TABLET ORAL
Status: SHIPPED | COMMUNITY
End: 2023-06-13

## 2022-04-29 RX ORDER — GABAPENTIN 800 MG/1
800 TABLET ORAL 3 TIMES DAILY
Status: SHIPPED | COMMUNITY
End: 2022-05-13

## 2022-04-29 RX ORDER — ONDANSETRON 2 MG/ML
4 INJECTION INTRAMUSCULAR; INTRAVENOUS ONCE
Status: COMPLETED | OUTPATIENT
Start: 2022-04-29 | End: 2022-04-29

## 2022-04-29 RX ORDER — CLONIDINE HYDROCHLORIDE 0.1 MG/1
0.1 TABLET ORAL 2 TIMES DAILY
Status: SHIPPED | COMMUNITY
End: 2022-06-13 | Stop reason: SDUPTHER

## 2022-04-29 RX ORDER — HYDROCODONE BITARTRATE AND ACETAMINOPHEN 5; 325 MG/1; MG/1
1 TABLET ORAL EVERY 6 HOURS PRN
Qty: 10 TABLET | Refills: 0 | Status: SHIPPED | OUTPATIENT
Start: 2022-04-29 | End: 2022-05-02

## 2022-04-29 RX ORDER — TOPIRAMATE 25 MG/1
25 TABLET ORAL 2 TIMES DAILY
Status: SHIPPED | COMMUNITY
End: 2023-06-13

## 2022-04-29 RX ORDER — FERROUS SULFATE 325(65) MG
325 TABLET ORAL DAILY
COMMUNITY

## 2022-04-29 RX ORDER — ACETAMINOPHEN 500 MG
500-1000 TABLET ORAL EVERY 6 HOURS PRN
Status: SHIPPED | COMMUNITY
End: 2022-05-09 | Stop reason: DRUGHIGH

## 2022-04-29 RX ORDER — OXYCODONE HYDROCHLORIDE 5 MG/1
5 TABLET ORAL 2 TIMES DAILY PRN
Status: SHIPPED | COMMUNITY
End: 2023-06-13

## 2022-04-29 RX ORDER — ALBUTEROL SULFATE 90 UG/1
2 AEROSOL, METERED RESPIRATORY (INHALATION) EVERY 6 HOURS PRN
Status: SHIPPED | COMMUNITY
End: 2022-08-29 | Stop reason: SDUPTHER

## 2022-04-29 RX ORDER — HYDROMORPHONE HYDROCHLORIDE 1 MG/ML
0.5 INJECTION, SOLUTION INTRAMUSCULAR; INTRAVENOUS; SUBCUTANEOUS ONCE
Status: COMPLETED | OUTPATIENT
Start: 2022-04-29 | End: 2022-04-29

## 2022-04-29 RX ADMIN — ONDANSETRON 4 MG: 2 INJECTION INTRAMUSCULAR; INTRAVENOUS at 12:54

## 2022-04-29 RX ADMIN — HYDROMORPHONE HYDROCHLORIDE 0.5 MG: 1 INJECTION, SOLUTION INTRAMUSCULAR; INTRAVENOUS; SUBCUTANEOUS at 12:54

## 2022-04-29 ASSESSMENT — FIBROSIS 4 INDEX: FIB4 SCORE: 1.75

## 2022-04-29 NOTE — ED NOTES
Med Rec completed per patient's daughter   Allergies reviewed  No ORAL antibiotics in last 30 days

## 2022-04-29 NOTE — TELEPHONE ENCOUNTER
Patient called to let us know that she went to the ER. Patient also stated the doctor that saw her in the ER is trying to get her a sooner appt scheduled with a GI doctor.

## 2022-04-29 NOTE — ED NOTES
Patient verbalized understanding to plan of care and discharge information. Patient reports pain 5/10. Patient in stable condition. Patient ambulated out of ED to person vehicle with stable gait with daughter.

## 2022-04-29 NOTE — ED PROVIDER NOTES
"ED Provider Note    Scribed for Ольга Lemus M.D. by Jory Mullins. 4/29/2022  12:16 PM    Primary care provider: Angie Sorensen M.D.  Means of arrival: Walk in  History obtained from: patient   History limited by: none    CHIEF COMPLAINT  Chief Complaint   Patient presents with    Shortness of Breath     Reports \"heat in my chest\" and SOB.     Abdominal Pain     Reports gen abd pain and black stools. Reports she was here last week for a blood transfusion d/t anemia. Denies emesis.        HPI  Lynette Correa is a 66 y.o. female who presents to the Emergency Department for worsening abdominal pain. She locates her abdominal pain diffusely with radiation into her back. Patient has associated body aches, diarrhea, and chills. Patient was seen in the ED last week for melena and was admitted for blood transfusions. She was feeling improved, but when she spoke to her doctor today she was complaining of abdominal pain and increasing melena so they were advised to come back to the ED. She was referred to Dr. Monterroso but they were unable to see her until July. Denies hematemesis. Endorses smoking. She has a history of knee replacement, shoulder replacement, and cervical spine fusion. Additionally she contracted COVID on 4/18. Denies being vaccinated against COVID.    REVIEW OF SYSTEMS  HEENT:  No ear pain, congestion, or sore throat   EYES: no discharge, redness, or vision changes  CARDIAC: no chest pain, no palpitations    PULMONARY: + cough. no dyspnea or congestion   GI: + diarrhea, melena, and abdominal pain, no vomiting,   : no dysuria, or back pain  Neuro: no weakness, numbness, aphasia, or headache  Musculoskeletal: no swelling, deformity, pain, or joint swelling  Endocrine: + chills and body aches, no fevers, sweating, or weight loss   SKIN: no rash, erythema, or contusions     See history of present illness. All other systems are negative. C.    PAST MEDICAL HISTORY   has a past medical history of Alcohol " abuse, Anesthesia, Anesthesia (16), Arrhythmia, Arthritis (16), ASTHMA, Bowel habit changes (16), Breath shortness, Bronchitis (), Chronic pain (2018, 18), Closed die-punch intra-articular fracture of distal end of left radius (3/3/2016), Cold (10/29/2018), COPD (chronic obstructive pulmonary disease) (), Dental disorder, Depression, Disorder of thyroid, Emphysema of lung ([facility]), Heart burn, Hepatitis C (2018), migraines, Indigestion, Jaundice, kidney stones, Neuropathy, peripheral, Pain, Pain (2018), Pneumonia (), Risk for falls, Seizure ([facility]) (), Sleep apnea (3667-1371), Substance abuse ([facility]), Ulcer, and Unspecified hemorrhagic conditions.    SURGICAL HISTORY   has a past surgical history that includes cervical fusion posterior (2010); cervical decompression posterior (2010); tonsillectomy (as child); lumbar laminectomy diskectomy (2011); mass excision general (10/4/2011); orif, wrist (Left, 3/3/2016); primary c section (/); lumbar laminectomy diskectomy (10/8/2016); shoulder arthroplasty total (Left, 2018); hysterectomy, total abdominal (); carpal tunnel release (Right, ); knee arthroplasty total (Right, ); other orthopedic surgery; knee arthroscopy (Left, ); knee arthroplasty total (Left, 2018); knee revision total (Right, 9/10/2018); biopsy general; block epidural steroid injection (); other cardiac surgery (); and knee revision total (Right, 2018).    SOCIAL HISTORY  Social History     Tobacco Use    Smoking status: Former Smoker     Packs/day: 0.50     Years: 40.00     Pack years: 20.00     Types: Cigarettes     Quit date: 2017     Years since quittin.2    Smokeless tobacco: Never Used    Tobacco comment: 1 ppd 10 yrs   Substance Use Topics    Alcohol use: No    Drug use: No      Social History     Substance and Sexual Activity   Drug Use No       FAMILY HISTORY  Family History  "  Problem Relation Age of Onset    Anxiety disorder Mother     Depression Mother     Cancer Mother     Cancer Father     Alcohol abuse Brother     Diabetes Brother         3 brothers    Cancer Paternal Uncle     Dementia Paternal Uncle     Dementia Paternal Grandmother        CURRENT MEDICATIONS  Current Outpatient Medications   Medication Instructions    acetaminophen (TYLENOL) 650 mg, Oral, EVERY 6 HOURS PRN    cloNIDine (CATAPRES) 0.1 mg, Oral, EVERY BEDTIME    Dexilant 60 mg, Oral, DAILY    DULoxetine (CYMBALTA) 60 mg, Oral, DAILY    gabapentin (NEURONTIN) 1,200 mg, Oral, 3 TIMES DAILY    guaiFENesin dextromethorphan (ROBITUSSIN DM) 100-10 MG/5ML Syrup syrup 10 mL, Oral, EVERY 6 HOURS PRN    ondansetron (ZOFRAN ODT) 4 mg, Oral, EVERY 6 HOURS PRN    polyethylene glycol/lytes (MIRALAX) 17 g Pack Oral, 1 TIME DAILY PRN    senna-docusate (PERICOLACE OR SENOKOT S) 8.6-50 MG Tab 2 Tablets, Oral, 2 TIMES DAILY       ALLERGIES  Allergies   Allergen Reactions    Codeine Rash     Difficulty breathing, and itching    Morphine Anaphylaxis     Headache; throat swelling    Pcn [Penicillins]      All \"cillin\" family, per patient  ---   I broke out in \"trench\" mouth       PHYSICAL EXAM  VITAL SIGNS: BP (!) 161/95   Pulse 75   Temp 36.6 °C (97.8 °F) (Temporal)   Resp 18   Ht 1.651 m (5' 5\")   Wt 63.5 kg (139 lb 15.9 oz)   LMP 01/01/1995   SpO2 99%   BMI 23.30 kg/m²     Constitutional: Well developed, Well nourished, Uncomfortable, Non-toxic appearance.   HEENT: Normocephalic, Atraumatic,  external ears normal, pharynx pink,  Mucous  Membranes moist, No rhinorrhea or mucosal edema  Eyes: PERRL, EOMI, Conjunctiva normal, No discharge.   Neck: Normal range of motion, No tenderness, Supple, No stridor.   Lymphatic: No lymphadenopathy    Cardiovascular: Regular Rate and Rhythm, No murmurs,  rubs, or gallops.   Thorax & Lungs: Junky cough, Lungs clear to auscultation bilaterally, No respiratory distress, No wheezes, rhales or " rhonchi, No chest wall tenderness.   Abdomen: Bowel sounds normal, Soft, non tender, non distended,  No pulsatile masses., no rebound guarding or peritoneal signs.   Skin: Warm, Dry, No erythema, No rash,   Back:  No CVA tenderness,  No spinal tenderness, bony crepitance, step offs, or instability.   Neurologic: Alert & oriented clear speech no focal deficits  Extremities: Equal, intact distal pulses, Trace edema bilateral lower extremities, No cyanosis or clubbing,  No tenderness.   Musculoskeletal: Good range of motion in all major joints. No tenderness to palpation or major deformities noted.       DIAGNOSTIC STUDIES / PROCEDURES    LABS  Results for orders placed or performed during the hospital encounter of 04/29/22   CBC with Differential   Result Value Ref Range    WBC 9.8 4.8 - 10.8 K/uL    RBC 4.33 4.20 - 5.40 M/uL    Hemoglobin 8.3 (L) 12.0 - 16.0 g/dL    Hematocrit 30.8 (L) 37.0 - 47.0 %    MCV 71.1 (L) 81.4 - 97.8 fL    MCH 19.2 (L) 27.0 - 33.0 pg    MCHC 26.9 (L) 33.6 - 35.0 g/dL    RDW 76.8 (H) 35.9 - 50.0 fL    Platelet Count 544 (H) 164 - 446 K/uL    MPV 9.5 9.0 - 12.9 fL    Neutrophils-Polys 76.50 (H) 44.00 - 72.00 %    Lymphocytes 12.60 (L) 22.00 - 41.00 %    Monocytes 8.60 0.00 - 13.40 %    Eosinophils 1.20 0.00 - 6.90 %    Basophils 0.70 0.00 - 1.80 %    Immature Granulocytes 0.40 0.00 - 0.90 %    Nucleated RBC 0.00 /100 WBC    Neutrophils (Absolute) 7.51 (H) 2.00 - 7.15 K/uL    Lymphs (Absolute) 1.24 1.00 - 4.80 K/uL    Monos (Absolute) 0.84 0.00 - 0.85 K/uL    Eos (Absolute) 0.12 0.00 - 0.51 K/uL    Baso (Absolute) 0.07 0.00 - 0.12 K/uL    Immature Granulocytes (abs) 0.04 0.00 - 0.11 K/uL    NRBC (Absolute) 0.00 K/uL    Hypochromia 2+ (A)     Anisocytosis 1+     Macrocytosis 1+     Microcytosis 1+    Complete Metabolic Panel (CMP)   Result Value Ref Range    Sodium 137 135 - 145 mmol/L    Potassium 4.4 3.6 - 5.5 mmol/L    Chloride 108 96 - 112 mmol/L    Co2 18 (L) 20 - 33 mmol/L    Anion Gap  11.0 7.0 - 16.0    Glucose 90 65 - 99 mg/dL    Bun 13 8 - 22 mg/dL    Creatinine 0.73 0.50 - 1.40 mg/dL    Calcium 8.9 8.4 - 10.2 mg/dL    AST(SGOT) 40 12 - 45 U/L    ALT(SGPT) 14 2 - 50 U/L    Alkaline Phosphatase 105 (H) 30 - 99 U/L    Total Bilirubin 0.3 0.1 - 1.5 mg/dL    Albumin 4.1 3.2 - 4.9 g/dL    Total Protein 7.0 6.0 - 8.2 g/dL    Globulin 2.9 1.9 - 3.5 g/dL    A-G Ratio 1.4 g/dL   Troponin   Result Value Ref Range    Troponin T 13 6 - 19 ng/L   ESTIMATED GFR   Result Value Ref Range    GFR (CKD-EPI) 89 >60 mL/min/1.73 m 2   PLATELET ESTIMATE   Result Value Ref Range    Plt Estimation Increased    MORPHOLOGY   Result Value Ref Range    RBC Morphology Present     Polychromia 1+     Target Cells 1+    DIFFERENTIAL COMMENT   Result Value Ref Range    Comments-Diff see below      All labs reviewed by me.    EKG  12 lead EKG; Interpreted by emergency department physician as above    RADIOLOGY  DX-CHEST-PORTABLE (1 VIEW)   Final Result      Cardiomegaly.        The radiologist's interpretation of all radiological studies have been reviewed by me.    COURSE & MEDICAL DECISION MAKING  Nursing notes, VS, PMSFHx reviewed in chart.    Reviewed patient's old medical records which showed the patient had a CT on 4/19 which showed constipation, but was otherwise normal.     12:16 PM Patient seen and examined at bedside.  I discussed that we will obtain labs and imaging to further evaluate for a cause of her symptoms. Patient will be treated with dilaudid 0.5 mg and Zofran 4 mg. Ordered chest x-ray, CBC w/ diff, CMP, troponin, and EKG to evaluate her symptoms. The differential diagnoses include but are not limited to: Recurrent anemia, colitis, cancer, COVID    1:43 PM - Patient was reevaluated at bedside. Discussed lab and radiology results with the patient and informed them that her hemoglobin is stable. I ifromed them that she needs a GI follow up sooner than later. Paged GI.    1:50 PM - I discussed the patient's case  and the above findings with Dr. Durand () who will schedule the patient for a sooner appointment. I informed the patient and daughter of my consult. Prescribed Norco. Discussed return precautions. Patient will be discharged at this time. She verbalizes agreement with discharge and plan of care.      I reviewed prescription monitoring program for patient's narcotic use before prescribing a scheduled drug.The patient will not drink alcohol nor drive with prescribed medications. The patient will return for new or worsening symptoms and is stable at the time of discharge.      In prescribing controlled substances to this patient, I certify that I have obtained and reviewed the medical history of Lynette Correa. I have also made a good rancho effort to obtain applicable records from other providers who have treated the patient and records did not demonstrate any increased risk of substance abuse that would prevent me from prescribing controlled substances.     I have conducted a physical exam and documented it. I have reviewed Ms. Correa’s prescription history as maintained by the [place] Prescription Monitoring Program.     I have assessed the patient’s risk for abuse, dependency, and addiction using the validated Opioid Risk Tool available at https://www.mdcalc.com/roljzs-xprd-fyok-ort-narcotic-abuse.     Given the above, I believe the benefits of controlled substance therapy outweigh the risks. The reasons for prescribing controlled substances include non-narcotic, oral analgesic alternatives have been inadequate for pain control. Accordingly, I have discussed the risk and benefits, treatment plan, and alternative therapies with the patient.       DISPOSITION:  Patient will be discharged home in stable condition.    FOLLOW UP:  [address] [place] [address] [phone]    Call in 2 days  to establish care, for recheck      OUTPATIENT MEDICATIONS:  New Prescriptions     HYDROCODONE-ACETAMINOPHEN (NORCO) 5-325 MG TAB PER TABLET    Take 1 Tablet by mouth every 6 hours as needed (pain) for up to 3 days.       FINAL IMPRESSION  1. Generalized abdominal pain    2. Gastrointestinal hemorrhage, unspecified gastrointestinal hemorrhage type    3. Iron deficiency anemia, unspecified iron deficiency anemia type          I, Jory Mullins (Scriblesvia), am scribing for, and in the presence of, Ольга Lemus M.D..    Electronically signed by: Jory Mullins (Scribe), 4/29/2022    IОльга M.D. personally performed the services described in this documentation, as scribed by Jory Mullins in my presence, and it is both accurate and complete.    The note accurately reflects work and decisions made by me.  Ольга Lemus M.D.  4/29/2022  5:54 PM

## 2022-04-29 NOTE — TELEPHONE ENCOUNTER
Still is having extreme abdominal pain, and has been having dark stools, thought it was because of the iron, but it has still continued. She has now developed diarrhea.   Has had low hemoglobin, requiring blood transfusion last week. She has an appointment to have an Endoscopy-Colonoscopy in outpatient but these test will not be performed until July.  There is extreme concern that she is still actively bleeding. Especially since she has anemia of an unknown source.   Spoke to patient and encouraged her to go to the ER for reevaluation of her abdominal pain and anemia as well as to possibly expedite any other possible testing, because of the urgency of the situation.

## 2022-05-09 ENCOUNTER — OFFICE VISIT (OUTPATIENT)
Dept: INTERNAL MEDICINE | Facility: OTHER | Age: 67
End: 2022-05-09
Payer: MEDICARE

## 2022-05-09 VITALS
HEART RATE: 74 BPM | SYSTOLIC BLOOD PRESSURE: 121 MMHG | TEMPERATURE: 98.5 F | OXYGEN SATURATION: 95 % | BODY MASS INDEX: 23.82 KG/M2 | WEIGHT: 143 LBS | DIASTOLIC BLOOD PRESSURE: 75 MMHG | HEIGHT: 65 IN

## 2022-05-09 DIAGNOSIS — F17.200 TOBACCO DEPENDENCE: ICD-10-CM

## 2022-05-09 DIAGNOSIS — K21.9 GASTROESOPHAGEAL REFLUX DISEASE WITHOUT ESOPHAGITIS: ICD-10-CM

## 2022-05-09 DIAGNOSIS — D50.0 IRON DEFICIENCY ANEMIA DUE TO CHRONIC BLOOD LOSS: ICD-10-CM

## 2022-05-09 DIAGNOSIS — F17.200 ENCOUNTER FOR SCREENING FOR MALIGNANT NEOPLASM OF LUNG IN CURRENT SMOKER WITH 30 PACK YEAR HISTORY OR GREATER: ICD-10-CM

## 2022-05-09 DIAGNOSIS — F41.9 ANXIETY: ICD-10-CM

## 2022-05-09 DIAGNOSIS — Z12.2 ENCOUNTER FOR SCREENING FOR MALIGNANT NEOPLASM OF LUNG IN CURRENT SMOKER WITH 30 PACK YEAR HISTORY OR GREATER: ICD-10-CM

## 2022-05-09 DIAGNOSIS — B19.20 HEPATITIS C VIRUS INFECTION WITHOUT HEPATIC COMA, UNSPECIFIED CHRONICITY: ICD-10-CM

## 2022-05-09 DIAGNOSIS — D50.9 MICROCYTIC ANEMIA: ICD-10-CM

## 2022-05-09 DIAGNOSIS — F17.200 CURRENT SMOKER: ICD-10-CM

## 2022-05-09 DIAGNOSIS — J45.50 SEVERE PERSISTENT ASTHMA WITHOUT COMPLICATION: ICD-10-CM

## 2022-05-09 DIAGNOSIS — L40.50 PSORIATIC ARTHRITIS (HCC): ICD-10-CM

## 2022-05-09 PROBLEM — J44.9 CHRONIC OBSTRUCTIVE PULMONARY DISEASE (HCC): Status: RESOLVED | Noted: 2020-10-12 | Resolved: 2022-05-09

## 2022-05-09 PROBLEM — N64.52 DISCHARGE FROM NIPPLE: Status: RESOLVED | Noted: 2020-10-12 | Resolved: 2022-05-09

## 2022-05-09 PROCEDURE — 99214 OFFICE O/P EST MOD 30 MIN: CPT | Mod: GC | Performed by: STUDENT IN AN ORGANIZED HEALTH CARE EDUCATION/TRAINING PROGRAM

## 2022-05-09 RX ORDER — BUSPIRONE HYDROCHLORIDE 15 MG/1
15 TABLET ORAL 2 TIMES DAILY
Qty: 90 TABLET | Refills: 0 | Status: SHIPPED | OUTPATIENT
Start: 2022-05-09 | End: 2022-06-21

## 2022-05-09 RX ORDER — LIDOCAINE 50 MG/G
1 PATCH TOPICAL EVERY 24 HOURS
Qty: 30 PATCH | Refills: 2 | Status: SHIPPED | OUTPATIENT
Start: 2022-05-09 | End: 2022-05-10

## 2022-05-09 RX ORDER — BUDESONIDE AND FORMOTEROL FUMARATE DIHYDRATE 80; 4.5 UG/1; UG/1
2 AEROSOL RESPIRATORY (INHALATION) 2 TIMES DAILY
Qty: 1 EACH | Refills: 3 | Status: SHIPPED | OUTPATIENT
Start: 2022-05-09 | End: 2022-05-13

## 2022-05-09 RX ORDER — ACETAMINOPHEN 500 MG
500 TABLET ORAL EVERY 6 HOURS PRN
Qty: 120 TABLET | Refills: 0 | Status: SHIPPED | OUTPATIENT
Start: 2022-05-09 | End: 2023-06-13

## 2022-05-09 RX ORDER — DICLOFENAC SODIUM 30 MG/G
1 GEL TOPICAL 2 TIMES DAILY PRN
Qty: 100 G | Refills: 1 | Status: SHIPPED | OUTPATIENT
Start: 2022-05-09 | End: 2023-10-31

## 2022-05-09 RX ORDER — NICOTINE 21 MG/24HR
1 PATCH, TRANSDERMAL 24 HOURS TRANSDERMAL EVERY 24 HOURS
Qty: 30 PATCH | Refills: 2 | Status: SHIPPED | OUTPATIENT
Start: 2022-05-09 | End: 2023-06-13

## 2022-05-09 ASSESSMENT — FIBROSIS 4 INDEX: FIB4 SCORE: 1.336306209562121923

## 2022-05-09 NOTE — PATIENT INSTRUCTIONS
Will refer to cardiology and obtain an ECHO per GI request so you can go for screening EGD/Colonoscopy     Referral information sent to the following:  Psychiatry & Neurology Psychiatry      [facility]  [address]  [place] [address]  Phone: [phone]   Fax: [phone]    Referral information sent to the following:  Psychologist     [facility]  [address]  [place] [address]  Phone: [phone]  Fax: [phone]    Gastroenterologist to call for colonoscopy/EGD  Dave Barney D.O.  [address] [place] [address] [phone]     Will send to rheumatologist for psoriatic arthritis (Dr. Ventura)  Will refer to pulmonology and obtain low-dose CT lungs   For smoking will send nicotine patches, please don't smoke while using the patch  Will clean ears today and send to audiologist for hearing loss   For pain, try lidocaine patches, tylenol 2 g total every 24 hours is ok, diclofenac gel (cream NSAID)  Don't take oral NSAIDs given GI bleed    Will start buspirone for anxiety and call to schedule with psychiatry and psychology

## 2022-05-10 ENCOUNTER — TELEPHONE (OUTPATIENT)
Dept: INTERNAL MEDICINE | Facility: OTHER | Age: 67
End: 2022-05-10
Payer: MEDICARE

## 2022-05-10 RX ORDER — LIDOCAINE 50 MG/G
PATCH TOPICAL
Qty: 90 PATCH | Refills: 0 | Status: SHIPPED | OUTPATIENT
Start: 2022-05-10 | End: 2022-05-13

## 2022-05-10 NOTE — ASSESSMENT & PLAN NOTE
Currently not in exacerbation, will refer to pulmonology  Continue albuterol as needed and will also add Symbicort scheduled for maintenance   DME for new nebulizer as hers is currently not working properly

## 2022-05-10 NOTE — ASSESSMENT & PLAN NOTE
We will obtain right upper quadrant ultrasound and hepatitis C labs and then refer to infectious disease in our office for treatment.    Plan:  When she completes the right upper quadrant ultrasound and hep C labs, will need to place infectious disease referral for Dr. Heart

## 2022-05-10 NOTE — ASSESSMENT & PLAN NOTE
Previously treated with methotrexate, but has not received treatment for the last 5 years.  Used to see Dr. Ruiz.  Has notable deformities and bilateral upper and lower extremity digits with chronic pain.    Plan:  We will place a referral to rheumatology for treatment   We will try as needed Tylenol, as needed diclofenac gel, as needed lidocaine patches and continue scheduled gabapentin and duloxetine for pain

## 2022-05-10 NOTE — ASSESSMENT & PLAN NOTE
Has ongoing anxiety.  We will try BuSpar 15 mg twice daily and follow-up with Dr. Sorensen on 6/27/2022 and see if medication is helping and consider uptitrating if needed.  Provided phone numbers for psychology and psychiatry for patient to call and set up appointments.

## 2022-05-10 NOTE — PROGRESS NOTES
Established Patient    Lynette presents today with the following:    CC: Hospital follow up     HPI:   Recently hospitalized for abdominal pain and melena with severe iron deficiency anemia requiring transfusion for hemoglobin less than 7.  GI was consulted and recommended PPI with oral iron replacement and outpatient follow-up for EGD/colonoscopy.  Enterology requesting cardiac clearance prior to EGD.  Has not seen cardiology before.  Has psoriatic arthritis with joint destruction and used to see rheumatology, but has not seen them in about 5 years.  Used to be on methotrexate.  Has untreated hepatitis C and reports testing positive was a teenager.  [id number] history and current half pack per day smoker.  Has anxiety associated with depression, denied SI.  History of severe persistent asthma and only on albuterol prior.  Reports her nebulizer is not functioning properly.  Reports chronic back and upper and lower extremity joint pain.  Reports her stools are still dark, but she is taking oral iron.  Reports feeling fatigued.  No chest pain or dizziness.    Patient Active Problem List    Diagnosis Date Noted   • Iron deficiency anemia due to chronic blood loss 04/19/2022   • GERD (gastroesophageal reflux disease) 10/12/2020   • Encounter for screening for malignant neoplasm of lung in current smoker with 30 pack year history or greater 05/09/2022   • Anxiety 04/20/2022   • COVID-19 virus infection, anemia 04/19/2022   • Screening for hyperlipidemia 10/26/2021   • Muscle pain 07/22/2021   • Tobacco dependence 01/19/2021   • Blindness of right eye with normal vision in contralateral eye 10/12/2020   • Cannabis abuse 10/12/2020   • Chronic low back pain 10/12/2020   • Combined drug dependence, continuous abuse (HCC) 10/12/2020   • Fusion of spine, cervical region 10/12/2020   • Psoriatic arthritis (HCC) 10/12/2020   • Severe persistent asthma without complication 10/12/2020   • Viral hepatitis C 10/12/2020   •  Posttraumatic stress disorder 12/13/2017   • Major depressive disorder, recurrent episode with anxious distress (HCC) 08/04/2017   • Chronic pain 08/04/2017   • Alcohol abuse, in remission 08/04/2017   • Lumbar spinal stenosis 10/08/2016       Current Outpatient Medications   Medication Sig Dispense Refill   • nicotine (NICODERM) 14 MG/24HR PATCH 24 HR Place 1 Patch on the skin every 24 hours. 30 Patch 2   • lidocaine (LIDODERM) 5 % Patch Place 1 Patch on the skin every 24 hours. 30 Patch 2   • acetaminophen (TYLENOL) 500 MG Tab Take 1 Tablet by mouth every 6 hours as needed for Moderate Pain (back and joint pain). 120 Tablet 0   • diclofenac sodium 3 % Gel Apply 1 Units topically 2 times a day as needed (for chronic arthritis pain in hands, feet, and back). 100 g 1   • busPIRone (BUSPAR) 15 MG tablet Take 1 Tablet by mouth 2 times a day. 90 Tablet 0   • budesonide-formoterol (SYMBICORT) 80-4.5 MCG/ACT Aerosol Inhale 2 Puffs 2 times a day. 1 Each 3   • cloNIDine (CATAPRES) 0.1 MG Tab Take 0.1 mg by mouth 2 times a day.     • gabapentin (NEURONTIN) 800 MG tablet Take 800 mg by mouth 3 times a day.     • ALPRAZolam (XANAX) 0.5 MG Tab Take 0.5 mg by mouth 1 time a day as needed for Anxiety. 6 tabs dispensed     • oxyCODONE immediate-release (ROXICODONE) 5 MG Tab Take 5 mg by mouth 2 times a day as needed for Severe Pain. 9 tabs dispensed     • topiramate (TOPAMAX) 25 MG Tab Take 25 mg by mouth 2 times a day.     • albuterol (PROVENTIL) 2.5mg/0.5ml Nebu Soln Take 2.5 mg by nebulization every four hours as needed for Shortness of Breath.     • albuterol 108 (90 Base) MCG/ACT Aero Soln inhalation aerosol Inhale 2 Puffs every 6 hours as needed for Shortness of Breath.     • ondansetron (ZOFRAN ODT) 4 MG TABLET DISPERSIBLE Take 1 Tablet by mouth every 6 hours as needed for Nausea (give PO if no IV route available). 10 Tablet 0   • DULoxetine (CYMBALTA) 60 MG Cap DR Particles delayed-release capsule Take 60 mg by mouth every  "day.     • Dexlansoprazole (DEXILANT) 60 MG CAPSULE DELAYED RELEASE delayed-release capsule Take 60 mg by mouth every day. 90 Capsule 3   • ferrous sulfate 325 (65 Fe) MG tablet Take 325 mg by mouth every day. (Patient not taking: Reported on 5/9/2022)       No current facility-administered medications for this visit.       ROS: As per HPI. Additional pertinent systems as noted below.  Constitutional: Negative for chills and fever.   HENT: Negative for ear pain and sore throat.    Eyes: Negative for discharge and redness.   Respiratory: Negative for cough, hemoptysis, wheezing and stridor.    Cardiovascular: Negative for chest pain, palpitations and leg swelling.   Gastrointestinal: Negative for abdominal pain, constipation, diarrhea, nausea and vomiting.   Genitourinary: Negative for dysuria, flank pain and hematuria.   Musculoskeletal: Negative for falls.  Has bilateral hand and foot pain as well as chronic back pain.  Reports he has not stiffness in the morning.  Skin: Negative for itching and rash.   Neurological: Negative for dizziness, seizures, loss of consciousness and headaches.   Endo/Heme/Allergies: Negative for polydipsia. Does not bruise/bleed easily.   Psychiatric/Behavioral: Negative for substance abuse and suicidal ideas.     /75 (BP Location: Left arm, Patient Position: Sitting, BP Cuff Size: Adult)   Pulse 74   Temp 36.9 °C (98.5 °F) (Temporal)   Ht 1.651 m (5' 5\")   Wt 64.9 kg (143 lb)   LMP 01/01/1995   SpO2 95%   BMI 23.80 kg/m²     Physical Exam   Constitutional:  oriented to person, place, and time. No distress.   Eyes: EOMI. No scleral icterus.  Neck:NC/AT  Cardiovascular: Normal rate, regular rhythm and normal heart sounds.  Exam reveals no gallop and no friction rub.  No murmur heard.  Pulmonary/Chest: Breath sounds normal. No wheezing or rales.  Musculoskeletal:   no edema.   Lymphadenopathy: no cervical adenopathy  Neurological: alert and oriented to person, place, and time. "   Skin: No cyanosis. Nails show no clubbing.  Joints: Deformities noted on bilateral hand and foot exam and mild joint swelling around the knuckles of both hands.      Note: I have reviewed all pertinent labs and diagnostic tests associated with this visit with specific comments listed under the assessment and plan below    Assessment and Plan    Problem List Items Addressed This Visit     Iron deficiency anemia due to chronic blood loss     Was recently in the ED with abdominal pain and melena and positive for severe iron deficiency anemia with symptoms of fatigue.  She was hemodynamically stable and GI plan for close outpatient follow-up for EGD and colonoscopy.  Patient states that GI is now requesting a cardiac clearance prior to EGD.  Patient without cardiac symptoms.  She is fatigued due to the anemia.  We will obtain echocardiogram and refer to cardiology.  Advised to have her call GI and set up an appointment as soon as she completes cardiac work-up.  Needs to see ISIDORO Ag.SANDRA. Phone number provided to patient.  Continue PPI and oral iron replacement.         Psoriatic arthritis (HCC)     Previously treated with methotrexate, but has not received treatment for the last 5 years.  Used to see Dr. Ruiz.  Has notable deformities and bilateral upper and lower extremity digits with chronic pain.    Plan:  We will place a referral to rheumatology for treatment   We will try as needed Tylenol, as needed diclofenac gel, as needed lidocaine patches and continue scheduled gabapentin and duloxetine for pain         Relevant Medications    acetaminophen (TYLENOL) 500 MG Tab    Other Relevant Orders    Referral to Rheumatology    Severe persistent asthma without complication     Currently not in exacerbation, will refer to pulmonology  Continue albuterol as needed and will also add Symbicort scheduled for maintenance   DME for new nebulizer as hers is currently not working properly            Relevant Medications     budesonide-formoterol (SYMBICORT) 80-4.5 MCG/ACT Aerosol    Other Relevant Orders    DME Nebulizer    Tobacco dependence     Interested in quitting and wants to try nicotine patches  Will start on nicotine patches, discussed not smoking while on therapy         Viral hepatitis C     We will obtain right upper quadrant ultrasound and hepatitis C labs and then refer to infectious disease in our office for treatment.    Plan:  When she completes the right upper quadrant ultrasound and hep C labs, will need to place infectious disease referral for Dr. Heart         Relevant Orders    US-RUQ    HCV RNA PCR-GENOTYPE REFLEX    HCV FIBROSURE    HIV AG/AB COMBO ASSAY SCREENING    Anxiety     Has ongoing anxiety.  We will try BuSpar 15 mg twice daily and follow-up with Dr. Sorensen on 6/27/2022 and see if medication is helping and consider uptitrating if needed.  Provided phone numbers for psychology and psychiatry for patient to call and set up appointments.         Relevant Medications    busPIRone (BUSPAR) 15 MG tablet    Encounter for screening for malignant neoplasm of lung in current smoker with 30 pack year history or greater    Relevant Orders    CT-CHEST LOW DOSE W/O    RESOLVED: Current smoker    Relevant Medications    nicotine (NICODERM) 14 MG/24HR PATCH 24 HR    Other Relevant Orders    Referral to Pulmonary and Sleep Medicine    CT-CHEST LOW DOSE W/O    GERD (gastroesophageal reflux disease)     Continue PPI              Followup: Return in about 5 weeks (around 6/13/2022).      Signed by: Patrick Elizabeth M.D.

## 2022-05-10 NOTE — ASSESSMENT & PLAN NOTE
Was recently in the ED with abdominal pain and melena and positive for severe iron deficiency anemia with symptoms of fatigue.  She was hemodynamically stable and GI plan for close outpatient follow-up for EGD and colonoscopy.  Patient states that GI is now requesting a cardiac clearance prior to EGD.  Patient without cardiac symptoms.  She is fatigued due to the anemia.  We will obtain echocardiogram and refer to cardiology.  Advised to have her call GI and set up an appointment as soon as she completes cardiac work-up.  Needs to see Dr.Ryan Barney D.SANDRA. Phone number provided to patient.  Continue PPI and oral iron replacement.

## 2022-05-10 NOTE — ASSESSMENT & PLAN NOTE
Interested in quitting and wants to try nicotine patches  Will start on nicotine patches, discussed not smoking while on therapy

## 2022-05-11 ENCOUNTER — TELEPHONE (OUTPATIENT)
Dept: INTERNAL MEDICINE | Facility: OTHER | Age: 67
End: 2022-05-11
Payer: MEDICARE

## 2022-05-11 NOTE — TELEPHONE ENCOUNTER
Received fax from pharmacy, the lidocain patch is not covered, please prescribe something different/advise

## 2022-05-11 NOTE — TELEPHONE ENCOUNTER
Received fax from pharmacy regarding the Budesonide-- that is not covered by pts insurance, the preferred alternative is symbicort, advair diskus, advair hfa- please update rx and resend

## 2022-05-12 ENCOUNTER — TELEPHONE (OUTPATIENT)
Dept: INTERNAL MEDICINE | Facility: OTHER | Age: 67
End: 2022-05-12
Payer: MEDICARE

## 2022-05-12 NOTE — TELEPHONE ENCOUNTER
VOICEMAIL  1. Caller Name: Lynette Correa                        Call Back Number: [phone]    2. Message: pt lm requesting Gabapentin,more than the 3 because sometimes she wakes up in the middle of the night and has to take more. She would like to discuss with a provider.    3. Patient approves office to leave a detailed voicemail/MyChart message: yes   2.38

## 2022-05-13 ENCOUNTER — TELEPHONE (OUTPATIENT)
Dept: INTERNAL MEDICINE | Facility: OTHER | Age: 67
End: 2022-05-13
Payer: MEDICARE

## 2022-05-13 DIAGNOSIS — M79.2 NEUROPATHIC PAIN: ICD-10-CM

## 2022-05-13 RX ORDER — LIDOCAINE 50 MG/G
1 OINTMENT TOPICAL PRN
Qty: 50 G | Refills: 1 | Status: SHIPPED | OUTPATIENT
Start: 2022-05-13 | End: 2023-06-13

## 2022-05-13 RX ORDER — GABAPENTIN 600 MG/1
600 TABLET ORAL 4 TIMES DAILY
Qty: 120 TABLET | Refills: 0 | Status: SHIPPED | OUTPATIENT
Start: 2022-05-13 | End: 2022-05-13 | Stop reason: SDUPTHER

## 2022-05-13 RX ORDER — DULOXETIN HYDROCHLORIDE 60 MG/1
60 CAPSULE, DELAYED RELEASE ORAL DAILY
Qty: 90 CAPSULE | Refills: 1 | Status: SHIPPED | OUTPATIENT
Start: 2022-05-13 | End: 2022-07-14 | Stop reason: SDUPTHER

## 2022-05-13 RX ORDER — FLUTICASONE PROPIONATE AND SALMETEROL 250; 50 UG/1; UG/1
1 POWDER RESPIRATORY (INHALATION) 2 TIMES DAILY
Qty: 14 EACH | Refills: 2 | Status: SHIPPED | OUTPATIENT
Start: 2022-05-13 | End: 2022-05-13

## 2022-05-13 RX ORDER — FLUTICASONE PROPIONATE AND SALMETEROL 250; 50 UG/1; UG/1
1 POWDER RESPIRATORY (INHALATION) 2 TIMES DAILY
Qty: 14 EACH | Refills: 2 | Status: SHIPPED | OUTPATIENT
Start: 2022-05-13 | End: 2022-08-18 | Stop reason: SDUPTHER

## 2022-05-13 RX ORDER — GABAPENTIN 600 MG/1
600 TABLET ORAL 4 TIMES DAILY
Qty: 120 TABLET | Refills: 0 | Status: SHIPPED | OUTPATIENT
Start: 2022-05-13 | End: 2022-07-14 | Stop reason: SDUPTHER

## 2022-05-13 NOTE — TELEPHONE ENCOUNTER
VOICEMAIL  1. Caller Name: Lynette Correa                      Call Back Number: [phone]    2. Message: pt lm requesting a refill on her gabapentin, per insurance it will only cover 600mg every 4 hours for a 30 day suppy. Please review and send to [facility] on [address] if appropriate.    3. Patient approves office to leave a detailed voicemail/MyChart message: yes

## 2022-05-13 NOTE — TELEPHONE ENCOUNTER
Received form from pharmacy stating Fluticasone is not covered by patients insurance, please send in alternative of symbicort,advair diskus, or breo ellipta.  Please review

## 2022-05-13 NOTE — TELEPHONE ENCOUNTER
Medication is to be taken every 6hrs at maximum unless exception made specialists: neurology or pain management.     Sent in one month refill of Gabapentin 600mg 4x/day

## 2022-05-16 ENCOUNTER — TELEPHONE (OUTPATIENT)
Dept: INTERNAL MEDICINE | Facility: OTHER | Age: 67
End: 2022-05-16
Payer: MEDICARE

## 2022-05-16 NOTE — TELEPHONE ENCOUNTER
Received fax from pharmacy regarding the Diclofenac, it is not covered by pts insurance, please advise?

## 2022-05-17 ENCOUNTER — TELEPHONE (OUTPATIENT)
Dept: INTERNAL MEDICINE | Facility: OTHER | Age: 67
End: 2022-05-17
Payer: MEDICARE

## 2022-05-17 NOTE — TELEPHONE ENCOUNTER
Spoke with April, she was wondering if a referral was placed for Cardiology, let her know it was placed 5/9/22, its taking about 7-14 business days for those to be done, she will get a my chart message when that has been processed with info needed to schedule

## 2022-05-17 NOTE — TELEPHONE ENCOUNTER
April from [facility] called  asking if she could get a call back regarding questions they received for the cardiology referral.  Aprils phone number is [phone]

## 2022-05-25 ENCOUNTER — TELEPHONE (OUTPATIENT)
Dept: INTERNAL MEDICINE | Facility: OTHER | Age: 67
End: 2022-05-25
Payer: MEDICARE

## 2022-05-25 DIAGNOSIS — N64.52 BREAST DISCHARGE: ICD-10-CM

## 2022-05-25 NOTE — TELEPHONE ENCOUNTER
VOICEMAIL  1. Caller Name: Lynette Correa                      Call Back Number: [phone]    2. Message: pt called and lm wanting the mammogram order to be changed, she got a call from [employer] and they told her that the order needed to be changed in order to schedule since she is having symptoms. The order needs to be changed to bilateral diagnostic because she is having pain on both breast and leakage out of both as well. Please change order.    She also said she wanted to speak with the provider because she has not been able to get her surgery for her back done because she hasn't heard from cardiology or for the labs.    3. Patient approves office to leave a detailed voicemail/MyChart message: yes

## 2022-05-26 NOTE — TELEPHONE ENCOUNTER
Pt aware of order being changed and provided number to cardiology department at [facility] where she was referred.

## 2022-05-26 NOTE — TELEPHONE ENCOUNTER
Angie Sorensen M.D.  [facility] 5 minutes ago (9:48 AM)     HJ    Placed altered order    Message text

## 2022-06-03 ENCOUNTER — TELEPHONE (OUTPATIENT)
Dept: INTERNAL MEDICINE | Facility: OTHER | Age: 67
End: 2022-06-03
Payer: MEDICARE

## 2022-06-03 NOTE — TELEPHONE ENCOUNTER
DOCUMENTATION OF PAR STATUS:    1. Name of Medication & Dose: Lidocaine ointment 5%     2. Name of Prescription Coverage Company & phone #: Medicare Part D    3. Date Prior Auth Submitted: 06/03/22    4. What information was given to obtain insurance decision? ICD-10 Code G89.29    5. Prior Auth Status? Pending    6. Patient Notified: no

## 2022-06-13 RX ORDER — CLONIDINE HYDROCHLORIDE 0.1 MG/1
0.1 TABLET ORAL 2 TIMES DAILY
Qty: 60 TABLET | Refills: 0 | Status: SHIPPED | OUTPATIENT
Start: 2022-06-13 | End: 2022-08-09 | Stop reason: SDUPTHER

## 2022-06-13 NOTE — TELEPHONE ENCOUNTER
Patient called and is requesting a refill on her Clonidine. She has been out since Last Thursday. Please review and send to pharmacy on file.

## 2022-06-14 ENCOUNTER — TELEPHONE (OUTPATIENT)
Dept: INTERNAL MEDICINE | Facility: OTHER | Age: 67
End: 2022-06-14
Payer: MEDICARE

## 2022-06-14 NOTE — TELEPHONE ENCOUNTER
1. Caller Name: April [facility]                        Call Back Number: [phone]        April with [facility] lvm stating pt will be discharged 06/23/2022 from home care services with all goals met. If any questions can be reached at the number provided.

## 2022-06-20 NOTE — TELEPHONE ENCOUNTER
Buspirone Refill    Last seen: 5/9/22 by Dr. Elizabeth  Next appt: 6/27/22 with Dr. Sorensen, H    Was the patient seen in the last year in this department? Yes   Does patient have an active prescription for medications requested? No   Received Request Via: Pharmacy

## 2022-06-21 RX ORDER — BUSPIRONE HYDROCHLORIDE 15 MG/1
TABLET ORAL
Qty: 90 TABLET | Refills: 0 | Status: SHIPPED | OUTPATIENT
Start: 2022-06-21 | End: 2022-08-09 | Stop reason: SDUPTHER

## 2022-06-22 ENCOUNTER — TELEPHONE (OUTPATIENT)
Dept: INTERNAL MEDICINE | Facility: OTHER | Age: 67
End: 2022-06-22
Payer: MEDICARE

## 2022-06-22 NOTE — TELEPHONE ENCOUNTER
Emi from [facility] left a vm stating that the patient is being discharge because she has met all her goal, and she is doing well. Patient has an appointment with her Cardiologist on 6/27. Emi can be reached at [phone] if you have any questions

## 2022-06-24 NOTE — TELEPHONE ENCOUNTER
Angie Sorensen M.D.  [facility] 2 hours ago (10:35 AM)     HJ    Dear all,   It appears that Lynette Correa had home health with [facility]. Could we request records? And how would we go about doing this? Spoke with them and they require a faxed form.     I have the fax number:   [phone]     Thanks,     Dr. Angie Sorensen     Message text

## 2022-06-28 ENCOUNTER — APPOINTMENT (OUTPATIENT)
Dept: INTERNAL MEDICINE | Facility: OTHER | Age: 67
End: 2022-06-28
Payer: MEDICARE

## 2022-07-14 DIAGNOSIS — M79.2 NEUROPATHIC PAIN: ICD-10-CM

## 2022-07-14 RX ORDER — DULOXETIN HYDROCHLORIDE 60 MG/1
60 CAPSULE, DELAYED RELEASE ORAL DAILY
Qty: 90 CAPSULE | Refills: 0 | Status: SHIPPED | OUTPATIENT
Start: 2022-07-14 | End: 2022-08-09 | Stop reason: SDUPTHER

## 2022-07-14 RX ORDER — GABAPENTIN 600 MG/1
600 TABLET ORAL 4 TIMES DAILY
Qty: 360 TABLET | Refills: 0 | Status: SHIPPED | OUTPATIENT
Start: 2022-07-14 | End: 2022-10-18 | Stop reason: SDUPTHER

## 2022-08-09 DIAGNOSIS — M79.2 NEUROPATHIC PAIN: ICD-10-CM

## 2022-08-09 NOTE — TELEPHONE ENCOUNTER
Patient called and lm requesting a refill on her meds, she was crying stating she has been out for weeks and can never get her meds filled.

## 2022-08-15 RX ORDER — DULOXETIN HYDROCHLORIDE 60 MG/1
60 CAPSULE, DELAYED RELEASE ORAL DAILY
Qty: 90 CAPSULE | Refills: 0 | Status: SHIPPED | OUTPATIENT
Start: 2022-08-15 | End: 2023-04-24 | Stop reason: SDUPTHER

## 2022-08-15 RX ORDER — BUSPIRONE HYDROCHLORIDE 15 MG/1
15 TABLET ORAL 2 TIMES DAILY
Qty: 180 TABLET | Refills: 0 | Status: SHIPPED | OUTPATIENT
Start: 2022-08-15 | End: 2023-02-06 | Stop reason: SDUPTHER

## 2022-08-15 RX ORDER — CLONIDINE HYDROCHLORIDE 0.1 MG/1
0.1 TABLET ORAL 2 TIMES DAILY
Qty: 90 TABLET | Refills: 0 | Status: SHIPPED | OUTPATIENT
Start: 2022-08-15 | End: 2022-10-21 | Stop reason: SDUPTHER

## 2022-08-15 NOTE — TELEPHONE ENCOUNTER
Patient will have to come in person or come in for these medications, since I have not seen her in a few months

## 2022-08-18 NOTE — TELEPHONE ENCOUNTER
Last seen: 05.09.2022 by Dr. Elizabeth  Next appt: none    Was the patient seen in the last year in this department? Yes   Does patient have an active prescription for medications requested? No   Received Request Via: Pharmacy

## 2022-08-23 RX ORDER — FLUTICASONE PROPIONATE AND SALMETEROL 250; 50 UG/1; UG/1
1 POWDER RESPIRATORY (INHALATION) 2 TIMES DAILY
Qty: 14 EACH | Refills: 2 | Status: SHIPPED | OUTPATIENT
Start: 2022-08-23 | End: 2023-06-13

## 2022-08-26 ENCOUNTER — TELEPHONE (OUTPATIENT)
Dept: INTERNAL MEDICINE | Facility: OTHER | Age: 67
End: 2022-08-26
Payer: MEDICARE

## 2022-08-29 RX ORDER — ALBUTEROL SULFATE 90 UG/1
2 AEROSOL, METERED RESPIRATORY (INHALATION) EVERY 6 HOURS PRN
Qty: 8.5 G | Refills: 6 | Status: SHIPPED | OUTPATIENT
Start: 2022-08-29 | End: 2023-07-07

## 2022-09-12 RX ORDER — DILTIAZEM HYDROCHLORIDE 60 MG/1
2 TABLET, FILM COATED ORAL 2 TIMES DAILY
COMMUNITY
Start: 2022-08-08 | End: 2022-09-12 | Stop reason: SDUPTHER

## 2022-09-13 RX ORDER — DILTIAZEM HYDROCHLORIDE 60 MG/1
2 TABLET, FILM COATED ORAL 2 TIMES DAILY
Qty: 1 EACH | Refills: 3 | Status: SHIPPED | OUTPATIENT
Start: 2022-09-13 | End: 2023-06-13

## 2022-10-18 DIAGNOSIS — M79.2 NEUROPATHIC PAIN: ICD-10-CM

## 2022-10-18 NOTE — TELEPHONE ENCOUNTER
Last seen: 05.09.2022 by Dr. Elizabeth  Next appt: 10.21.2022 with Dr. Kim    Was the patient seen in the last year in this department? Yes   Does patient have an active prescription for medications requested? No   Received Request Via: Pharmacy

## 2022-10-20 ENCOUNTER — TELEPHONE (OUTPATIENT)
Dept: INTERNAL MEDICINE | Facility: OTHER | Age: 67
End: 2022-10-20
Payer: MEDICARE

## 2022-10-20 NOTE — TELEPHONE ENCOUNTER
Gabapentin Refill     Last seen: 5/9/22 by Dr. Elizabeth  Next appt: 10/21/22 with Dr. Kim    Was the patient seen in the last year in this department? Yes   Does patient have an active prescription for medications requested? No   Received Request Via: Pharmacy

## 2022-10-21 ENCOUNTER — APPOINTMENT (OUTPATIENT)
Dept: INTERNAL MEDICINE | Facility: OTHER | Age: 67
End: 2022-10-21
Payer: MEDICARE

## 2022-10-21 PROBLEM — M96.1 CERVICAL POST-LAMINECTOMY SYNDROME: Status: ACTIVE | Noted: 2022-07-07

## 2022-10-21 PROBLEM — M54.81 CERVICO-OCCIPITAL NEURALGIA: Status: ACTIVE | Noted: 2022-07-07

## 2022-10-21 PROBLEM — M50.10 CERVICAL DISC DISORDER WITH RADICULOPATHY: Status: ACTIVE | Noted: 2017-07-26

## 2022-10-21 PROBLEM — M54.17 LUMBOSACRAL RADICULOPATHY: Status: ACTIVE | Noted: 2022-07-07

## 2022-10-21 PROBLEM — M47.819 SPONDYLOSIS WITHOUT MYELOPATHY: Status: ACTIVE | Noted: 2022-09-20

## 2022-10-21 RX ORDER — GABAPENTIN 600 MG/1
600 TABLET ORAL 4 TIMES DAILY
Qty: 360 TABLET | Refills: 0 | Status: SHIPPED | OUTPATIENT
Start: 2022-10-21 | End: 2023-01-30

## 2022-10-21 NOTE — TELEPHONE ENCOUNTER
Ifrah Agosto M.D.  [facility] 9 hours ago (2:57 AM)     NG  Already refilled

## 2022-10-21 NOTE — TELEPHONE ENCOUNTER
Last seen: 05.09.2022 by Dr. Elizabeth  Next appt: 10.24.2022 with Dr. Klein    Was the patient seen in the last year in this department? Yes   Does patient have an active prescription for medications requested? No   Received Request Via: Pharmacy

## 2022-10-24 ENCOUNTER — APPOINTMENT (OUTPATIENT)
Dept: INTERNAL MEDICINE | Facility: OTHER | Age: 67
End: 2022-10-24
Payer: MEDICARE

## 2022-10-25 RX ORDER — CLONIDINE HYDROCHLORIDE 0.1 MG/1
0.1 TABLET ORAL 2 TIMES DAILY
Qty: 90 TABLET | Refills: 0 | Status: SHIPPED | OUTPATIENT
Start: 2022-10-25 | End: 2022-12-14

## 2022-11-07 ENCOUNTER — PATIENT MESSAGE (OUTPATIENT)
Dept: HEALTH INFORMATION MANAGEMENT | Facility: OTHER | Age: 67
End: 2022-11-07

## 2022-12-14 RX ORDER — CLONIDINE HYDROCHLORIDE 0.1 MG/1
TABLET ORAL
Qty: 90 TABLET | Refills: 0 | Status: SHIPPED | OUTPATIENT
Start: 2022-12-14 | End: 2023-02-06 | Stop reason: SDUPTHER

## 2023-01-09 ENCOUNTER — APPOINTMENT (OUTPATIENT)
Dept: INTERNAL MEDICINE | Facility: OTHER | Age: 68
End: 2023-01-09
Payer: MEDICARE

## 2023-01-09 RX ORDER — ATORVASTATIN CALCIUM 20 MG/1
20 TABLET, FILM COATED ORAL DAILY
COMMUNITY
Start: 2022-11-03

## 2023-01-09 RX ORDER — PREGABALIN 50 MG/1
CAPSULE ORAL
COMMUNITY
End: 2023-06-13

## 2023-01-09 RX ORDER — TIZANIDINE 2 MG/1
2 TABLET ORAL EVERY 8 HOURS PRN
COMMUNITY
Start: 2022-12-12 | End: 2023-12-05

## 2023-01-09 RX ORDER — TIZANIDINE 2 MG/1
TABLET ORAL
COMMUNITY
End: 2023-06-13

## 2023-01-09 RX ORDER — GABAPENTIN 400 MG/1
600 CAPSULE ORAL 4 TIMES DAILY
COMMUNITY
End: 2023-10-31

## 2023-01-09 RX ORDER — OMEPRAZOLE 20 MG/1
CAPSULE, DELAYED RELEASE ORAL
COMMUNITY
End: 2023-06-13

## 2023-01-09 RX ORDER — ALBUTEROL SULFATE 2.5 MG/3ML
SOLUTION RESPIRATORY (INHALATION)
COMMUNITY

## 2023-01-16 ENCOUNTER — APPOINTMENT (OUTPATIENT)
Dept: INTERNAL MEDICINE | Facility: OTHER | Age: 68
End: 2023-01-16
Payer: MEDICARE

## 2023-01-28 DIAGNOSIS — M79.2 NEUROPATHIC PAIN: ICD-10-CM

## 2023-01-30 RX ORDER — GABAPENTIN 600 MG/1
TABLET ORAL
Qty: 360 TABLET | Refills: 0 | Status: SHIPPED | OUTPATIENT
Start: 2023-01-30 | End: 2023-05-24

## 2023-02-05 ENCOUNTER — PATIENT MESSAGE (OUTPATIENT)
Dept: MEDICAL GROUP | Facility: CLINIC | Age: 68
End: 2023-02-05
Payer: MEDICARE

## 2023-02-06 RX ORDER — CLONIDINE HYDROCHLORIDE 0.1 MG/1
0.1 TABLET ORAL 2 TIMES DAILY
Qty: 90 TABLET | Refills: 3 | Status: SHIPPED | OUTPATIENT
Start: 2023-02-06 | End: 2023-06-29

## 2023-02-06 RX ORDER — DEXLANSOPRAZOLE 60 MG/1
60 CAPSULE, DELAYED RELEASE ORAL DAILY
Qty: 90 CAPSULE | Refills: 3 | Status: SHIPPED | OUTPATIENT
Start: 2023-02-06 | End: 2024-01-31

## 2023-02-06 RX ORDER — BUSPIRONE HYDROCHLORIDE 15 MG/1
15 TABLET ORAL 2 TIMES DAILY
Qty: 180 TABLET | Refills: 3 | Status: SHIPPED | OUTPATIENT
Start: 2023-02-06 | End: 2023-10-31 | Stop reason: SDUPTHER

## 2023-02-06 NOTE — TELEPHONE ENCOUNTER
Patient called and left message, she needs refills on her medications- Clonidine, Buspirone and Dexilant. She made an appt for today, but they made it at the wrong facility. She is requesting labs as well. Please review and advise.    Received request via: Patient    Was the patient seen in the last year in this department? Yes    Does the patient have an active prescription (recently filled or refills available) for medication(s) requested? No    Does the patient have [facility] Plus and need 100 day supply (blood pressure, diabetes and cholesterol meds only)? Patient does not have SCP

## 2023-04-24 DIAGNOSIS — M79.2 NEUROPATHIC PAIN: ICD-10-CM

## 2023-04-25 RX ORDER — DULOXETIN HYDROCHLORIDE 60 MG/1
60 CAPSULE, DELAYED RELEASE ORAL DAILY
Qty: 90 CAPSULE | Refills: 0 | Status: SHIPPED | OUTPATIENT
Start: 2023-04-25 | End: 2023-07-29

## 2023-05-02 ENCOUNTER — TELEPHONE (OUTPATIENT)
Dept: INTERNAL MEDICINE | Facility: OTHER | Age: 68
End: 2023-05-02
Payer: MEDICARE

## 2023-05-17 ENCOUNTER — APPOINTMENT (OUTPATIENT)
Dept: INTERNAL MEDICINE | Facility: OTHER | Age: 68
End: 2023-05-17
Payer: MEDICARE

## 2023-05-20 DIAGNOSIS — M79.2 NEUROPATHIC PAIN: ICD-10-CM

## 2023-05-24 RX ORDER — GABAPENTIN 600 MG/1
TABLET ORAL
Qty: 360 TABLET | Refills: 0 | Status: SHIPPED | OUTPATIENT
Start: 2023-05-24 | End: 2023-08-21

## 2023-05-24 RX ORDER — GABAPENTIN 400 MG/1
CAPSULE ORAL
Qty: 90 CAPSULE | OUTPATIENT
Start: 2023-05-24

## 2023-05-24 NOTE — TELEPHONE ENCOUNTER
Received request via: Patient    Was the patient seen in the last year in this department? No    Does the patient have an active prescription (recently filled or refills available) for medication(s) requested? No    Does the patient have [facility] and need 100 day supply (blood pressure, diabetes and cholesterol meds only)? Patient does not have SCP

## 2023-06-05 ENCOUNTER — APPOINTMENT (OUTPATIENT)
Dept: INTERNAL MEDICINE | Facility: OTHER | Age: 68
End: 2023-06-05
Payer: MEDICARE

## 2023-06-13 ENCOUNTER — OFFICE VISIT (OUTPATIENT)
Dept: INTERNAL MEDICINE | Facility: OTHER | Age: 68
End: 2023-06-13
Payer: MEDICARE

## 2023-06-13 VITALS
DIASTOLIC BLOOD PRESSURE: 77 MMHG | WEIGHT: 141.2 LBS | HEART RATE: 80 BPM | BODY MASS INDEX: 25.02 KG/M2 | HEIGHT: 63 IN | OXYGEN SATURATION: 96 % | TEMPERATURE: 98.3 F | SYSTOLIC BLOOD PRESSURE: 129 MMHG

## 2023-06-13 DIAGNOSIS — D50.9 IRON DEFICIENCY ANEMIA, UNSPECIFIED IRON DEFICIENCY ANEMIA TYPE: ICD-10-CM

## 2023-06-13 DIAGNOSIS — G89.29 CHRONIC RIGHT-SIDED THORACIC BACK PAIN: ICD-10-CM

## 2023-06-13 DIAGNOSIS — F32.1 CURRENT MODERATE EPISODE OF MAJOR DEPRESSIVE DISORDER, UNSPECIFIED WHETHER RECURRENT (HCC): ICD-10-CM

## 2023-06-13 DIAGNOSIS — Z91.81 RISK FOR FALLS: ICD-10-CM

## 2023-06-13 DIAGNOSIS — M54.6 CHRONIC RIGHT-SIDED THORACIC BACK PAIN: ICD-10-CM

## 2023-06-13 DIAGNOSIS — J45.50 SEVERE PERSISTENT ASTHMA WITHOUT COMPLICATION: ICD-10-CM

## 2023-06-13 PROCEDURE — 3074F SYST BP LT 130 MM HG: CPT | Mod: GC | Performed by: STUDENT IN AN ORGANIZED HEALTH CARE EDUCATION/TRAINING PROGRAM

## 2023-06-13 PROCEDURE — 99214 OFFICE O/P EST MOD 30 MIN: CPT | Mod: GC | Performed by: STUDENT IN AN ORGANIZED HEALTH CARE EDUCATION/TRAINING PROGRAM

## 2023-06-13 PROCEDURE — 3078F DIAST BP <80 MM HG: CPT | Mod: GC | Performed by: STUDENT IN AN ORGANIZED HEALTH CARE EDUCATION/TRAINING PROGRAM

## 2023-06-13 RX ORDER — BUDESONIDE AND FORMOTEROL FUMARATE DIHYDRATE 160; 4.5 UG/1; UG/1
2 AEROSOL RESPIRATORY (INHALATION) 2 TIMES DAILY
Qty: 6 G | Refills: 4 | Status: SHIPPED | OUTPATIENT
Start: 2023-06-13 | End: 2023-11-27 | Stop reason: SDUPTHER

## 2023-06-13 ASSESSMENT — PATIENT HEALTH QUESTIONNAIRE - PHQ9
8. MOVING OR SPEAKING SO SLOWLY THAT OTHER PEOPLE COULD HAVE NOTICED. OR THE OPPOSITE, BEING SO FIGETY OR RESTLESS THAT YOU HAVE BEEN MOVING AROUND A LOT MORE THAN USUAL: NEARLY EVERY DAY
4. FEELING TIRED OR HAVING LITTLE ENERGY: MORE THAN HALF THE DAYS
3. TROUBLE FALLING OR STAYING ASLEEP OR SLEEPING TOO MUCH: NEARLY EVERY DAY
5. POOR APPETITE OR OVEREATING: NOT AT ALL
6. FEELING BAD ABOUT YOURSELF - OR THAT YOU ARE A FAILURE OR HAVE LET YOURSELF OR YOUR FAMILY DOWN: NEARLY EVERY DAY
SUM OF ALL RESPONSES TO PHQ9 QUESTIONS 1 AND 2: 6
1. LITTLE INTEREST OR PLEASURE IN DOING THINGS: NEARLY EVERY DAY
2. FEELING DOWN, DEPRESSED, IRRITABLE, OR HOPELESS: NEARLY EVERY DAY
SUM OF ALL RESPONSES TO PHQ QUESTIONS 1-9: 18
9. THOUGHTS THAT YOU WOULD BE BETTER OFF DEAD, OR OF HURTING YOURSELF: NOT AT ALL
7. TROUBLE CONCENTRATING ON THINGS, SUCH AS READING THE NEWSPAPER OR WATCHING TELEVISION: SEVERAL DAYS

## 2023-06-13 ASSESSMENT — ENCOUNTER SYMPTOMS
BACK PAIN: 1
SHORTNESS OF BREATH: 0
PALPITATIONS: 0
SPUTUM PRODUCTION: 0
CONSTIPATION: 0
DIARRHEA: 0
BLOOD IN STOOL: 0
VOMITING: 0
CHILLS: 0
FEVER: 0
HEMOPTYSIS: 0
MYALGIAS: 1
BLURRED VISION: 0
DOUBLE VISION: 0
COUGH: 1
DEPRESSION: 1
NAUSEA: 0

## 2023-06-13 ASSESSMENT — FIBROSIS 4 INDEX: FIB4 SCORE: 1.36

## 2023-06-13 NOTE — PATIENT INSTRUCTIONS
-Follow-up with Dr. Guzmán and see if he's okay with you going to physical therapy.  -Follow-up with psychiatry. Keep an eye on your MyChart.  -Have your lab done at your earliest convenience.  -Your insurance should be able to provide rides for medical reasons.  -Take your tizanidine at night before sleep. Your pain seems to be muscular.  -Follow-up in 2 weeks.

## 2023-06-14 NOTE — PROGRESS NOTES
Subjective:     CC: Follow-up visit.    HPI:   Patient is a 67-year-old female with a complex past medical history of psoriatic arthritis, GERD, major depressive disorder/anxiety, weakness, and anemia who presents to clinic today for a follow-up visit    Patient reports having increased right shoulder pain over the past few weeks.  Patient reports she is currently following with orthopedic surgeon for right shoulder replacement.  Patient says that the pain occurs mainly with certain movements and is located in the right upper part of her back.  Denies any similar complaints in the past and says that she previously fell on her back causing her to be hospitalized, which might have contributed to this.    Reports feeling anxious/depressed, but denies any SI/HI.  She says she is having a difficult time dealing with stress in her life at this point and would like to see a psychiatrist.    Reports increased fatigue and says that she was found to have anemia last year, but was unable to get further testing for it due to her insurance.  Patient is unsure why she had anemia and has not followed up since.  Patient is requesting repeat lab work for further follow-up    No other complaints at this time.      Problem   Risk for Falls       Health Maintenance: Completed    ROS:  Review of Systems   Constitutional:  Negative for chills and fever.   HENT:  Negative for ear pain, hearing loss and tinnitus.    Eyes:  Negative for blurred vision and double vision.   Respiratory:  Positive for cough. Negative for hemoptysis, sputum production and shortness of breath.    Cardiovascular:  Negative for chest pain, palpitations and leg swelling.   Gastrointestinal:  Negative for blood in stool, constipation, diarrhea, melena, nausea and vomiting.   Genitourinary:  Negative for dysuria and urgency.   Musculoskeletal:  Positive for back pain and myalgias.   Skin:  Negative for itching and rash.   Psychiatric/Behavioral:  Positive for  "depression. Negative for suicidal ideas.        Objective:     Exam:  /77 (BP Location: Left arm, Patient Position: Sitting, BP Cuff Size: Adult)   Pulse 80   Temp 36.8 °C (98.3 °F) (Temporal)   Ht 1.6 m (5' 3\")   Wt 64 kg (141 lb 3.2 oz)   LMP 01/01/1995   SpO2 96%   BMI 25.01 kg/m²  Body mass index is 25.01 kg/m².    Physical Exam  Constitutional:       General: She is not in acute distress.     Appearance: Normal appearance. She is normal weight. She is not ill-appearing, toxic-appearing or diaphoretic.   HENT:      Head: Normocephalic and atraumatic.      Mouth/Throat:      Mouth: Mucous membranes are moist.      Pharynx: Oropharynx is clear. No oropharyngeal exudate or posterior oropharyngeal erythema.   Eyes:      General: No scleral icterus.        Right eye: No discharge.         Left eye: No discharge.      Conjunctiva/sclera: Conjunctivae normal.   Cardiovascular:      Rate and Rhythm: Normal rate and regular rhythm.      Pulses: Normal pulses.      Heart sounds: Murmur heard.      No friction rub. No gallop.   Pulmonary:      Effort: Pulmonary effort is normal. No respiratory distress.      Breath sounds: Normal breath sounds. No stridor. No wheezing or rhonchi.   Abdominal:      General: Abdomen is flat. Bowel sounds are normal. There is no distension.      Palpations: Abdomen is soft. There is no mass.      Tenderness: There is no abdominal tenderness.      Hernia: No hernia is present.   Musculoskeletal:         General: Tenderness (Right subscapular tenderness to palpation with reduced range of motion due to pain.) present. No swelling.      Right lower leg: No edema.      Left lower leg: No edema.   Skin:     General: Skin is warm and dry.      Coloration: Skin is not jaundiced or pale.      Findings: No bruising or erythema.   Neurological:      Mental Status: She is alert and oriented to person, place, and time. Mental status is at baseline.   Psychiatric:         Mood and Affect: Mood " normal.         Behavior: Behavior normal.         Thought Content: Thought content normal.           Labs: Please see results section for further information.    Assessment & Plan:     67 y.o. female with the following -     1. Chronic right-sided thoracic back pain  - Referred patient to physical therapy. Patient to check with orthopedic surgery for clearance before proceeding with physical therapy.  - Previous documented hx of psoriatic arthritis with no current follow-up. Consider rheum referral at next visit.  - Advised patient to use tizanidine at night for pain control.  - Concern for pain in the teres major caused by tear.    2. Risk for falls  - Referred patient to physical therapy for lower extremity weakness.  - Referral to Physical Therapy    3. Iron deficiency anemia, unspecified iron deficiency anemia type  - Unclear etiology. Previously referred to GI for endoscopy and colonoscopy, but unclear if this was done.  - Ordered repeat lab work. Consider GI referral if found to have continued anemia.  - No noted weight loss from last visit.  - CBC WITH DIFFERENTIAL; Future  - IRON/TOTAL IRON BIND; Future  - FERRITIN; Future  - Comp Metabolic Panel; Future    4. Severe persistent asthma without complication  - Discontinued advair and increased symbicort.    5. Current moderate episode of major depressive disorder, unspecified whether recurrent (HCC)  - Patient with high PHQ-9 score. Denies any SI/HI today.  - Will refer to psychiatry for follow-up.  - Referral to Psychiatry      I spent a total of 45 minutes with record review, exam, communication with the patient, communication with other providers, and documentation of this encounter.      Return in about 2 weeks (around 6/27/2023).

## 2023-06-28 NOTE — TELEPHONE ENCOUNTER
Received request via: Pharmacy    Was the patient seen in the last year in this department? Yes    Does the patient have an active prescription (recently filled or refills available) for medication(s) requested? No    Does the patient have [facility] Plus and need 100 day supply (blood pressure, diabetes and cholesterol meds only)? Patient does not have SCP

## 2023-06-29 RX ORDER — CLONIDINE HYDROCHLORIDE 0.1 MG/1
TABLET ORAL
Qty: 180 TABLET | Refills: 0 | Status: SHIPPED | OUTPATIENT
Start: 2023-06-29 | End: 2023-09-28

## 2023-07-07 RX ORDER — ALBUTEROL SULFATE 90 UG/1
AEROSOL, METERED RESPIRATORY (INHALATION)
Qty: 8.5 G | Refills: 6 | Status: SHIPPED | OUTPATIENT
Start: 2023-07-07 | End: 2023-12-27

## 2023-07-27 DIAGNOSIS — M79.2 NEUROPATHIC PAIN: ICD-10-CM

## 2023-07-29 RX ORDER — DULOXETIN HYDROCHLORIDE 60 MG/1
60 CAPSULE, DELAYED RELEASE ORAL DAILY
Qty: 90 CAPSULE | Refills: 0 | Status: SHIPPED | OUTPATIENT
Start: 2023-07-29 | End: 2023-10-26

## 2023-08-20 DIAGNOSIS — M79.2 NEUROPATHIC PAIN: ICD-10-CM

## 2023-08-21 RX ORDER — GABAPENTIN 600 MG/1
TABLET ORAL
Qty: 360 TABLET | Refills: 0 | Status: SHIPPED | OUTPATIENT
Start: 2023-08-21 | End: 2023-12-05

## 2023-09-28 RX ORDER — CLONIDINE HYDROCHLORIDE 0.1 MG/1
TABLET ORAL
Qty: 180 TABLET | Refills: 0 | Status: SHIPPED | OUTPATIENT
Start: 2023-09-28 | End: 2023-12-27

## 2023-10-25 DIAGNOSIS — M79.2 NEUROPATHIC PAIN: ICD-10-CM

## 2023-10-26 RX ORDER — DULOXETIN HYDROCHLORIDE 60 MG/1
60 CAPSULE, DELAYED RELEASE ORAL DAILY
Qty: 90 CAPSULE | Refills: 0 | Status: SHIPPED | OUTPATIENT
Start: 2023-10-26 | End: 2024-01-23

## 2023-10-31 ENCOUNTER — OFFICE VISIT (OUTPATIENT)
Dept: INTERNAL MEDICINE | Facility: OTHER | Age: 68
End: 2023-10-31
Payer: MEDICARE

## 2023-10-31 VITALS
BODY MASS INDEX: 23.04 KG/M2 | HEART RATE: 72 BPM | DIASTOLIC BLOOD PRESSURE: 75 MMHG | WEIGHT: 130 LBS | OXYGEN SATURATION: 98 % | HEIGHT: 63 IN | TEMPERATURE: 97.7 F | SYSTOLIC BLOOD PRESSURE: 138 MMHG

## 2023-10-31 DIAGNOSIS — Z13.228 SCREENING FOR METABOLIC DISORDER: ICD-10-CM

## 2023-10-31 DIAGNOSIS — Z13.820 SCREENING FOR OSTEOPOROSIS: ICD-10-CM

## 2023-10-31 DIAGNOSIS — B18.2 CHRONIC HEPATITIS C WITHOUT HEPATIC COMA (HCC): ICD-10-CM

## 2023-10-31 DIAGNOSIS — L40.50 PSORIATIC ARTHRITIS (HCC): ICD-10-CM

## 2023-10-31 DIAGNOSIS — Z23 NEED FOR PROPHYLACTIC VACCINATION AGAINST STREPTOCOCCUS PNEUMONIAE (PNEUMOCOCCUS): ICD-10-CM

## 2023-10-31 DIAGNOSIS — K25.9 GASTRIC ULCER, UNSPECIFIED CHRONICITY, UNSPECIFIED WHETHER GASTRIC ULCER HEMORRHAGE OR PERFORATION PRESENT: ICD-10-CM

## 2023-10-31 DIAGNOSIS — Z11.4 SCREENING FOR HIV (HUMAN IMMUNODEFICIENCY VIRUS): ICD-10-CM

## 2023-10-31 DIAGNOSIS — F33.9 MAJOR DEPRESSIVE DISORDER, RECURRENT EPISODE WITH ANXIOUS DISTRESS (HCC): ICD-10-CM

## 2023-10-31 DIAGNOSIS — Z12.11 SCREENING FOR COLON CANCER: ICD-10-CM

## 2023-10-31 DIAGNOSIS — Z12.31 SCREENING MAMMOGRAM FOR BREAST CANCER: ICD-10-CM

## 2023-10-31 PROCEDURE — 90677 PCV20 VACCINE IM: CPT | Performed by: STUDENT IN AN ORGANIZED HEALTH CARE EDUCATION/TRAINING PROGRAM

## 2023-10-31 PROCEDURE — G0009 ADMIN PNEUMOCOCCAL VACCINE: HCPCS | Performed by: STUDENT IN AN ORGANIZED HEALTH CARE EDUCATION/TRAINING PROGRAM

## 2023-10-31 PROCEDURE — 99214 OFFICE O/P EST MOD 30 MIN: CPT | Mod: GC,25 | Performed by: STUDENT IN AN ORGANIZED HEALTH CARE EDUCATION/TRAINING PROGRAM

## 2023-10-31 PROCEDURE — 3075F SYST BP GE 130 - 139MM HG: CPT | Performed by: STUDENT IN AN ORGANIZED HEALTH CARE EDUCATION/TRAINING PROGRAM

## 2023-10-31 PROCEDURE — 3078F DIAST BP <80 MM HG: CPT | Performed by: STUDENT IN AN ORGANIZED HEALTH CARE EDUCATION/TRAINING PROGRAM

## 2023-10-31 RX ORDER — BUSPIRONE HYDROCHLORIDE 15 MG/1
15 TABLET ORAL 2 TIMES DAILY
Qty: 180 TABLET | Refills: 3 | Status: SHIPPED | OUTPATIENT
Start: 2023-10-31

## 2023-10-31 ASSESSMENT — FIBROSIS 4 INDEX: FIB4 SCORE: 1.36

## 2023-10-31 NOTE — PROGRESS NOTES
"      Office Visit Follow up    Chief Complaint   Patient presents with    Chronic Opiate Therapy     Patient here for chronic pain and anxiety    Anxiety    Depression       Subjective   Pt states that she has had an increase in her baseline diffuse joint pain in the past 1 year. She describes a \"stabbing/dull\" pain worst in her LUE. She denied any precipitating trauma. There is no focal weakness. She does have chronic LE Numbness but this is unchanged from her baseline ever since her back surgeries. The pain is better with Ibuprofen (800mg x12 pills per day) and is worse with movement of her joints. She denies fevers or chills      ROS   -Tinnitus which pt attributes to NSAID use  -Anxiety (GAD7 = 18 consistent with severe anxiety), Depression (SIGECAPS = 8/9). Her depression is severe enough that it causes her to have decreased PO intake. She denies SI/AVH/eunice.   -pt denies melena or hematochezia.     /75 (BP Location: Right arm, Patient Position: Sitting, BP Cuff Size: Adult)   Pulse 72   Temp 36.5 °C (97.7 °F) (Temporal)   Ht 1.6 m (5' 3\")   Wt 59 kg (130 lb)   LMP 01/01/1995   SpO2 98%   BMI 23.03 kg/m²     Physical Exam   -General: NAD, converses well  -Cardio: no murmurs or gallops  -Resp: lungs CTAB, symmetric expansion  -Abd: soft and nontender, no guarding or rebound      Data   -Hgb = 8.3, MCV = 71       Note: I have reviewed all pertinent labs and diagnostic tests associated with this visit with specific comments listed under the assessment and plan below    Assessment and Plan  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    She presented with chronic pain. Rheum recs for Psoriatic Arthritis/Pain management recs for chronic pain/GI recs for gastric ulcer/H Pylori/FOBT/Social Work recs/ID recs for Hep C/A1c/Lipid panel/Colonoscopy/DEXA/Mammogram/HIV/CBC/CMP/Fe panel.     -----------------------------------------------------------------------------------------------------------------------------------------------------------------------------------------------------------  #Psoriatic Arthritis c/b Chronic pain  -we are unable to give opioids due to pt's h/o opioid addiction  -pt is on Duloxetine as below for depression  -c/w Tizanidine  -pt encouraged to limit NSAID use if possible  -Rheum recs = pending (for consideration of a DMARD)  -pain management recs = pending    #LUE pain   -plan to address in greater detail on pt's next visit    #MDD/MARIA FERNANDA  (SIGECAPS = 8/9 and GAD7 = 18 consistent with severe anxiety. Pt's depression seems to acutely worse due to her pain and social stressors  -c/w Duloxetine 60mg  -c/w Buspirone (can consider weaning on next visit due to risk of serotonin syndrome with 2 serotonin agents)  -CBT = ordered (10/31/23)  -Social work referral = pending (due to pt's reports that she is sad because her daughter yells at her and because her daughter will not drive her places that she wants to go)    #Asthma  -c/w Symbicort    #Vertebral Artery Stenosis  -c/w ASA/statin per Cardiology recs    #Opioid Dependance  -c/w Clonidine to prevent withdrawal sx    #h/o Gastric ulcers in 2018, in the setting of ABA  (Most likely secondary to pt's significant NSAID use)  -Hgb = 8.3, MCV = 71   -c/w Dexlansoprazole for now  -w/u pending as above    #Preventative Health Care  -COVID vaccine = declined  -Flu vaccine = declined  -next A1c = pending  -next Lipid panel = pending  -next  colonoscopy = pending  -next TD booster = due ~2027  -shingles vaccine = recommended  -PCV Vaccine = obtained  -pt is not a vegetarian  -next PAP = due NOW  -next Mammogram = pending  -DEXA scan = pending  -HIV x1 = pending  -Hep C x1 = done      Code Status = Full Code    Followup: 1 week      Doni Hudson, PGY3  [facility]    Pt has been seen and discussed with the Attending Physician.

## 2023-10-31 NOTE — PROGRESS NOTES
Teaching Physician Attestation      Level of Participation    I have personally interviewed and examined the patient.  In addition, I discussed with the resident physician the patient's history, exam, assessment and plan in detail.  Topics listed in my addendum were the focus of the visit.  Healthcare maintenance was not addressed this visit unless listed as a topic in my addendum.  I agree with the plan as written along with the following additions/modifications:    Chronic pain 2/2 psoriatic arthritis and chronic back pain status post surgeries  -prior opiate addition s/p rehab  -Patient has chronic saddle anesthesia and chronic intermittent bowel incontinence, she states neurosurgery is aware and has a plan in place, states that it may be related to her prior back surgeries.  Defer further management to neurosurgery and pain management, appreciate support  -Rheumatology referral given patient reports prior established with rheumatology with diagnosis of psoriatic arthritis to potentially assist with management of pain, appreciate support      Siutational sadness, ? Prior depression per chart  -no si/hi  -due to pain and psychosocial issues on discussion today  -already on snri, although primary issues at present seem situational.  Has hx depression per chart.  Also noted on buspar.   Was referred to psychiatry in June, unclear if has followed up. Discussed with Dr. Hudson the importance of weaning buspar at f/u.  -psychology referral, appreciate support  -Patient reports frequent arguments (verbal) at home, and inability to get out of the house easily with the exception of doctors appointments.  Social work consult to see if anything can be done to increase patient's autonomy, appreciate support - I discussed directly with SW intern to help facilitate referral    History of gastric ulcers  -Reports was previously evaluated by GI.  No bloody stools, does note some dark stools but they are not jet black.  No other  sources of bleeding appreciated.  Is using very high doses of ibuprofen to deal with pain as above.  She is aware of the concerns related to utilizing high doses of ibuprofen  -Check CBC, iron studies for monitoring (prior history of anemia with hemoglobin around 8), also fecal occult blood testing.  -GI referral to reestablish, appreciate support    History of chronic hep c since childhoold  -has + hcv rna in emr.  referral to ID for treatment, appreciater support    HM  -dexa and mammogram ordered, otherwsie not addressed.  -metabolic labs ordered for screening.    Return to clinic in 1 to 2 weeks to follow-up on left shoulder pain (patient reports occurs with arm movement, no fevers), discuss buspar wean.

## 2023-11-01 ENCOUNTER — PATIENT OUTREACH (OUTPATIENT)
Dept: INTERNAL MEDICINE | Facility: OTHER | Age: 68
End: 2023-11-01
Payer: MEDICARE

## 2023-11-01 NOTE — PROGRESS NOTES
BENNETT Esparza called and left voicemail for patient who was referred by provider Rigoberto Connell MD regarding issues with housing and domestic issues with her daughter who she is currently living with. BENNETT Esparza left message for patient about affordable & income based senior housing options as well as [facility] academic and outreach programs to provide respite during stressful domestic times. BENNETT Esparza provided direct phone line [phone] for patient to call if she is interested in receiving information about the above-mentioned services. BENNETT Esparza will follow-up as needed.

## 2023-11-06 ENCOUNTER — TELEPHONE (OUTPATIENT)
Dept: HOSPITALIST | Facility: MEDICAL CENTER | Age: 68
End: 2023-11-06
Payer: MEDICARE

## 2023-11-06 DIAGNOSIS — L40.50 PSORIATIC ARTHRITIS (HCC): ICD-10-CM

## 2023-11-30 RX ORDER — BUDESONIDE AND FORMOTEROL FUMARATE DIHYDRATE 160; 4.5 UG/1; UG/1
2 AEROSOL RESPIRATORY (INHALATION) 2 TIMES DAILY
Qty: 6 G | Refills: 4 | Status: SHIPPED | OUTPATIENT
Start: 2023-11-30

## 2023-12-01 ENCOUNTER — TELEPHONE (OUTPATIENT)
Dept: INTERNAL MEDICINE | Facility: OTHER | Age: 68
End: 2023-12-01
Payer: MEDICARE

## 2023-12-01 DIAGNOSIS — M79.2 NEUROPATHIC PAIN: ICD-10-CM

## 2023-12-01 NOTE — TELEPHONE ENCOUNTER
The Budesonide that was sent in yesterday is not covered bu pt plan. The preferred alternative is Symbicortaer, Advairdiskuaer, Breoelliptainh. If appropriate, can you please change to one of those three? Thank you!

## 2023-12-04 RX ORDER — BUDESONIDE AND FORMOTEROL FUMARATE DIHYDRATE 160; 4.5 UG/1; UG/1
2 AEROSOL RESPIRATORY (INHALATION) 2 TIMES DAILY
Qty: 1 EACH | Refills: 3 | Status: SHIPPED | OUTPATIENT
Start: 2023-12-04 | End: 2023-12-06

## 2023-12-05 ENCOUNTER — OFFICE VISIT (OUTPATIENT)
Dept: INTERNAL MEDICINE | Facility: OTHER | Age: 68
End: 2023-12-05
Payer: MEDICARE

## 2023-12-05 VITALS
WEIGHT: 128 LBS | OXYGEN SATURATION: 95 % | HEART RATE: 71 BPM | TEMPERATURE: 97.7 F | SYSTOLIC BLOOD PRESSURE: 166 MMHG | DIASTOLIC BLOOD PRESSURE: 72 MMHG | HEIGHT: 63 IN | BODY MASS INDEX: 22.68 KG/M2

## 2023-12-05 DIAGNOSIS — G89.29 CHRONIC BILATERAL LOW BACK PAIN WITH LEFT-SIDED SCIATICA: ICD-10-CM

## 2023-12-05 DIAGNOSIS — M79.2 NEUROPATHIC PAIN: ICD-10-CM

## 2023-12-05 DIAGNOSIS — N64.52 BREAST DISCHARGE: ICD-10-CM

## 2023-12-05 DIAGNOSIS — M54.42 CHRONIC BILATERAL LOW BACK PAIN WITH LEFT-SIDED SCIATICA: ICD-10-CM

## 2023-12-05 DIAGNOSIS — N64.52 DISCHARGE FROM BREAST: ICD-10-CM

## 2023-12-05 PROCEDURE — 3077F SYST BP >= 140 MM HG: CPT

## 2023-12-05 PROCEDURE — 99214 OFFICE O/P EST MOD 30 MIN: CPT | Mod: GC

## 2023-12-05 PROCEDURE — 3078F DIAST BP <80 MM HG: CPT

## 2023-12-05 RX ORDER — GABAPENTIN 600 MG/1
TABLET ORAL
Qty: 90 TABLET | Refills: 0 | Status: SHIPPED | OUTPATIENT
Start: 2023-12-05 | End: 2023-12-28 | Stop reason: SDUPTHER

## 2023-12-05 RX ORDER — METHOCARBAMOL 500 MG/1
1000 TABLET, FILM COATED ORAL 4 TIMES DAILY
Qty: 120 TABLET | Refills: 2 | Status: SHIPPED | OUTPATIENT
Start: 2023-12-05

## 2023-12-05 RX ORDER — TIZANIDINE 2 MG/1
2 TABLET ORAL EVERY 8 HOURS PRN
Qty: 90 TABLET | Refills: 2 | Status: CANCELLED | OUTPATIENT
Start: 2023-12-05

## 2023-12-05 ASSESSMENT — FIBROSIS 4 INDEX: FIB4 SCORE: 1.38

## 2023-12-05 NOTE — PROGRESS NOTES
Date of Service:  12/5/2023    CC: Follow-up, left back and leg pain    HPI:  Lynette Correa  is a 68 y.o. female with a medical history of lumbar spinal stenosis, chronic pain back pain, lumbar sacral radiculopathy, GERD, and anxiety disorder.  Patient presents to discuss multiple problems.  Patient has been experiencing increased pain in her left lower back radiating down her leg.  She tearfully explains that she has not been on her gabapentin or tizanidine and has had worsening pain is preventing her from sleeping and eating.  Patient has been following with neurosurgery but has not seen them for some time.  She has not felt well enough to get any labs or any imaging.  She was supposed to get an MRI for neurosurgery and see them on 10/17 she is unsure if she did so did not see any indication in the chart that in the evaluation took place.  Labs were ordered to determine if there was any other rheumatologic problems connected to her pain.  She has not gotten any labs at this time.  She is also started taking an increased amount of ibuprofen up to 10 at any given time.   Her left leg has gotten worse over the last week. Patient does not feel that she can do physical therapy. Last visit discussed titration off buspirone to decrease risk of serotonin syndrome.   Patient also has had discharge from her left nipple, she does not have a mammogram on file.         Review of systems:  See HPI    Past Medical History:  Patient Active Problem List    Diagnosis Date Noted    Risk for falls 06/13/2023    Spondylosis without myelopathy 09/20/2022    Cervical post-laminectomy syndrome 07/07/2022    Cervico-occipital neuralgia 07/07/2022    Lumbosacral radiculopathy 07/07/2022    Anxiety 04/20/2022    COVID-19 virus infection, anemia 04/19/2022    Iron deficiency anemia due to chronic blood loss 04/19/2022    Muscle pain 07/22/2021    Tobacco dependence 01/19/2021    Blindness of right eye with normal vision in contralateral  eye 10/12/2020    Cannabis abuse 10/12/2020    Chronic low back pain 10/12/2020    Combined drug dependence, continuous abuse (HCC) 10/12/2020    Discharge from breast 10/12/2020    Fusion of spine, cervical region 10/12/2020    GERD (gastroesophageal reflux disease) 10/12/2020    Psoriatic arthritis ([facility]) 10/12/2020    Severe persistent asthma without complication 10/12/2020    Viral hepatitis C 10/12/2020    Posttraumatic stress disorder 12/13/2017    Major depressive disorder, recurrent episode with anxious distress ([facility]) 08/04/2017    Alcohol abuse, in remission 08/04/2017    Cervical disc disorder with radiculopathy 07/26/2017    Lumbar spinal stenosis 10/08/2016    Degeneration of cervicothoracic intervertebral disc 07/06/2015    Displacement of cervical intervertebral disc without myelopathy 07/06/2015    Carpal tunnel syndrome 04/16/2015    Neck pain 04/16/2015    Degeneration of lumbar intervertebral disc 04/11/2013       Past Surgical History:    has a past surgical history that includes cervical fusion posterior (7/20/2010); cervical decompression posterior (7/20/2010); tonsillectomy (as child); lumbar laminectomy diskectomy (1/8/2011); mass excision general (10/4/2011); orif, wrist (Left, 3/3/2016); primary c section (1989/1988/1993); lumbar laminectomy diskectomy (10/8/2016); shoulder arthroplasty total (Left, 2/12/2018); hysterectomy, total abdominal (1995); carpal tunnel release (Right, 2005); knee arthroplasty total (Right, 2003); other orthopedic surgery; knee arthroscopy (Left, 2008); knee arthroplasty total (Left, 5/14/2018); knee revision total (Right, 9/10/2018); biopsy general; block epidural steroid injection (2015-7/2018); other cardiac surgery (2005); and knee revision total (Right, 11/26/2018).    Medications:  Current Outpatient Medications   Medication Sig Dispense Refill    methocarbamol (ROBAXIN) 500 MG Tab Take 2 Tablets by mouth 4 times a day. 120 Tablet 2    budesonide-formoterol  "(SYMBICORT) 160-4.5 MCG/ACT Aerosol Inhale 2 Puffs 2 times a day. 6 g 4    busPIRone (BUSPAR) 15 MG tablet Take 1 Tablet by mouth 2 times a day. 180 Tablet 3    DULoxetine (CYMBALTA) 60 MG Cap DR Particles delayed-release capsule TAKE 1 CAPSULE BY MOUTH EVERY DAY 90 Capsule 0    cloNIDine (CATAPRES) 0.1 MG Tab TAKE 1 TABLET BY MOUTH TWICE DAILY 180 Tablet 0    albuterol 108 (90 Base) MCG/ACT Aero Soln inhalation aerosol INHALE 2 PUFFS BY MOUTH EVERY 6 HOURS AS NEEDED FOR SHORTNESS OF BREATH 8.5 g 6    Dexlansoprazole (DEXILANT) 60 MG CAPSULE DELAYED RELEASE delayed-release capsule Take 60 mg by mouth every day. 90 Capsule 3    atorvastatin (LIPITOR) 20 MG Tab Take 20 mg by mouth every day.      albuterol (PROVENTIL) 2.5mg/3ml Nebu Soln solution for nebulization albuterol sulfate 2.5 mg/3 mL (0.083 %) solution for nebulization   INHALE 1 VIAL VIA NEBULIZER EVERY 4 HOURS AS NEEDED FOR SHORTNESS OF BREATH      ferrous sulfate 325 (65 Fe) MG tablet Take 325 mg by mouth every day.      gabapentin (NEURONTIN) 600 MG tablet TAKE 1 TABLET BY MOUTH FOUR TIMES DAILY 90 Tablet 0    budesonide-formoterol (SYMBICORT) 160-4.5 MCG/ACT Aerosol Inhale 2 Puffs 2 times a day. (Patient not taking: Reported on 12/5/2023) 1 Each 3     No current facility-administered medications for this visit.       Allergies:  Allergies   Allergen Reactions    Codeine Rash     Difficulty breathing, and itching    Morphine Anaphylaxis     Headache; throat swelling    Pcn [Penicillins]      All \"cillin\" family, per patient  ---   I broke out in \"trench\" mouth       Family History:   family history includes Alcohol abuse in her brother; Anxiety disorder in her mother; Cancer in her father, mother, and paternal uncle; Dementia in her paternal grandmother and paternal uncle; Depression in her mother; Diabetes in her brother.     Social History:    Social History     Tobacco Use    Smoking status: Former     Current packs/day: 0.00     Average packs/day: 0.5 " packs/day for 40.0 years (20.0 ttl pk-yrs)     Types: Cigarettes     Start date: 1977     Quit date: 2017     Years since quittin.8    Smokeless tobacco: Never    Tobacco comments:     1 ppd 10 yrs   Vaping Use    Vaping Use: Never used   Substance Use Topics    Alcohol use: No    Drug use: No       Physical Exam:  Vitals:    23 1318   BP: (!) 166/72   Pulse: 71   Temp: 36.5 °C (97.7 °F)   SpO2: 95%     Body mass index is 22.67 kg/m².  Physical Exam  HENT:      Head: Normocephalic and atraumatic.   Eyes:      Extraocular Movements: Extraocular movements intact.      Pupils: Pupils are equal, round, and reactive to light.   Cardiovascular:      Rate and Rhythm: Normal rate and regular rhythm.      Pulses: Normal pulses.      Heart sounds: Normal heart sounds.   Pulmonary:      Effort: Pulmonary effort is normal. No respiratory distress.      Breath sounds: Normal breath sounds. No stridor. No wheezing or rales.   Abdominal:      General: Abdomen is flat. Bowel sounds are normal. There is no distension.      Palpations: Abdomen is soft. There is no mass.      Tenderness: There is no abdominal tenderness.   Musculoskeletal:         General: Normal range of motion.      Right lower leg: No edema.      Left lower leg: No edema.      Comments: Sciatic pain on left. 4/5 strength in lower extremities bilaterally.  strength 3/5. Decreased muscle mass. Tenderness in left leg, and thigh.    Skin:     General: Skin is warm and dry.      Findings: No lesion or rash.   Neurological:      General: No focal deficit present.      Mental Status: She is alert and oriented to person, place, and time.   Psychiatric:         Attention and Perception: Attention normal.         Mood and Affect: Mood is anxious.      Comments: Patient crying.           Labs:  none    Imaging:  none    Assessment/Plan:  Chronic low back pain  Patient has had chronic worsening low back and joint pain. Left side worsening. She follows  with neurosurgery. They have requested a follow up and MRI of SI area. Patient has not been able to follow up. Last visit, Dr. Doni Hudson ordered an extensive rheumatologic work up. Patient has not gotten labs completed.     Plan:  -Labs  -MRI  -methocarbamol for muscle pain  -Referral to pain clinic  -Follow up in one week    Discharge from breast  Patient does not have recent mammogram on file. States she has had one one year prior. Continues to have discharge from left breast.     Plan:  -Diagnostic Mammogram           All imaging results and lab results and consult notes are reviewed at this visit.  Followup: Return in about 1 week (around 12/12/2023).    Angie Sorensen MD  Internal Medicine PGY-3

## 2023-12-05 NOTE — PATIENT INSTRUCTIONS
Get labs for next week  Call psychiatrist for appointment:   Referral information sent to the following:  Psychiatry & Neurology Psychiatry     [facility]  [id number]  [place] [id number]  Phone: [phone]    3. Referral to Pain Management  4. Follow up with Neurosurgery  5. Mammogram ordered, please schedule   6. One week take Buspirone once per day, and then every other day for a week and then stop medication

## 2023-12-06 DIAGNOSIS — M79.2 NEUROPATHIC PAIN: ICD-10-CM

## 2023-12-06 PROBLEM — F17.200 ENCOUNTER FOR SCREENING FOR MALIGNANT NEOPLASM OF LUNG IN CURRENT SMOKER WITH 30 PACK YEAR HISTORY OR GREATER: Status: RESOLVED | Noted: 2022-05-09 | Resolved: 2023-12-06

## 2023-12-06 PROBLEM — Z13.220 SCREENING FOR HYPERLIPIDEMIA: Status: RESOLVED | Noted: 2021-10-26 | Resolved: 2023-12-06

## 2023-12-06 PROBLEM — Z12.2 ENCOUNTER FOR SCREENING FOR MALIGNANT NEOPLASM OF LUNG IN CURRENT SMOKER WITH 30 PACK YEAR HISTORY OR GREATER: Status: RESOLVED | Noted: 2022-05-09 | Resolved: 2023-12-06

## 2023-12-06 RX ORDER — GABAPENTIN 600 MG/1
TABLET ORAL
Qty: 90 TABLET | Refills: 0 | OUTPATIENT
Start: 2023-12-06

## 2023-12-07 ENCOUNTER — TELEPHONE (OUTPATIENT)
Dept: INTERNAL MEDICINE | Facility: OTHER | Age: 68
End: 2023-12-07
Payer: MEDICARE

## 2023-12-07 NOTE — ASSESSMENT & PLAN NOTE
Patient does not have recent mammogram on file. States she has had one one year prior. Continues to have discharge from left breast.     Plan:  -Diagnostic Mammogram

## 2023-12-07 NOTE — ASSESSMENT & PLAN NOTE
Patient has had chronic worsening low back and joint pain. Left side worsening. She follows with neurosurgery. They have requested a follow up and MRI of SI area. Patient has not been able to follow up. Last visit, Dr. Doni Hudson ordered an extensive rheumatologic work up. Patient has not gotten labs completed.     Plan:  -Labs  -MRI  -methocarbamol for muscle pain  -Referral to pain clinic  -Follow up in one week

## 2023-12-12 ENCOUNTER — APPOINTMENT (OUTPATIENT)
Dept: INTERNAL MEDICINE | Facility: OTHER | Age: 68
End: 2023-12-12
Payer: MEDICARE

## 2023-12-22 NOTE — TELEPHONE ENCOUNTER
Patient would like prescribed tizanidine she stated the new rx she cannot afford and her insurance wont cover, does she need to be seen?

## 2023-12-27 DIAGNOSIS — M79.2 NEUROPATHIC PAIN: ICD-10-CM

## 2023-12-27 RX ORDER — CLONIDINE HYDROCHLORIDE 0.1 MG/1
TABLET ORAL
Qty: 180 TABLET | Refills: 0 | Status: SHIPPED | OUTPATIENT
Start: 2023-12-27 | End: 2024-03-26

## 2023-12-27 RX ORDER — GABAPENTIN 600 MG/1
600 TABLET ORAL 4 TIMES DAILY
Qty: 90 TABLET | Refills: 0 | Status: CANCELLED | OUTPATIENT
Start: 2023-12-27

## 2023-12-27 RX ORDER — ALBUTEROL SULFATE 90 UG/1
AEROSOL, METERED RESPIRATORY (INHALATION)
Qty: 8.5 G | Refills: 6 | Status: SHIPPED | OUTPATIENT
Start: 2023-12-27

## 2023-12-28 RX ORDER — GABAPENTIN 600 MG/1
600 TABLET ORAL 4 TIMES DAILY
Qty: 120 TABLET | Refills: 1 | Status: SHIPPED | OUTPATIENT
Start: 2023-12-28 | End: 2024-02-20

## 2024-01-08 ENCOUNTER — TELEPHONE (OUTPATIENT)
Dept: INTERNAL MEDICINE | Facility: OTHER | Age: 69
End: 2024-01-08
Payer: MEDICARE

## 2024-01-08 NOTE — TELEPHONE ENCOUNTER
Phone Number Called: [phone] (home)       Call outcome: Spoke to patient regarding message below.    Message:     Per Dr. Heart, pt is okay to schedule but we are no longer contracted with [facility] Medicare. [facility] is contracted with pt's insurance; calling referral dept to try to reroute it to a different location.   Spoke to [facility]'s referral dept and they are checking to see if they are able to just reroute the referral as is, versus having the patient re-establish and get a new one with a new pcp. They will contact the pt through [facility].  Pt was spoken to and made aware of this entire encounter. She is instructed to call back in case there are issues. Closing enc and routing to Dr. Sorensen so she is updated on pt's referral.

## 2024-01-09 ENCOUNTER — TELEPHONE (OUTPATIENT)
Dept: INTERNAL MEDICINE | Facility: OTHER | Age: 69
End: 2024-01-09
Payer: MEDICARE

## 2024-01-09 DIAGNOSIS — J45.50 SEVERE PERSISTENT ASTHMA WITHOUT COMPLICATION: ICD-10-CM

## 2024-01-09 NOTE — TELEPHONE ENCOUNTER
Rec'd letter from [facility] (scanned in media tab) regarding the Symbicort that was prescribed. This is not on their formulary but [facility] was required to give pt a 30 day supply. They are asking for you to prescribe something on their formulary (if listed.) Please advise, thank you!

## 2024-01-10 RX ORDER — FLUTICASONE FUROATE AND VILANTEROL 200; 25 UG/1; UG/1
1 POWDER RESPIRATORY (INHALATION) DAILY
Qty: 1 EACH | Refills: 0 | Status: SHIPPED | OUTPATIENT
Start: 2024-01-10 | End: 2024-03-10

## 2024-01-21 DIAGNOSIS — M79.2 NEUROPATHIC PAIN: ICD-10-CM

## 2024-01-22 NOTE — TELEPHONE ENCOUNTER
Received request via: Pharmacy    Was the patient seen in the last year in this department? Yes    Does the patient have an active prescription (recently filled or refills available) for medication(s) requested? No    [facility]    Does the patient have [facility] Plus and need 100 day supply (blood pressure, diabetes and cholesterol meds only)? Patient does not have SCP

## 2024-01-23 RX ORDER — DULOXETIN HYDROCHLORIDE 60 MG/1
60 CAPSULE, DELAYED RELEASE ORAL DAILY
Qty: 90 CAPSULE | Refills: 0 | Status: SHIPPED | OUTPATIENT
Start: 2024-01-23

## 2024-01-29 NOTE — TELEPHONE ENCOUNTER
Received request via: Pharmacy    Was the patient seen in the last year in this department? Yes    Does the patient have an active prescription (recently filled or refills available) for medication(s) requested? No    Ph[facility] Name: [facility]    Does the patient have alf Plus and need 100 day supply (blood pressure, diabetes and cholesterol meds only)? Patient does not have SCP

## 2024-01-31 RX ORDER — DEXLANSOPRAZOLE 60 MG/1
1 CAPSULE, DELAYED RELEASE ORAL
Qty: 90 CAPSULE | Refills: 3 | Status: SHIPPED | OUTPATIENT
Start: 2024-01-31

## 2024-02-20 DIAGNOSIS — M79.2 NEUROPATHIC PAIN: ICD-10-CM

## 2024-02-20 RX ORDER — GABAPENTIN 600 MG/1
600 TABLET ORAL 4 TIMES DAILY
Qty: 120 TABLET | Refills: 1 | Status: SHIPPED | OUTPATIENT
Start: 2024-02-20

## 2024-02-20 NOTE — TELEPHONE ENCOUNTER
Received request via: Pharmacy    Was the patient seen in the last year in this department? Yes    Does the patient have an active prescription (recently filled or refills available) for medication(s) requested? No    Ph[facility] Name[facility]    Does the patient have jail Plus and need 100 day supply (blood pressure, diabetes and cholesterol meds only)? Patient does not have SCP

## 2024-03-25 NOTE — TELEPHONE ENCOUNTER
Received request via: Pharmacy    Was the patient seen in the last year in this department? Yes    Does the patient have an active prescription (recently filled or refills available) for medication(s) requested? No    Pharmacy Name: [id number] - [place], [place] - [address] AT [facility]     Does the patient have [facility] Plus and need 100 day supply (blood pressure, diabetes and cholesterol meds only)? Patient does not have SCP

## 2024-03-26 RX ORDER — CLONIDINE HYDROCHLORIDE 0.1 MG/1
TABLET ORAL
Qty: 180 TABLET | Refills: 0 | Status: SHIPPED | OUTPATIENT
Start: 2024-03-26

## 2024-04-05 DIAGNOSIS — M79.2 NEUROPATHIC PAIN: ICD-10-CM

## 2024-04-05 NOTE — TELEPHONE ENCOUNTER
Received request via: Pharmacy    Was the patient seen in the last year in this department? Yes    Does the patient have an active prescription (recently filled or refills available) for medication(s) requested? No    Pharmacy Name:  [id number] - [place], [place] - [address] AT [facility]     Does the patient have half-way Plus and need 100 day supply (blood pressure, diabetes and cholesterol meds only)? Patient does not have SCP

## 2024-04-06 RX ORDER — GABAPENTIN 600 MG/1
600 TABLET ORAL 4 TIMES DAILY
Qty: 120 TABLET | Refills: 1 | Status: SHIPPED | OUTPATIENT
Start: 2024-04-06

## 2024-07-02 ENCOUNTER — HOSPITAL ENCOUNTER (EMERGENCY)
Facility: MEDICAL CENTER | Age: 69
End: 2024-07-02
Attending: EMERGENCY MEDICINE
Payer: MEDICARE

## 2024-07-02 ENCOUNTER — APPOINTMENT (OUTPATIENT)
Dept: RADIOLOGY | Facility: MEDICAL CENTER | Age: 69
End: 2024-07-02
Attending: EMERGENCY MEDICINE
Payer: MEDICARE

## 2024-07-02 VITALS
TEMPERATURE: 98 F | HEIGHT: 64 IN | HEART RATE: 74 BPM | RESPIRATION RATE: 18 BRPM | SYSTOLIC BLOOD PRESSURE: 158 MMHG | BODY MASS INDEX: 22.81 KG/M2 | OXYGEN SATURATION: 96 % | WEIGHT: 133.6 LBS | DIASTOLIC BLOOD PRESSURE: 78 MMHG

## 2024-07-02 DIAGNOSIS — S20.211A CONTUSION OF RIB ON RIGHT SIDE, INITIAL ENCOUNTER: ICD-10-CM

## 2024-07-02 PROCEDURE — 700102 HCHG RX REV CODE 250 W/ 637 OVERRIDE(OP): Performed by: EMERGENCY MEDICINE

## 2024-07-02 PROCEDURE — 99283 EMERGENCY DEPT VISIT LOW MDM: CPT

## 2024-07-02 PROCEDURE — A9270 NON-COVERED ITEM OR SERVICE: HCPCS | Performed by: EMERGENCY MEDICINE

## 2024-07-02 PROCEDURE — 71045 X-RAY EXAM CHEST 1 VIEW: CPT

## 2024-07-02 RX ORDER — OXYCODONE HYDROCHLORIDE AND ACETAMINOPHEN 5; 325 MG/1; MG/1
1 TABLET ORAL EVERY 4 HOURS PRN
Qty: 20 TABLET | Refills: 0 | Status: SHIPPED | OUTPATIENT
Start: 2024-07-02 | End: 2024-07-07

## 2024-07-02 RX ORDER — OXYCODONE HYDROCHLORIDE AND ACETAMINOPHEN 5; 325 MG/1; MG/1
2 TABLET ORAL ONCE
Status: COMPLETED | OUTPATIENT
Start: 2024-07-02 | End: 2024-07-02

## 2024-07-02 RX ADMIN — OXYCODONE AND ACETAMINOPHEN 2 TABLET: 5; 325 TABLET ORAL at 15:35

## 2024-07-02 RX ADMIN — IBUPROFEN 800 MG: 200 TABLET, FILM COATED ORAL at 17:12

## 2024-07-03 RX ORDER — CLONIDINE HYDROCHLORIDE 0.1 MG/1
TABLET ORAL
Qty: 180 TABLET | Refills: 0 | Status: SHIPPED | OUTPATIENT
Start: 2024-07-03

## 2024-09-14 NOTE — TELEPHONE ENCOUNTER
Pt called and lm requesting a refill on her nebulizer solution and her inhaler, she is almost completely out. Please review and send in.  
No

## 2024-10-15 RX ORDER — BUSPIRONE HYDROCHLORIDE 15 MG/1
15 TABLET ORAL 2 TIMES DAILY
Qty: 60 TABLET | Refills: 0 | Status: SHIPPED | OUTPATIENT
Start: 2024-10-15

## 2025-01-07 ENCOUNTER — APPOINTMENT (OUTPATIENT)
Dept: CARDIOLOGY | Facility: MEDICAL CENTER | Age: 70
End: 2025-01-07
Attending: HOSPITALIST
Payer: MEDICARE

## 2025-01-07 ENCOUNTER — APPOINTMENT (OUTPATIENT)
Dept: RADIOLOGY | Facility: MEDICAL CENTER | Age: 70
End: 2025-01-07
Attending: EMERGENCY MEDICINE
Payer: MEDICARE

## 2025-01-07 ENCOUNTER — HOSPITAL ENCOUNTER (OUTPATIENT)
Facility: MEDICAL CENTER | Age: 70
End: 2025-01-08
Attending: EMERGENCY MEDICINE | Admitting: HOSPITALIST
Payer: MEDICARE

## 2025-01-07 DIAGNOSIS — Z91.81 RISK FOR FALLS: ICD-10-CM

## 2025-01-07 DIAGNOSIS — J41.0 SIMPLE CHRONIC BRONCHITIS (HCC): ICD-10-CM

## 2025-01-07 DIAGNOSIS — R07.9 ACUTE CHEST PAIN: ICD-10-CM

## 2025-01-07 DIAGNOSIS — D64.9 ANEMIA, UNSPECIFIED TYPE: ICD-10-CM

## 2025-01-07 DIAGNOSIS — D64.9 SYMPTOMATIC ANEMIA: ICD-10-CM

## 2025-01-07 LAB
ABO GROUP BLD: NORMAL
ALBUMIN SERPL BCP-MCNC: 3.6 G/DL (ref 3.2–4.9)
ALBUMIN/GLOB SERPL: 1.4 G/DL
ALP SERPL-CCNC: 104 U/L (ref 30–99)
ALT SERPL-CCNC: 27 U/L (ref 2–50)
ANION GAP SERPL CALC-SCNC: 9 MMOL/L (ref 7–16)
ANISOCYTOSIS BLD QL SMEAR: ABNORMAL
AST SERPL-CCNC: 78 U/L (ref 12–45)
BARCODED ABORH UBTYP: 5100
BARCODED PRD CODE UBPRD: NORMAL
BARCODED UNIT NUM UBUNT: NORMAL
BASOPHILS # BLD AUTO: 1 % (ref 0–1.8)
BASOPHILS # BLD: 0.06 K/UL (ref 0–0.12)
BILIRUB SERPL-MCNC: 0.3 MG/DL (ref 0.1–1.5)
BLD GP AB SCN SERPL QL: NORMAL
BUN SERPL-MCNC: 15 MG/DL (ref 8–22)
CALCIUM ALBUM COR SERPL-MCNC: 8.8 MG/DL (ref 8.5–10.5)
CALCIUM SERPL-MCNC: 8.5 MG/DL (ref 8.4–10.2)
CHLORIDE SERPL-SCNC: 105 MMOL/L (ref 96–112)
CO2 SERPL-SCNC: 22 MMOL/L (ref 20–33)
COMPONENT R 8504R: NORMAL
CREAT SERPL-MCNC: 0.63 MG/DL (ref 0.5–1.4)
EKG IMPRESSION: NORMAL
EKG IMPRESSION: NORMAL
EOSINOPHIL # BLD AUTO: 0.14 K/UL (ref 0–0.51)
EOSINOPHIL NFR BLD: 2.2 % (ref 0–6.9)
ERYTHROCYTE [DISTWIDTH] IN BLOOD BY AUTOMATED COUNT: 41.1 FL (ref 35.9–50)
GFR SERPLBLD CREATININE-BSD FMLA CKD-EPI: 95 ML/MIN/1.73 M 2
GLOBULIN SER CALC-MCNC: 2.5 G/DL (ref 1.9–3.5)
GLUCOSE SERPL-MCNC: 97 MG/DL (ref 65–99)
HCT VFR BLD AUTO: 23.5 % (ref 37–47)
HGB BLD-MCNC: 6.6 G/DL (ref 12–16)
HGB BLD-MCNC: 7.9 G/DL (ref 12–16)
HGB RETIC QN AUTO: 17.8 PG/CELL (ref 29–35)
HYPOCHROMIA BLD QL SMEAR: ABNORMAL
IMM GRANULOCYTES # BLD AUTO: 0.01 K/UL (ref 0–0.11)
IMM GRANULOCYTES NFR BLD AUTO: 0.2 % (ref 0–0.9)
IMM RETICS NFR: 19.8 % (ref 2.6–16.1)
IRON SATN MFR SERPL: 3 % (ref 15–55)
IRON SERPL-MCNC: 12 UG/DL (ref 40–170)
LDH SERPL L TO P-CCNC: 324 U/L (ref 107–266)
LIPASE SERPL-CCNC: 28 U/L (ref 11–82)
LV EJECT FRACT  99904: 62
LV EJECT FRACT MOD 2C 99903: 66.13
LV EJECT FRACT MOD 4C 99902: 57.25
LV EJECT FRACT MOD BP 99901: 61.59
LYMPHOCYTES # BLD AUTO: 1.01 K/UL (ref 1–4.8)
LYMPHOCYTES NFR BLD: 16 % (ref 22–41)
MAGNESIUM SERPL-MCNC: 2 MG/DL (ref 1.5–2.5)
MCH RBC QN AUTO: 17.5 PG (ref 27–33)
MCHC RBC AUTO-ENTMCNC: 28.1 G/DL (ref 32.2–35.5)
MCV RBC AUTO: 62.2 FL (ref 81.4–97.8)
MICROCYTES BLD QL SMEAR: ABNORMAL
MONOCYTES # BLD AUTO: 0.39 K/UL (ref 0–0.85)
MONOCYTES NFR BLD AUTO: 6.2 % (ref 0–13.4)
NEUTROPHILS # BLD AUTO: 4.7 K/UL (ref 1.82–7.42)
NEUTROPHILS NFR BLD: 74.4 % (ref 44–72)
NRBC # BLD AUTO: 0 K/UL
NRBC BLD-RTO: 0 /100 WBC (ref 0–0.2)
PLATELET # BLD AUTO: 439 K/UL (ref 164–446)
PLATELET BLD QL SMEAR: NORMAL
PMV BLD AUTO: 9 FL (ref 9–12.9)
POTASSIUM SERPL-SCNC: 4.3 MMOL/L (ref 3.6–5.5)
PRODUCT TYPE UPROD: NORMAL
PROT SERPL-MCNC: 6.1 G/DL (ref 6–8.2)
RBC # BLD AUTO: 3.78 M/UL (ref 4.2–5.4)
RBC BLD AUTO: PRESENT
RETICS # AUTO: 0.07 M/UL (ref 0.04–0.12)
RETICS/RBC NFR: 1.8 % (ref 0.8–2.6)
RH BLD: NORMAL
SODIUM SERPL-SCNC: 136 MMOL/L (ref 135–145)
TIBC SERPL-MCNC: 370 UG/DL (ref 250–450)
TROPONIN T SERPL-MCNC: 22 NG/L (ref 6–19)
TROPONIN T SERPL-MCNC: 23 NG/L (ref 6–19)
UIBC SERPL-MCNC: 358 UG/DL (ref 110–370)
UNIT STATUS USTAT: NORMAL
WBC # BLD AUTO: 6.3 K/UL (ref 4.8–10.8)

## 2025-01-07 PROCEDURE — 93005 ELECTROCARDIOGRAM TRACING: CPT | Mod: TC | Performed by: HOSPITALIST

## 2025-01-07 PROCEDURE — 84484 ASSAY OF TROPONIN QUANT: CPT

## 2025-01-07 PROCEDURE — 86850 RBC ANTIBODY SCREEN: CPT

## 2025-01-07 PROCEDURE — 71045 X-RAY EXAM CHEST 1 VIEW: CPT

## 2025-01-07 PROCEDURE — 86923 COMPATIBILITY TEST ELECTRIC: CPT

## 2025-01-07 PROCEDURE — P9016 RBC LEUKOCYTES REDUCED: HCPCS

## 2025-01-07 PROCEDURE — 82607 VITAMIN B-12: CPT

## 2025-01-07 PROCEDURE — 36415 COLL VENOUS BLD VENIPUNCTURE: CPT

## 2025-01-07 PROCEDURE — 83540 ASSAY OF IRON: CPT

## 2025-01-07 PROCEDURE — 93306 TTE W/DOPPLER COMPLETE: CPT | Mod: 26 | Performed by: INTERNAL MEDICINE

## 2025-01-07 PROCEDURE — 83735 ASSAY OF MAGNESIUM: CPT

## 2025-01-07 PROCEDURE — 94760 N-INVAS EAR/PLS OXIMETRY 1: CPT

## 2025-01-07 PROCEDURE — 85018 HEMOGLOBIN: CPT

## 2025-01-07 PROCEDURE — 86900 BLOOD TYPING SEROLOGIC ABO: CPT

## 2025-01-07 PROCEDURE — 83010 ASSAY OF HAPTOGLOBIN QUANT: CPT

## 2025-01-07 PROCEDURE — G0378 HOSPITAL OBSERVATION PER HR: HCPCS

## 2025-01-07 PROCEDURE — 80053 COMPREHEN METABOLIC PANEL: CPT

## 2025-01-07 PROCEDURE — 99285 EMERGENCY DEPT VISIT HI MDM: CPT

## 2025-01-07 PROCEDURE — 84466 ASSAY OF TRANSFERRIN: CPT

## 2025-01-07 PROCEDURE — 85025 COMPLETE CBC W/AUTO DIFF WBC: CPT

## 2025-01-07 PROCEDURE — 85046 RETICYTE/HGB CONCENTRATE: CPT

## 2025-01-07 PROCEDURE — 99223 1ST HOSP IP/OBS HIGH 75: CPT | Performed by: HOSPITALIST

## 2025-01-07 PROCEDURE — 93005 ELECTROCARDIOGRAM TRACING: CPT | Mod: TC,77 | Performed by: EMERGENCY MEDICINE

## 2025-01-07 PROCEDURE — A9270 NON-COVERED ITEM OR SERVICE: HCPCS | Performed by: EMERGENCY MEDICINE

## 2025-01-07 PROCEDURE — 83550 IRON BINDING TEST: CPT

## 2025-01-07 PROCEDURE — 93306 TTE W/DOPPLER COMPLETE: CPT

## 2025-01-07 PROCEDURE — 83615 LACTATE (LD) (LDH) ENZYME: CPT

## 2025-01-07 PROCEDURE — 93010 ELECTROCARDIOGRAM REPORT: CPT | Mod: 59 | Performed by: INTERNAL MEDICINE

## 2025-01-07 PROCEDURE — 83690 ASSAY OF LIPASE: CPT

## 2025-01-07 PROCEDURE — 86901 BLOOD TYPING SEROLOGIC RH(D): CPT

## 2025-01-07 PROCEDURE — 700102 HCHG RX REV CODE 250 W/ 637 OVERRIDE(OP): Performed by: HOSPITALIST

## 2025-01-07 PROCEDURE — 36430 TRANSFUSION BLD/BLD COMPNT: CPT

## 2025-01-07 PROCEDURE — A9270 NON-COVERED ITEM OR SERVICE: HCPCS | Performed by: HOSPITALIST

## 2025-01-07 PROCEDURE — 99407 BEHAV CHNG SMOKING > 10 MIN: CPT | Performed by: HOSPITALIST

## 2025-01-07 PROCEDURE — 82728 ASSAY OF FERRITIN: CPT

## 2025-01-07 PROCEDURE — 700102 HCHG RX REV CODE 250 W/ 637 OVERRIDE(OP): Performed by: EMERGENCY MEDICINE

## 2025-01-07 RX ORDER — BUSPIRONE HYDROCHLORIDE 5 MG/1
15 TABLET ORAL 2 TIMES DAILY
Status: DISCONTINUED | OUTPATIENT
Start: 2025-01-07 | End: 2025-01-08 | Stop reason: HOSPADM

## 2025-01-07 RX ORDER — METHOCARBAMOL 500 MG/1
1000 TABLET, FILM COATED ORAL 4 TIMES DAILY
Status: DISCONTINUED | OUTPATIENT
Start: 2025-01-07 | End: 2025-01-07

## 2025-01-07 RX ORDER — IBUPROFEN 200 MG
800 TABLET ORAL EVERY 6 HOURS PRN
Status: ON HOLD | COMMUNITY
End: 2025-01-08

## 2025-01-07 RX ORDER — NITROGLYCERIN 0.4 MG/1
0.4 TABLET SUBLINGUAL
Status: DISCONTINUED | OUTPATIENT
Start: 2025-01-07 | End: 2025-01-08 | Stop reason: HOSPADM

## 2025-01-07 RX ORDER — BACLOFEN 20 MG/1
20 TABLET ORAL 2 TIMES DAILY PRN
COMMUNITY

## 2025-01-07 RX ORDER — LABETALOL HYDROCHLORIDE 5 MG/ML
10 INJECTION, SOLUTION INTRAVENOUS EVERY 4 HOURS PRN
Status: DISCONTINUED | OUTPATIENT
Start: 2025-01-07 | End: 2025-01-08 | Stop reason: HOSPADM

## 2025-01-07 RX ORDER — OXYCODONE HYDROCHLORIDE 5 MG/1
5 TABLET ORAL
Status: DISCONTINUED | OUTPATIENT
Start: 2025-01-07 | End: 2025-01-08 | Stop reason: HOSPADM

## 2025-01-07 RX ORDER — ATORVASTATIN CALCIUM 20 MG/1
20 TABLET, FILM COATED ORAL DAILY
Status: DISCONTINUED | OUTPATIENT
Start: 2025-01-07 | End: 2025-01-08 | Stop reason: HOSPADM

## 2025-01-07 RX ORDER — CLONIDINE HYDROCHLORIDE 0.1 MG/1
0.1 TABLET ORAL 2 TIMES DAILY
Status: DISCONTINUED | OUTPATIENT
Start: 2025-01-07 | End: 2025-01-08 | Stop reason: HOSPADM

## 2025-01-07 RX ORDER — HYDROCODONE BITARTRATE AND ACETAMINOPHEN 5; 325 MG/1; MG/1
1 TABLET ORAL ONCE
Status: COMPLETED | OUTPATIENT
Start: 2025-01-07 | End: 2025-01-07

## 2025-01-07 RX ORDER — DULOXETIN HYDROCHLORIDE 30 MG/1
60 CAPSULE, DELAYED RELEASE ORAL DAILY
Status: DISCONTINUED | OUTPATIENT
Start: 2025-01-07 | End: 2025-01-08 | Stop reason: HOSPADM

## 2025-01-07 RX ORDER — ONDANSETRON 2 MG/ML
4 INJECTION INTRAMUSCULAR; INTRAVENOUS EVERY 4 HOURS PRN
Status: DISCONTINUED | OUTPATIENT
Start: 2025-01-07 | End: 2025-01-08 | Stop reason: HOSPADM

## 2025-01-07 RX ORDER — GABAPENTIN 300 MG/1
600 CAPSULE ORAL 4 TIMES DAILY
Status: DISCONTINUED | OUTPATIENT
Start: 2025-01-07 | End: 2025-01-08 | Stop reason: HOSPADM

## 2025-01-07 RX ORDER — OXYCODONE HYDROCHLORIDE 10 MG/1
10 TABLET ORAL
Status: DISCONTINUED | OUTPATIENT
Start: 2025-01-07 | End: 2025-01-08 | Stop reason: HOSPADM

## 2025-01-07 RX ORDER — AMINOPHYLLINE 25 MG/ML
100 INJECTION, SOLUTION INTRAVENOUS
Status: DISCONTINUED | OUTPATIENT
Start: 2025-01-07 | End: 2025-01-08 | Stop reason: HOSPADM

## 2025-01-07 RX ORDER — ALBUTEROL SULFATE 90 UG/1
2 INHALANT RESPIRATORY (INHALATION) EVERY 6 HOURS PRN
Status: DISCONTINUED | OUTPATIENT
Start: 2025-01-07 | End: 2025-01-08 | Stop reason: HOSPADM

## 2025-01-07 RX ORDER — ONDANSETRON 4 MG/1
4 TABLET, ORALLY DISINTEGRATING ORAL EVERY 4 HOURS PRN
Status: DISCONTINUED | OUTPATIENT
Start: 2025-01-07 | End: 2025-01-08 | Stop reason: HOSPADM

## 2025-01-07 RX ORDER — REGADENOSON 0.08 MG/ML
0.4 INJECTION, SOLUTION INTRAVENOUS
Status: DISCONTINUED | OUTPATIENT
Start: 2025-01-07 | End: 2025-01-08 | Stop reason: HOSPADM

## 2025-01-07 RX ORDER — BUDESONIDE AND FORMOTEROL FUMARATE DIHYDRATE 160; 4.5 UG/1; UG/1
2 AEROSOL RESPIRATORY (INHALATION) 2 TIMES DAILY
Status: DISCONTINUED | OUTPATIENT
Start: 2025-01-07 | End: 2025-01-07

## 2025-01-07 RX ORDER — HYDROMORPHONE HYDROCHLORIDE 1 MG/ML
0.5 INJECTION, SOLUTION INTRAMUSCULAR; INTRAVENOUS; SUBCUTANEOUS
Status: DISCONTINUED | OUTPATIENT
Start: 2025-01-07 | End: 2025-01-08 | Stop reason: HOSPADM

## 2025-01-07 RX ORDER — NICOTINE 21 MG/24HR
21 PATCH, TRANSDERMAL 24 HOURS TRANSDERMAL
Status: DISCONTINUED | OUTPATIENT
Start: 2025-01-07 | End: 2025-01-08 | Stop reason: HOSPADM

## 2025-01-07 RX ADMIN — OXYCODONE HYDROCHLORIDE 10 MG: 10 TABLET ORAL at 17:35

## 2025-01-07 RX ADMIN — OMEPRAZOLE 20 MG: 20 CAPSULE, DELAYED RELEASE ORAL at 15:14

## 2025-01-07 RX ADMIN — OXYCODONE HYDROCHLORIDE 10 MG: 10 TABLET ORAL at 22:23

## 2025-01-07 RX ADMIN — OXYCODONE 5 MG: 5 TABLET ORAL at 15:13

## 2025-01-07 RX ADMIN — HYDROCODONE BITARTRATE AND ACETAMINOPHEN 1 TABLET: 5; 325 TABLET ORAL at 12:22

## 2025-01-07 RX ADMIN — GABAPENTIN 600 MG: 300 CAPSULE ORAL at 20:23

## 2025-01-07 RX ADMIN — DULOXETINE HYDROCHLORIDE 60 MG: 30 CAPSULE, DELAYED RELEASE ORAL at 15:14

## 2025-01-07 RX ADMIN — ATORVASTATIN CALCIUM 20 MG: 20 TABLET, FILM COATED ORAL at 15:14

## 2025-01-07 RX ADMIN — BUSPIRONE HYDROCHLORIDE 15 MG: 5 TABLET ORAL at 17:35

## 2025-01-07 RX ADMIN — MOMETASONE FUROATE AND FORMOTEROL FUMARATE DIHYDRATE 2 PUFF: 200; 5 AEROSOL RESPIRATORY (INHALATION) at 17:37

## 2025-01-07 RX ADMIN — GABAPENTIN 600 MG: 300 CAPSULE ORAL at 17:35

## 2025-01-07 RX ADMIN — GABAPENTIN 600 MG: 300 CAPSULE ORAL at 15:14

## 2025-01-07 RX ADMIN — NICOTINE TRANSDERMAL SYSTEM 21 MG: 21 PATCH, EXTENDED RELEASE TRANSDERMAL at 15:14

## 2025-01-07 RX ADMIN — CLONIDINE HYDROCHLORIDE 0.1 MG: 0.1 TABLET ORAL at 17:28

## 2025-01-07 SDOH — ECONOMIC STABILITY: TRANSPORTATION INSECURITY
IN THE PAST 12 MONTHS, HAS THE LACK OF TRANSPORTATION KEPT YOU FROM MEDICAL APPOINTMENTS OR FROM GETTING MEDICATIONS?: NO

## 2025-01-07 SDOH — ECONOMIC STABILITY: TRANSPORTATION INSECURITY
IN THE PAST 12 MONTHS, HAS LACK OF RELIABLE TRANSPORTATION KEPT YOU FROM MEDICAL APPOINTMENTS, MEETINGS, WORK OR FROM GETTING THINGS NEEDED FOR DAILY LIVING?: NO

## 2025-01-07 ASSESSMENT — LIFESTYLE VARIABLES
TOTAL SCORE: 0
CONSUMPTION TOTAL: NEGATIVE
HOW MANY TIMES IN THE PAST YEAR HAVE YOU HAD 5 OR MORE DRINKS IN A DAY: 0
AVERAGE NUMBER OF DAYS PER WEEK YOU HAVE A DRINK CONTAINING ALCOHOL: 0
HAVE PEOPLE ANNOYED YOU BY CRITICIZING YOUR DRINKING: NO
ALCOHOL_USE: NO
ON A TYPICAL DAY WHEN YOU DRINK ALCOHOL HOW MANY DRINKS DO YOU HAVE: 0
TOTAL SCORE: 0
EVER HAD A DRINK FIRST THING IN THE MORNING TO STEADY YOUR NERVES TO GET RID OF A HANGOVER: NO
EVER FELT BAD OR GUILTY ABOUT YOUR DRINKING: NO
HAVE YOU EVER FELT YOU SHOULD CUT DOWN ON YOUR DRINKING: NO
SUBSTANCE_ABUSE: 1
TOTAL SCORE: 0
DOES PATIENT WANT TO STOP DRINKING: CANNOT ASSESS

## 2025-01-07 ASSESSMENT — ENCOUNTER SYMPTOMS
HEADACHES: 1
PALPITATIONS: 0
SEIZURES: 0
HEARTBURN: 0
DOUBLE VISION: 0
BLURRED VISION: 0
NAUSEA: 1
BRUISES/BLEEDS EASILY: 0
COUGH: 0
WHEEZING: 0
BLOOD IN STOOL: 0
CONSTIPATION: 0
DEPRESSION: 0
MYALGIAS: 1
HEMOPTYSIS: 0
FOCAL WEAKNESS: 0
SORE THROAT: 0
CHILLS: 0
DIARRHEA: 0
SHORTNESS OF BREATH: 1
BACK PAIN: 1
VOMITING: 0
WEAKNESS: 1
ABDOMINAL PAIN: 0
NERVOUS/ANXIOUS: 0
DIAPHORESIS: 0
EYES NEGATIVE: 1
LOSS OF CONSCIOUSNESS: 0
DIZZINESS: 0
FEVER: 0

## 2025-01-07 ASSESSMENT — COGNITIVE AND FUNCTIONAL STATUS - GENERAL
CLIMB 3 TO 5 STEPS WITH RAILING: A LITTLE
STANDING UP FROM CHAIR USING ARMS: A LITTLE
PERSONAL GROOMING: A LITTLE
SUGGESTED CMS G CODE MODIFIER DAILY ACTIVITY: CJ
TURNING FROM BACK TO SIDE WHILE IN FLAT BAD: A LITTLE
SUGGESTED CMS G CODE MODIFIER MOBILITY: CK
MOBILITY SCORE: 18
DAILY ACTIVITIY SCORE: 21
DRESSING REGULAR LOWER BODY CLOTHING: A LITTLE
MOVING FROM LYING ON BACK TO SITTING ON SIDE OF FLAT BED: A LITTLE
HELP NEEDED FOR BATHING: A LITTLE
WALKING IN HOSPITAL ROOM: A LITTLE
MOVING TO AND FROM BED TO CHAIR: A LITTLE

## 2025-01-07 ASSESSMENT — PAIN DESCRIPTION - PAIN TYPE
TYPE: CHRONIC PAIN
TYPE: ACUTE PAIN
TYPE: CHRONIC PAIN
TYPE: ACUTE PAIN
TYPE: ACUTE PAIN

## 2025-01-07 ASSESSMENT — SOCIAL DETERMINANTS OF HEALTH (SDOH)

## 2025-01-07 ASSESSMENT — PATIENT HEALTH QUESTIONNAIRE - PHQ9
SUM OF ALL RESPONSES TO PHQ9 QUESTIONS 1 AND 2: 0
1. LITTLE INTEREST OR PLEASURE IN DOING THINGS: NOT AT ALL
2. FEELING DOWN, DEPRESSED, IRRITABLE, OR HOPELESS: NOT AT ALL

## 2025-01-07 ASSESSMENT — FIBROSIS 4 INDEX: FIB4 SCORE: 2.43

## 2025-01-07 NOTE — PROGRESS NOTES
4 Eyes Skin Assessment Completed by TIFFANIE Auguste and TIFFANIE Cartwright.    Head WDL  Ears WDL  Nose WDL  Mouth WDL  Neck WDL  Breast/Chest WDL  Shoulder Blades WDL  Spine scar  (R) Arm/Elbow/Hand Bruising  (L) Arm/Elbow/Hand Bruising, pinky finger cut  Abdomen WDL  Groin WDL  Scrotum/Coccyx/Buttocks WDL  (R) Leg scabs/scratches on medial ankle, swelling  (L) Leg gen bruising, swelling  (R) Heel/Foot/Toe large calloused plantar foot  (L) Heel/Foot/Toe small callouse middle toe/plantar          Devices In Places Tele Box and Blood Pressure Cuff      Interventions In Place Pillows    Possible Skin Injury     Pictures Uploaded Into Epic Yes  Wound Consult Placed N/A  RN Wound Prevention Protocol Ordered No

## 2025-01-07 NOTE — ED TRIAGE NOTES
BIB EMS for following complaints.     Chief Complaint   Patient presents with    Chest Pressure     Pt states she has list of chronic complaints including neck pain for 1.5 years, facial numbness on left side for 4 months, leg swelling. Pt called ems today for increased chest pressure since last night.      /68   Pulse 70   Temp 36.8 °C (98.3 °F) (Temporal)   Resp 20   Wt 64.4 kg (142 lb)   LMP 01/01/1995   SpO2 97%   Breastfeeding No   BMI 24.76 kg/m²     Pt recd nitro and 324 asa PTA

## 2025-01-07 NOTE — ASSESSMENT & PLAN NOTE
Pain management as needed and patient would benefit from muscle relaxants and anti-inflammatory management

## 2025-01-07 NOTE — ASSESSMENT & PLAN NOTE
Patient has significant symptomatic anemia causing cardiac strain and elevation of her troponin as well as shortness of breath  Patient has had complete previous workups with negative findings  Patient does have chronic alcohol abuse as well as cannabis use and tobacco use, medications were reviewed and she is currently not taking any medications that would cause symptomatic anemia  Transfuse 1 unit of packed red blood cells  Obtain an iron panel and a B12 level if these are low we will replace the iron and IV form.

## 2025-01-07 NOTE — ASSESSMENT & PLAN NOTE
Patient is complaining of severe back pain.  She is usually not prescribed narcotics due to her drug abuse history  We will give her low-dose narcotics in the IV form here but which she will not be given these is a prescription for out patient management she will need to follow-up with her spine specialist and continue with muscle relaxants.

## 2025-01-07 NOTE — ED PROVIDER NOTES
ED Provider Note    CHIEF COMPLAINT  Chief Complaint   Patient presents with    Chest Pressure     Pt states she has list of chronic complaints including neck pain for 1.5 years, facial numbness on left side for 4 months, leg swelling. Pt called ems today for increased chest pressure since last night.        EXTERNAL RECORDS REVIEWED  Outpatient labs & studies Hb 8.3 in 4/2022    HPI/ROS  LIMITATION TO HISTORY   Select: none    OUTSIDE HISTORIAN(S):  none    Lynette Correa is a 69 y.o. female smoker with history of COPD not on home O2, thyroid disorder, GERD, SUPA, and arrhythmia who presents for chest pressure.    Gradually worsening generalized body pain over several months which the patient states occurs when she is anemic.    Patient had chest tightness while at rest earlier today with dizziness and palpitations.  Patient denies syncope.  She admits to night sweats and chills along with frequent headaches.    Patient denies nausea, vomiting, diaphoresis, melena, hematochezia, hematuria, epistaxis, vaginal bleeding, abdominal pain.    Patient states she has had anemia in the past.  The etiology is not known.    Legally blind, unaware of visual changes    Tob use since age 12    PAST MEDICAL HISTORY   has a past medical history of Alcohol abuse, Anesthesia, Anesthesia (9-23-16), Arrhythmia, Arthritis (9-23-16), ASTHMA, Bowel habit changes (9-23-16), Breath shortness, Bronchitis (2015), Chronic pain (01/2018, 11/19/18), Closed die-punch intra-articular fracture of distal end of left radius (3/3/2016), Cold (10/29/2018), COPD (chronic obstructive pulmonary disease) (HCC), Dental disorder, Depression, Disorder of thyroid, Emphysema of lung ([facility]), Heart burn, Hepatitis C (01/2018), migraines, Indigestion, Jaundice, kidney stones, Neuropathy, peripheral, Pain, Pain (08/31/2018), Pneumonia (2015), Risk for falls, Seizure ([facility]) (2015), Sleep apnea ([id number]), Substance abuse ([facility]), Ulcer, and Unspecified hemorrhagic  conditions.    SURGICAL HISTORY   has a past surgical history that includes cervical fusion posterior (2010); cervical decompression posterior (2010); tonsillectomy (as child); lumbar laminectomy diskectomy (2011); mass excision general (10/4/2011); orif, wrist (Left, 3/3/2016); primary c section (); lumbar laminectomy diskectomy (10/8/2016); shoulder arthroplasty total (Left, 2018); hysterectomy, total abdominal (); carpal tunnel release (Right, ); knee arthroplasty total (Right, ); other orthopedic surgery; knee arthroscopy (Left, ); knee arthroplasty total (Left, 2018); knee revision total (Right, 9/10/2018); biopsy general; block epidural steroid injection (-2018); other cardiac surgery (); and knee revision total (Right, 2018).    FAMILY HISTORY  Family History   Problem Relation Age of Onset    Anxiety disorder Mother     Depression Mother     Cancer Mother     Cancer Father     Alcohol abuse Brother     Diabetes Brother         3 brothers    Cancer Paternal Uncle     Dementia Paternal Uncle     Dementia Paternal Grandmother        SOCIAL HISTORY  Social History     Tobacco Use    Smoking status: Every Day     Current packs/day: 0.00     Average packs/day: 0.5 packs/day for 40.0 years (20.0 ttl pk-yrs)     Types: Cigarettes     Start date: 1977     Last attempt to quit: 2017     Years since quittin.9    Smokeless tobacco: Never    Tobacco comments:     1 ppd 10 yrs   Vaping Use    Vaping status: Never Used   Substance and Sexual Activity    Alcohol use: No    Drug use: Yes     Comment: weed    Sexual activity: Not on file       CURRENT MEDICATIONS  Home Medications       Reviewed by Radha Veliz (Pharmacy Tech) on 25 at 1314  Med List Status: Complete     Medication Last Dose Status   albuterol (PROVENTIL) 2.5mg/3ml Nebu Soln solution for nebulization 2025 Active   albuterol 108 (90 Base) MCG/ACT Aero Soln  "inhalation aerosol 1/7/2025 Active   atorvastatin (LIPITOR) 20 MG Tab 1/6/2025 Active   baclofen (LIORESAL) 10 MG Tab 1/4/2025 Active   busPIRone (BUSPAR) 15 MG tablet 12/6/2024 Active   cloNIDine (CATAPRES) 0.1 MG Tab 1/7/2025 Active   Dexlansoprazole 60 MG CAPSULE DELAYED RELEASE delayed-release capsule 1/6/2025 Active   DULoxetine (CYMBALTA) 60 MG Cap DR Particles delayed-release capsule 1/6/2025 Active   Ferrous Sulfate (IRON PO) 1/6/2025 Active   gabapentin (NEURONTIN) 600 MG tablet 1/7/2025 Active   ibuprofen (MOTRIN) 200 MG Tab 1/7/2025 Active   tizanidine (ZANAFLEX) 4 MG Tab 1/6/2025 Active                  Audit from Redirected Encounters    **Home medications have not yet been reviewed for this encounter**         ALLERGIES  Allergies   Allergen Reactions    Codeine Anaphylaxis     Difficulty breathing, and itching    Morphine Anaphylaxis     Headache; throat swelling    Pcn [Penicillins] Anaphylaxis     All \"cillin\" family, per patient  ---   I broke out in \"trench\" mouth       PHYSICAL EXAM  VITAL SIGNS: /68   Pulse 70   Temp 36.8 °C (98.3 °F) (Temporal)   Resp 20   Wt 64.4 kg (142 lb)   LMP 01/01/1995   SpO2 97%   Breastfeeding No   BMI 24.76 kg/m²    General:  WDWN female, chronically ill appearing in NAD; A+Ox3; V/S as above  Skin: warm and dry; pale color; no rash  HEENT: NCAT  Cardiovascular: Regular heart rate and rhythm.  No murmurs, rubs, or gallops  Lungs: No respiratory distress or tachypnea; Clear to auscultation with good air movement bilaterally.  No wheezes, rhonchi, or rales.   Abdomen: soft; NTND; no rebound, guarding, or rigidity.  No organomegaly or pulsatile mass  Rectal: Guaiac negative brown stool in the vault  Extremities: BEASLEY x 4; large hands  Neurologic: CNs III-XII grossly intact; speech clear; distal sensation intact; strength 5/5 UE/LEs  Psychiatric: Appropriate affect, normal mood      EKG/LABS  Results for orders placed or performed during the hospital encounter " of 01/07/25   CBC WITH DIFFERENTIAL    Collection Time: 01/07/25 11:12 AM   Result Value Ref Range    WBC 6.3 4.8 - 10.8 K/uL    RBC 3.78 (L) 4.20 - 5.40 M/uL    Hemoglobin 6.6 (L) 12.0 - 16.0 g/dL    Hematocrit 23.5 (L) 37.0 - 47.0 %    MCV 62.2 (L) 81.4 - 97.8 fL    MCH 17.5 (L) 27.0 - 33.0 pg    MCHC 28.1 (L) 32.2 - 35.5 g/dL    RDW 41.1 35.9 - 50.0 fL    Platelet Count 439 164 - 446 K/uL    MPV 9.0 9.0 - 12.9 fL    Neutrophils-Polys 74.40 (H) 44.00 - 72.00 %    Lymphocytes 16.00 (L) 22.00 - 41.00 %    Monocytes 6.20 0.00 - 13.40 %    Eosinophils 2.20 0.00 - 6.90 %    Basophils 1.00 0.00 - 1.80 %    Immature Granulocytes 0.20 0.00 - 0.90 %    Nucleated RBC 0.00 0.00 - 0.20 /100 WBC    Neutrophils (Absolute) 4.70 1.82 - 7.42 K/uL    Lymphs (Absolute) 1.01 1.00 - 4.80 K/uL    Monos (Absolute) 0.39 0.00 - 0.85 K/uL    Eos (Absolute) 0.14 0.00 - 0.51 K/uL    Baso (Absolute) 0.06 0.00 - 0.12 K/uL    Immature Granulocytes (abs) 0.01 0.00 - 0.11 K/uL    NRBC (Absolute) 0.00 K/uL    Hypochromia 2+ (A)     Anisocytosis 2+ (A)     Microcytosis 2+ (A)    CMP    Collection Time: 01/07/25 11:12 AM   Result Value Ref Range    Sodium 136 135 - 145 mmol/L    Potassium 4.3 3.6 - 5.5 mmol/L    Chloride 105 96 - 112 mmol/L    Co2 22 20 - 33 mmol/L    Anion Gap 9.0 7.0 - 16.0    Glucose 97 65 - 99 mg/dL    Bun 15 8 - 22 mg/dL    Creatinine 0.63 0.50 - 1.40 mg/dL    Calcium 8.5 8.4 - 10.2 mg/dL    Correct Calcium 8.8 8.5 - 10.5 mg/dL    AST(SGOT) 78 (H) 12 - 45 U/L    ALT(SGPT) 27 2 - 50 U/L    Alkaline Phosphatase 104 (H) 30 - 99 U/L    Total Bilirubin 0.3 0.1 - 1.5 mg/dL    Albumin 3.6 3.2 - 4.9 g/dL    Total Protein 6.1 6.0 - 8.2 g/dL    Globulin 2.5 1.9 - 3.5 g/dL    A-G Ratio 1.4 g/dL   LIPASE    Collection Time: 01/07/25 11:12 AM   Result Value Ref Range    Lipase 28 11 - 82 U/L   TROPONIN    Collection Time: 01/07/25 11:12 AM   Result Value Ref Range    Troponin T 22 (H) 6 - 19 ng/L   ESTIMATED GFR    Collection Time: 01/07/25  11:12 AM   Result Value Ref Range    GFR (CKD-EPI) 95 >60 mL/min/1.73 m 2   PLATELET ESTIMATE    Collection Time: 25 11:12 AM   Result Value Ref Range    Plt Estimation Normal    MORPHOLOGY    Collection Time: 25 11:12 AM   Result Value Ref Range    RBC Morphology Present    EKG    Collection Time: 25 11:18 AM   Result Value Ref Range    Report       [facility] Emergency Dept.    Test Date:  2025  Pt Name:    EARLE NOVA                 Department: [facility]  MRN:        [id number]                      [facility]  Gender:     Female                       Technician: [id number]  :        [id number]                   Requested By:ER TRIAGE PROTOCOL  Order #:    [id number]                    Reading MD: GENIA TREVIÑO MD    Measurements  Intervals                                Axis  Rate:       68                           P:          34  NE:         120                          QRS:        21  QRSD:       96                           T:          49  QT:         429  QTc:        457    Interpretive Statements  Sinus rhythm  Probable left atrial enlargement  Low voltage, extremity leads  Compared to ECG 2022 22:26:46  Low QRS voltage now present  Ventricular premature complex(es) no longer present  Electronically Signed On 2025 11:18:05 PST by GENIA TREVIÑO MD     COD - Adult (Type and Screen)    Collection Time: 25 12:23 PM   Result Value Ref Range    ABO Grouping Only O     Rh Grouping Only POS     Antibody Screen-Cod NEG      *Note: Due to a large number of results and/or encounters for the requested time period, some results have not been displayed. A complete set of results can be found in Results Review.       I have independently interpreted this EKG which shows no acute ST changes.  Possibly prominent T waves.    RADIOLOGY/PROCEDURES   I have independently interpreted the diagnostic imaging associated with this visit and am waiting the  final reading from the radiologist.   My preliminary interpretation is as follows:   CXR shows no focal consolidation    Radiologist interpretation:  DX-CHEST-PORTABLE (1 VIEW)   Final Result      Cardiomegaly with mild interstitial edema.      Bibasilar atelectasis.      NM-CARDIAC STRESS TEST    (Results Pending)   EC-ECHOCARDIOGRAM COMPLETE W/O CONT    (Results Pending)       COURSE & MEDICAL DECISION MAKING    ASSESSMENT, COURSE AND PLAN  Care Narrative: This is a 69-year-old smoker with history of anemia of unknown etiology who presents for allover body pain gradually increasing over the last several months with chest tightness this morning.  Patient is guaiac negative from below.    Norco ordered for pain.    EKG shows no acute ST changes.    Labs show worsening anemia with a hemoglobin of 6.6.  This is microcytic.  Patient reports no obvious bleeding sources.  Troponin is elevated to 22.    Chest x-ray shows cardiomegaly with mild interstitial edema.    Patient amenable to being hospitalized.  I discussed the case with Dr. Arceo who agrees to hospitalize the patient.  COD ordered.      DISPOSITION AND DISCUSSIONS  I have discussed management of the patient with the following physicians and JAVIER's:    [place]    FINAL DIAGNOSIS  1. Acute chest pain    2. Anemia, unspecified type         Electronically signed by: Silvia Chin M.D., 1/7/2025 11:13 AM

## 2025-01-07 NOTE — H&P
Hospital Medicine History & Physical Note    Date of Service  1/7/2025    Primary Care Physician  Angie Sorensen M.D.    Consultants  None    Specialist Names: None    Code Status  Full Code    Chief Complaint  Chief Complaint   Patient presents with    Chest Pressure     Pt states she has list of chronic complaints including neck pain for 1.5 years, facial numbness on left side for 4 months, leg swelling. Pt called ems today for increased chest pressure since last night.        History of Presenting Illness  Lynette Correa is a 69 y.o. female who presented 1/7/2025 with chest tightness that is been going on for about the past day and a half.  The patient also complains of leg swelling and shortness of breath.  The patient uses cannabis tobacco and alcohol on a chronic basis.  The patient now has been found to have symptomatic anemia that is also recurrent and chronic.  Patient will need a unit of packed red blood cells to be transfused.  In the meantime patient will need a cardiac workup as well given her high risk and current chest pressure and elevated troponins.  We will also obtain an echocardiogram.  Patient has been counseled on tobacco cessation cannabis cessation apparently she has already stopped drinking in the past.  Patient is complaining of severe back pain and arthritic pain which has been worked up in the past and has been diagnosed with psoriatic arthritis.  The patient at this point will need muscle relaxants and mild pain management as the patient apparently has abused pain medications as an outpatient in the past even though she says she does not abuse drugs and she does not want to be here for requesting more drugs..    I discussed the plan of care with patient, bedside RN, and emergency room physician Dr. Silvia Chin .    Review of Systems  Review of Systems   Constitutional:  Positive for malaise/fatigue. Negative for chills, diaphoresis and fever.   HENT:  Positive for hearing loss.  Negative for nosebleeds and sore throat.    Eyes: Negative.  Negative for blurred vision and double vision.   Respiratory:  Positive for shortness of breath. Negative for cough, hemoptysis and wheezing.    Cardiovascular:  Positive for chest pain. Negative for palpitations and leg swelling.   Gastrointestinal:  Positive for nausea. Negative for abdominal pain, blood in stool, constipation, diarrhea, heartburn and vomiting.   Genitourinary: Negative.  Negative for frequency, hematuria and urgency.   Musculoskeletal:  Positive for back pain and myalgias. Negative for joint pain.   Skin: Negative.  Negative for itching and rash.   Neurological:  Positive for weakness and headaches. Negative for dizziness, focal weakness, seizures and loss of consciousness.   Endo/Heme/Allergies: Negative.  Does not bruise/bleed easily.   Psychiatric/Behavioral:  Positive for substance abuse. Negative for depression and suicidal ideas. The patient is not nervous/anxious.    All other systems reviewed and are negative.      Past Medical History   has a past medical history of Alcohol abuse, Anesthesia, Anesthesia (9-23-16), Arrhythmia, Arthritis (9-23-16), ASTHMA, Bowel habit changes (9-23-16), Breath shortness, Bronchitis (2015), Chronic pain (01/2018, 11/19/18), Closed die-punch intra-articular fracture of distal end of left radius (3/3/2016), Cold (10/29/2018), COPD (chronic obstructive pulmonary disease) (formerly [facility]), Dental disorder, Depression, Disorder of thyroid, Emphysema of lung (formerly [facility]), Heart burn, Hepatitis C (01/2018), migraines, Indigestion, Jaundice, kidney stones, Neuropathy, peripheral, Pain, Pain (08/31/2018), Pneumonia (2015), Risk for falls, Seizure (formerly [facility]) (2015), Sleep apnea ([id number]), Substance abuse (formerly [facility]), Ulcer, and Unspecified hemorrhagic conditions.    Surgical History   has a past surgical history that includes cervical fusion posterior (7/20/2010); cervical decompression posterior (7/20/2010); tonsillectomy (as  "child); lumbar laminectomy diskectomy (1/8/2011); mass excision general (10/4/2011); orif, wrist (Left, 3/3/2016); primary c section (1989/1988/1993); lumbar laminectomy diskectomy (10/8/2016); shoulder arthroplasty total (Left, 2/12/2018); hysterectomy, total abdominal (1995); carpal tunnel release (Right, 2005); knee arthroplasty total (Right, 2003); other orthopedic surgery; knee arthroscopy (Left, 2008); knee arthroplasty total (Left, 5/14/2018); knee revision total (Right, 9/10/2018); biopsy general; block epidural steroid injection (2015-7/2018); other cardiac surgery (2005); and knee revision total (Right, 11/26/2018).     Family History  family history includes Alcohol abuse in her brother; Anxiety disorder in her mother; Cancer in her father, mother, and paternal uncle; Dementia in her paternal grandmother and paternal uncle; Depression in her mother; Diabetes in her brother.   Family history reviewed with patient. There is family history that is pertinent to the chief complaint.     Social History   reports that she has been smoking cigarettes. She started smoking about 47 years ago. She has a 20 pack-year smoking history. She has never used smokeless tobacco. She reports current drug use. She reports that she does not drink alcohol.    Allergies  Allergies   Allergen Reactions    Codeine Rash     Difficulty breathing, and itching    Morphine Anaphylaxis     Headache; throat swelling    Pcn [Penicillins]      All \"cillin\" family, per patient  ---   I broke out in \"trench\" mouth       Medications  Prior to Admission Medications   Prescriptions Last Dose Informant Patient Reported? Taking?   DULoxetine (CYMBALTA) 60 MG Cap DR Particles delayed-release capsule   No No   Sig: TAKE 1 CAPSULE BY MOUTH EVERY DAY   Dexlansoprazole 60 MG CAPSULE DELAYED RELEASE delayed-release capsule   No No   Sig: TAKE 1 CAPSULE BY MOUTH EVERY DAY   albuterol (PROVENTIL) 2.5mg/3ml Nebu Soln solution for nebulization   Yes No "   Sig: albuterol sulfate 2.5 mg/3 mL (0.083 %) solution for nebulization   INHALE 1 VIAL VIA NEBULIZER EVERY 4 HOURS AS NEEDED FOR SHORTNESS OF BREATH   albuterol 108 (90 Base) MCG/ACT Aero Soln inhalation aerosol   No No   Sig: INHALE 2 PUFFS BY MOUTH EVERY 6 HOURS AS NEEDED FOR SHORTNESS OF BREATH   atorvastatin (LIPITOR) 20 MG Tab   Yes No   Sig: Take 20 mg by mouth every day.   budesonide-formoterol (SYMBICORT) 160-4.5 MCG/ACT Aerosol   No No   Sig: Inhale 2 Puffs 2 times a day.   busPIRone (BUSPAR) 15 MG tablet   No No   Sig: Take 1 Tablet by mouth 2 times a day.   cloNIDine (CATAPRES) 0.1 MG Tab   No No   Sig: TAKE 1 TABLET BY MOUTH TWICE DAILY   ferrous sulfate 325 (65 Fe) MG tablet  Family Member Yes No   Sig: Take 325 mg by mouth every day.   gabapentin (NEURONTIN) 600 MG tablet   No No   Sig: TAKE 1 TABLET BY MOUTH FOUR TIMES DAILY      Facility-Administered Medications: None       Physical Exam  Temp:  [36.8 °C (98.3 °F)] 36.8 °C (98.3 °F)  Pulse:  [70] 70  Resp:  [20] 20  BP: (134)/(68) 134/68  SpO2:  [97 %] 97 %  Blood Pressure : 134/68   Temperature: 36.8 °C (98.3 °F)   Pulse: 70   Respiration: 20   Pulse Oximetry: 97 %       Physical Exam  Vitals and nursing note reviewed. Exam conducted with a chaperone present.   Constitutional:       Appearance: Normal appearance. She is well-developed and underweight. She is ill-appearing.   HENT:      Head: Normocephalic and atraumatic.      Right Ear: Tympanic membrane, ear canal and external ear normal.      Left Ear: Tympanic membrane, ear canal and external ear normal.      Nose: Nose normal. No congestion or rhinorrhea.      Mouth/Throat:      Mouth: Mucous membranes are dry.      Pharynx: Oropharynx is clear.   Eyes:      General:         Right eye: No discharge.         Left eye: No discharge.      Extraocular Movements: Extraocular movements intact.      Conjunctiva/sclera: Conjunctivae normal.      Pupils: Pupils are equal, round, and reactive to light.    Neck:      Thyroid: No thyroid mass.      Vascular: No carotid bruit or JVD.   Cardiovascular:      Rate and Rhythm: Normal rate and regular rhythm.      Pulses: Normal pulses.      Heart sounds: Normal heart sounds, S1 normal and S2 normal.   Pulmonary:      Effort: Pulmonary effort is normal. Tachypnea present.      Breath sounds: Decreased air movement present. Examination of the right-lower field reveals decreased breath sounds. Examination of the left-lower field reveals decreased breath sounds. Decreased breath sounds present.   Abdominal:      General: Abdomen is flat. Bowel sounds are normal. There is no distension.      Palpations: Abdomen is soft. There is no mass.      Tenderness: There is no guarding.   Musculoskeletal:         General: Normal range of motion.      Cervical back: Normal range of motion and neck supple. No edema or rigidity.      Right lower leg: Edema present.      Left lower leg: Edema present.      Right foot: Normal range of motion. No deformity or foot drop.      Left foot: Normal range of motion. No deformity or foot drop.   Feet:      Right foot:      Skin integrity: Skin integrity normal. No ulcer.      Left foot:      Skin integrity: Skin integrity normal. No ulcer.   Lymphadenopathy:      Head:      Right side of head: No submental adenopathy.      Left side of head: No submental adenopathy.      Cervical: No cervical adenopathy.      Right cervical: No superficial cervical adenopathy.     Left cervical: No superficial cervical adenopathy.      Upper Body:      Right upper body: No supraclavicular adenopathy.      Left upper body: No supraclavicular adenopathy.      Lower Body: No right inguinal adenopathy. No left inguinal adenopathy.   Skin:     General: Skin is warm and dry.      Capillary Refill: Capillary refill takes less than 2 seconds.      Findings: No abrasion or wound.   Neurological:      General: No focal deficit present.      Mental Status: She is alert and  "oriented to person, place, and time. Mental status is at baseline.      GCS: GCS eye subscore is 4. GCS verbal subscore is 5. GCS motor subscore is 6.      Sensory: Sensation is intact.      Motor: Motor function is intact. No weakness.      Coordination: Coordination is intact.   Psychiatric:         Mood and Affect: Mood and affect normal.         Speech: Speech normal.         Behavior: Behavior normal. Behavior is cooperative.         Thought Content: Thought content normal.         Judgment: Judgment normal.         Laboratory:  Recent Labs     01/07/25  1112   WBC 6.3   RBC 3.78*   HEMOGLOBIN 6.6*   HEMATOCRIT 23.5*   MCV 62.2*   MCH 17.5*   MCHC 28.1*   RDW 41.1   PLATELETCT 439   MPV 9.0     Recent Labs     01/07/25  1112   SODIUM 136   POTASSIUM 4.3   CHLORIDE 105   CO2 22   GLUCOSE 97   BUN 15   CREATININE 0.63   CALCIUM 8.5     Recent Labs     01/07/25  1112   ALTSGPT 27   ASTSGOT 78*   ALKPHOSPHAT 104*   TBILIRUBIN 0.3   LIPASE 28   GLUCOSE 97         No results for input(s): \"NTPROBNP\" in the last 72 hours.      Recent Labs     01/07/25  1112   TROPONINT 22*       Imaging:  DX-CHEST-PORTABLE (1 VIEW)   Final Result      Cardiomegaly with mild interstitial edema.      Bibasilar atelectasis.      NM-CARDIAC STRESS TEST    (Results Pending)   EC-ECHOCARDIOGRAM COMPLETE W/O CONT    (Results Pending)       X-Ray:  I have personally reviewed the images and compared with prior images.  EKG:  I have personally reviewed the images and compared with prior images.    Assessment/Plan:  Justification for Admission Status  I anticipate this patient is appropriate for observation status at this time because patient has symptomatic anemia and chest pressure and will require 23 hours or less of hospital management for the workup    Patient will need a Telemetry bed on MEDICAL service .  The need is secondary to symptomatic anemia with chest pressure.    * Symptomatic anemia- (present on admission)  Assessment & " Plan  Patient has significant symptomatic anemia causing cardiac strain and elevation of her troponin as well as shortness of breath  Patient has had complete previous workups with negative findings  Patient does have chronic alcohol abuse as well as cannabis use and tobacco use, medications were reviewed and she is currently not taking any medications that would cause symptomatic anemia  Transfuse 1 unit of packed red blood cells  Obtain an iron panel and a B12 level if these are low we will replace the iron and IV form.    Chest pain- (present on admission)  Assessment & Plan  The ASCVD Risk score (Bekah MESSINA, et al., 2019) failed to calculate for the following reasons:    Cannot find a previous HDL lab    Cannot find a previous total cholesterol lab  Patient complaining of chest pressure and shortness of breath.  Patient is a chronic smoker  Patient has multiple risk factors for coronary artery disease  Patient's troponin slightly elevated at 22, continue to monitor  Serial troponins  Echocardiogram  Stress test in the morning      Simple chronic bronchitis (HCC)- (present on admission)  Assessment & Plan  The patient has chronic bronchitis with apparently asthma in the background as well and smokes significantly.  Patient at this point will be on RT protocol nebulizer treatments currently she is 97% saturating on room air    Tobacco dependence- (present on admission)  Assessment & Plan  -nicotine replacement protocol and cessation education provided for 12 minutes, discussed options of nicotine patch, acupuncture, medical treatment with wellbutrin and chantix. Discussed other options. Code 57824    Combined drug dependence, continuous abuse (HCC)- (present on admission)  Assessment & Plan  Patient has combined drug dependence alcohol tobacco and marijuana.  Obtain drug screen she is not admitting to any other drugs we will at this point evaluate to make sure she is not using other substances of abuse.    Chronic  low back pain- (present on admission)  Assessment & Plan  Patient is complaining of severe back pain.  She is usually not prescribed narcotics due to her drug abuse history  We will give her low-dose narcotics in the IV form here but which she will not be given these is a prescription for out patient management she will need to follow-up with her spine specialist and continue with muscle relaxants.    Iron deficiency anemia due to chronic blood loss- (present on admission)  Assessment & Plan  Check iron panel    Viral hepatitis C- (present on admission)  Assessment & Plan  Chronic and will need follow-ups with gastroenterology as an outpatient for antiviral treatment    Psoriatic arthritis (HCC)- (present on admission)  Assessment & Plan  Pain management as needed and patient would benefit from muscle relaxants and anti-inflammatory management    GERD (gastroesophageal reflux disease)- (present on admission)  Assessment & Plan  PPI therapy    Cannabis abuse- (present on admission)  Assessment & Plan  Counseled on cessation    Alcohol abuse, in remission- (present on admission)  Assessment & Plan  History of chronic alcohol abuse which is most likely what is causing her chronic anemia    Major depressive disorder, recurrent episode with anxious distress (HCC)- (present on admission)  Assessment & Plan  Continue Cymbalta  Currently not suicidal homicidal        VTE prophylaxis: SCDs/TEDs

## 2025-01-07 NOTE — ED NOTES
Medication history reviewed with pt. Med rec is complete.  Allergies reviewed, per pt    Pt reports that she has not taken her SYMBICORT 160-4.5MCG in over a year and her insurance did not cover her METHOCARBAMOL 500MG, she did not take this medication.   Pt reports that she alternates her BACLOFEN 20MG and TIZANIDINE 4MG.  Pt reports that she did ran out of her BACLOFEN 20MG about 3 days ago.    Patient has not had any outpatient antibiotics in the last 30 days.    Pt is not on any anticoagulants

## 2025-01-07 NOTE — CARE PLAN
The patient is Stable - Low risk of patient condition declining or worsening    Shift Goals  Clinical Goals: blood tranfusion, monitor VS    Progress made toward(s) clinical / shift goals: blood transfusion, pt educated and verbalizes understanding     Problem: Knowledge Deficit - Standard  Goal: Patient and family/care givers will demonstrate understanding of plan of care, disease process/condition, diagnostic tests and medications  Outcome: Progressing     Problem: Fall Risk  Goal: Patient will remain free from falls  Outcome: Progressing       Patient is not progressing towards the following goals:

## 2025-01-07 NOTE — ASSESSMENT & PLAN NOTE
Patient has combined drug dependence alcohol tobacco and marijuana.  Obtain drug screen she is not admitting to any other drugs we will at this point evaluate to make sure she is not using other substances of abuse.

## 2025-01-07 NOTE — ASSESSMENT & PLAN NOTE
The ASCVD Risk score (Bekah MESSINA, et al., 2019) failed to calculate for the following reasons:    Cannot find a previous HDL lab    Cannot find a previous total cholesterol lab  Patient complaining of chest pressure and shortness of breath.  Patient is a chronic smoker  Patient has multiple risk factors for coronary artery disease  Patient's troponin slightly elevated at 22, continue to monitor  Serial troponins  Echocardiogram  Stress test in the morning

## 2025-01-07 NOTE — ASSESSMENT & PLAN NOTE
The patient has chronic bronchitis with apparently asthma in the background as well and smokes significantly.  Patient at this point will be on RT protocol nebulizer treatments currently she is 97% saturating on room air

## 2025-01-07 NOTE — ASSESSMENT & PLAN NOTE
-nicotine replacement protocol and cessation education provided for 12 minutes, discussed options of nicotine patch, acupuncture, medical treatment with wellbutrin and chantix. Discussed other options. Code 67525

## 2025-01-08 ENCOUNTER — APPOINTMENT (OUTPATIENT)
Dept: RADIOLOGY | Facility: MEDICAL CENTER | Age: 70
End: 2025-01-08
Attending: HOSPITALIST
Payer: MEDICARE

## 2025-01-08 VITALS
HEART RATE: 81 BPM | SYSTOLIC BLOOD PRESSURE: 134 MMHG | OXYGEN SATURATION: 95 % | BODY MASS INDEX: 22.21 KG/M2 | WEIGHT: 130.07 LBS | DIASTOLIC BLOOD PRESSURE: 73 MMHG | HEIGHT: 64 IN | TEMPERATURE: 97.9 F | RESPIRATION RATE: 17 BRPM

## 2025-01-08 LAB
AMPHET UR QL SCN: POSITIVE
ANION GAP SERPL CALC-SCNC: 8 MMOL/L (ref 7–16)
BARBITURATES UR QL SCN: NEGATIVE
BENZODIAZ UR QL SCN: NEGATIVE
BUN SERPL-MCNC: 13 MG/DL (ref 8–22)
BZE UR QL SCN: NEGATIVE
CALCIUM SERPL-MCNC: 8.3 MG/DL (ref 8.4–10.2)
CANNABINOIDS UR QL SCN: POSITIVE
CHLORIDE SERPL-SCNC: 105 MMOL/L (ref 96–112)
CHOLEST SERPL-MCNC: 94 MG/DL (ref 100–199)
CO2 SERPL-SCNC: 23 MMOL/L (ref 20–33)
CREAT SERPL-MCNC: 0.62 MG/DL (ref 0.5–1.4)
ERYTHROCYTE [DISTWIDTH] IN BLOOD BY AUTOMATED COUNT: 47 FL (ref 35.9–50)
FENTANYL UR QL: NEGATIVE
GFR SERPLBLD CREATININE-BSD FMLA CKD-EPI: 95 ML/MIN/1.73 M 2
GLUCOSE SERPL-MCNC: 95 MG/DL (ref 65–99)
HCT VFR BLD AUTO: 26.7 % (ref 37–47)
HDLC SERPL-MCNC: 47 MG/DL
HGB BLD-MCNC: 7.7 G/DL (ref 12–16)
LDLC SERPL CALC-MCNC: 38 MG/DL
MCH RBC QN AUTO: 18.9 PG (ref 27–33)
MCHC RBC AUTO-ENTMCNC: 28.8 G/DL (ref 32.2–35.5)
MCV RBC AUTO: 65.6 FL (ref 81.4–97.8)
METHADONE UR QL SCN: NEGATIVE
OPIATES UR QL SCN: NEGATIVE
OXYCODONE UR QL SCN: POSITIVE
PCP UR QL SCN: NEGATIVE
PLATELET # BLD AUTO: 429 K/UL (ref 164–446)
PMV BLD AUTO: 9 FL (ref 9–12.9)
POTASSIUM SERPL-SCNC: 4.3 MMOL/L (ref 3.6–5.5)
PROPOXYPH UR QL SCN: NEGATIVE
RBC # BLD AUTO: 4.07 M/UL (ref 4.2–5.4)
SODIUM SERPL-SCNC: 136 MMOL/L (ref 135–145)
TRIGL SERPL-MCNC: 47 MG/DL (ref 0–149)
WBC # BLD AUTO: 6.2 K/UL (ref 4.8–10.8)

## 2025-01-08 PROCEDURE — 700111 HCHG RX REV CODE 636 W/ 250 OVERRIDE (IP): Mod: JZ | Performed by: INTERNAL MEDICINE

## 2025-01-08 PROCEDURE — A9270 NON-COVERED ITEM OR SERVICE: HCPCS | Performed by: HOSPITALIST

## 2025-01-08 PROCEDURE — 96375 TX/PRO/DX INJ NEW DRUG ADDON: CPT

## 2025-01-08 PROCEDURE — 94664 DEMO&/EVAL PT USE INHALER: CPT

## 2025-01-08 PROCEDURE — 36415 COLL VENOUS BLD VENIPUNCTURE: CPT

## 2025-01-08 PROCEDURE — 96366 THER/PROPH/DIAG IV INF ADDON: CPT

## 2025-01-08 PROCEDURE — 80061 LIPID PANEL: CPT

## 2025-01-08 PROCEDURE — 85027 COMPLETE CBC AUTOMATED: CPT

## 2025-01-08 PROCEDURE — 99407 BEHAV CHNG SMOKING > 10 MIN: CPT

## 2025-01-08 PROCEDURE — 80307 DRUG TEST PRSMV CHEM ANLYZR: CPT

## 2025-01-08 PROCEDURE — 96365 THER/PROPH/DIAG IV INF INIT: CPT

## 2025-01-08 PROCEDURE — 97166 OT EVAL MOD COMPLEX 45 MIN: CPT

## 2025-01-08 PROCEDURE — 700105 HCHG RX REV CODE 258: Performed by: INTERNAL MEDICINE

## 2025-01-08 PROCEDURE — 700102 HCHG RX REV CODE 250 W/ 637 OVERRIDE(OP): Performed by: HOSPITALIST

## 2025-01-08 PROCEDURE — G0378 HOSPITAL OBSERVATION PER HR: HCPCS

## 2025-01-08 PROCEDURE — 80048 BASIC METABOLIC PNL TOTAL CA: CPT

## 2025-01-08 PROCEDURE — 99239 HOSP IP/OBS DSCHRG MGMT >30: CPT | Mod: 25 | Performed by: INTERNAL MEDICINE

## 2025-01-08 PROCEDURE — 99406 BEHAV CHNG SMOKING 3-10 MIN: CPT | Performed by: INTERNAL MEDICINE

## 2025-01-08 RX ORDER — METHYLPREDNISOLONE SODIUM SUCCINATE 125 MG/2ML
125 INJECTION, POWDER, LYOPHILIZED, FOR SOLUTION INTRAMUSCULAR; INTRAVENOUS ONCE
Status: COMPLETED | OUTPATIENT
Start: 2025-01-08 | End: 2025-01-08

## 2025-01-08 RX ORDER — FERROUS SULFATE 325(65) MG
325 TABLET ORAL DAILY
Qty: 30 TABLET | Refills: 0 | Status: SHIPPED | OUTPATIENT
Start: 2025-01-08 | End: 2025-02-07

## 2025-01-08 RX ADMIN — MOMETASONE FUROATE AND FORMOTEROL FUMARATE DIHYDRATE 2 PUFF: 200; 5 AEROSOL RESPIRATORY (INHALATION) at 16:46

## 2025-01-08 RX ADMIN — OXYCODONE HYDROCHLORIDE 10 MG: 10 TABLET ORAL at 16:46

## 2025-01-08 RX ADMIN — ATORVASTATIN CALCIUM 20 MG: 20 TABLET, FILM COATED ORAL at 04:54

## 2025-01-08 RX ADMIN — BUSPIRONE HYDROCHLORIDE 15 MG: 5 TABLET ORAL at 16:45

## 2025-01-08 RX ADMIN — GABAPENTIN 600 MG: 300 CAPSULE ORAL at 16:46

## 2025-01-08 RX ADMIN — MOMETASONE FUROATE AND FORMOTEROL FUMARATE DIHYDRATE 2 PUFF: 200; 5 AEROSOL RESPIRATORY (INHALATION) at 04:56

## 2025-01-08 RX ADMIN — BUSPIRONE HYDROCHLORIDE 15 MG: 5 TABLET ORAL at 04:55

## 2025-01-08 RX ADMIN — CLONIDINE HYDROCHLORIDE 0.1 MG: 0.1 TABLET ORAL at 04:55

## 2025-01-08 RX ADMIN — SODIUM CHLORIDE 1000 MG: 9 INJECTION, SOLUTION INTRAVENOUS at 12:41

## 2025-01-08 RX ADMIN — NICOTINE TRANSDERMAL SYSTEM 21 MG: 21 PATCH, EXTENDED RELEASE TRANSDERMAL at 06:17

## 2025-01-08 RX ADMIN — METHYLPREDNISOLONE SODIUM SUCCINATE 125 MG: 125 INJECTION, POWDER, FOR SOLUTION INTRAMUSCULAR; INTRAVENOUS at 11:34

## 2025-01-08 RX ADMIN — DULOXETINE HYDROCHLORIDE 60 MG: 30 CAPSULE, DELAYED RELEASE ORAL at 04:54

## 2025-01-08 RX ADMIN — OMEPRAZOLE 20 MG: 20 CAPSULE, DELAYED RELEASE ORAL at 04:54

## 2025-01-08 RX ADMIN — CLONIDINE HYDROCHLORIDE 0.1 MG: 0.1 TABLET ORAL at 16:46

## 2025-01-08 RX ADMIN — GABAPENTIN 600 MG: 300 CAPSULE ORAL at 12:48

## 2025-01-08 RX ADMIN — GABAPENTIN 600 MG: 300 CAPSULE ORAL at 08:08

## 2025-01-08 RX ADMIN — OXYCODONE HYDROCHLORIDE 10 MG: 10 TABLET ORAL at 12:48

## 2025-01-08 ASSESSMENT — COGNITIVE AND FUNCTIONAL STATUS - GENERAL
SUGGESTED CMS G CODE MODIFIER DAILY ACTIVITY: CJ
PERSONAL GROOMING: A LITTLE
DAILY ACTIVITIY SCORE: 20
TOILETING: A LITTLE
HELP NEEDED FOR BATHING: A LITTLE
DRESSING REGULAR LOWER BODY CLOTHING: A LITTLE

## 2025-01-08 ASSESSMENT — ACTIVITIES OF DAILY LIVING (ADL): TOILETING: INDEPENDENT

## 2025-01-08 ASSESSMENT — LIFESTYLE VARIABLES: PACK_YEARS: 20+

## 2025-01-08 ASSESSMENT — FIBROSIS 4 INDEX: FIB4 SCORE: 2.48

## 2025-01-08 ASSESSMENT — PAIN DESCRIPTION - PAIN TYPE
TYPE: CHRONIC PAIN
TYPE: ACUTE PAIN
TYPE: CHRONIC PAIN

## 2025-01-08 ASSESSMENT — GAIT ASSESSMENTS: DISTANCE (FEET): 20

## 2025-01-08 NOTE — PROGRESS NOTES
Bedside report received from lolahijoellen Auguste. Patient awake and alert in bed A&Ox4. Bed is locked and in low position with bed alarm on. Reviewed plan of care with patient. Call light within reach. No other needs at this time.

## 2025-01-08 NOTE — DISCHARGE SUMMARY
Hospital Medicine Discharge Note     Admit Date:  1/7/2025       Discharge Date:   1/8/2025  LOS: 0 days     Primary Care Provider:    Pcp Pt States None    Attending Physician:  Citlaly Aguilar M.D.     Discharge Diagnoses:   Principal Problem:    Symptomatic anemia  Active Problems:    Major depressive disorder, recurrent episode with anxious distress (HCC)    Alcohol abuse, in remission    Cannabis abuse    Chronic low back pain    Combined drug dependence, continuous abuse (HCC)    GERD (gastroesophageal reflux disease)    Psoriatic arthritis (HCC)    Tobacco dependence    Viral hepatitis C    Iron deficiency anemia due to chronic blood loss    Simple chronic bronchitis (HCC)    Chest pain        Hospital Summary (Brief Narrative):         70 yo with hx of severe microcytic iron deficient anemia, tobacco abuse, hep c, alcohol abuse, MDD, presentd with chest tightness x 1 day found to have Hb of 6.6 requiring 1 unit pf PRBC. MCV in 60s. Patient has had new onset microcytic anemia since 2022 , her Hb most recently most mainly in 7-8 range. She denied having bloody stools or melena. She denies any obvious bleed. Her cardiac work up for chest pain was  reassuring w troponins in 20s indicating demand ischemia of myocardium in setting of severe anemia. Echo was also reassuring with nl LVEF, no SWMAs, moderate TR, mild MR. I do not believe stress test was indicated but this was ordered by the admitting doc and not done due to UDS + for meth. Pt was counseled on tobacco and meth cessation, 5 minute spent on this discussion.     I did speak with Dr. Garzon from [facility]. Pt was seen in their clinic in 2022 and did not follow up. He agreed this would be an outpatient f/u for colonoscopy, no indication for in patient urgent scope. Pt was given information to follow up. In addition, patient had no PCP so I reached out to CM to ensure patient has PCP prior to discharge.      Disposition:   Discharge home    Condition:   Stable    Activity:   As tolerated     Diet:   Regular    Discharge Medications:           Medication List        START taking these medications        Instructions   ferrous sulfate 325 (65 Fe) MG tablet   Take 1 Tablet by mouth every day for 30 days.  Dose: 325 mg            CHANGE how you take these medications        Instructions   cloNIDine 0.1 MG Tabs  What changed: when to take this  Commonly known as: Catapres   TAKE 1 TABLET BY MOUTH TWICE DAILY            CONTINUE taking these medications        Instructions   * albuterol 2.5mg/3ml Nebu solution for nebulization  Commonly known as: Proventil   Take 2.5 mg by nebulization every four hours as needed for Shortness of Breath.  Dose: 2.5 mg     * albuterol 108 (90 Base) MCG/ACT Aers inhalation aerosol   INHALE 2 PUFFS BY MOUTH EVERY 6 HOURS AS NEEDED FOR SHORTNESS OF BREATH     atorvastatin 20 MG Tabs  Commonly known as: Lipitor   Take 20 mg by mouth every day.  Dose: 20 mg     baclofen 20 MG tablet  Commonly known as: Lioresal   Take 20 mg by mouth 2 times a day as needed. Pt ran out on 1/4/2024  Indications: Muscle Spasm  Dose: 20 mg     busPIRone 15 MG tablet  Commonly known as: Buspar   Take 1 Tablet by mouth 2 times a day.  Dose: 15 mg     Dexlansoprazole 60 MG Cpdr delayed-release capsule   TAKE 1 CAPSULE BY MOUTH EVERY DAY  Dose: 1 Capsule     DULoxetine 60 MG Cpep delayed-release capsule  Commonly known as: Cymbalta   TAKE 1 CAPSULE BY MOUTH EVERY DAY  Dose: 60 mg     gabapentin 600 MG tablet  Commonly known as: Neurontin   Doctor's comments: ZERO refills remain on this prescription. Your patient is requesting advance approval of refills for this medication to PREVENT ANY MISSED DOSES  TAKE 1 TABLET BY MOUTH FOUR TIMES DAILY  Dose: 600 mg     IRON PO   Take 1 Tablet by mouth every day. (OTC)  Dose: 1 Tablet     tizanidine 4 MG Tabs  Commonly known as: Zanaflex   Take 4 mg by mouth every 6 hours as needed. Indications: Muscle Spasticity  Dose: 4 mg            * This list has 2 medication(s) that are the same as other medications prescribed for you. Read the directions carefully, and ask your doctor or other care provider to review them with you.                STOP taking these medications      ibuprofen 200 MG Tabs  Commonly known as: Motrin                Follow up appointment details :      I encouraged her to call his PCP to confirm follow up after discharge.    No future appointments.      Consultants:      None    Studies:    Imaging/ Testing:      EC-ECHOCARDIOGRAM COMPLETE W/O CONT   Final Result      DX-CHEST-PORTABLE (1 VIEW)   Final Result      Cardiomegaly with mild interstitial edema.      Bibasilar atelectasis.          Procedures:        None      Instructions:      The were given instructions to return to the ER if patient's condition worsens      Time Spent on Discharge:     Discharge instructions were discussed with the patient at bedside. Patient  expressed understanding and agreed to comply with all discharge instructions.    37 minutes were spent in the discharge planning and management of this  patient, including more than 50% of the time spent face to face in   Counseling.

## 2025-01-08 NOTE — DISCHARGE INSTRUCTIONS
You were hospitalized for severe anemia and got a unit of blood.  You have no evidence of active GI blood.  However, you need a colonoscopy.  [facility] needs to perform a colonoscopy. You need to call and make an appointment. I reached out to their team and they would like to see you in their clinic.  Take your iron tablets daily. We have added a different one for you that you can take daily and your own iron tablet in the evenings.  We gave you IV iron prior to discharge.  Your anemia has been present and severe since 2022.

## 2025-01-08 NOTE — DISCHARGE PLANNING
DC Transport Scheduled    Transport Company Scheduled:  [facility]  Spoke with [facility] to schedule transport.    Scheduled Date: 1/8/25  Scheduled Time: 1915    Transport Type: Wheelchair  Destination: Home   Destination address: [address] [place] [address] [place] [address] [id number]    Notified care team of scheduled transport via [employer].     If there are any changes needed to the DC transportation scheduled, please contact [facility] at ext. [phone] between the hours of 8032-2675. If outside those hours, contact the ED Case Manager at ext. [phone].

## 2025-01-08 NOTE — CARE PLAN
The patient is Stable - Low risk of patient condition declining or worsening    Shift Goals  Clinical Goals: Monitor hgb levels  Patient Goals: pain mgt    Progress made toward(s) clinical / shift goals:      Safety and fall precaution in place, patient demonstrated use of call light prior to ambulation, NPO since midnight for stress test, patient stated reduction of pain to current goal with PRN meds    Problem: Knowledge Deficit - Standard  Goal: Patient and family/care givers will demonstrate understanding of plan of care, disease process/condition, diagnostic tests and medications  Outcome: Progressing     Problem: Fall Risk  Goal: Patient will remain free from falls  Outcome: Progressing     Problem: Pain - Standard  Goal: Alleviation of pain or a reduction in pain to the patient’s comfort goal  Outcome: Progressing       Patient is not progressing towards the following goals:

## 2025-01-08 NOTE — THERAPY
"Occupational Therapy   Initial Evaluation     Patient Name: Lynette Correa  Age:  69 y.o., Sex:  female  Medical Record #: [id number]  Today's Date: 1/8/2025     Precautions  Precautions: Fall Risk    Assessment  Patient is 69 y.o. female who presented 1/7/2025 with chest tightness, symptomatic anemia, severe back pain and arthritic pain, chronic bronchitis, Psoriatic arthritis, GERD, Major depressive disorder, legally blind.  Pt reports that she lives with dtr who is her caregiver, and requires assist for bathing and sometimes dressing depending on pain.  She normally can walk indep with FWW but needs help with most IADL's.  Pt was able to demo bed mobility and short walk in the room with FWW and SBA.  She appears at or near her functional baseline.  Unclear if she would benefit from further therapy at this time as she is highly limited by chronic pain.  When asking pt, she declined HH therapy.  No further therapy recs at this time.  Pt appears approp for home when cleared medically.        Plan    Occupational Therapy Initial Treatment Plan   Duration: Evaluation only    DC Equipment Recommendations: None  Discharge Recommendations: Anticipate that the patient will have no further occupational therapy needs after discharge from the hospital     Subjective    \"My daughter takes good care of me.\"     Objective       01/08/25 0947   Prior Living Situation   Prior Services Intermittent Physical Support for ADL Per Family;Meals on Wheels   Housing / Facility 1 Story House   Bathroom Set up Walk In Shower;Shower Chair   Equipment Owned 4-Wheel Walker;Tub / Shower Seat;Grab Bar(s) In Tub / Shower;Toilet Frame;Hand Held Shower;Reacher  (bed rail)   Lives with - Patient's Self Care Capacity Adult Children   Comments Pt lives with her daughter and Dtr's Fiance.  Pt reports dtr is her paid caregiver.  Dtr is home, however goes 4 days a week to watch her grandkids from 4pm to 12am.  At times pt will be home alone during this " time, otherwise if pt needs more assist then dtr takes her with her to watch the grandkids.   Prior Level of ADL Function   Self Feeding Independent   Grooming / Hygiene Independent   Bathing Requires Assist   Dressing Requires Assist  (intermittently)   Toileting Independent   Prior Level of IADL Function   Medication Management Requires Assist   Laundry Requires Assist   Kitchen Mobility Independent  (simple meal prep)   Finances Requires Assist   Home Management Requires Assist   Shopping Requires Assist   Prior Level Of Mobility Independent With Device in Home   Driving / Transportation Relatives / Others Provide Transportation   Occupation (Pre-Hospital Vocational) Retired Due To Age   Leisure Interests Unable To Determine At This Time   History of Falls   History of Falls Yes  (per EMR)   Precautions   Precautions Fall Risk   Vitals   Pulse Oximetry 91 %   O2 Delivery Device None - Room Air   Pain 0 - 10 Group   Location Generalized;Neck;Back   Location Orientation Right;Left;Upper;Lower   Description Aching   Therapist Pain Assessment Prior to Activity;During Activity;Post Activity;Nurse Notified  (Pt reports pain is much better managed in hospital since she's given narcotics.  She reports she does not have same pain meds for home use)   Cognition    Level of Consciousness Alert   Comments slow with responses at times, mildly [place].  Pleasant and cooperative   Active ROM Upper Body   Active ROM Upper Body  WDL   Dominant Hand Right   Strength Upper Body   Upper Body Strength  X   Gross Strength Generalized Weakness, Equal Bilaterally.    Sensation Upper Body   Upper Extremity Sensation  WDL   Upper Body Muscle Tone   Upper Body Muscle Tone  WDL   Coordination Upper Body   Coordination WDL   Balance Assessment   Sitting Balance (Static) Fair +   Sitting Balance (Dynamic) Fair +   Standing Balance (Static) Fair   Standing Balance (Dynamic) Fair   Weight Shift Sitting Good   Weight Shift Standing Fair   Comments  with FWW   Bed Mobility    Supine to Sit Modified Independent   Sit to Supine Modified Independent   Comments use of rail   ADL Assessment   Grooming Standby Assist;Standing  (hand hygiene)   Lower Body Dressing Standby Assist  (socks)   Toileting Standby Assist   How much help from another person does the patient currently need...   Putting on and taking off regular lower body clothing? 3   Bathing (including washing, rinsing, and drying)? 3   Toileting, which includes using a toilet, bedpan, or urinal? 3   Putting on and taking off regular upper body clothing? 4   Taking care of personal grooming such as brushing teeth? 3   Eating meals? 4   6 Clicks Daily Activity Score 20   Functional Mobility   Sit to Stand Standby Assist   Bed, Chair, Wheelchair Transfer Standby Assist   Toilet Transfers Standby Assist   Transfer Method Stand Step   Mobility OOB to BR, to sink and BTB.   Distance (Feet) 20   # of Times Distance was Traveled 2   Edema / Skin Assessment   Edema / Skin  Not Assessed   Activity Tolerance   Sitting Edge of Bed 3 min   Standing 2 min x2   Comments limited by pain and fatigue   Patient / Family Goals   Patient / Family Goal #1 home   Education Group   Education Provided Role of Occupational Therapist;Activities of Daily Living   Role of Occupational Therapist Patient Response Patient;Acceptance;Explanation;Verbal Demonstration   ADL Patient Response Patient;Acceptance;Explanation;Verbal Demonstration

## 2025-01-08 NOTE — RESPIRATORY CARE
"   EDUCATION by COPD CLINICAL EDUCATOR  1/8/2025 at 3:05 PM by Charo Chong, RRT     Patient interviewed by education team. Patient does not have a history or diagnosis of COPD. Patient is a smoker.  Therefore, smoking cessation education done. Smoking Cessation Intervention and education completed, 20 minutes spent on smoking cessation education with patient.  Provided smoking cessation packet with \"Tips to Quit\" and brochure for \"Free Smoking Cessation Classes\".  A Short intervention was completed with this patient covering: What is COPD (how the lungs work), daily medications rescue and maintenance, breathing techniques, infection prevention and oxygen safety were covered in detail.  A comprehensive packet including information about COPD, treatments, and oxygen safety was given.       Provided spacer with instruction for use, care, and cleaning. A personalized \"COPD Action Plan\" was reviewed with and provided to the patient.    COPD Screen  COPD Risk Screening  Do you have a history of COPD?:  (No formal hx dx COPD, pt states has Asthma and smoking hx)    COPD Assessment  COPD Clinical Specialists ONLY  COPD Education Initiated: Yes--Short Intervention (Seen in 2018, currently smoking quit but started back, also smokes marijuana, Smoking Cessation Education/COPD education provided as well as action plan and spacer as pt is  not compliant with them, pt very apologetic wants to improve life goals/outcomes)  Is this a COPD exacerbation patient?: No  DME Company: None  Physician Name: None - referrals sent out  Pulmonologist Name: None - referrals sent out  Referrals Initiated: Yes  Pulmonary Rehab: Declined (too weak)  Smoking Cessation: Yes  $ Smoking Cessation >10 Minutes: Symptomatic  Smoking Pack Years: 20+  Hospice: N/A  Home Health Care: N/A  Mobile Urgent Care Services: N/A  Geriatric Specialty Group: N/A  Private In-Home Care Agency: N/A  $ Demo/Eval of SVN's, MDI's and Aerosols: Yes  (OP) Pulmonary " "Function Testing: Yes    PFT Results    No results found for: \"PFT\"    Meds to Beds  [facility] provides bedside medication delivery for all eligible patients at discharge and you have been automatically enrolled in the Meds to Beds Program. Would you like to opt out of this program for any reason?: Yes - Opt Out  Reason the patient would like to opt out of Bedside Medication Delivery at Discharge?: Patient prefers another pharmacy     MY COPD ACTION PLAN     It is recommended that patients and physicians /healthcare providers complete this action plan together. This plan should be discussed at each physician visit and updated as needed.    The green, yellow and red zones show groups of symptoms of COPD. This list of symptoms is not comprehensive, and you may experience other symptoms. In the \"Actions\" column, your healthcare provider has recommended actions for you to take based on your symptoms.    Patient Name: Lynette Correa   YOB: 1955   Last Updated on: 1/8/2025  3:05 PM   Green Zone:  I am doing well today Actions     Usual activitiy and exercise level   Take daily medications     Usual amounts of cough and phlegm/mucus   Use oxygen as prescribed     Sleep well at night   Continue regular exercise/diet plan     Appetite is good   At all times avoid cigarette smoke, inhaled irritants     Daily Medications (these medications are taken every day):   Budesonide-Formoterol Fumarate (Symbicort)  Albuterol (Accuneb, Proair, Proventil, Ventolin) 2 Puffs  2 Puffs Twice daily  Every 4 hours PRN     Additional Information:  Use spacer with Symbicort and rescue inhaler, always rinse mouth!    Yellow Zone:  I am having a bad day or a COPD flare Actions     More breathless than usual   Continue daily medications     I have less energy for my daily activities   Use quick relief inhaler as ordered     Increased or thicker phlegm/mucus   Use oxygen as prescribed     Using quick relief inhaler/nebulizer more often " "  Get plenty of rest     Swelling of ankles more than usual   Use pursed lip breathing     More coughing than usual   At all times avoid cigarette smoke, inhaled irritants     I feel like I have a \"chest cold\"     Poor sleep and my symptoms woke me up     My appetite is not good     My medicine is not helping      Call provider immediately if symptoms don’t improve     Continue daily medications, add rescue medications:   Albuterol 3mL via nebulizer Every 4 hours       Medications to be used during a flare up, (as Discussed with Provider):           Additional Information:  Call provider immediately if symptoms do not improve!    Patient instructed on importance of cleaning home nebulizer after every treatment to prevent infection.      Red Zone:  I need urgent medical care Actions     Severe shortness of breath even at rest   Call 911 or seek medical care immediately     Not able to do any activity because of breathing      Fever or shaking chills      Feeling confused or very drowsy       Chest pains      Coughing up blood                  "

## 2025-01-08 NOTE — PROGRESS NOTES
Case Management Discharge Planning    Admission Date: 1/7/2025  GMLOS:    ALOS: 0    6-Clicks ADL Score: 21  6-Clicks Mobility Score: 18      Anticipated Discharge Dispo: Discharge Disposition: Discharged to home/self care (01)    [facility] order submitted. Leadership approved GMT home as patient's iron won't be finished until around 1700 and patient's daughter can't pick her up past 1600. No house keys, but daughter will be home.     @1405: message sent to [facility] following up on transport.    @1436: PT evaluation pending, but patient is declining HH. Anticipate a discharge home later today. Transport set up for 7:15PM.   No

## 2025-01-08 NOTE — CARE PLAN
The patient is Stable - Low risk of patient condition declining or worsening    Shift Goals  Clinical Goals: monitor hgb, stress test  Patient Goals: pain mgt    Progress made toward(s) clinical / shift goals: Pt educated on POC, pt to go for stress test today, pt verbalizes understanding. Safety precautions in place.      Problem: Knowledge Deficit - Standard  Goal: Patient and family/care givers will demonstrate understanding of plan of care, disease process/condition, diagnostic tests and medications  Outcome: Progressing     Problem: Fall Risk  Goal: Patient will remain free from falls  Outcome: Progressing     Problem: Pain - Standard  Goal: Alleviation of pain or a reduction in pain to the patient’s comfort goal  Outcome: Progressing       Patient is not progressing towards the following goals:

## 2025-01-08 NOTE — PROGRESS NOTES
Telemetry Shift Summary     Per monitor tech:  Rhythm SR  HR Range 63-77  Ectopy r-o-f PVC, rPAC,rBIG,rTRIG,rCOUP  Measurements 0.12/0.08/0.40      Normal Values  Rhythm SR  HR Range:   Measurements: 0.12-0.20/0.06-0.10/0.30-0.52

## 2025-01-09 LAB
FERRITIN SERPL-MCNC: 12.2 NG/ML (ref 10–291)
HAPTOGLOB SERPL-MCNC: 105 MG/DL (ref 30–200)
TRANSFERRIN SERPL-MCNC: 308 MG/DL (ref 200–370)
VIT B12 SERPL-MCNC: 391 PG/ML (ref 211–911)

## 2025-01-09 NOTE — PROGRESS NOTES
Telemetry Shift Summary     Rhythm: SR   Rate: 69 - 74  Measurements:  .14 / .08 / .40  Ectopy (reported by Monitor Tech): O PVC, O bigem     Normal Values  Rhythm: Sinus  HR:   Measurements: 0.12-0.20/0.06-0.10/0.30-0.52

## 2025-05-09 ENCOUNTER — APPOINTMENT (OUTPATIENT)
Dept: CARDIOLOGY | Facility: MEDICAL CENTER | Age: 70
End: 2025-05-09
Payer: MEDICARE

## 2025-06-09 ENCOUNTER — HOSPITAL ENCOUNTER (EMERGENCY)
Facility: MEDICAL CENTER | Age: 70
End: 2025-06-09
Attending: STUDENT IN AN ORGANIZED HEALTH CARE EDUCATION/TRAINING PROGRAM
Payer: MEDICARE

## 2025-06-09 VITALS
HEART RATE: 84 BPM | TEMPERATURE: 98.5 F | SYSTOLIC BLOOD PRESSURE: 128 MMHG | WEIGHT: 121.47 LBS | OXYGEN SATURATION: 96 % | RESPIRATION RATE: 18 BRPM | BODY MASS INDEX: 21.18 KG/M2 | DIASTOLIC BLOOD PRESSURE: 100 MMHG

## 2025-06-09 DIAGNOSIS — S61.411A LACERATION OF RIGHT HAND WITHOUT FOREIGN BODY, INITIAL ENCOUNTER: Primary | ICD-10-CM

## 2025-06-09 PROCEDURE — 303747 HCHG EXTRA SUTURE

## 2025-06-09 PROCEDURE — 700111 HCHG RX REV CODE 636 W/ 250 OVERRIDE (IP): Performed by: STUDENT IN AN ORGANIZED HEALTH CARE EDUCATION/TRAINING PROGRAM

## 2025-06-09 PROCEDURE — 304217 HCHG IRRIGATION SYSTEM

## 2025-06-09 PROCEDURE — 304999 HCHG REPAIR-SIMPLE/INTERMED LEVEL 1

## 2025-06-09 PROCEDURE — 99284 EMERGENCY DEPT VISIT MOD MDM: CPT

## 2025-06-09 RX ADMIN — LIDOCAINE HYDROCHLORIDE 10 ML: 10 INJECTION, SOLUTION INFILTRATION; PERINEURAL at 21:33

## 2025-06-09 ASSESSMENT — FIBROSIS 4 INDEX: FIB4 SCORE: 2.48

## 2025-06-09 ASSESSMENT — PAIN DESCRIPTION - PAIN TYPE: TYPE: ACUTE PAIN

## 2025-06-10 NOTE — ED TRIAGE NOTES
Chief Complaint   Patient presents with    Hand Laceration     Right hand laceration on a metal can lid 1 hour pta      BP (!) 132/102   Pulse 85   Temp 36.9 °C (98.5 °F) (Temporal)   Resp 18   Wt 55.1 kg (121 lb 7.6 oz)   LMP 01/01/1995   SpO2 95%   BMI 21.18 kg/m²

## 2025-06-10 NOTE — ED PROVIDER NOTES
ED Provider Note    CHIEF COMPLAINT  Chief Complaint   Patient presents with    Hand Laceration     Right hand laceration on a metal can lid 1 hour pta        EXTERNAL RECORDS REVIEWED  She was admitted in 1/2025 for symptomatic anemia    HPI/ROS  LIMITATION TO HISTORY   none  OUTSIDE HISTORIAN(S):  none    Lynette Correa is a 69 y.o. female who presents with laceration to the base of the right thumb.  She says that she was reaching into the trash when she cut her hand on a metal can lid.  This occurred about 1 hour prior to arrival.  She is right-hand-dominant.  Tetanus is up-to-date.    PAST MEDICAL HISTORY   has a past medical history of Alcohol abuse, Anesthesia, Anesthesia (9-23-16), Arrhythmia, Arthritis (9-23-16), ASTHMA, Bowel habit changes (9-23-16), Breath shortness, Bronchitis (2015), Chronic pain (01/2018, 11/19/18), Closed die-punch intra-articular fracture of distal end of left radius (3/3/2016), Cold (10/29/2018), COPD (chronic obstructive pulmonary disease) ([facility]), Dental disorder, Depression, Disorder of thyroid, Emphysema of lung ([facility]), Heart burn, Hepatitis C (01/2018), migraines, Indigestion, Jaundice, kidney stones, Neuropathy, peripheral, Pain, Pain (08/31/2018), Pneumonia (2015), Risk for falls, Seizure ([facility]) (2015), Sleep apnea (0248-1548), Substance abuse ([facility]), Ulcer, and Unspecified hemorrhagic conditions.    SURGICAL HISTORY   has a past surgical history that includes cervical fusion posterior (7/20/2010); cervical decompression posterior (7/20/2010); tonsillectomy (as child); lumbar laminectomy diskectomy (1/8/2011); mass excision general (10/4/2011); orif, wrist (Left, 3/3/2016); primary c section (1989/1988/1993); lumbar laminectomy diskectomy (10/8/2016); shoulder arthroplasty total (Left, 2/12/2018); hysterectomy, total abdominal (1995); carpal tunnel release (Right, 2005); knee arthroplasty total (Right, 2003); other orthopedic surgery; knee arthroscopy (Left, 2008); knee  arthroplasty total (Left, 2018); knee revision total (Right, 9/10/2018); biopsy general; block epidural steroid injection (-2018); other cardiac surgery (); and knee revision total (Right, 2018).    FAMILY HISTORY  Family History   Problem Relation Age of Onset    Anxiety disorder Mother     Depression Mother     Cancer Mother     Cancer Father     Alcohol abuse Brother     Diabetes Brother         3 brothers    Cancer Paternal Uncle     Dementia Paternal Uncle     Dementia Paternal Grandmother        SOCIAL HISTORY  Social History     Tobacco Use    Smoking status: Every Day     Current packs/day: 0.00     Average packs/day: 0.5 packs/day for 40.0 years (20.0 ttl pk-yrs)     Types: Cigarettes     Start date: 1977     Last attempt to quit: 2017     Years since quittin.3    Smokeless tobacco: Never    Tobacco comments:     1 ppd 10 yrs   Vaping Use    Vaping status: Never Used   Substance and Sexual Activity    Alcohol use: No    Drug use: Yes     Comment: weed    Sexual activity: Not on file       CURRENT MEDICATIONS  Home Medications       Reviewed by Jacob Robertson R.N. (Registered Nurse) on 25 at 2039  Med List Status: Not Addressed     Medication Last Dose Status   albuterol (PROVENTIL) 2.5mg/3ml Nebu Soln solution for nebulization  Active   albuterol 108 (90 Base) MCG/ACT Aero Soln inhalation aerosol  Active   atorvastatin (LIPITOR) 20 MG Tab  Active   baclofen (LIORESAL) 20 MG tablet  Active   busPIRone (BUSPAR) 15 MG tablet  Active   cloNIDine (CATAPRES) 0.1 MG Tab  Active   Dexlansoprazole 60 MG CAPSULE DELAYED RELEASE delayed-release capsule  Active   DULoxetine (CYMBALTA) 60 MG Cap DR Particles delayed-release capsule  Active   Ferrous Sulfate (IRON PO)  Active   gabapentin (NEURONTIN) 600 MG tablet  Active   tizanidine (ZANAFLEX) 4 MG Tab  Active                    ALLERGIES  Allergies[1]    PHYSICAL EXAM  VITAL SIGNS: BP (!) 128/100   Pulse 84   Temp 36.9  °C (98.5 °F) (Temporal)   Resp 18   Wt 55.1 kg (121 lb 7.6 oz)   LMP 01/01/1995   SpO2 96%   BMI 21.18 kg/m²    Constitutional: Awake and alert. Nontoxic  HENT:  Grossly normal  Eyes: Grossly normal  Neck: Normal range of motion  Cardiovascular: Normal heart rate   Thorax & Lungs: No respiratory distress  Abdomen: Nontender  Skin:  No pathologic rash.   Extremities: Approx 3 cm the base of the right thumb.  She has full flexion, abduction, adduction and opposition through the MCP.  She has normal flexion and extension of the PIP.  She has normal sensation distally.  She has brisk cap refill.  Psychiatric: Affect normal      RADIOLOGY/PROCEDURES     Procedure - Laceration closure  Location: Right Thumb     Patient was positioned appropriately, 8 cc lidocaine without epinephrine was used as a local anesthetic. 1000 cc NaCl was used for irrigation. Patient was sterile draped with wound exposed.  4x  4.0 nylon sutures were placed with good approximation.  There was residual bleeding and I did place an additional 1 x 4.0 nylon figure of 8 stitch with hemostasis.  This was reinforced with Surgicel cell.  Procedure tolerated without complications.       COURSE & MEDICAL DECISION MAKING    ASSESSMENT, COURSE AND PLAN  Care Narrative: This is a 69-year-old who presents with a laceration to the base of her right thumb.  She arrives neurovascularly intact. She has full ROM and I doubt underlying tendon injury.  Her tetanus is up-to-date.  I personally irrigated and explored the wound under bright light and did not see any evidence of retained foreign body.  I have low suspicion for fracture given the mechanism and do not see an indication for further imaging.  The laceration was repaired by myself as above without complication.  I placed a clean dressing with Xeroform and dry gauze.  Her family member is with her and will help with wound care and ensure that she returns to have the sutures removed.  She continues to look  "well and was discharged with her family member in improved condition    DISPOSITION AND DISCUSSIONS  I have discussed management of the patient with the following physicians and JAVIER's:  none    Discussion of management with other QHP or appropriate source(s): None     Escalation of care considered, and ultimately not performed:diagnostic imaging    Barriers to care at this time, including but not limited to: none.     Decision tools and prescription drugs considered including, but not limited to: Antibiotics no signs of gross contamination.    FINAL DIAGNOSIS  1. Laceration of right hand without foreign body, initial encounter Acute        Electronically signed by: Sabiha Garcia M.D., 6/9/2025 8:57 PM           [1]   Allergies  Allergen Reactions    Codeine Anaphylaxis     Difficulty breathing, and itching    Morphine Anaphylaxis     Headache; throat swelling    Pcn [Penicillins] Anaphylaxis     All \"cillin\" family, per patient  ---   I broke out in \"trench\" mouth     "

## 2025-06-10 NOTE — DISCHARGE INSTRUCTIONS
Your wound was repaired with sutures.    Please leave the current dressing in place for 24 hours. Then begin cleaning the wound with soap and water - do not scrub, but let the soapy water run over it.  Pat the wound dry; do not rub it.  Please do this twice daily and change the dressing after each clean. If the bandage becomes wet or dirty change it  You will need to have a wound check in approximately 10-14 days for suture removal.  Please see your primary care physician in that time window to have your wound evaluated to ensure it is healing properly.  If you cannot get an appointment with your primary physician, go to an Urgent Care or return to an Emergency Department for this. Do not soak/immerse the wound for at least 4-6 weeks - this means no hot tubs, pools, baths, or swimming until the wound has completely healed.    Scar formation is inevitable, but one of the greatest risks for scar formation is severe sun exposure or sunburns to the wounded area. For the next 9 months, please use hats, sunscreen, or other clothing to try and minimize the sun exposure to the wound. Some wounds can benefit from scar revision surgery, so please followup with your PCP or plastic surgeon as needed.     PAIN MEDICATIONS:   If you were prescribed pain medications, take them only as prescribed.  Do not take extra and do not use another person's pain medications.  Medications with opioids (e.g. Percocet, Vicodin, Norco, oxycodone, Dilaudid, etc..) can make you drowsy or confused.  Do not mix with alcohol.  Do not drive after taking these medications.     Often times, acetaminophen (Tylenol) or ibuprofen (Motrin, Advil, etc...) can be used as a first line pain regimen.  This is often sufficient for most pain associated with your wound.  If you do take these medications, especially Tylenol, make sure you do not mix this with other medications that contain Tylenol (e.g. Percocet).  Make sure you follow the instructions on the bottle  to ensure you do not overdose on these medications.     SEEK MEDICAL CARE IF:    You have redness, swelling, or increasing pain in the wound.  You see a red line that goes away from the wound.  You have yellowish-white fluid (pus) coming from the wound.  You have a fever.  You notice a bad smell coming from the wound or dressing.  Your wound breaks open before or after sutures have been removed.  You notice something coming out of the wound such as wood or glass.  Your wound is on your hand or foot and you cannot move a finger or toe.     SEEK IMMEDIATE MEDICAL CARE IF:    Your pain is not controlled with prescribed medicine.  You have severe swelling around the wound causing pain and numbness or a change in color in your arm, hand, leg, or foot.  Your wound splits open and starts bleeding.  You have worsening numbness, weakness, or loss of function of any joint around or beyond the wound.  You develop painful lumps near the wound or on the skin anywhere on your body.

## (undated) DEVICE — SUCTION TIP STRAIGHT ARGYLE - 50EA/CA

## (undated) DEVICE — PACK TOTAL KNEE  (1/CA)

## (undated) DEVICE — HUMID-VENT HEAT AND MOISTURE EXCHANGE- (50/BX)

## (undated) DEVICE — SODIUM CHL IRRIGATION 0.9% 1000ML (12EA/CA)

## (undated) DEVICE — GLOVE BIOGEL PI INDICATOR SZ 7.5 SURGICAL PF LF -(50/BX 4BX/CA)

## (undated) DEVICE — Device

## (undated) DEVICE — TOWELS CLOTH SURGICAL - (4/PK 20PK/CA)

## (undated) DEVICE — CLOSURE SKIN STRIP 1/2 X 4 IN - (STERI STRIP) (50/BX 4BX/CA)

## (undated) DEVICE — LACTATED RINGERS INJ 1000 ML - (14EA/CA 60CA/PF)

## (undated) DEVICE — KIT ROOM DECONTAMINATION

## (undated) DEVICE — PROTECTOR ULNA NERVE - (36PR/CA)

## (undated) DEVICE — MASK, LARYNGEAL AIRWAY #4

## (undated) DEVICE — CHLORAPREP 26 ML APPLICATOR - ORANGE TINT(25/CA)

## (undated) DEVICE — DRAPE LARGE 3 QUARTER - (20/CA)

## (undated) DEVICE — GOWN WARMING STANDARD FLEX - (30/CA)

## (undated) DEVICE — LENS/HOOD FOR SPACESUIT - (32/PK) PEEL AWAY FACE

## (undated) DEVICE — SUCTION INSTRUMENT YANKAUER BULBOUS TIP W/O VENT (50EA/CA)

## (undated) DEVICE — BLADE SAW 90X25X1.37MM SAGITTAL DUAL CUT

## (undated) DEVICE — GLOVE, LITE (PAIR)

## (undated) DEVICE — DRAPE IOBAN II INCISE 23X17 - (10EA/BX 4BX/CA)

## (undated) DEVICE — SUTURE 2-0 MONOCRYL PLUS UNDYED CT-1 1 X 36 (36EA/BX)"

## (undated) DEVICE — FIBERWIRE 2.0 (12EA/BX)

## (undated) DEVICE — HEAD HOLDER JUNIOR/ADULT

## (undated) DEVICE — SODIUM CHL. IRRIGATION 0.9% 3000ML (4EA/CA 65CA/PF)

## (undated) DEVICE — DRESSING TEGADERM 8 X 12 - (10/BX 8BX/CA)

## (undated) DEVICE — TUBE E-T HI-LO CUFF 7.5MM (10EA/PK)

## (undated) DEVICE — WATER IRRIGATION STERILE 1000ML (12EA/CA)

## (undated) DEVICE — GLOVE BIOGEL PI INDICATOR SZ 7.0 SURGICAL PF LF - (50/BX 4BX/CA)

## (undated) DEVICE — PAD UNIVERSAL MULTI USE (1/EA)

## (undated) DEVICE — HANDPIECE 10FT INTPLS SCT PLS IRRIGATION HAND CONTROL SET (6/PK)

## (undated) DEVICE — PADDING CAST 6 IN STERILE - 6 X 4 YDS (24/CA)

## (undated) DEVICE — TRAY CATHETER FOLEY URINE METER W/STATLOCK 350ML (10EA/CA)

## (undated) DEVICE — TIP INTPLS HFLO ML ORFC BTRY - (12/CS)  FOR SURGILAV

## (undated) DEVICE — MASK ANESTHESIA ADULT  - (100/CA)

## (undated) DEVICE — ELECTRODE 850 FOAM ADHESIVE - HYDROGEL RADIOTRNSPRNT (50/PK)

## (undated) DEVICE — TUBE CONNECTING SUCTION - CLEAR PLASTIC STERILE 72 IN (50EA/CA)

## (undated) DEVICE — TOURNIQUET, STERILE 24 (YELLOW)

## (undated) DEVICE — GLOVE 7.0 LF PF PROTEXIS (50PR/BX)

## (undated) DEVICE — KIT ANESTHESIA W/CIRCUIT & 3/LT BAG W/FILTER (20EA/CA)

## (undated) DEVICE — GLOVE SZ 7.5 LF PROTEXIS (50PR/BX)

## (undated) DEVICE — BLADE SURGICAL #15 - (50/BX 3BX/CA)

## (undated) DEVICE — PACK MAJOR ORTHO - (2EA/CA)

## (undated) DEVICE — SYRINGE DISP. 60 CC LL - (30/BX, 12BX/CA)**WHEN THESE ARE GONE ORDER #500206**

## (undated) DEVICE — DRESSING AQUACEL AG ADVANTAGE 3.5 X 10" (10EA/BX)"

## (undated) DEVICE — GLOVE BIOGEL SZ 8 SURGICAL PF LTX - (50PR/BX 4BX/CA)

## (undated) DEVICE — BAG, SPONGE COUNT 50600

## (undated) DEVICE — SPONGE GAUZESTER 4 X 4 4PLY - (128PK/CA)

## (undated) DEVICE — ELECTRODE DUAL RETURN W/ CORD - (50/PK)

## (undated) DEVICE — GLOVE PROTEXIS LATEX MICRO SIZE 9 (50PR/BX)

## (undated) DEVICE — GLOVE BIOGEL PI ULTRATOUCH SZ 7.0 SURGICAL PF LF- POWDER FREE (50/BX 4BX/CA)

## (undated) DEVICE — SPONGE GAUZE STER 4X4 8-PL - (2/PK 50PK/BX 12BX/CS)

## (undated) DEVICE — DRAPE IOBAN II 23 IN X 33 IN - (10/BX)

## (undated) DEVICE — GLOVE BIOGEL SZ 6.5 SURGICAL PF LTX (50PR/BX 4BX/CA)

## (undated) DEVICE — BLOCK

## (undated) DEVICE — NEEDLE MAYO CATGUT TPR POINT - (2EA/PK20PK/BX)

## (undated) DEVICE — SUTURE 0 VICRYL PLUS CT-1 - 36 INCH (36/BX)

## (undated) DEVICE — SENSOR SPO2 NEO LNCS ADHESIVE (20/BX) SEE USER NOTES

## (undated) DEVICE — GLOVE POTEXIS PI MICRO SIZE 9 (50PR/BX)

## (undated) DEVICE — BLADE SAGITTAL SAW DUAL CUT 75.0 X 25.0MM (1/EA)

## (undated) DEVICE — NEPTUNE 4 PORT MANIFOLD - (20/PK)

## (undated) DEVICE — SUTURE 2-0 TI-CRON GS-25 - (24/BX)

## (undated) DEVICE — GLOVE BIOGEL SZ 7.5 SURGICAL PF LTX - (50PR/BX 4BX/CA)

## (undated) DEVICE — SUTURE 3-0 VICRYL PLUS CT-1 - 36 INCH (36/BX)

## (undated) DEVICE — STOCKINETTE IMPERVIOUS 12X48 - STERILELF (10/CA)"

## (undated) DEVICE — MIXER BONE CEMENT REVOLUTION - W/FEMORAL PRESSURIZER (6/CA)

## (undated) DEVICE — MASK AIRWAY SIZE 3 UNIQUE SILICON (10/BX)

## (undated) DEVICE — DERMABOND ADVANCED - (12EA/BX)

## (undated) DEVICE — GLOVE SURGICAL PROTEXIS PI 8.0 LF - (50PR/BX)

## (undated) DEVICE — CANISTER SUCTION RIGID RED 1500CC (40EA/CA)

## (undated) DEVICE — DRESSING XEROFORM 1X8 - (50/BX 4BX/CA)

## (undated) DEVICE — GLOVE BIOGEL INDICATOR SZ 6.5 SURGICAL PF LTX - (50PR/BX 4BX/CA)

## (undated) DEVICE — NEEDLE SPINAL NON-SAFETY 18 GA X 3 IN (25EA/BX)

## (undated) DEVICE — DRAPETIBURON SHOULDER W/POUCH - (5EA/CA)

## (undated) DEVICE — BLADE 90X18X1.27MM SAW SAGITTAL

## (undated) DEVICE — SUTURE GENERAL

## (undated) DEVICE — GLOVE PROTEXIS LATEX SIZE 9 (40PR/BX)

## (undated) DEVICE — BLADE SAGITTAL SAW DUAL CUT 25.0 X 90.0 X 1.27MM (1/EA)

## (undated) DEVICE — DRESSING INTEGUSEAL MICROBIAL SEALANT IS100 (20EA/CA)

## (undated) DEVICE — TRAY SKIN SCRUB PVP WET (20EA/CA) PART #DYND70356 DISCONTINUED

## (undated) DEVICE — BOVIE NEEDLE 6 INSULATED - INSUL. (50/PK)

## (undated) DEVICE — SUTURE MONOCRYL CT-2 VIO - (36/BX)

## (undated) DEVICE — GLOVE BIOGEL PI ULTRATOUCH SZ 8.0 SURGICAL PF LF - (50/BX 4BX/CA)

## (undated) DEVICE — SUTURE 2 TICRON BLUE GS-21 (36EA/CA)

## (undated) DEVICE — GLOVE BIOGEL INDICATOR SZ 7.5 SURGICAL PF LTX - (50PR/BX 4BX/CA)

## (undated) DEVICE — PEN SKIN MARKER W/RULER - (50EA/BX)

## (undated) DEVICE — MASK AIRWAY SZ 2.5 UNIQUE SILICON (10EA/BX)

## (undated) DEVICE — GOWN SURGICAL X-LARGE ULTRA - FILM-REINFORCED (20/CA)

## (undated) DEVICE — BANDAGE ELASTIC STERILE VELCRO 6 X 5 YDS (25EA/CA)

## (undated) DEVICE — GLOVE BIOGEL INDICATOR SZ 8 SURGICAL PF LTX - (50/BX 4BX/CA)

## (undated) DEVICE — SLING STABLE MEDIUM

## (undated) DEVICE — SLEEVE STERILE  A599T - 30/BX 2BX/CS

## (undated) DEVICE — GUIDE PIN

## (undated) DEVICE — DRAPE SURGICAL U 77X120 - (10/CA)

## (undated) DEVICE — GOWN SURGEONS X-LARGE - DISP. (30/CA)

## (undated) DEVICE — GLOVE BIOGEL SZ 7 SURGICAL PF LTX - (50PR/BX 4BX/CA)

## (undated) DEVICE — TRAY SRGPRP PVP IOD WT PRP - (20/CA)

## (undated) DEVICE — GOWN SURGEONS LARGE - (32/CA)

## (undated) DEVICE — GLOVE BIOGEL PI INDICATOR SZ 9.0 SURGICAL PF LF -(50PR/BX 4BX/CA)

## (undated) DEVICE — MASK AIRWAY SIZE 4 UNIQUE SILICON (10EA/BX)

## (undated) DEVICE — SYS BN CMNT HI VAC KT MXR BWL - (MIX-E-VAC II)  (10EA/CA)

## (undated) DEVICE — SPIDER SHOULDER HOLDER (12EA/BX)

## (undated) DEVICE — DRAPE U SPLIT IMP 54 X 76 - (24/CA)

## (undated) DEVICE — STERI STRIP COMPOUND BENZOIN - TINCTURE 0.6ML WITH APPLICATOR (40EA/BX)